# Patient Record
Sex: MALE | Race: WHITE | NOT HISPANIC OR LATINO | Employment: OTHER | URBAN - METROPOLITAN AREA
[De-identification: names, ages, dates, MRNs, and addresses within clinical notes are randomized per-mention and may not be internally consistent; named-entity substitution may affect disease eponyms.]

---

## 2017-01-06 ENCOUNTER — DOCUMENTATION ONLY (OUTPATIENT)
Dept: FAMILY MEDICINE | Facility: CLINIC | Age: 77
End: 2017-01-06

## 2017-01-06 NOTE — PROGRESS NOTES
Pre-Visit Chart Review  For Appointment Scheduled on 1/9/17.    Health Maintenance Due   Topic Date Due    Influenza Vaccine  08/01/2016

## 2017-01-09 ENCOUNTER — OFFICE VISIT (OUTPATIENT)
Dept: FAMILY MEDICINE | Facility: CLINIC | Age: 77
End: 2017-01-09
Payer: MEDICARE

## 2017-01-09 VITALS
SYSTOLIC BLOOD PRESSURE: 132 MMHG | DIASTOLIC BLOOD PRESSURE: 77 MMHG | HEART RATE: 70 BPM | WEIGHT: 174.19 LBS | HEIGHT: 71 IN | BODY MASS INDEX: 24.39 KG/M2

## 2017-01-09 DIAGNOSIS — M25.432 WRIST SWELLING, LEFT: ICD-10-CM

## 2017-01-09 DIAGNOSIS — B35.3 TINEA PEDIS, UNSPECIFIED LATERALITY: ICD-10-CM

## 2017-01-09 DIAGNOSIS — E78.5 HYPERLIPIDEMIA, UNSPECIFIED HYPERLIPIDEMIA TYPE: ICD-10-CM

## 2017-01-09 DIAGNOSIS — I10 ESSENTIAL HYPERTENSION: Primary | ICD-10-CM

## 2017-01-09 PROCEDURE — 99499 UNLISTED E&M SERVICE: CPT | Mod: S$GLB,,, | Performed by: FAMILY MEDICINE

## 2017-01-09 PROCEDURE — 1126F AMNT PAIN NOTED NONE PRSNT: CPT | Mod: S$GLB,,, | Performed by: FAMILY MEDICINE

## 2017-01-09 PROCEDURE — 3078F DIAST BP <80 MM HG: CPT | Mod: S$GLB,,, | Performed by: FAMILY MEDICINE

## 2017-01-09 PROCEDURE — 1160F RVW MEDS BY RX/DR IN RCRD: CPT | Mod: S$GLB,,, | Performed by: FAMILY MEDICINE

## 2017-01-09 PROCEDURE — 1157F ADVNC CARE PLAN IN RCRD: CPT | Mod: S$GLB,,, | Performed by: FAMILY MEDICINE

## 2017-01-09 PROCEDURE — 99999 PR PBB SHADOW E&M-EST. PATIENT-LVL III: CPT | Mod: PBBFAC,,, | Performed by: FAMILY MEDICINE

## 2017-01-09 PROCEDURE — 99214 OFFICE O/P EST MOD 30 MIN: CPT | Mod: S$GLB,,, | Performed by: FAMILY MEDICINE

## 2017-01-09 PROCEDURE — 1159F MED LIST DOCD IN RCRD: CPT | Mod: S$GLB,,, | Performed by: FAMILY MEDICINE

## 2017-01-09 PROCEDURE — 3075F SYST BP GE 130 - 139MM HG: CPT | Mod: S$GLB,,, | Performed by: FAMILY MEDICINE

## 2017-01-09 RX ORDER — CLOTRIMAZOLE AND BETAMETHASONE DIPROPIONATE 10; .64 MG/G; MG/G
CREAM TOPICAL 2 TIMES DAILY
Qty: 45 G | Refills: 3 | Status: SHIPPED | OUTPATIENT
Start: 2017-01-09 | End: 2017-04-24 | Stop reason: ALTCHOICE

## 2017-01-09 RX ORDER — TERAZOSIN 10 MG/1
10 CAPSULE ORAL NIGHTLY
Qty: 90 CAPSULE | Refills: 2 | Status: SHIPPED | OUTPATIENT
Start: 2017-01-09 | End: 2017-11-30 | Stop reason: SDUPTHER

## 2017-01-09 RX ORDER — IBUPROFEN 100 MG/5ML
1000 SUSPENSION, ORAL (FINAL DOSE FORM) ORAL DAILY
COMMUNITY

## 2017-01-09 NOTE — MR AVS SNAPSHOT
[facility]  [address]  [place] [address]  Phone: [phone]  Fax: [phone]                  Maycol Hodge   2017 8:40 AM   Office Visit    Description:  Male : 1940   Provider:  Tatiana Estrella MD   Department:  [facility]           Reason for Visit     Establish Care           Diagnoses this Visit        Comments    Essential hypertension    -  Primary     Hyperlipidemia, unspecified hyperlipidemia type         Wrist swelling, left         Tinea pedis, unspecified laterality                To Do List           Future Appointments        Provider Department Dept Phone    1/10/2017 9:30 AM LAB, JEANETTE [facility] - Lab [phone]    1/10/2017 10:00 AM SLIC US1 [facility]- Ultrasound [phone]    2017 8:40 AM Tatiana Estrella MD [facility] [phone]      Goals (5 Years of Data)     None      Follow-Up and Disposition     Return in about 6 months (around 2017) for htn.       These Medications        Disp Refills Start End    terazosin (HYTRIN) 10 MG capsule 90 capsule 2 2017    Take 1 capsule (10 mg total) by mouth every evening. - Oral    Pharmacy: [facility] [address] [place] [address] [address]  RD AT [facility] Ph #: [phone]       clotrimazole-betamethasone 1-0.05% (LOTRISONE) cream 45 g 3 2017     Apply topically 2 (two) times daily. - Topical (Top)    Pharmacy: [facility] [address] [place] [address] [address]  RD AT [facility] Ph #: [phone]         [facility]     [facility] On Call Nurse Care Line -  Assistance  Registered nurses in the [facility] Center provide clinical advisement, health education, appointment booking, and other advisory services.  Call for this free service at [phone].             Medications           Message regarding Medications     Verify the changes and/or additions to your  medication regime listed below are the same as discussed with your clinician today.  If any of these changes or additions are incorrect, please notify your healthcare provider.        START taking these NEW medications        Refills    clotrimazole-betamethasone 1-0.05% (LOTRISONE) cream 3    Sig: Apply topically 2 (two) times daily.    Class: Normal    Route: Topical (Top)      CHANGE how you are taking these medications     Start Taking Instead of    terazosin (HYTRIN) 10 MG capsule terazosin (HYTRIN) 5 MG capsule    Dosage:  Take 1 capsule (10 mg total) by mouth every evening. Dosage:  Take 1 capsule (5 mg total) by mouth every evening.    Reason for Change:  Reorder       STOP taking these medications     azithromycin (ZITHROMAX) 500 MG tablet One daily for yellow mucous, repeat if needed    diazePAM (VALIUM) 5 MG tablet TK 1 T PO BID    hydrocodone-acetaminophen 5-325mg (NORCO) 5-325 mg per tablet TK 1 TO 2 TS PO Q 6 H    predniSONE (DELTASONE) 10 MG tablet     predniSONE (DELTASONE) 20 MG tablet One daily for 3 days and repeat for flare of lung symptoms as intructed           Verify that the below list of medications is an accurate representation of the medications you are currently taking.  If none reported, the list may be blank. If incorrect, please contact your healthcare provider. Carry this list with you in case of emergency.           Current Medications     albuterol 90 mcg/actuation inhaler Inhale 2 puffs into the lungs every 4 (four) hours as needed for Wheezing. This is a rescue inhaler medication and should be used as needed    ascorbic acid, vitamin C, (VITAMIN C) 1000 MG tablet Take 1,000 mg by mouth once daily.    aspirin (ECOTRIN) 81 MG EC tablet Take 81 mg by mouth once daily.    clotrimazole-betamethasone 1-0.05% (LOTRISONE) cream Apply topically 2 (two) times daily.    fexofenadine (ALLEGRA ALLERGY) 180 MG tablet Take 180 mg by mouth daily as needed.     fluticasone-vilanterol (BREO  "ELLIPTA) 200-25 mcg/dose DsDv diskus inhaler Inhale 1 puff into the lungs once daily.    GLUC HCL/GLUC JAMESON/PG-EOZ-V-GLUC (GLUCOSAMINE COMPLEX ORAL) Take 1 capsule by mouth once daily.    latanoprost 0.005 % ophthalmic solution Place 1 drop into both eyes every evening.     lisinopril (PRINIVIL,ZESTRIL) 40 MG tablet Take 1 tablet (40 mg total) by mouth once daily.    montelukast (SINGULAIR) 10 mg tablet Take 1 tablet (10 mg total) by mouth once daily.    pravastatin (PRAVACHOL) 10 MG tablet Take 1 tablet (10 mg total) by mouth once daily.    terazosin (HYTRIN) 10 MG capsule Take 1 capsule (10 mg total) by mouth every evening.           Clinical Reference Information           Vital Signs - Last Recorded  Most recent update: 1/9/2017  8:50 AM by Steff Gaitan MA    BP Pulse Ht Wt BMI    132/77 70 5' 11" (1.803 m) 79 kg (174 lb 2.6 oz) 24.29 kg/m2      Blood Pressure          Most Recent Value    BP  132/77      Allergies as of 1/9/2017     Doxycycline    Diltiazem    Hydrochlorothiazide    Levaquin [Levofloxacin]    Norvasc [Amlodipine]      Immunizations Administered on Date of Encounter - 1/9/2017     None      Orders Placed During Today's Visit     Future Labs/Procedures Expected by Expires    Comprehensive metabolic panel  1/9/2017 3/10/2018    Lipid panel  1/9/2017 3/10/2018    US Soft Tissue Misc  1/9/2017 1/9/2018      "

## 2017-01-09 NOTE — PROGRESS NOTES
CHIEF COMPLAINT:  Establish care      HISTORY OF PRESENT ILLNESS:  Maycol Hodge is a 76 y.o. male who presents to clinic as a new patient to me to [facility]    1. HTN: he is on terazosin and lisinopril.  He brings in his BP log which demonstrates a lot of values greater than 150/90.    2. Left heel peeling: for the last several weeks he has noticed skin peeling over his left heel.    3. Left wrist mass: over the last several months he has noticed swelling over left ventral wrist in area of radial artery. It is not tender or painful. It has not increased in size.    4. Hyperlipidemia: on pravastatin. He denies any myalgia, dark colored urine.    REVIEW OF SYSTEMS:  The patient denies any fever, chills, night sweats, headaches, vision changes, difficulty speaking or swallowing, decreased hearing, weight loss, weight gain, chest pain, palpitations, shortness of breath, cough, nausea, vomiting, abdominal pain, dysuria, diarrhea, constipation, hematuria, hematochezia, melena, changes in his hair, nails, numbness or weakness in his extremities, erythema, swelling or pain over any of his joints, myalgia, swollen glands, easy bruising, fatigue, edema.     MEDICATIONS:   Reviewed and/or reconciled in EPIC    ALLERGIES:  Reviewed and/or reconciled in [facility]    PAST MEDICAL/SURGICAL HISTORY:   Past Medical History   Diagnosis Date    Hematuria     Hyperlipidemia       Past Surgical History   Procedure Laterality Date    Joint replacement Left      shoulder    Colonoscopy      Hernia repair       righht inguinal    Prostate surgery       TURP    Cystoscopy         FAMILY HISTORY:    Family History   Problem Relation Age of Onset    Stroke Father     Cancer Neg Hx     Diabetes Neg Hx     Heart disease Neg Hx     Hypertension Neg Hx        SOCIAL HISTORY:    Social History     Social History    Marital status:      Spouse name: N/A    Number of children: N/A    Years of education: N/A     Occupational  "History    Not on file.     Social History Main Topics    Smoking status: Former Smoker     Types: Cigarettes    Smokeless tobacco: Never Used      Comment: Quit around 1958    Alcohol use Yes    Drug use: No    Sexual activity: Not Currently     Partners: Female     Other Topics Concern    Not on file     Social History Narrative       PHYSICAL EXAM:  VITAL SIGNS:   Vitals:    01/09/17 0844   BP: 132/77   Pulse: 70   Weight: 79 kg (174 lb 2.6 oz)   Height: 5' 11" (1.803 m)     GENERAL:  Patient appears well nourished, sitting on exam table, in no acute distress.  HEENT:  Atraumatic, normocephalic, PERRLA, EOMI, no conjunctival injection, sclerae are anicteric, normal external auditory canals,TMs clear b/l, gross hearing intact to whisper, MMM, no oropharygneal erythema or exudate.  NECK:  Supple, normal ROM, trachea is midline , no supraclavicular or cervical LAD or masses palpated.  Thyroid gland not palpable.  CARDIOVASCULAR:  RRR, normal S1 and S2, no m/r/g.  RESPIRATORY:  CTA b/l, no wheezes, rhonchi, rales.  No increased work of breathing, no  use of accessory muscles.  ABDOMEN:  Soft, nontender, nondistended, normoactive bowel sounds in all four quadrants, no rebound or guarding, no HSM or masses palpated.  Normal percussion.  EXTREMITIES:  2+ DP pulses b/l, no edema.  SKIN:  Warm, red peeling skin over left heel.   NEUROMUSCULAR:  Cranial nerves II-XII grossly intact.  . No clubbing or cyanosis of digits/nails.  Steady gait.  PSYCH:  Patient is alert and oriented to person, time, place. They are appropriately dressed and groomed. There is normal eye contact. Rate and tone of speech is normal. Normal insight, judgement. Normal thought content and process.     LABORATORY/IMAGING STUDIES: pending    ASSESSMENT/PLAN: This is a 76 y.o. male who presents to clinic for evaluation of the following concerns    1. HTN: see below  2. Left wrist swelling:  3. Left tinea pedis: place on two week course of " lotrisone  4. Hyperlipidemia: CMP, lipid panel    Patient readiness: acceptance and barriers:none    During the course of the visit the patient was educated and counseled about the following:     Hypertension:   Medication: continue with lisinopril, increase terazosin to 10 mg daily. notify clinic in 2 weeks with BP values..    Goals: Hypertension: Reduce Blood Pressure    Did patient meet goals/outcomes: No    The following self management tools provided: declined    Patient Instructions (the written plan) was given to the patient/family.     Time spent with patient: 30 minutes    FOLLOW UP:  6 months      Tatiana Estrella MD

## 2017-01-10 ENCOUNTER — HOSPITAL ENCOUNTER (OUTPATIENT)
Dept: RADIOLOGY | Facility: CLINIC | Age: 77
Discharge: HOME | End: 2017-01-10
Attending: FAMILY MEDICINE
Payer: MEDICARE

## 2017-01-10 DIAGNOSIS — M25.432 WRIST SWELLING, LEFT: ICD-10-CM

## 2017-01-10 PROCEDURE — 76882 US LMTD JT/FCL EVL NVASC XTR: CPT | Mod: 26,,, | Performed by: RADIOLOGY

## 2017-01-10 PROCEDURE — 76882 US LMTD JT/FCL EVL NVASC XTR: CPT | Mod: TC,PO

## 2017-01-13 RX ORDER — AZITHROMYCIN 500 MG/1
500 TABLET, FILM COATED ORAL DAILY
COMMUNITY
End: 2017-01-13 | Stop reason: SDUPTHER

## 2017-01-13 RX ORDER — AMOXICILLIN 500 MG/1
500 CAPSULE ORAL 3 TIMES DAILY
COMMUNITY
End: 2017-01-13

## 2017-01-13 RX ORDER — AZITHROMYCIN 500 MG/1
500 TABLET, FILM COATED ORAL DAILY
Qty: 3 TABLET | Refills: 3 | Status: SHIPPED | OUTPATIENT
Start: 2017-01-13 | End: 2017-04-24 | Stop reason: ALTCHOICE

## 2017-01-17 ENCOUNTER — TELEPHONE (OUTPATIENT)
Dept: PULMONOLOGY | Facility: CLINIC | Age: 77
End: 2017-01-17

## 2017-01-19 ENCOUNTER — OFFICE VISIT (OUTPATIENT)
Dept: PULMONOLOGY | Facility: CLINIC | Age: 77
End: 2017-01-19
Payer: MEDICARE

## 2017-01-19 VITALS
WEIGHT: 170.63 LBS | BODY MASS INDEX: 23.89 KG/M2 | DIASTOLIC BLOOD PRESSURE: 67 MMHG | SYSTOLIC BLOOD PRESSURE: 112 MMHG | HEIGHT: 71 IN | OXYGEN SATURATION: 96 % | HEART RATE: 67 BPM

## 2017-01-19 DIAGNOSIS — J44.89 EXACERBATION OF CHRONIC BRONCHIOLITIS: Primary | ICD-10-CM

## 2017-01-19 DIAGNOSIS — J44.89 EXACERBATION OF CHRONIC BRONCHIOLITIS: ICD-10-CM

## 2017-01-19 PROCEDURE — 99999 PR PBB SHADOW E&M-EST. PATIENT-LVL IV: CPT | Mod: PBBFAC,,, | Performed by: INTERNAL MEDICINE

## 2017-01-19 PROCEDURE — 3078F DIAST BP <80 MM HG: CPT | Mod: S$GLB,,, | Performed by: INTERNAL MEDICINE

## 2017-01-19 PROCEDURE — 99213 OFFICE O/P EST LOW 20 MIN: CPT | Mod: S$GLB,,, | Performed by: INTERNAL MEDICINE

## 2017-01-19 PROCEDURE — 99499 UNLISTED E&M SERVICE: CPT | Mod: S$GLB,,, | Performed by: INTERNAL MEDICINE

## 2017-01-19 PROCEDURE — 1159F MED LIST DOCD IN RCRD: CPT | Mod: S$GLB,,, | Performed by: INTERNAL MEDICINE

## 2017-01-19 PROCEDURE — 1157F ADVNC CARE PLAN IN RCRD: CPT | Mod: S$GLB,,, | Performed by: INTERNAL MEDICINE

## 2017-01-19 PROCEDURE — 1160F RVW MEDS BY RX/DR IN RCRD: CPT | Mod: S$GLB,,, | Performed by: INTERNAL MEDICINE

## 2017-01-19 PROCEDURE — 1126F AMNT PAIN NOTED NONE PRSNT: CPT | Mod: S$GLB,,, | Performed by: INTERNAL MEDICINE

## 2017-01-19 PROCEDURE — 3074F SYST BP LT 130 MM HG: CPT | Mod: S$GLB,,, | Performed by: INTERNAL MEDICINE

## 2017-01-19 RX ORDER — PREDNISONE 20 MG/1
TABLET ORAL
Qty: 12 TABLET | Refills: 0 | Status: SHIPPED | OUTPATIENT
Start: 2017-01-19 | End: 2017-04-24 | Stop reason: ALTCHOICE

## 2017-01-19 RX ORDER — IPRATROPIUM BROMIDE AND ALBUTEROL SULFATE 2.5; .5 MG/3ML; MG/3ML
3 SOLUTION RESPIRATORY (INHALATION) EVERY 6 HOURS PRN
Qty: 120 VIAL | Refills: 11 | Status: SHIPPED | OUTPATIENT
Start: 2017-01-19 | End: 2017-01-19 | Stop reason: SDUPTHER

## 2017-01-19 RX ORDER — IPRATROPIUM BROMIDE AND ALBUTEROL SULFATE 2.5; .5 MG/3ML; MG/3ML
3 SOLUTION RESPIRATORY (INHALATION) EVERY 6 HOURS PRN
Qty: 1080 VIAL | Refills: 3 | Status: SHIPPED | OUTPATIENT
Start: 2017-01-19 | End: 2018-01-05 | Stop reason: SDUPTHER

## 2017-01-19 NOTE — PATIENT INSTRUCTIONS
Had flu like illness and are improved.  If flu may have chance pneumonia--  Do chest xray if needed.

## 2017-01-19 NOTE — MR AVS SNAPSHOT
[facility]  [address]  [place] [address]  Phone: [phone]                  Maycol Hodge   2017 9:00 AM   Office Visit    Description:  Male : 1940   Provider:  Osvaldo Burch MD   Department:  [facility]           Reason for Visit     Sputum Production     Shortness of Breath     Cough     Asthma     Bronchitis           Diagnoses this Visit        Comments    Exacerbation of chronic bronchiolitis    -  Primary            To Do List           Future Appointments        Provider Department Dept Phone    2017 8:40 AM MD [facility] [phone]      Goals (5 Years of Data)     None      Follow-Up and Disposition     Return in about 6 months (around 2017), or if symptoms worsen or fail to improve.    Follow-up and Disposition History       These Medications        Disp Refills Start End    albuterol-ipratropium 2.5mg-0.5mg/3mL (DUO-NEB) 0.5 mg-3 mg(2.5 mg base)/3 mL nebulizer solution 120 vial 11 2017    Take 3 mLs by nebulization every 6 (six) hours as needed. - Nebulization    Pharmacy: [facility] [address] [facility] [address] AT [facility] Ph #: [phone]       predniSONE (DELTASONE) 20 MG tablet 12 tablet 0 2017     One daily for 3 days and repeat for flare of lung symptoms as intructed    Pharmacy: [facility] [id number] - [place], [place] - [address] AT [facility] Ph #: [phone]         [facility]     [facility] On Call Nurse Care Line -  Assistance  Registered nurses in the [facility] On Call Center provide clinical advisement, health education, appointment booking, and other advisory services.  Call for this free service at [phone].             Medications           Message regarding Medications     Verify the changes and/or additions to your medication regime listed below are the same as  discussed with your clinician today.  If any of these changes or additions are incorrect, please notify your healthcare provider.        START taking these NEW medications        Refills    albuterol-ipratropium 2.5mg-0.5mg/3mL (DUO-NEB) 0.5 mg-3 mg(2.5 mg base)/3 mL nebulizer solution 11    Sig: Take 3 mLs by nebulization every 6 (six) hours as needed.    Class: Normal    Route: Nebulization    predniSONE (DELTASONE) 20 MG tablet 0    Sig: One daily for 3 days and repeat for flare of lung symptoms as intructed    Class: Normal           Verify that the below list of medications is an accurate representation of the medications you are currently taking.  If none reported, the list may be blank. If incorrect, please contact your healthcare provider. Carry this list with you in case of emergency.           Current Medications     albuterol 90 mcg/actuation inhaler Inhale 2 puffs into the lungs every 4 (four) hours as needed for Wheezing. This is a rescue inhaler medication and should be used as needed    ascorbic acid, vitamin C, (VITAMIN C) 1000 MG tablet Take 1,000 mg by mouth once daily.    aspirin (ECOTRIN) 81 MG EC tablet Take 81 mg by mouth once daily.    clotrimazole-betamethasone 1-0.05% (LOTRISONE) cream Apply topically 2 (two) times daily.    fluticasone-vilanterol (BREO ELLIPTA) 200-25 mcg/dose DsDv diskus inhaler Inhale 1 puff into the lungs once daily.    GLUC HCL/GLUC JAMESON/ML-PCW-L-GLUC (GLUCOSAMINE COMPLEX ORAL) Take 1 capsule by mouth once daily.    latanoprost 0.005 % ophthalmic solution Place 1 drop into both eyes every evening.     lisinopril (PRINIVIL,ZESTRIL) 40 MG tablet Take 1 tablet (40 mg total) by mouth once daily.    montelukast (SINGULAIR) 10 mg tablet Take 1 tablet (10 mg total) by mouth once daily.    pravastatin (PRAVACHOL) 10 MG tablet Take 1 tablet (10 mg total) by mouth once daily.    terazosin (HYTRIN) 10 MG capsule Take 1 capsule (10 mg total) by mouth every evening.     "albuterol-ipratropium 2.5mg-0.5mg/3mL (DUO-NEB) 0.5 mg-3 mg(2.5 mg base)/3 mL nebulizer solution Take 3 mLs by nebulization every 6 (six) hours as needed.    azithromycin (ZITHROMAX) 500 MG tablet Take 1 tablet (500 mg total) by mouth once daily. Take 1(one) tablet a day for 3(three) days for yellow mucous    fexofenadine (ALLEGRA ALLERGY) 180 MG tablet Take 180 mg by mouth daily as needed.     predniSONE (DELTASONE) 20 MG tablet One daily for 3 days and repeat for flare of lung symptoms as intructed           Clinical Reference Information           Vital Signs - Last Recorded  Most recent update: 1/19/2017  9:05 AM by Chepe Dolan MA    BP Pulse Ht Wt SpO2 BMI    112/67 (BP Location: Right arm, Patient Position: Sitting, BP Method: Automatic) 67 5' 11" (1.803 m) 77.4 kg (170 lb 10.2 oz) 96% 23.8 kg/m2      Blood Pressure          Most Recent Value    BP  112/67      Allergies as of 1/19/2017     Doxycycline    Diltiazem    Hydrochlorothiazide    Levaquin [Levofloxacin]    Norvasc [Amlodipine]      Immunizations Administered on Date of Encounter - 1/19/2017     None      Orders Placed During Today's Visit     Future Labs/Procedures Expected by Expires    X-Ray Chest PA And Lateral  6/19/2017 1/19/2018      Instructions    Had flu like illness and are improved.  If flu may have chance pneumonia--  Do chest xray if needed.       "

## 2017-01-19 NOTE — PROGRESS NOTES
1/19/2017    Maycol Hodge  Patient Seen Years Ago And Here For Evaluation    Chief Complaint   Patient presents with    Sputum Production     clearr to light yellow    Shortness of Breath    Cough    Asthma    Bronchitis     Jan 19, hpi, had acute onset 5 days ago with sinus runny, cough, wheezes, sob, no fever, no n/v, pos headache.  Muscle aches and joint aches.  Took prednisone, took inhaler, neb rx was old. Run down, poor appetite,     Took prednisone and azithro x3, still cough but better,  Down but better.  Mucous clear.           Dec 16, 2016HPI:pt with asthma with no usual noct arousals or rescue use.  Exacerbates 3x yr in spring mainly. Progresses for a week on rescue and then uses pred and doxy (recent doxy reaction) to clear.  I cared for years ago.  No recent pft.  Pt feels breathing better than  Usual yrs past.          The chief compliant  problem is varies with instablilty at time   PFSH:  Past Medical History   Diagnosis Date    Hematuria     Hyperlipidemia          Past Surgical History   Procedure Laterality Date    Joint replacement Left      shoulder    Colonoscopy      Hernia repair       righht inguinal    Prostate surgery       TURP    Cystoscopy       Social History   Substance Use Topics    Smoking status: Former Smoker     Types: Cigarettes     Quit date: 1955    Smokeless tobacco: Never Used      Comment: Quit around 1958    Alcohol use Yes     Family History   Problem Relation Age of Onset    Stroke Father     Cancer Neg Hx     Diabetes Neg Hx     Heart disease Neg Hx     Hypertension Neg Hx      Review of patient's allergies indicates:   Allergen Reactions    Doxycycline Rash     Causes rash, hives and itching    Diltiazem      pruritis    Hydrochlorothiazide      Causes elevate uric acid, gout      Levaquin [levofloxacin] Swelling    Norvasc [amlodipine] Swelling       Performance Status:The patient's activity level is functions out of house.      Review of  "Systems:  a review of eleven systems covering constitutional, Eye, HEENT, Psych, Respiratory, Cardiac, GI, , Musculoskeletal, Endocrine, Dermatologic was negative except for pertinent findings as listed ABOVE and below:  No pos, except dizzy and had tia 18 months ago with acute onset while walking of not being able standup straight and resolved after one hr.  Had artery check and eval by Dr Jha.    Exam:Comprehensive exam done.   Visit Vitals    /67 (BP Location: Right arm, Patient Position: Sitting, BP Method: Automatic)    Pulse 67    Ht 5' 11" (1.803 m)    Wt 77.4 kg (170 lb 10.2 oz)    SpO2 96%    BMI 23.8 kg/m2     Exam included Vitals as listed, and patient's appearance and affect and alertness and mood, oral exam for yeast and hygiene and pharynx lesions and Mallapatti (M) score, neck with inspection for jvd and masses and thyroid abnormalities and lymph nodes (supraclavicular and infraclavicular nodes and axillary also examined and noted if abn), chest exam included symmetry and effort and fremitus and percussion and auscultation, cardiac exam included rhythm and gallops and murmur and rubs and jvd and edema, abdominal exam for mass and hepatosplenomegaly and tenderness and hernias and bowel sounds, Musculoskeletal exam with muscle tone and posture and mobility/gait and  strength, and skin for rashes and cyanosis and pallor and turgor, extremity for clubbing.  Findings were normal except for pertinent findings listed below:  M3, chest clear and no murmur, no edema    Radiographs (ct chest and cxr) reviewed: was not done      Labs was not done        PFT was not done     Plan:  Clinical impression is apparently straight forward and impression with management as below.    Maycol was seen today for sputum production, shortness of breath, cough, asthma and bronchitis.    Diagnoses and all orders for this visit:    Exacerbation of chronic bronchiolitis  -     albuterol-ipratropium " 2.5mg-0.5mg/3mL (DUO-NEB) 0.5 mg-3 mg(2.5 mg base)/3 mL nebulizer solution; Take 3 mLs by nebulization every 6 (six) hours as needed.  -     predniSONE (DELTASONE) 20 MG tablet; One daily for 3 days and repeat for flare of lung symptoms as intructed  -     X-Ray Chest PA And Lateral; Future      Return in about 6 months (around 7/19/2017), or if symptoms worsen or fail to improve.    Discussed with patient above for education the following:       Had flu like illness and are improved.  If flu may have chance pneumonia--  Do chest xray if needed

## 2017-01-24 ENCOUNTER — TELEPHONE (OUTPATIENT)
Dept: FAMILY MEDICINE | Facility: CLINIC | Age: 77
End: 2017-01-24

## 2017-01-25 NOTE — TELEPHONE ENCOUNTER
BP log reviewed, majority of BP values in the evening are not at goal. I feel that it is not lasting the 24 hours.  I want him to also take a terazosin capsule in the am as well and continue with lisinopril and drop off bp values in one week.      If he becomes hypotensive he needs to eat something salty, drink water and go back to the old medication dose and let us know.

## 2017-01-25 NOTE — TELEPHONE ENCOUNTER
----- Message from Patience Cope sent at 1/25/2017  4:38 PM CST -----  Returning your call.  Please call [phone].

## 2017-01-26 NOTE — TELEPHONE ENCOUNTER
----- Message from Patience Cope sent at 1/26/2017 10:21 AM CST -----  Returning your call.  Please call [phone].

## 2017-01-26 NOTE — TELEPHONE ENCOUNTER
----- Message from Patience Cope sent at 1/25/2017  4:38 PM CST -----  Returning your call.  Please call [phone].

## 2017-01-31 ENCOUNTER — TELEPHONE (OUTPATIENT)
Dept: FAMILY MEDICINE | Facility: CLINIC | Age: 77
End: 2017-01-31

## 2017-02-22 ENCOUNTER — TELEPHONE (OUTPATIENT)
Dept: PULMONOLOGY | Facility: CLINIC | Age: 77
End: 2017-02-22

## 2017-02-22 ENCOUNTER — TELEPHONE (OUTPATIENT)
Dept: FAMILY MEDICINE | Facility: CLINIC | Age: 77
End: 2017-02-22

## 2017-02-22 NOTE — TELEPHONE ENCOUNTER
----- Message from Chepe Dolan MA sent at 2/22/2017  3:18 PM CST -----  Contact: [facility]      ----- Message -----     From: Osvaldo Burch MD     Sent: 2/22/2017   3:10 PM       To: Marcin SAMUEL Staff    Ok to order a home spirometry if able per [facility]  ----- Message -----     From: Chepe Dolan MA     Sent: 2/22/2017   1:55 PM       To: Osvaldo Burch MD    [facility] is requesting this to be done and asking us for orders to send to them to get this done. They sent this to Dr. Jm olguin and she sent it to us.    Requesting orders for a spirometry test to be done in the home. Please call back at  fax .     ----- Message -----     From: Gali Cavazos LPN     Sent: 2/22/2017   1:13 PM       To: Marcin SAMUEL Staff        ----- Message -----     From: Neelam Acosta     Sent: 2/22/2017  12:47 PM       To: Sameer Simpson Staff    Requesting orders for a spirometry test to be done in the home. Please call back at  fax .

## 2017-02-23 NOTE — TELEPHONE ENCOUNTER
Yes patient is taking the medication as below, this am110/53 71 12p 126/61 59  2p 153/83 59 Patient has complaints of dizziness,more tired, and headaches.

## 2017-02-23 NOTE — TELEPHONE ENCOUNTER
BP values reviewed. Still has high pressures. Is he taking the terazosin twice a day now and the lisinopril once a day?

## 2017-02-24 NOTE — TELEPHONE ENCOUNTER
Needs to continue with lisinopril 40 mg. Can decrease terazosin back to 10 mg at night, and 1/2 half tablet (5 mg) in the morning.. This may be all he needs to keep the BP at goal without having any episodes of hypotension, and the dizziness, headache.      Needs to drop off BP values in one week and have nurse BP check at that time.    If he continues to feel unwell he needs to go bacfk to just 10 mg of terazosin and needs to let us know right away.

## 2017-03-06 ENCOUNTER — TELEPHONE (OUTPATIENT)
Dept: FAMILY MEDICINE | Facility: CLINIC | Age: 77
End: 2017-03-06

## 2017-03-06 ENCOUNTER — CLINICAL SUPPORT (OUTPATIENT)
Dept: FAMILY MEDICINE | Facility: CLINIC | Age: 77
End: 2017-03-06
Payer: MEDICARE

## 2017-03-06 VITALS — DIASTOLIC BLOOD PRESSURE: 74 MMHG | HEART RATE: 58 BPM | SYSTOLIC BLOOD PRESSURE: 116 MMHG

## 2017-03-06 RX ORDER — LOSARTAN POTASSIUM 50 MG/1
50 TABLET ORAL DAILY
Qty: 90 TABLET | Refills: 3 | Status: SHIPPED | OUTPATIENT
Start: 2017-03-06 | End: 2017-03-28 | Stop reason: SDUPTHER

## 2017-03-06 NOTE — TELEPHONE ENCOUNTER
"Patient came to clinic for bp check. Terazosin 5mg in the am 10mg in the pm. lisinopril 40mg every evening.bp readings on your desk. Automatic 116/74 58. States he feels lightheaded sometimes all day,states he sometimes has trouble focusing his vision. Patient states he feel like the 5mg in the am "is not doing him any good",patient states he does not feel like it helps his reading, wants to know if he can stop 5mg in the am  "

## 2017-03-06 NOTE — TELEPHONE ENCOUNTER
BP log reviewed. Can go back to taking 10 mg of terazosin at night. Needs to stop the lisnopril. Am starting losartan 50 mg instead. Needs to drop off BP values in 2 weeks.    He needs an office visit for his dizziness/lightheadedness.

## 2017-03-08 NOTE — TELEPHONE ENCOUNTER
Patient notified and states understanding,patient states he does not feel dizzy or lightheaded patient states he stopped 5mg of terazosin in the am,patient states he will call back if he needs appointment

## 2017-03-28 ENCOUNTER — TELEPHONE (OUTPATIENT)
Dept: FAMILY MEDICINE | Facility: CLINIC | Age: 77
End: 2017-03-28

## 2017-03-28 RX ORDER — LOSARTAN POTASSIUM 100 MG/1
100 TABLET ORAL DAILY
Qty: 90 TABLET | Refills: 3 | Status: SHIPPED | OUTPATIENT
Start: 2017-03-28 | End: 2018-05-02 | Stop reason: SDUPTHER

## 2017-03-28 NOTE — TELEPHONE ENCOUNTER
Some improvement in BP with losartan. Am increasing to 100 mg daily. He really needs a nurse BP check in 2 weeks, and needs to bring his home cuff to that if he has not already done so to make sure that it is accurate. Needs to go back to 50 mg of losartan if he feels dizzy, lightheaded or if BP is less than 90/60.

## 2017-04-03 RX ORDER — CICLOPIROX 80 MG/ML
SOLUTION TOPICAL
Qty: 6.6 ML | Refills: 10 | OUTPATIENT
Start: 2017-04-03

## 2017-04-05 RX ORDER — CICLOPIROX 80 MG/ML
SOLUTION TOPICAL
Qty: 6.6 ML | Refills: 10 | OUTPATIENT
Start: 2017-04-05

## 2017-04-13 ENCOUNTER — CLINICAL SUPPORT (OUTPATIENT)
Dept: FAMILY MEDICINE | Facility: CLINIC | Age: 77
End: 2017-04-13
Payer: MEDICARE

## 2017-04-13 ENCOUNTER — TELEPHONE (OUTPATIENT)
Dept: FAMILY MEDICINE | Facility: CLINIC | Age: 77
End: 2017-04-13

## 2017-04-13 VITALS — DIASTOLIC BLOOD PRESSURE: 84 MMHG | HEART RATE: 55 BPM | SYSTOLIC BLOOD PRESSURE: 162 MMHG

## 2017-04-13 DIAGNOSIS — I10 ESSENTIAL HYPERTENSION: Primary | ICD-10-CM

## 2017-04-13 PROCEDURE — 99999 PR PBB SHADOW E&M-EST. PATIENT-LVL I: CPT | Mod: PBBFAC,,, | Performed by: FAMILY MEDICINE

## 2017-04-13 NOTE — PROGRESS NOTES
Patient came to clinic for bp check. Automatic 153/80 pulse 55. Patient has 2 bp machines. With patients wrist machine 173/95 p 59 with patient regular bp machine 176/103 p 61. Manually 162/84. bp readings on your desk,patient states losartan 100 mg once a day and terazosin 10mg once a day

## 2017-04-24 ENCOUNTER — OFFICE VISIT (OUTPATIENT)
Dept: PODIATRY | Facility: CLINIC | Age: 77
End: 2017-04-24
Payer: MEDICARE

## 2017-04-24 VITALS — WEIGHT: 174.19 LBS | BODY MASS INDEX: 24.39 KG/M2 | HEIGHT: 71 IN

## 2017-04-24 DIAGNOSIS — B35.1 ONYCHOMYCOSIS DUE TO DERMATOPHYTE: Primary | ICD-10-CM

## 2017-04-24 DIAGNOSIS — L60.9 DISEASE OF NAIL: ICD-10-CM

## 2017-04-24 PROCEDURE — 1126F AMNT PAIN NOTED NONE PRSNT: CPT | Mod: S$GLB,,, | Performed by: PODIATRIST

## 2017-04-24 PROCEDURE — 99213 OFFICE O/P EST LOW 20 MIN: CPT | Mod: S$GLB,,, | Performed by: PODIATRIST

## 2017-04-24 PROCEDURE — 1159F MED LIST DOCD IN RCRD: CPT | Mod: S$GLB,,, | Performed by: PODIATRIST

## 2017-04-24 PROCEDURE — 1160F RVW MEDS BY RX/DR IN RCRD: CPT | Mod: S$GLB,,, | Performed by: PODIATRIST

## 2017-04-24 PROCEDURE — 99999 PR PBB SHADOW E&M-EST. PATIENT-LVL II: CPT | Mod: PBBFAC,,, | Performed by: PODIATRIST

## 2017-04-24 RX ORDER — CICLOPIROX 80 MG/ML
SOLUTION TOPICAL NIGHTLY
Qty: 6.6 ML | Refills: 11 | Status: SHIPPED | OUTPATIENT
Start: 2017-04-24 | End: 2019-01-17

## 2017-04-24 NOTE — PROGRESS NOTES
Subjective:      Patient ID: Maycol Hodge is a 77 y.o. male.    Chief Complaint: Nail Problem    Maycol is a 77 y.o. male who presents to the clinic complaining of thick and discolored toenails on the left foot great toe.  Gradual onset, worsening over past several months, aggravated by increased weight bearing, shoe gear, pressure.  No previous medical treatment.  OTC med and 1 year of penlac not helping.  Relates someone stepped on it intitiating process - unknown significance.   Maycol is inquiring about treatment options.      Review of Systems   Constitution: Negative for chills, diaphoresis, fever, malaise/fatigue and night sweats.   Cardiovascular: Negative for claudication, cyanosis, leg swelling and syncope.   Skin: Positive for nail changes. Negative for color change, dry skin, rash, suspicious lesions and unusual hair distribution.   Musculoskeletal: Negative for falls, joint pain, joint swelling, muscle cramps, muscle weakness and stiffness.   Gastrointestinal: Negative for constipation, diarrhea, nausea and vomiting.   Neurological: Negative for brief paralysis, disturbances in coordination, focal weakness, numbness, paresthesias, sensory change and tremors.           Objective:      Physical Exam   Constitutional: He appears well-developed and well-nourished. He is cooperative. No distress.   Cardiovascular:   Pulses:       Popliteal pulses are 2+ on the right side, and 2+ on the left side.        Dorsalis pedis pulses are 2+ on the right side, and 2+ on the left side.        Posterior tibial pulses are 2+ on the right side, and 2+ on the left side.   Capillary refill 3 seconds all toes/distal feet, all toes/both feet warm to touch.      Negative lymphadenopathy bilateral popliteal fossa and tarsal tunnel.      Negavie lower extremity edema bilateral.     Musculoskeletal:        Right ankle: Normal. He exhibits normal range of motion, no swelling, no ecchymosis, no deformity, no laceration and  normal pulse. Achilles tendon normal. Achilles tendon exhibits no pain, no defect and normal Stanley's test results.   Normal angle, base, station of gait. All ten toes without clubbing, cyanosis, or signs of ischemia.  No pain to palpation bilateral lower extremities.  Range of motion, stability, muscle strength, and muscle tone normal bilateral feet and legs.       Lymphadenopathy: No inguinal adenopathy noted on the right or left side.   Negative lymphadenopathy bilateral popliteal fossa and tarsal tunnel.   Neurological: He is alert. He has normal strength. He displays no atrophy and no tremor. No sensory deficit. He exhibits normal muscle tone. He displays no seizure activity. Gait normal.   Reflex Scores:       Patellar reflexes are 2+ on the right side and 2+ on the left side.       Achilles reflexes are 2+ on the right side and 2+ on the left side.  Negative tinel sign to percussion sural, superficial peroneal, deep peroneal, saphenous, and posterior tibial nerves right and left ankles and feet.     Skin: Skin is warm, dry and intact. No abrasion, no bruising, no burn, no ecchymosis, no laceration, no lesion and no rash noted. He is not diaphoretic. No cyanosis or erythema. No pallor. Nails show no clubbing.     Toenails 1st, left are hypertrophic thickened 2-3 mm, dystrophic, discolored tanish brown with tan, gray crumbly subungual debris.  Tender to distal nail plate pressure, without periungual skin abnormality of each.    Otherwise, Skin is normal age and health appropriate color, turgor, texture, and temperature bilateral lower extremities without ulceration, hyperpigmentation, discoloration, masses nodules or cords palpated.  No ecchymosis, erythema, edema, or cardinal signs of infection bilateral lower extremities.               Assessment:       Encounter Diagnoses   Name Primary?    Onychomycosis due to dermatophyte Yes    Disease of nail          Plan:       Maycol was seen today for nail  problem.    Diagnoses and all orders for this visit:    Onychomycosis due to dermatophyte    Disease of nail    Other orders  -     ciclopirox (PENLAC) 8 % Soln; Apply topically nightly.      I counseled the patient on his conditions, their implications and medical management.    Discussed conservative treatment with shoes of adequate dimensions, material, and style to alleviate symptoms and delay or prevent surgical intervention.     Rx penlac  Left hallux daily.           Return if symptoms worsen or fail to improve.

## 2017-04-24 NOTE — MR AVS SNAPSHOT
[facility]  [address] [place] [address]  Phone: [phone]                  Maycol Hodge   2017 7:00 AM   Office Visit    Description:  Male : 1940   Provider:  Tye Cool DPM   Department:  [facility]           Reason for Visit     Nail Problem           Diagnoses this Visit        Comments    Onychomycosis due to dermatophyte    -  Primary     Disease of nail                To Do List           Future Appointments        Provider Department Dept Phone    2017 11:00 AM Roel Vogel Jr., MD [facility] [phone]    2017 8:40 AM Tatiana Estrella MD [facility] [phone]      Goals (5 Years of Data)     None      Follow-Up and Disposition     Return if symptoms worsen or fail to improve.       These Medications        Disp Refills Start End    ciclopirox (PENLAC) 8 % Soln 6.6 mL 11 2017     Apply topically nightly. - Topical    Pharmacy: [id number] - [address] #: [phone]         [facility]     [facility] Nurse Care Line -  Assistance  Unless otherwise directed by your provider, please contact [facility], our nurse care line that is available for  assistance.     Registered nurses in the [facility] provide: appointment scheduling, clinical advisement, health education, and other advisory services.  Call: [phone] (toll free)               Medications           Message regarding Medications     Verify the changes and/or additions to your medication regime listed below are the same as discussed with your clinician today.  If any of these changes or additions are incorrect, please notify your healthcare provider.        START taking these NEW medications        Refills    ciclopirox (PENLAC) 8 % Soln 11    Sig: Apply topically nightly.    Class: Normal    Route: Topical      STOP taking these medications     azithromycin  (ZITHROMAX) 500 MG tablet Take 1 tablet (500 mg total) by mouth once daily. Take 1(one) tablet a day for 3(three) days for yellow mucous    predniSONE (DELTASONE) 20 MG tablet One daily for 3 days and repeat for flare of lung symptoms as intructed    clotrimazole-betamethasone 1-0.05% (LOTRISONE) cream Apply topically 2 (two) times daily.           Verify that the below list of medications is an accurate representation of the medications you are currently taking.  If none reported, the list may be blank. If incorrect, please contact your healthcare provider. Carry this list with you in case of emergency.           Current Medications     ascorbic acid, vitamin C, (VITAMIN C) 1000 MG tablet Take 1,000 mg by mouth once daily.    aspirin (ECOTRIN) 81 MG EC tablet Take 81 mg by mouth once daily.    fexofenadine (ALLEGRA ALLERGY) 180 MG tablet Take 180 mg by mouth daily as needed.     GLUC HCL/GLUC JAMESON/BT-CAH-Z-GLUC (GLUCOSAMINE COMPLEX ORAL) Take 1 capsule by mouth once daily.    latanoprost 0.005 % ophthalmic solution Place 1 drop into both eyes every evening.     losartan (COZAAR) 100 MG tablet Take 1 tablet (100 mg total) by mouth once daily.    montelukast (SINGULAIR) 10 mg tablet Take 1 tablet (10 mg total) by mouth once daily.    pravastatin (PRAVACHOL) 10 MG tablet Take 1 tablet (10 mg total) by mouth once daily.    terazosin (HYTRIN) 10 MG capsule Take 1 capsule (10 mg total) by mouth every evening.    albuterol 90 mcg/actuation inhaler Inhale 2 puffs into the lungs every 4 (four) hours as needed for Wheezing. This is a rescue inhaler medication and should be used as needed    albuterol-ipratropium 2.5mg-0.5mg/3mL (DUO-NEB) 0.5 mg-3 mg(2.5 mg base)/3 mL nebulizer solution Take 3 mLs by nebulization every 6 (six) hours as needed.    ciclopirox (PENLAC) 8 % Soln Apply topically nightly.    fluticasone-vilanterol (BREO ELLIPTA) 200-25 mcg/dose DsDv diskus inhaler Inhale 1 puff into the lungs once daily.          "  Clinical Reference Information           Your Vitals Were     Height Weight BMI          5' 11" (1.803 m) 79 kg (174 lb 2.6 oz) 24.29 kg/m2        Allergies as of 4/24/2017     Doxycycline    Diltiazem    Hydrochlorothiazide    Levaquin [Levofloxacin]    Norvasc [Amlodipine]      Immunizations Administered on Date of Encounter - 4/24/2017     None      Language Assistance Services     ATTENTION: Language assistance services are available, free of charge. Please call [phone].      ATENCIÓN: Si habla luis, tiene a gaviria disposición servicios gratuitos de asistencia lingüística. Llame al [phone].     [facility] Ý: N?u b?n nói Ti?ng Vi?t, có các d?ch v? h? tr? ngôn ng? mi?n phí dành cho b?n. G?i s? [phone].         [facility] complies with applicable Federal civil rights laws and does not discriminate on the basis of race, color, national origin, age, disability, or sex.        "

## 2017-04-26 ENCOUNTER — TELEPHONE (OUTPATIENT)
Dept: PODIATRY | Facility: CLINIC | Age: 77
End: 2017-04-26

## 2017-04-28 ENCOUNTER — TELEPHONE (OUTPATIENT)
Dept: PODIATRY | Facility: CLINIC | Age: 77
End: 2017-04-28

## 2017-04-28 ENCOUNTER — OFFICE VISIT (OUTPATIENT)
Dept: CARDIOLOGY | Facility: CLINIC | Age: 77
End: 2017-04-28
Payer: MEDICARE

## 2017-04-28 VITALS
WEIGHT: 174.63 LBS | DIASTOLIC BLOOD PRESSURE: 74 MMHG | SYSTOLIC BLOOD PRESSURE: 151 MMHG | BODY MASS INDEX: 24.45 KG/M2 | HEIGHT: 71 IN | HEART RATE: 61 BPM

## 2017-04-28 DIAGNOSIS — I10 ESSENTIAL HYPERTENSION: Primary | ICD-10-CM

## 2017-04-28 DIAGNOSIS — E78.5 HYPERLIPIDEMIA, UNSPECIFIED HYPERLIPIDEMIA TYPE: ICD-10-CM

## 2017-04-28 PROCEDURE — 1160F RVW MEDS BY RX/DR IN RCRD: CPT | Mod: S$GLB,,, | Performed by: INTERNAL MEDICINE

## 2017-04-28 PROCEDURE — 99499 UNLISTED E&M SERVICE: CPT | Mod: S$GLB,,, | Performed by: INTERNAL MEDICINE

## 2017-04-28 PROCEDURE — 3077F SYST BP >= 140 MM HG: CPT | Mod: S$GLB,,, | Performed by: INTERNAL MEDICINE

## 2017-04-28 PROCEDURE — 99999 PR PBB SHADOW E&M-EST. PATIENT-LVL III: CPT | Mod: PBBFAC,,, | Performed by: INTERNAL MEDICINE

## 2017-04-28 PROCEDURE — 1159F MED LIST DOCD IN RCRD: CPT | Mod: S$GLB,,, | Performed by: INTERNAL MEDICINE

## 2017-04-28 PROCEDURE — 99204 OFFICE O/P NEW MOD 45 MIN: CPT | Mod: S$GLB,,, | Performed by: INTERNAL MEDICINE

## 2017-04-28 PROCEDURE — 1126F AMNT PAIN NOTED NONE PRSNT: CPT | Mod: S$GLB,,, | Performed by: INTERNAL MEDICINE

## 2017-04-28 PROCEDURE — 3078F DIAST BP <80 MM HG: CPT | Mod: S$GLB,,, | Performed by: INTERNAL MEDICINE

## 2017-04-28 NOTE — PROGRESS NOTES
Subjective:    Patient ID:  Maycol Hodge is a 77 y.o. male who presents for evaluation of new pt- ref from Dr sEtrella (new pt); Hypertension; and Hyperlipidemia      HPI 76 yo WF referred for HTN. Patient feels good. Goes to a gym several times a week without problems. Denies chest pain, SOB, or edema Denies palpitations, weak spells, and syncope. Last three recorded BP readings in chart are all OK. Has had reations to diuretics and calcium channel blockers.    CONCLUSIONS     1 - Concentric remodeling.     2 - Normal left ventricular systolic function (EF 60-65%).     3 - Normal right ventricular systolic function .         This document has been electronically    SIGNED BY: Maycol Jones MD On: 03/17/2015 17:58    Review of Systems   Constitution: Negative for decreased appetite, fever, weakness, malaise/fatigue, weight gain and weight loss.   HENT: Negative for headaches, hearing loss and nosebleeds.    Eyes: Negative for visual disturbance.   Cardiovascular: Negative for chest pain, claudication, cyanosis, dyspnea on exertion, irregular heartbeat, leg swelling, near-syncope, orthopnea, palpitations, paroxysmal nocturnal dyspnea and syncope.   Respiratory: Negative for cough, hemoptysis, shortness of breath, sleep disturbances due to breathing, snoring and wheezing.    Endocrine: Negative for cold intolerance, heat intolerance, polydipsia and polyuria.   Hematologic/Lymphatic: Negative for adenopathy and bleeding problem. Does not bruise/bleed easily.   Skin: Negative for color change, itching, poor wound healing, rash and suspicious lesions.   Musculoskeletal: Negative for arthritis, back pain, falls, joint pain, joint swelling, muscle cramps, muscle weakness and myalgias.   Gastrointestinal: Negative for bloating, abdominal pain, change in bowel habit, constipation, flatus, heartburn, hematemesis, hematochezia, hemorrhoids, jaundice, melena, nausea and vomiting.   Genitourinary: Negative for bladder  "incontinence, decreased libido, frequency, hematuria, hesitancy and urgency.   Neurological: Negative for brief paralysis, difficulty with concentration, excessive daytime sleepiness, dizziness, focal weakness, light-headedness, loss of balance, numbness and vertigo.   Psychiatric/Behavioral: Negative for altered mental status, depression and memory loss. The patient does not have insomnia and is not nervous/anxious.    Allergic/Immunologic: Negative for environmental allergies, hives and persistent infections.        Objective:    Physical Exam   Constitutional: He is oriented to person, place, and time. He appears well-developed and well-nourished. No distress.   BP (!) 151/74  Pulse 61  Ht 5' 11" (1.803 m)  Wt 79.2 kg (174 lb 9.7 oz)  BMI 24.35 kg/m2     HENT:   Head: Normocephalic and atraumatic.   Eyes: Conjunctivae and lids are normal. Pupils are equal, round, and reactive to light. Right eye exhibits no discharge. No scleral icterus.   Neck: Normal range of motion. Neck supple. No JVD present. No tracheal deviation present. No thyromegaly present.   Cardiovascular: Normal rate, regular rhythm, S1 normal, S2 normal, normal heart sounds and intact distal pulses.  Exam reveals no gallop and no friction rub.    No murmur heard.  Pulses:       Carotid pulses are 2+ on the right side, and 2+ on the left side.       Radial pulses are 2+ on the right side, and 2+ on the left side.        Femoral pulses are 2+ on the right side, and 2+ on the left side.       Popliteal pulses are 2+ on the right side, and 2+ on the left side.        Dorsalis pedis pulses are 2+ on the right side, and 2+ on the left side.        Posterior tibial pulses are 2+ on the right side, and 2+ on the left side.   Pulmonary/Chest: Effort normal and breath sounds normal. No respiratory distress. He has no wheezes. He has no rales. He exhibits no tenderness.   Abdominal: Soft. Bowel sounds are normal. He exhibits no distension and no mass. " There is no hepatosplenomegaly or hepatomegaly. There is no tenderness. There is no rebound and no guarding.   Musculoskeletal: Normal range of motion. He exhibits no edema or tenderness.   Lymphadenopathy:     He has no cervical adenopathy.   Neurological: He is alert and oriented to person, place, and time. He has normal reflexes. No cranial nerve deficit. Coordination normal.   Skin: Skin is warm and dry. No rash noted. He is not diaphoretic. No erythema. No pallor.   Psychiatric: He has a normal mood and affect. His speech is normal and behavior is normal. Judgment and thought content normal. Cognition and memory are normal.         Assessment:       1. Essential hypertension    2. Hyperlipidemia, unspecified hyperlipidemia type         Plan:     Patient reluctant to make ant blood pressure med changes and his BP seems reasonably controlled    The current medical regimen is effective;  continue present plan and medications.    Low salt diet and regular exercise    No orders of the defined types were placed in this encounter.    Return if symptoms worsen or fail to improve.

## 2017-04-28 NOTE — PROGRESS NOTES
Subjective:    Patient ID:  Maycol Hodge is a 77 y.o. male who presents for follow-up of new pt- ref from Dr Estrella (new pt); Hypertension; and Hyperlipidemia      HPI    ROS     Objective:    Physical Exam      Assessment:       1. Essential hypertension    2. Hyperlipidemia, unspecified hyperlipidemia type         Plan:

## 2017-04-28 NOTE — TELEPHONE ENCOUNTER
Requesting status of form faxed for PA.  Will be addressed when Dr Cool returns to clinic.  At [facility] today.  PA was submitted

## 2017-04-28 NOTE — LETTER
April 28, 2017      Tatiana Estrella MD  [address] [facility] [place] [address]           [facility]  [address] [facility] [address] [place] [address]  Phone: [phone]          Patient: Maycol Hodge   MR Number: [id number]   YOB: 1940   Date of Visit: 4/28/2017       Dear Dr. Tatiana Estrella:    Thank you for referring Maycol Hodge to me for evaluation. Attached you will find relevant portions of my assessment and plan of care.    If you have questions, please do not hesitate to call me. I look forward to following Maycol Hodge along with you.    Sincerely,    Roel Vogel Jr., MD    Enclosure  CC:  No Recipients    If you would like to receive this communication electronically, please contact [email or web address] or [phone] to request more information on [employer] Link access.    For providers and/or their staff who would like to refer a patient to [facility], please contact us through our one-stop-shop provider referral line, [facility] , at [phone].    If you feel you have received this communication in error or would no longer like to receive these types of communications, please e-mail [email or web address]

## 2017-05-30 ENCOUNTER — OFFICE VISIT (OUTPATIENT)
Dept: FAMILY MEDICINE | Facility: CLINIC | Age: 77
End: 2017-05-30
Payer: MEDICARE

## 2017-05-30 ENCOUNTER — DOCUMENTATION ONLY (OUTPATIENT)
Dept: FAMILY MEDICINE | Facility: CLINIC | Age: 77
End: 2017-05-30

## 2017-05-30 VITALS
WEIGHT: 172.38 LBS | TEMPERATURE: 98 F | HEIGHT: 71 IN | DIASTOLIC BLOOD PRESSURE: 62 MMHG | BODY MASS INDEX: 24.13 KG/M2 | SYSTOLIC BLOOD PRESSURE: 124 MMHG | HEART RATE: 63 BPM

## 2017-05-30 DIAGNOSIS — I10 ESSENTIAL HYPERTENSION: ICD-10-CM

## 2017-05-30 DIAGNOSIS — Z01.818 PRE-OPERATIVE EXAMINATION: Primary | ICD-10-CM

## 2017-05-30 DIAGNOSIS — H26.9 CATARACT OF LEFT EYE, UNSPECIFIED CATARACT TYPE: ICD-10-CM

## 2017-05-30 PROCEDURE — 99214 OFFICE O/P EST MOD 30 MIN: CPT | Mod: S$GLB,,, | Performed by: FAMILY MEDICINE

## 2017-05-30 PROCEDURE — 1126F AMNT PAIN NOTED NONE PRSNT: CPT | Mod: S$GLB,,, | Performed by: FAMILY MEDICINE

## 2017-05-30 PROCEDURE — 99999 PR PBB SHADOW E&M-EST. PATIENT-LVL III: CPT | Mod: PBBFAC,,, | Performed by: FAMILY MEDICINE

## 2017-05-30 PROCEDURE — 99499 UNLISTED E&M SERVICE: CPT | Mod: S$GLB,,, | Performed by: FAMILY MEDICINE

## 2017-05-30 PROCEDURE — 1159F MED LIST DOCD IN RCRD: CPT | Mod: S$GLB,,, | Performed by: FAMILY MEDICINE

## 2017-05-30 NOTE — PROGRESS NOTES
CHIEF COMPLAINT:  Pre-operative examination      HISTORY OF PRESENT ILLNESS:  Maycol Hodge is a 77 y.o. male who presents to clinic for a pre-operative examination.  He is scheduled to undergo left cataract surgery with Dr. Hester on 6/21/17 under local and monitored anesthesia.   There is no personal or family history of complication from anesthesia.  There is no personal or family history of coagulopathy, hypercoagulable state. His BP is well controlled. He is on a daily 81 mg ASA.      REVIEW OF SYSTEMS:  The patient denies any fever, chills, night sweats, headaches, vision changes, difficulty speaking or swallowing, decreased hearing, weight loss, weight gain, chest pain, palpitations, shortness of breath, cough, nausea, vomiting, abdominal pain, dysuria, diarrhea, constipation, hematuria, hematochezia, melena, changes in his hair, skin, nails, numbness or weakness in his extremities, erythema, swelling or pain over any of his joints, myalgia, swollen glands, easy bruising, fatigue, edema. He denies any scrotal/testicular masses, penile discharge. He denies any symptoms of anxiety or depression.      MEDICATIONS:   Reviewed and/or reconciled in EPIC    ALLERGIES:  Reviewed and/or reconciled in EPIC    PAST MEDICAL/SURGICAL HISTORY:   Past Medical History:   Diagnosis Date    Hematuria     Hyperlipidemia       Past Surgical History:   Procedure Laterality Date    COLONOSCOPY      CYSTOSCOPY      HERNIA REPAIR      righht inguinal    JOINT REPLACEMENT Left     shoulder    PROSTATE SURGERY      TURP       FAMILY HISTORY:    Family History   Problem Relation Age of Onset    Stroke Father     Cancer Neg Hx     Diabetes Neg Hx     Heart disease Neg Hx     Hypertension Neg Hx        SOCIAL HISTORY:    Social History     Social History    Marital status:      Spouse name: N/A    Number of children: N/A    Years of education: N/A     Occupational History    Not on file.     Social History  "Main Topics    Smoking status: Former Smoker     Types: Cigarettes     Quit date: 1955    Smokeless tobacco: Never Used      Comment: Quit around 1958    Alcohol use Yes    Drug use: No    Sexual activity: Not Currently     Partners: Female     Other Topics Concern    Not on file     Social History Narrative    No narrative on file       PHYSICAL EXAM:  VITAL SIGNS:   Vitals:    05/30/17 1302 05/30/17 1330   BP: (!) 144/80 124/62   BP Location: Left arm Left arm   Patient Position: Sitting Sitting   BP Method: Automatic Manual   Pulse: 63    Temp: 97.6 °F (36.4 °C)    TempSrc: Oral    Weight: 78.2 kg (172 lb 6.4 oz)    Height: 5' 11" (1.803 m)      GENERAL:  Patient appears well nourished, sitting on exam table, in no acute distress.  HEENT:  Atraumatic, normocephalic, PERRLA, EOMI, no conjunctival injection, sclerae are anicteric, normal external auditory canals,TMs clear b/l, gross hearing intact to whisper, MMM, no oropharygneal erythema or exudate.  NECK:  Supple, normal ROM, trachea is midline , no supraclavicular or cervical LAD or masses palpated.  Thyroid gland not palpable.  CARDIOVASCULAR:  RRR, normal S1 and S2, no m/r/g.  RESPIRATORY:  CTA b/l, no wheezes, rhonchi, rales.  No increased work of breathing, no  use of accessory muscles.  ABDOMEN:  Soft, nontender, nondistended, normoactive bowel sounds in all four quadrants, no rebound or guarding, no HSM or masses palpated.  Normal percussion.  EXTREMITIES:  2+ DP pulses b/l, no edema.  SKIN:  Warm, no lesions on exposed skin.  NEUROMUSCULAR:  Cranial nerves II-XII grossly intact.  No clubbing or cyanosis of digits/nails.  Steady gait.  PSYCH:  Patient is alert and oriented to person, time, place. They are appropriately dressed and groomed. There is normal eye contact. Rate and tone of speech is normal. Normal insight, judgement. Normal thought content and process.         ASSESSMENT/PLAN: This is a 77 y.o. male who presents to clinic for " pre-operative examination  1. pre-operative examination, left cataract: patient is cleared for cataract surgery. Paperwork completed and returned to him and will also be faxed to Dr. Hester's office. Discussed holding ASA 7 days prior to procedure and starting day after procedure  2. HTN: see below    Patient readiness: acceptance and barriers:none    During the course of the visit the patient was educated and counseled about the following:     Hypertension:   Medication: no change.    Goals: Hypertension: Reduce Blood Pressure    Did patient meet goals/outcomes: Yes    The following self management tools provided: declined    Patient Instructions (the written plan) was given to the patient/family.     Time spent with patient: 30 minutes        Tatiana Estrella MD

## 2017-05-30 NOTE — PROGRESS NOTES
Pre-Visit Chart Review  For Appointment Scheduled on 5/30/17.    Health Maintenance Due   Topic Date Due    Zoster Vaccine  04/18/1998    Pneumococcal (65+) (2 of 2 - PPSV23) 02/22/2017

## 2017-07-14 ENCOUNTER — DOCUMENTATION ONLY (OUTPATIENT)
Dept: FAMILY MEDICINE | Facility: CLINIC | Age: 77
End: 2017-07-14

## 2017-07-14 NOTE — PROGRESS NOTES
Pre-Visit Chart Review  For Appointment Scheduled on 7/18/17.    Health Maintenance Due   Topic Date Due    Zoster Vaccine  04/18/1998    Pneumococcal (65+) (2 of 2 - PPSV23) 02/22/2017

## 2017-07-19 RX ORDER — MONTELUKAST SODIUM 10 MG/1
10 TABLET ORAL DAILY
Qty: 90 TABLET | Refills: 3 | Status: SHIPPED | OUTPATIENT
Start: 2017-07-19 | End: 2018-01-05 | Stop reason: SDUPTHER

## 2017-08-18 ENCOUNTER — DOCUMENTATION ONLY (OUTPATIENT)
Dept: FAMILY MEDICINE | Facility: CLINIC | Age: 77
End: 2017-08-18

## 2017-08-18 NOTE — PROGRESS NOTES
Pre-Visit Chart Review  For Appointment Scheduled on 8/28/17.    Health Maintenance Due   Topic Date Due    Zoster Vaccine  04/18/1998    Pneumococcal (65+) (2 of 2 - PPSV23) 02/22/2017    Influenza Vaccine  08/01/2017

## 2017-08-28 ENCOUNTER — OFFICE VISIT (OUTPATIENT)
Dept: FAMILY MEDICINE | Facility: CLINIC | Age: 77
End: 2017-08-28
Payer: MEDICARE

## 2017-08-28 VITALS
SYSTOLIC BLOOD PRESSURE: 130 MMHG | DIASTOLIC BLOOD PRESSURE: 68 MMHG | HEIGHT: 71 IN | TEMPERATURE: 98 F | WEIGHT: 175.94 LBS | BODY MASS INDEX: 24.63 KG/M2 | HEART RATE: 65 BPM

## 2017-08-28 DIAGNOSIS — Z01.818 PRE-OPERATIVE EXAMINATION: ICD-10-CM

## 2017-08-28 DIAGNOSIS — H26.9 CATARACT OF RIGHT EYE, UNSPECIFIED CATARACT TYPE: ICD-10-CM

## 2017-08-28 DIAGNOSIS — I10 ESSENTIAL HYPERTENSION: Primary | ICD-10-CM

## 2017-08-28 DIAGNOSIS — E78.5 HYPERLIPIDEMIA, UNSPECIFIED HYPERLIPIDEMIA TYPE: ICD-10-CM

## 2017-08-28 PROCEDURE — 1126F AMNT PAIN NOTED NONE PRSNT: CPT | Mod: S$GLB,,, | Performed by: FAMILY MEDICINE

## 2017-08-28 PROCEDURE — 99999 PR PBB SHADOW E&M-EST. PATIENT-LVL III: CPT | Mod: PBBFAC,,, | Performed by: FAMILY MEDICINE

## 2017-08-28 PROCEDURE — 3008F BODY MASS INDEX DOCD: CPT | Mod: S$GLB,,, | Performed by: FAMILY MEDICINE

## 2017-08-28 PROCEDURE — 99499 UNLISTED E&M SERVICE: CPT | Mod: S$GLB,,, | Performed by: FAMILY MEDICINE

## 2017-08-28 PROCEDURE — 3078F DIAST BP <80 MM HG: CPT | Mod: S$GLB,,, | Performed by: FAMILY MEDICINE

## 2017-08-28 PROCEDURE — 1159F MED LIST DOCD IN RCRD: CPT | Mod: S$GLB,,, | Performed by: FAMILY MEDICINE

## 2017-08-28 PROCEDURE — 99214 OFFICE O/P EST MOD 30 MIN: CPT | Mod: S$GLB,,, | Performed by: FAMILY MEDICINE

## 2017-08-28 PROCEDURE — 3075F SYST BP GE 130 - 139MM HG: CPT | Mod: S$GLB,,, | Performed by: FAMILY MEDICINE

## 2017-08-28 RX ORDER — BESIFLOXACIN 6 MG/ML
SUSPENSION OPHTHALMIC
Refills: 1 | COMMUNITY
Start: 2017-06-16 | End: 2018-01-05

## 2017-08-28 RX ORDER — DUREZOL 0.5 MG/ML
EMULSION OPHTHALMIC
Refills: 1 | COMMUNITY
Start: 2017-06-16 | End: 2018-01-05

## 2017-08-28 RX ORDER — KETOROLAC TROMETHAMINE 4 MG/ML
SOLUTION/ DROPS OPHTHALMIC
Refills: 1 | COMMUNITY
Start: 2017-06-16 | End: 2018-01-05

## 2017-08-28 NOTE — PROGRESS NOTES
CHIEF COMPLAINT:  Pre-operative examination      HISTORY OF PRESENT ILLNESS:  Maycol Hodge is a 77 y.o. male who presents to clinic for a pre-operative examination.  He is scheduled to undergo right  cataract surgery with Dr. Hester on 9/20/17 under local and monitored anesthesia.  He did not bring any clearance paperwork with him.  There is no personal or family history of complication from anesthesia.  There is no personal or family history of coagulopathy, hypercoagulable state. His BP is well controlled with losartan and hytrin. He is on a daily 81 mg ASA and also pravastatin.  He is due for repeat labs.    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       REVIEW OF SYSTEMS:  The patient denies any fever, chills, night sweats, headaches, vision changes, difficulty speaking or swallowing, decreased hearing, weight loss, weight gain, chest pain, palpitations, shortness of breath, cough, nausea, vomiting, abdominal pain, dysuria, diarrhea, constipation, hematuria, hematochezia, melena, changes in his hair, skin, nails, numbness or weakness in his extremities, erythema, swelling or pain over any of his joints, myalgia, swollen glands, easy bruising, fatigue, edema. He denies any scrotal/testicular masses, penile discharge. He denies any symptoms of anxiety or depression.      MEDICATIONS:   Reviewed and/or reconciled in EPIC    ALLERGIES:  Reviewed and/or reconciled in [facility]    PAST MEDICAL/SURGICAL HISTORY:   Past Medical History:   Diagnosis Date    Hematuria     Hyperlipidemia       Past Surgical History:   Procedure Laterality Date    COLONOSCOPY      CYSTOSCOPY      HERNIA REPAIR      righht inguinal    JOINT REPLACEMENT Left     shoulder    left cataract surgery      PROSTATE SURGERY      TURP       FAMILY HISTORY:    Family History   Problem Relation Age of Onset    Stroke Father     Cancer Neg Hx     Diabetes Neg Hx     Heart disease Neg Hx     Hypertension Neg Hx   "      SOCIAL HISTORY:    Social History     Social History    Marital status:      Spouse name: N/A    Number of children: N/A    Years of education: N/A     Occupational History    Not on file.     Social History Main Topics    Smoking status: Former Smoker     Types: Cigarettes     Quit date: 1955    Smokeless tobacco: Never Used      Comment: Quit around 1958    Alcohol use Yes    Drug use: No    Sexual activity: Not Currently     Partners: Female     Other Topics Concern    Not on file     Social History Narrative    No narrative on file       PHYSICAL EXAM:  VITAL SIGNS:   Vitals:    08/28/17 0953   BP: (!) 147/79   BP Location: Left arm   Patient Position: Sitting   BP Method: Small (Automatic)   Pulse: 65   Temp: 98.2 °F (36.8 °C)   TempSrc: Oral   Weight: 79.8 kg (175 lb 14.8 oz)   Height: 5' 11" (1.803 m)     GENERAL:  Patient appears well nourished, sitting on exam table, in no acute distress.  HEENT:  Atraumatic, normocephalic, PERRLA, EOMI, no conjunctival injection, sclerae are anicteric, normal external auditory canals,TMs clear b/l, gross hearing intact to whisper, MMM, no oropharygneal erythema or exudate.  NECK:  Supple, normal ROM, trachea is midline , no supraclavicular or cervical LAD or masses palpated.  Thyroid gland not palpable.  CARDIOVASCULAR:  RRR, normal S1 and S2, no m/r/g.  RESPIRATORY:  CTA b/l, no wheezes, rhonchi, rales.  No increased work of breathing, no  use of accessory muscles.  ABDOMEN:  Soft, nontender, nondistended, normoactive bowel sounds in all four quadrants, no rebound or guarding, no HSM or masses palpated.  Normal percussion.  EXTREMITIES:  2+ DP pulses b/l, no edema.  SKIN:  Warm, no lesions on exposed skin.  NEUROMUSCULAR:  Cranial nerves II-XII grossly intact.  No clubbing or cyanosis of digits/nails.  Steady gait.  PSYCH:  Patient is alert and oriented to person, time, place. They are appropriately dressed and groomed. There is normal eye contact. " Rate and tone of speech is normal. Normal insight, judgement. Normal thought content and process.         ASSESSMENT/PLAN: This is a 77 y.o. male who presents to clinic for pre-operative examination  1. pre-operative examination, right cataract: Patient is cleared for cataract surgery. We will call Dr. Hester's office to obtain clearance paperwork.  We discussed holding his ASA starting 7 days prior to the procedure and restarting it the day after procedure.  2. HTN: see below  4. Hyperlipidemia: CMP, lipid panel    Patient readiness: acceptance and barriers:none    During the course of the visit the patient was educated and counseled about the following:     Hypertension:   Medication: no change.    Goals: Hypertension: Reduce Blood Pressure    Did patient meet goals/outcomes: Yes    The following self management tools provided: declined    Patient Instructions (the written plan) was given to the patient/family.     Time spent with patient: 30 minutes        Tatiana Estrella MD

## 2017-09-11 ENCOUNTER — LAB VISIT (OUTPATIENT)
Dept: LAB | Facility: HOSPITAL | Age: 77
End: 2017-09-11
Attending: FAMILY MEDICINE
Payer: MEDICARE

## 2017-09-11 DIAGNOSIS — E78.5 HYPERLIPIDEMIA, UNSPECIFIED HYPERLIPIDEMIA TYPE: ICD-10-CM

## 2017-09-11 DIAGNOSIS — I10 ESSENTIAL HYPERTENSION: ICD-10-CM

## 2017-09-11 LAB
ALBUMIN SERPL BCP-MCNC: 3.4 G/DL
ALP SERPL-CCNC: 54 U/L
ALT SERPL W/O P-5'-P-CCNC: 16 U/L
ANION GAP SERPL CALC-SCNC: 7 MMOL/L
AST SERPL-CCNC: 18 U/L
BILIRUB SERPL-MCNC: 2 MG/DL
BUN SERPL-MCNC: 18 MG/DL
CALCIUM SERPL-MCNC: 9 MG/DL
CHLORIDE SERPL-SCNC: 106 MMOL/L
CHOLEST SERPL-MCNC: 171 MG/DL
CHOLEST/HDLC SERPL: 2.5 {RATIO}
CO2 SERPL-SCNC: 29 MMOL/L
CREAT SERPL-MCNC: 1.1 MG/DL
EST. GFR  (AFRICAN AMERICAN): >60 ML/MIN/1.73 M^2
EST. GFR  (NON AFRICAN AMERICAN): >60 ML/MIN/1.73 M^2
GLUCOSE SERPL-MCNC: 84 MG/DL
HDLC SERPL-MCNC: 68 MG/DL
HDLC SERPL: 39.8 %
LDLC SERPL CALC-MCNC: 89.6 MG/DL
NONHDLC SERPL-MCNC: 103 MG/DL
POTASSIUM SERPL-SCNC: 3.8 MMOL/L
PROT SERPL-MCNC: 6.6 G/DL
SODIUM SERPL-SCNC: 142 MMOL/L
TRIGL SERPL-MCNC: 67 MG/DL

## 2017-09-11 PROCEDURE — 36415 COLL VENOUS BLD VENIPUNCTURE: CPT | Mod: PO

## 2017-09-11 PROCEDURE — 80061 LIPID PANEL: CPT

## 2017-09-11 PROCEDURE — 80053 COMPREHEN METABOLIC PANEL: CPT

## 2017-09-14 RX ORDER — PRAVASTATIN SODIUM 10 MG/1
10 TABLET ORAL DAILY
Qty: 90 TABLET | Refills: 3 | Status: SHIPPED | OUTPATIENT
Start: 2017-09-14 | End: 2017-11-03 | Stop reason: SDUPTHER

## 2017-09-26 ENCOUNTER — TELEPHONE (OUTPATIENT)
Dept: FAMILY MEDICINE | Facility: CLINIC | Age: 77
End: 2017-09-26

## 2017-09-26 NOTE — TELEPHONE ENCOUNTER
----- Message from Alyx Lee sent at 9/25/2017  3:08 PM CDT -----  Patient calling to [employer] he received his flu shot on 09/14/17, contact patient at [phone].      Thank you

## 2017-09-26 NOTE — TELEPHONE ENCOUNTER
Spoke to patient, patient states he received HD flu shot at [facility] on 9/14/17

## 2017-10-16 ENCOUNTER — HOSPITAL ENCOUNTER (OUTPATIENT)
Dept: RADIOLOGY | Facility: HOSPITAL | Age: 77
Discharge: HOME | End: 2017-10-16
Attending: INTERNAL MEDICINE
Payer: MEDICARE

## 2017-10-16 DIAGNOSIS — J44.89 EXACERBATION OF CHRONIC BRONCHIOLITIS: ICD-10-CM

## 2017-10-16 PROCEDURE — 71020 XR CHEST PA AND LATERAL: CPT | Mod: TC

## 2017-10-16 PROCEDURE — 71020 XR CHEST PA AND LATERAL: CPT | Mod: 26,,, | Performed by: RADIOLOGY

## 2017-10-18 ENCOUNTER — TELEPHONE (OUTPATIENT)
Dept: PULMONOLOGY | Facility: CLINIC | Age: 77
End: 2017-10-18

## 2017-10-18 NOTE — TELEPHONE ENCOUNTER
Asked to return my call for results     ----- Message from Osvaldo Burch MD sent at 10/16/2017  5:00 PM CDT -----  Notify cxr good

## 2017-10-30 DIAGNOSIS — E78.2 HYPERLIPIDEMIA, MIXED: ICD-10-CM

## 2017-10-30 DIAGNOSIS — E80.6 HYPERBILIRUBINEMIA: Primary | ICD-10-CM

## 2017-10-30 NOTE — TELEPHONE ENCOUNTER
Cardiac risk is high enough to be on a higher dose of pravastatin such as 40 mg, let me know if I can increase this.  Also bilirubin is elevated, needs repeat total and direct bilirubi, CBC and u/s of gallbladder

## 2017-11-03 RX ORDER — PRAVASTATIN SODIUM 20 MG/1
20 TABLET ORAL DAILY
Qty: 90 TABLET | Refills: 3 | Status: SHIPPED | OUTPATIENT
Start: 2017-11-03 | End: 2017-11-03 | Stop reason: SDUPTHER

## 2017-11-03 RX ORDER — PRAVASTATIN SODIUM 20 MG/1
20 TABLET ORAL DAILY
Qty: 90 TABLET | Refills: 3 | Status: SHIPPED | OUTPATIENT
Start: 2017-11-03 | End: 2017-12-12 | Stop reason: SINTOL

## 2017-11-03 NOTE — TELEPHONE ENCOUNTER
----- Message from Alissa Fernandez sent at 11/3/2017  3:13 PM CDT -----  Pt returning Gali's call / please call pt at [phone]

## 2017-11-03 NOTE — TELEPHONE ENCOUNTER
Patient was informed of Dr Estrella's message  Lab and ultrasound booked am of 11-7-17.   He want to know if he can go to 20 mg not 40 mg of the pravastatin?   Patient requested copy of lab be sent to him. Done.

## 2017-11-03 NOTE — TELEPHONE ENCOUNTER
The recommended dose for his cardiac risk is 40 mg , but if does not want to take 40 mg will increase to 20 mg and needs rpeat CMP and lipid panel in 3 months.

## 2017-11-07 ENCOUNTER — HOSPITAL ENCOUNTER (OUTPATIENT)
Dept: RADIOLOGY | Facility: CLINIC | Age: 77
Discharge: HOME | End: 2017-11-07
Attending: FAMILY MEDICINE
Payer: MEDICARE

## 2017-11-07 DIAGNOSIS — E80.6 HYPERBILIRUBINEMIA: ICD-10-CM

## 2017-11-07 PROCEDURE — 76700 US EXAM ABDOM COMPLETE: CPT | Mod: 26,,, | Performed by: RADIOLOGY

## 2017-11-07 PROCEDURE — 76700 US EXAM ABDOM COMPLETE: CPT | Mod: TC,PO

## 2017-11-30 RX ORDER — TERAZOSIN 10 MG/1
CAPSULE ORAL
Qty: 90 CAPSULE | Refills: 3 | Status: SHIPPED | OUTPATIENT
Start: 2017-11-30 | End: 2018-11-27 | Stop reason: SDUPTHER

## 2017-12-12 ENCOUNTER — TELEPHONE (OUTPATIENT)
Dept: FAMILY MEDICINE | Facility: CLINIC | Age: 77
End: 2017-12-12

## 2017-12-12 NOTE — TELEPHONE ENCOUNTER
Itching and hives since increasing pravastatin to 20mg patient did not stop medication   Notified patient to not taken anymore.  Please advise

## 2017-12-12 NOTE — TELEPHONE ENCOUNTER
Ok great, lets give the medication a week or 2 to get out of his system and we can try a different medication

## 2017-12-12 NOTE — TELEPHONE ENCOUNTER
Patient denies having trouble breathing or throat swelling patient states he does have a sore throat.  Notified patient if he has sob and throat swelling needs to go to ER.

## 2018-01-04 NOTE — PROGRESS NOTES
1/5/2018    Maycol Hodge  Patient Seen Years Ago And Here For Evaluation    Chief Complaint   Patient presents with    Follow-up     1 year    Asthma     Jan 5, 2018-- took azithro and pred x 3 - good results.  Doing well    Jan 19,2017 hpi, had acute onset 5 days ago with sinus runny, cough, wheezes, sob, no fever, no n/v, pos headache.  Muscle aches and joint aches.  Took prednisone, took inhaler, neb rx was old. Run down, poor appetite,     Took prednisone and azithro x3, still cough but better,  Down but better.  Mucous clear.           Dec 16, 2016HPI:pt with asthma with no usual noct arousals or rescue use.  Exacerbates 3x yr in spring mainly. Progresses for a week on rescue and then uses pred and doxy (recent doxy reaction) to clear.  I cared for years ago.  No recent pft.  Pt feels breathing better than  Usual yrs past.          The chief compliant  problem is varies with instablilty at time   PFSH:  Past Medical History:   Diagnosis Date    Hematuria     Hyperlipidemia          Past Surgical History:   Procedure Laterality Date    COLONOSCOPY      CYSTOSCOPY      HERNIA REPAIR      righht inguinal    JOINT REPLACEMENT Left     shoulder    left cataract surgery      PROSTATE SURGERY      TURP     Social History   Substance Use Topics    Smoking status: Former Smoker     Types: Cigarettes     Quit date: 1955    Smokeless tobacco: Never Used      Comment: Quit around 1958    Alcohol use Yes     Family History   Problem Relation Age of Onset    Stroke Father     Cancer Neg Hx     Diabetes Neg Hx     Heart disease Neg Hx     Hypertension Neg Hx      Review of patient's allergies indicates:   Allergen Reactions    Doxycycline Rash     Causes rash, hives and itching    Diltiazem      pruritis    Hydrochlorothiazide      Causes elevate uric acid, gout      Levaquin [levofloxacin] Swelling    Norvasc [amlodipine] Swelling       Performance Status:The patient's activity level is functions  "out of house.      Review of Systems:  a review of eleven systems covering constitutional, Eye, HEENT, Psych, Respiratory, Cardiac, GI, , Musculoskeletal, Endocrine, Dermatologic was negative except for pertinent findings as listed ABOVE and below:  No pos, except dizzy and had tia 18 months ago with acute onset while walking of not being able standup straight and resolved after one hr.  Had artery check and eval by Dr Jha.    Exam:Comprehensive exam done.   BP (!) 142/77 (BP Location: Right arm, Patient Position: Sitting)   Pulse (!) 53   Ht 5' 11" (1.803 m)   Wt 79.1 kg (174 lb 6.1 oz)   SpO2 98%   BMI 24.32 kg/m²   Exam included Vitals as listed, and patient's appearance and affect and alertness and mood, oral exam for yeast and hygiene and pharynx lesions and Mallapatti (M) score, neck with inspection for jvd and masses and thyroid abnormalities and lymph nodes (supraclavicular and infraclavicular nodes and axillary also examined and noted if abn), chest exam included symmetry and effort and fremitus and percussion and auscultation, cardiac exam included rhythm and gallops and murmur and rubs and jvd and edema, abdominal exam for mass and hepatosplenomegaly and tenderness and hernias and bowel sounds, Musculoskeletal exam with muscle tone and posture and mobility/gait and  strength, and skin for rashes and cyanosis and pallor and turgor, extremity for clubbing.  Findings were normal except for pertinent findings listed below:  M3, chest clear and no murmur, no edema    Radiographs (ct chest and cxr) reviewed: was not done      Labs was not done        PFT was not done     Plan:  Clinical impression is apparently straight forward and impression with management as below.    Maycol was seen today for follow-up and asthma.    Diagnoses and all orders for this visit:    Mild intermittent asthma without complication    Exacerbation of chronic bronchiolitis  -     albuterol-ipratropium 2.5mg-0.5mg/3mL " (DUO-NEB) 0.5 mg-3 mg(2.5 mg base)/3 mL nebulizer solution; Take 3 mLs by nebulization every 6 (six) hours as needed.    Intermittent asthma, uncomplicated  -     albuterol-ipratropium 2.5mg-0.5mg/3mL (DUO-NEB) 0.5 mg-3 mg(2.5 mg base)/3 mL nebulizer solution; Take 3 mLs by nebulization every 6 (six) hours as needed.  -     predniSONE (DELTASONE) 20 MG tablet; One daily for 3 days and repeat for flare of lung symptoms as intructed  -     albuterol 90 mcg/actuation inhaler; Inhale 2 puffs into the lungs every 4 (four) hours as needed for Wheezing. This is a rescue inhaler medication and should be used as needed  -     azithromycin (ZITHROMAX) 500 MG tablet; One daily for yellow mucous, repeat if needed  -     montelukast (SINGULAIR) 10 mg tablet; Take 1 tablet (10 mg total) by mouth once daily.        Return in about 1 year (around 1/5/2019), or if symptoms worsen or fail to improve.    Discussed with patient above for education the following:     Could add more asthma therapy but getting by ok.  Same prednisone and azithromycin as needed.    Flu epidemic- use tamiflu if impressive illness- call if question.

## 2018-01-05 ENCOUNTER — OFFICE VISIT (OUTPATIENT)
Dept: PULMONOLOGY | Facility: CLINIC | Age: 78
End: 2018-01-05
Payer: MEDICARE

## 2018-01-05 VITALS
HEART RATE: 53 BPM | OXYGEN SATURATION: 98 % | BODY MASS INDEX: 24.41 KG/M2 | DIASTOLIC BLOOD PRESSURE: 77 MMHG | HEIGHT: 71 IN | WEIGHT: 174.38 LBS | SYSTOLIC BLOOD PRESSURE: 142 MMHG

## 2018-01-05 DIAGNOSIS — J44.89 EXACERBATION OF CHRONIC BRONCHIOLITIS: ICD-10-CM

## 2018-01-05 DIAGNOSIS — J45.20 MILD INTERMITTENT ASTHMA WITHOUT COMPLICATION: Primary | ICD-10-CM

## 2018-01-05 DIAGNOSIS — J45.20 INTERMITTENT ASTHMA, UNCOMPLICATED: ICD-10-CM

## 2018-01-05 PROCEDURE — 99213 OFFICE O/P EST LOW 20 MIN: CPT | Mod: S$GLB,,, | Performed by: INTERNAL MEDICINE

## 2018-01-05 PROCEDURE — 99999 PR PBB SHADOW E&M-EST. PATIENT-LVL III: CPT | Mod: PBBFAC,,, | Performed by: INTERNAL MEDICINE

## 2018-01-05 RX ORDER — PREDNISONE 20 MG/1
TABLET ORAL
Qty: 12 TABLET | Refills: 0 | Status: SHIPPED | OUTPATIENT
Start: 2018-01-05 | End: 2019-08-05 | Stop reason: ALTCHOICE

## 2018-01-05 RX ORDER — IPRATROPIUM BROMIDE AND ALBUTEROL SULFATE 2.5; .5 MG/3ML; MG/3ML
3 SOLUTION RESPIRATORY (INHALATION) EVERY 6 HOURS PRN
Qty: 120 VIAL | Refills: 5 | Status: SHIPPED | OUTPATIENT
Start: 2018-01-05 | End: 2019-10-04 | Stop reason: SDUPTHER

## 2018-01-05 RX ORDER — ALBUTEROL SULFATE 90 UG/1
2 AEROSOL, METERED RESPIRATORY (INHALATION) EVERY 4 HOURS PRN
Qty: 3 INHALER | Refills: 3 | Status: SHIPPED | OUTPATIENT
Start: 2018-01-05 | End: 2020-08-17

## 2018-01-05 RX ORDER — AZITHROMYCIN 500 MG/1
TABLET, FILM COATED ORAL
Qty: 3 TABLET | Refills: 3 | Status: SHIPPED | OUTPATIENT
Start: 2018-01-05 | End: 2018-09-06 | Stop reason: ALTCHOICE

## 2018-01-05 RX ORDER — MONTELUKAST SODIUM 10 MG/1
10 TABLET ORAL DAILY
Qty: 90 TABLET | Refills: 3 | Status: SHIPPED | OUTPATIENT
Start: 2018-01-05 | End: 2019-01-15 | Stop reason: SDUPTHER

## 2018-01-05 NOTE — PATIENT INSTRUCTIONS
Could add more asthma therapy but getting by ok.  Same prednisone and azithromycin as needed.    Flu epidemic- use tamiflu if impressive illness- call if question.

## 2018-02-27 ENCOUNTER — DOCUMENTATION ONLY (OUTPATIENT)
Dept: FAMILY MEDICINE | Facility: CLINIC | Age: 78
End: 2018-02-27

## 2018-02-27 NOTE — PROGRESS NOTES
Pre-Visit Chart Review  For Appointment Scheduled on 3/1/18.    Health Maintenance Due   Topic Date Due    Zoster Vaccine  04/18/1998

## 2018-03-01 ENCOUNTER — OFFICE VISIT (OUTPATIENT)
Dept: FAMILY MEDICINE | Facility: CLINIC | Age: 78
End: 2018-03-01
Payer: MEDICARE

## 2018-03-01 VITALS
TEMPERATURE: 99 F | DIASTOLIC BLOOD PRESSURE: 83 MMHG | HEART RATE: 62 BPM | BODY MASS INDEX: 24.17 KG/M2 | SYSTOLIC BLOOD PRESSURE: 133 MMHG | HEIGHT: 71 IN | WEIGHT: 172.63 LBS

## 2018-03-01 DIAGNOSIS — E78.5 HYPERLIPIDEMIA, UNSPECIFIED HYPERLIPIDEMIA TYPE: ICD-10-CM

## 2018-03-01 DIAGNOSIS — T73.3XXA FATIGUE DUE TO EXCESSIVE EXERTION, INITIAL ENCOUNTER: ICD-10-CM

## 2018-03-01 DIAGNOSIS — I10 ESSENTIAL HYPERTENSION: Primary | ICD-10-CM

## 2018-03-01 PROCEDURE — 3075F SYST BP GE 130 - 139MM HG: CPT | Mod: S$GLB,,, | Performed by: FAMILY MEDICINE

## 2018-03-01 PROCEDURE — 3079F DIAST BP 80-89 MM HG: CPT | Mod: S$GLB,,, | Performed by: FAMILY MEDICINE

## 2018-03-01 PROCEDURE — 99999 PR PBB SHADOW E&M-EST. PATIENT-LVL III: CPT | Mod: PBBFAC,,, | Performed by: FAMILY MEDICINE

## 2018-03-01 PROCEDURE — 99214 OFFICE O/P EST MOD 30 MIN: CPT | Mod: S$GLB,,, | Performed by: FAMILY MEDICINE

## 2018-03-01 RX ORDER — LATANOPROST 50 UG/ML
1 SOLUTION/ DROPS OPHTHALMIC NIGHTLY
COMMUNITY
Start: 2018-01-12

## 2018-03-02 ENCOUNTER — LAB VISIT (OUTPATIENT)
Dept: LAB | Facility: HOSPITAL | Age: 78
End: 2018-03-02
Attending: FAMILY MEDICINE
Payer: MEDICARE

## 2018-03-02 DIAGNOSIS — E78.5 HYPERLIPIDEMIA, UNSPECIFIED HYPERLIPIDEMIA TYPE: ICD-10-CM

## 2018-03-02 DIAGNOSIS — I10 ESSENTIAL HYPERTENSION: ICD-10-CM

## 2018-03-02 LAB
ALBUMIN SERPL BCP-MCNC: 3.7 G/DL
ALP SERPL-CCNC: 62 U/L
ALT SERPL W/O P-5'-P-CCNC: 18 U/L
ANION GAP SERPL CALC-SCNC: 9 MMOL/L
AST SERPL-CCNC: 23 U/L
BILIRUB SERPL-MCNC: 1.7 MG/DL
BUN SERPL-MCNC: 15 MG/DL
CALCIUM SERPL-MCNC: 9.4 MG/DL
CHLORIDE SERPL-SCNC: 105 MMOL/L
CHOLEST SERPL-MCNC: 192 MG/DL
CHOLEST/HDLC SERPL: 3.6 {RATIO}
CO2 SERPL-SCNC: 26 MMOL/L
CREAT SERPL-MCNC: 1.1 MG/DL
EST. GFR  (AFRICAN AMERICAN): >60 ML/MIN/1.73 M^2
EST. GFR  (NON AFRICAN AMERICAN): >60 ML/MIN/1.73 M^2
GLUCOSE SERPL-MCNC: 93 MG/DL
HDLC SERPL-MCNC: 54 MG/DL
HDLC SERPL: 28.1 %
LDLC SERPL CALC-MCNC: 121.2 MG/DL
NONHDLC SERPL-MCNC: 138 MG/DL
POTASSIUM SERPL-SCNC: 4.1 MMOL/L
PROT SERPL-MCNC: 6.9 G/DL
SODIUM SERPL-SCNC: 140 MMOL/L
T4 FREE SERPL-MCNC: 0.85 NG/DL
TRIGL SERPL-MCNC: 84 MG/DL
TSH SERPL DL<=0.005 MIU/L-ACNC: 5.09 UIU/ML

## 2018-03-02 PROCEDURE — 80061 LIPID PANEL: CPT

## 2018-03-02 PROCEDURE — 36415 COLL VENOUS BLD VENIPUNCTURE: CPT | Mod: PO

## 2018-03-02 PROCEDURE — 84443 ASSAY THYROID STIM HORMONE: CPT

## 2018-03-02 PROCEDURE — 80053 COMPREHEN METABOLIC PANEL: CPT

## 2018-03-02 PROCEDURE — 84439 ASSAY OF FREE THYROXINE: CPT

## 2018-03-07 ENCOUNTER — TELEPHONE (OUTPATIENT)
Dept: FAMILY MEDICINE | Facility: CLINIC | Age: 78
End: 2018-03-07

## 2018-03-07 DIAGNOSIS — R79.89 ELEVATED TSH: Primary | ICD-10-CM

## 2018-03-07 NOTE — TELEPHONE ENCOUNTER
TSH is slightly elevated again. Needs repeat TSH in 1 month.  Cholesterol and cardiac risk are higher since not being on the pravastatin, he needs to eat a low fat/cholesterol diet and let me know if he would like to try a different statin with close monitoring for a rash.

## 2018-03-12 ENCOUNTER — TELEPHONE (OUTPATIENT)
Dept: FAMILY MEDICINE | Facility: CLINIC | Age: 78
End: 2018-03-12

## 2018-03-12 ENCOUNTER — DOCUMENTATION ONLY (OUTPATIENT)
Dept: FAMILY MEDICINE | Facility: CLINIC | Age: 78
End: 2018-03-12

## 2018-03-12 NOTE — PROGRESS NOTES
Pre-Visit Chart Review  For Appointment Scheduled on 03/13/2018    There are no preventive care reminders to display for this patient.

## 2018-03-12 NOTE — TELEPHONE ENCOUNTER
----- Message from Alyx Rothman sent at 3/12/2018 10:02 AM CDT -----  Patient requesting to post pone upcoming stress test to next week/please call patient back at [phone] to reschedule or advise.

## 2018-03-13 ENCOUNTER — OFFICE VISIT (OUTPATIENT)
Dept: FAMILY MEDICINE | Facility: CLINIC | Age: 78
End: 2018-03-13
Payer: MEDICARE

## 2018-03-13 ENCOUNTER — LAB VISIT (OUTPATIENT)
Dept: LAB | Facility: HOSPITAL | Age: 78
End: 2018-03-13
Attending: PHYSICIAN ASSISTANT
Payer: MEDICARE

## 2018-03-13 VITALS
SYSTOLIC BLOOD PRESSURE: 107 MMHG | BODY MASS INDEX: 23.8 KG/M2 | TEMPERATURE: 98 F | HEART RATE: 68 BPM | HEIGHT: 71 IN | WEIGHT: 170 LBS | DIASTOLIC BLOOD PRESSURE: 66 MMHG

## 2018-03-13 DIAGNOSIS — K57.92 ACUTE DIVERTICULITIS: ICD-10-CM

## 2018-03-13 DIAGNOSIS — R03.1 LOW BLOOD PRESSURE READING: ICD-10-CM

## 2018-03-13 DIAGNOSIS — K57.92 ACUTE DIVERTICULITIS: Primary | ICD-10-CM

## 2018-03-13 LAB
ANION GAP SERPL CALC-SCNC: 9 MMOL/L
BASOPHILS # BLD AUTO: 0.09 K/UL
BASOPHILS NFR BLD: 1.1 %
BUN SERPL-MCNC: 18 MG/DL
CALCIUM SERPL-MCNC: 9.6 MG/DL
CHLORIDE SERPL-SCNC: 103 MMOL/L
CO2 SERPL-SCNC: 29 MMOL/L
CREAT SERPL-MCNC: 1.1 MG/DL
DIFFERENTIAL METHOD: NORMAL
EOSINOPHIL # BLD AUTO: 0.5 K/UL
EOSINOPHIL NFR BLD: 6.7 %
ERYTHROCYTE [DISTWIDTH] IN BLOOD BY AUTOMATED COUNT: 14 %
EST. GFR  (AFRICAN AMERICAN): >60 ML/MIN/1.73 M^2
EST. GFR  (NON AFRICAN AMERICAN): >60 ML/MIN/1.73 M^2
GLUCOSE SERPL-MCNC: 106 MG/DL
HCT VFR BLD AUTO: 42.4 %
HGB BLD-MCNC: 14.2 G/DL
IMM GRANULOCYTES # BLD AUTO: 0.02 K/UL
IMM GRANULOCYTES NFR BLD AUTO: 0.2 %
LYMPHOCYTES # BLD AUTO: 1.7 K/UL
LYMPHOCYTES NFR BLD: 21.1 %
MCH RBC QN AUTO: 30.8 PG
MCHC RBC AUTO-ENTMCNC: 33.5 G/DL
MCV RBC AUTO: 92 FL
MONOCYTES # BLD AUTO: 0.7 K/UL
MONOCYTES NFR BLD: 9 %
NEUTROPHILS # BLD AUTO: 5 K/UL
NEUTROPHILS NFR BLD: 61.9 %
NRBC BLD-RTO: 0 /100 WBC
PLATELET # BLD AUTO: 281 K/UL
PMV BLD AUTO: 10.4 FL
POTASSIUM SERPL-SCNC: 3.9 MMOL/L
RBC # BLD AUTO: 4.61 M/UL
SODIUM SERPL-SCNC: 141 MMOL/L
WBC # BLD AUTO: 8.09 K/UL

## 2018-03-13 PROCEDURE — 99999 PR PBB SHADOW E&M-EST. PATIENT-LVL IV: CPT | Mod: PBBFAC,,, | Performed by: PHYSICIAN ASSISTANT

## 2018-03-13 PROCEDURE — 36415 COLL VENOUS BLD VENIPUNCTURE: CPT | Mod: PO

## 2018-03-13 PROCEDURE — 3078F DIAST BP <80 MM HG: CPT | Mod: CPTII,S$GLB,, | Performed by: PHYSICIAN ASSISTANT

## 2018-03-13 PROCEDURE — 80048 BASIC METABOLIC PNL TOTAL CA: CPT

## 2018-03-13 PROCEDURE — 3074F SYST BP LT 130 MM HG: CPT | Mod: CPTII,S$GLB,, | Performed by: PHYSICIAN ASSISTANT

## 2018-03-13 PROCEDURE — 85025 COMPLETE CBC W/AUTO DIFF WBC: CPT

## 2018-03-13 PROCEDURE — 99214 OFFICE O/P EST MOD 30 MIN: CPT | Mod: S$GLB,,, | Performed by: PHYSICIAN ASSISTANT

## 2018-03-13 RX ORDER — METRONIDAZOLE 500 MG/1
TABLET ORAL
COMMUNITY
Start: 2018-03-06 | End: 2018-09-06

## 2018-03-13 RX ORDER — AMOXICILLIN AND CLAVULANATE POTASSIUM 875; 125 MG/1; MG/1
TABLET, FILM COATED ORAL
COMMUNITY
Start: 2018-03-06 | End: 2018-09-06 | Stop reason: ALTCHOICE

## 2018-03-13 NOTE — PROGRESS NOTES
Subjective:       Patient ID: Maycol Hodge is a 77 y.o. male.    Chief Complaint: Hospital Follow Up (ER visit)    Patient presents for ER follow up.  He was diagnosed with acute diverticulitis 3/6/2018.  I do not have ER records at this time.  He was prescribed augmentin and flagyl.  His llq abd pain is resolved.  He is feeling better.  He denies fever.  He has one day left of antibiotics.  He admits to not eating a drinking as much as he usually dose.  He has never had any previous episodes of diverticulitis.  After his last colonoscopy his GI Dr Aleman told him that he would not need any more screening colonoscopies.        Review of Systems   Constitutional: Negative for activity change, appetite change, fatigue and unexpected weight change.   Respiratory: Negative for cough, chest tightness and shortness of breath.    Cardiovascular: Negative for chest pain, palpitations and leg swelling.   Gastrointestinal: Negative for abdominal distention, abdominal pain, anal bleeding, blood in stool, constipation, diarrhea and nausea.   Endocrine: Negative for polydipsia and polyuria.   Genitourinary: Negative for difficulty urinating, frequency, hematuria and urgency.   Musculoskeletal: Negative for arthralgias.   Skin: Negative for rash.   Neurological: Negative for dizziness, weakness, light-headedness and headaches.   Psychiatric/Behavioral: Negative for dysphoric mood. The patient is not nervous/anxious.        Objective:      Physical Exam   Constitutional: He appears well-developed and well-nourished. He is cooperative. No distress.   HENT:   Head: Normocephalic and atraumatic.   Mouth/Throat: Oropharynx is clear and moist and mucous membranes are normal. Mucous membranes are not pale and not dry.   Eyes: Conjunctivae and EOM are normal. No scleral icterus.   Neck: Carotid bruit is not present.   Cardiovascular: Normal rate, regular rhythm and normal heart sounds.    Pulmonary/Chest: Effort normal and breath  sounds normal.   Abdominal: Soft. Normal appearance and bowel sounds are normal. He exhibits no distension. There is no tenderness.   Musculoskeletal:        Right lower leg: He exhibits no edema.        Left lower leg: He exhibits no edema.   Neurological: He is alert.   Skin: Skin is warm and dry. Capillary refill takes less than 2 seconds.   Vitals reviewed.      Assessment:       1. Acute diverticulitis    2. Low blood pressure reading        Plan:       Maycol was seen today for hospital follow up.    Diagnoses and all orders for this visit:    Acute diverticulitis  -     Basic metabolic panel; Future  -     CBC auto differential; Future  -     Ambulatory referral to Gastroenterology    Complete flagyl and augmentin as ordered per ER  Increase fluid intake  Maycol was seen today for hospital follow up.    Low blood pressure reading    Increase fluids   Further recommendations will be made based on results   BP log at home

## 2018-03-14 ENCOUNTER — TELEPHONE (OUTPATIENT)
Dept: FAMILY MEDICINE | Facility: CLINIC | Age: 78
End: 2018-03-14

## 2018-03-14 NOTE — TELEPHONE ENCOUNTER
----- Message from Marcelle Guthrie sent at 3/14/2018  3:03 PM CDT -----  Patient missed a call regarding results please call [phone] (fqdg)

## 2018-03-14 NOTE — TELEPHONE ENCOUNTER
----- Message from Marcelle Guthrie sent at 3/14/2018  3:03 PM CDT -----  Patient missed a call regarding results please call [phone] (iubv)

## 2018-03-22 ENCOUNTER — CLINICAL SUPPORT (OUTPATIENT)
Dept: CARDIOLOGY | Facility: CLINIC | Age: 78
End: 2018-03-22
Attending: FAMILY MEDICINE
Payer: MEDICARE

## 2018-03-22 DIAGNOSIS — T73.3XXA FATIGUE DUE TO EXCESSIVE EXERTION, INITIAL ENCOUNTER: ICD-10-CM

## 2018-03-22 LAB
AORTIC VALVE REGURGITATION: NORMAL
DIASTOLIC DYSFUNCTION: NO
ESTIMATED PA SYSTOLIC PRESSURE: 25.28
MITRAL VALVE REGURGITATION: NORMAL
RETIRED EF AND QEF - SEE NOTES: 66 (ref 55–65)
TRICUSPID VALVE REGURGITATION: NORMAL

## 2018-03-22 PROCEDURE — 93325 DOPPLER ECHO COLOR FLOW MAPG: CPT | Mod: S$GLB,,, | Performed by: INTERNAL MEDICINE

## 2018-03-22 PROCEDURE — 93351 STRESS TTE COMPLETE: CPT | Mod: S$GLB,,, | Performed by: INTERNAL MEDICINE

## 2018-03-22 PROCEDURE — 93320 DOPPLER ECHO COMPLETE: CPT | Mod: S$GLB,,, | Performed by: INTERNAL MEDICINE

## 2018-04-09 ENCOUNTER — LAB VISIT (OUTPATIENT)
Dept: LAB | Facility: HOSPITAL | Age: 78
End: 2018-04-09
Attending: FAMILY MEDICINE
Payer: MEDICARE

## 2018-04-09 DIAGNOSIS — R79.89 ELEVATED TSH: ICD-10-CM

## 2018-04-09 PROCEDURE — 84443 ASSAY THYROID STIM HORMONE: CPT

## 2018-04-09 PROCEDURE — 36415 COLL VENOUS BLD VENIPUNCTURE: CPT | Mod: PO

## 2018-04-09 PROCEDURE — 84439 ASSAY OF FREE THYROXINE: CPT

## 2018-04-10 ENCOUNTER — TELEPHONE (OUTPATIENT)
Dept: FAMILY MEDICINE | Facility: CLINIC | Age: 78
End: 2018-04-10

## 2018-04-10 DIAGNOSIS — R79.89 ELEVATED TSH: Primary | ICD-10-CM

## 2018-04-10 LAB
T4 FREE SERPL-MCNC: 0.76 NG/DL
TSH SERPL DL<=0.005 MIU/L-ACNC: 4.93 UIU/ML

## 2018-04-20 ENCOUNTER — TELEPHONE (OUTPATIENT)
Dept: FAMILY MEDICINE | Facility: CLINIC | Age: 78
End: 2018-04-20

## 2018-04-20 NOTE — TELEPHONE ENCOUNTER
Called pt regarding results and recommendations per Dr. Estrella. Left voicemail with return number.   ----- Message from Nuno Begum sent at 4/20/2018  9:18 AM CDT -----  Contact: self   Patient miss call from your office please call back at [phone] (home)

## 2018-05-02 RX ORDER — LOSARTAN POTASSIUM 100 MG/1
TABLET ORAL
Qty: 90 TABLET | Refills: 0 | Status: SHIPPED | OUTPATIENT
Start: 2018-05-02 | End: 2018-07-29 | Stop reason: SDUPTHER

## 2018-07-19 ENCOUNTER — LAB VISIT (OUTPATIENT)
Dept: LAB | Facility: HOSPITAL | Age: 78
End: 2018-07-19
Attending: FAMILY MEDICINE
Payer: MEDICARE

## 2018-07-19 DIAGNOSIS — R79.89 ELEVATED TSH: ICD-10-CM

## 2018-07-19 LAB — TSH SERPL DL<=0.005 MIU/L-ACNC: 3.98 UIU/ML

## 2018-07-19 PROCEDURE — 84443 ASSAY THYROID STIM HORMONE: CPT

## 2018-07-19 PROCEDURE — 36415 COLL VENOUS BLD VENIPUNCTURE: CPT | Mod: PO

## 2018-07-30 RX ORDER — LOSARTAN POTASSIUM 100 MG/1
TABLET ORAL
Qty: 90 TABLET | Refills: 0 | Status: SHIPPED | OUTPATIENT
Start: 2018-07-30 | End: 2018-10-28 | Stop reason: SDUPTHER

## 2018-09-04 ENCOUNTER — DOCUMENTATION ONLY (OUTPATIENT)
Dept: FAMILY MEDICINE | Facility: CLINIC | Age: 78
End: 2018-09-04

## 2018-09-04 NOTE — PROGRESS NOTES
Pre-Visit Chart Review  For Appointment Scheduled on 9/6/2018    Health Maintenance Due   Topic Date Due    Influenza Vaccine  08/01/2018

## 2018-09-06 ENCOUNTER — OFFICE VISIT (OUTPATIENT)
Dept: FAMILY MEDICINE | Facility: CLINIC | Age: 78
End: 2018-09-06
Payer: MEDICARE

## 2018-09-06 VITALS
BODY MASS INDEX: 24.45 KG/M2 | TEMPERATURE: 98 F | DIASTOLIC BLOOD PRESSURE: 71 MMHG | HEART RATE: 64 BPM | WEIGHT: 174.63 LBS | SYSTOLIC BLOOD PRESSURE: 130 MMHG | HEIGHT: 71 IN

## 2018-09-06 DIAGNOSIS — I10 ESSENTIAL HYPERTENSION: Primary | ICD-10-CM

## 2018-09-06 DIAGNOSIS — E78.5 HYPERLIPIDEMIA, UNSPECIFIED HYPERLIPIDEMIA TYPE: ICD-10-CM

## 2018-09-06 PROCEDURE — 99214 OFFICE O/P EST MOD 30 MIN: CPT | Mod: S$PBB,,, | Performed by: FAMILY MEDICINE

## 2018-09-06 PROCEDURE — 99999 PR PBB SHADOW E&M-EST. PATIENT-LVL IV: CPT | Mod: PBBFAC,,, | Performed by: FAMILY MEDICINE

## 2018-09-06 PROCEDURE — 3078F DIAST BP <80 MM HG: CPT | Mod: CPTII,,, | Performed by: FAMILY MEDICINE

## 2018-09-06 PROCEDURE — 99214 OFFICE O/P EST MOD 30 MIN: CPT | Mod: PBBFAC,PO | Performed by: FAMILY MEDICINE

## 2018-09-06 PROCEDURE — 3075F SYST BP GE 130 - 139MM HG: CPT | Mod: CPTII,,, | Performed by: FAMILY MEDICINE

## 2018-09-06 PROCEDURE — 1101F PT FALLS ASSESS-DOCD LE1/YR: CPT | Mod: CPTII,,, | Performed by: FAMILY MEDICINE

## 2018-09-06 NOTE — PROGRESS NOTES
CHIEF COMPLAINT: follow up HTN, hyperlipidemia      HISTORY OF PRESENT ILLNESS:  Maycol Hodge is a 78 y.o. male who presents to clinic for follow up    1. HTN: He is on terazosin and cozaar.  His blood pressures have been well controlled for the most part, he states that he will occasionally have values in the 150/80s.  He denies any cough, CP, SOB, edema.     2.  Hyperlipidemia: He was on pravastatin.  This was discontinued in December due to a rash. He is trying to eat a low fat diet.         REVIEW OF SYSTEMS:  The patient denies any fever, chills, night sweats, headaches, vision changes, difficulty speaking or swallowing, decreased hearing, weight loss, weight gain, chest pain, palpitations, shortness of breath, cough, nausea, vomiting, abdominal pain, dysuria, diarrhea, constipation, hematuria, hematochezia, melena, changes in his hair, nails, numbness or weakness in his extremities, erythema, swelling or pain over any of his joints, myalgia, swollen glands, easy bruising,  edema.     MEDICATIONS:   Reviewed and/or reconciled in EPIC    ALLERGIES:  Reviewed and/or reconciled in [facility]    PAST MEDICAL/SURGICAL HISTORY:   Past Medical History:   Diagnosis Date    Hematuria     Hyperlipidemia       Past Surgical History:   Procedure Laterality Date    COLONOSCOPY      CYSTOSCOPY      HERNIA REPAIR      righht inguinal    JOINT REPLACEMENT Left     shoulder    left cataract surgery      PROSTATE SURGERY      TURP       FAMILY HISTORY:    Family History   Problem Relation Age of Onset    Stroke Father     Cancer Neg Hx     Diabetes Neg Hx     Heart disease Neg Hx     Hypertension Neg Hx        SOCIAL HISTORY:    Social History     Socioeconomic History    Marital status:      Spouse name: Not on file    Number of children: Not on file    Years of education: Not on file    Highest education level: Not on file   Social Needs    Financial resource strain: Not on file    Food insecurity -  "worry: Not on file    Food insecurity - inability: Not on file    Transportation needs - medical: Not on file    Transportation needs - non-medical: Not on file   Occupational History    Not on file   Tobacco Use    Smoking status: Former Smoker     Types: Cigarettes     Last attempt to quit: 1955     Years since quittin.7    Smokeless tobacco: Never Used    Tobacco comment: Quit around    Substance and Sexual Activity    Alcohol use: Yes    Drug use: No    Sexual activity: Not Currently     Partners: Female   Other Topics Concern    Not on file   Social History Narrative    Not on file       PHYSICAL EXAM:  VITAL SIGNS:   Vitals:    18 0805   BP: 130/71   BP Location: Left arm   Patient Position: Sitting   BP Method: Medium (Automatic)   Pulse: 64   Temp: 97.7 °F (36.5 °C)   TempSrc: Oral   Weight: 79.2 kg (174 lb 9.7 oz)   Height: 5' 11" (1.803 m)     GENERAL:  Patient appears well nourished, sitting on exam table, in no acute distress.  HEENT:  Atraumatic, normocephalic, PERRLA, EOMI, no conjunctival injection, sclerae are anicteric, normal external auditory canals,TMs clear b/l, gross hearing intact to whisper, MMM, no oropharygneal erythema or exudate.  NECK:  Supple, normal ROM, trachea is midline , no supraclavicular or cervical LAD or masses palpated.  Thyroid gland not palpable.  CARDIOVASCULAR:  RRR, normal S1 and S2, no m/r/g.  RESPIRATORY:  CTA b/l, no wheezes, rhonchi, rales.  No increased work of breathing, no  use of accessory muscles.  ABDOMEN:  Soft, nontender, nondistended, normoactive bowel sounds in all four quadrants, no rebound or guarding, no HSM or masses palpated.  Normal percussion.  EXTREMITIES:  2+ DP pulses b/l, no edema.  SKIN:  Warm, red peeling skin over left heel.   NEUROMUSCULAR:  Cranial nerves II-XII grossly intact.  . No clubbing or cyanosis of digits/nails.  Steady gait.  PSYCH:  Patient is alert and oriented to person, time, place. They are " appropriately dressed and groomed. There is normal eye contact. Rate and tone of speech is normal. Normal insight, judgement. Normal thought content and process.     LABORATORY/IMAGING STUDIES: pending    ASSESSMENT/PLAN: This is a 78 y.o. male who presents to clinic for evaluation of the following concerns    1. HTN: see below  2. Hyperlipidemia: CBC, CMP, lipid panel, TSH      Patient readiness: acceptance and barriers:none    During the course of the visit the patient was educated and counseled about the following:     Hypertension:  Continue with current medications.    Goals: Hypertension: Reduce Blood Pressure    Did patient meet goals/outcomes: yes    The following self management tools provided: declined    Patient Instructions (the written plan) was given to the patient/family.     Time spent with patient: 30 minutes    FOLLOW UP:  6 months      Tatiana Estrella MD

## 2018-09-11 ENCOUNTER — LAB VISIT (OUTPATIENT)
Dept: LAB | Facility: HOSPITAL | Age: 78
End: 2018-09-11
Attending: FAMILY MEDICINE
Payer: MEDICARE

## 2018-09-11 DIAGNOSIS — E78.5 HYPERLIPIDEMIA, UNSPECIFIED HYPERLIPIDEMIA TYPE: ICD-10-CM

## 2018-09-11 DIAGNOSIS — I10 ESSENTIAL HYPERTENSION: ICD-10-CM

## 2018-09-11 LAB
ALBUMIN SERPL BCP-MCNC: 3.5 G/DL
ALP SERPL-CCNC: 55 U/L
ALT SERPL W/O P-5'-P-CCNC: 15 U/L
ANION GAP SERPL CALC-SCNC: 7 MMOL/L
AST SERPL-CCNC: 24 U/L
BASOPHILS # BLD AUTO: 0.13 K/UL
BASOPHILS NFR BLD: 1.9 %
BILIRUB SERPL-MCNC: 1.9 MG/DL
BUN SERPL-MCNC: 16 MG/DL
CALCIUM SERPL-MCNC: 8.9 MG/DL
CHLORIDE SERPL-SCNC: 108 MMOL/L
CHOLEST SERPL-MCNC: 176 MG/DL
CHOLEST/HDLC SERPL: 2.9 {RATIO}
CO2 SERPL-SCNC: 26 MMOL/L
CREAT SERPL-MCNC: 1 MG/DL
DIFFERENTIAL METHOD: ABNORMAL
EOSINOPHIL # BLD AUTO: 0.7 K/UL
EOSINOPHIL NFR BLD: 10.1 %
ERYTHROCYTE [DISTWIDTH] IN BLOOD BY AUTOMATED COUNT: 14.6 %
EST. GFR  (AFRICAN AMERICAN): >60 ML/MIN/1.73 M^2
EST. GFR  (NON AFRICAN AMERICAN): >60 ML/MIN/1.73 M^2
GLUCOSE SERPL-MCNC: 87 MG/DL
HCT VFR BLD AUTO: 44.1 %
HDLC SERPL-MCNC: 60 MG/DL
HDLC SERPL: 34.1 %
HGB BLD-MCNC: 14.4 G/DL
IMM GRANULOCYTES # BLD AUTO: 0.02 K/UL
IMM GRANULOCYTES NFR BLD AUTO: 0.3 %
LDLC SERPL CALC-MCNC: 99 MG/DL
LYMPHOCYTES # BLD AUTO: 1.9 K/UL
LYMPHOCYTES NFR BLD: 28.4 %
MCH RBC QN AUTO: 31.2 PG
MCHC RBC AUTO-ENTMCNC: 32.7 G/DL
MCV RBC AUTO: 96 FL
MONOCYTES # BLD AUTO: 0.7 K/UL
MONOCYTES NFR BLD: 10.2 %
NEUTROPHILS # BLD AUTO: 3.4 K/UL
NEUTROPHILS NFR BLD: 49.1 %
NONHDLC SERPL-MCNC: 116 MG/DL
NRBC BLD-RTO: 0 /100 WBC
PLATELET # BLD AUTO: 231 K/UL
PMV BLD AUTO: 10.5 FL
POTASSIUM SERPL-SCNC: 3.9 MMOL/L
PROT SERPL-MCNC: 6.4 G/DL
RBC # BLD AUTO: 4.62 M/UL
SODIUM SERPL-SCNC: 141 MMOL/L
T4 FREE SERPL-MCNC: 0.72 NG/DL
TRIGL SERPL-MCNC: 85 MG/DL
TSH SERPL DL<=0.005 MIU/L-ACNC: 7.02 UIU/ML
WBC # BLD AUTO: 6.84 K/UL

## 2018-09-11 PROCEDURE — 84443 ASSAY THYROID STIM HORMONE: CPT

## 2018-09-11 PROCEDURE — 36415 COLL VENOUS BLD VENIPUNCTURE: CPT | Mod: PO

## 2018-09-11 PROCEDURE — 80061 LIPID PANEL: CPT

## 2018-09-11 PROCEDURE — 85025 COMPLETE CBC W/AUTO DIFF WBC: CPT

## 2018-09-11 PROCEDURE — 84439 ASSAY OF FREE THYROXINE: CPT

## 2018-09-11 PROCEDURE — 80053 COMPREHEN METABOLIC PANEL: CPT

## 2018-09-12 ENCOUNTER — TELEPHONE (OUTPATIENT)
Dept: FAMILY MEDICINE | Facility: CLINIC | Age: 78
End: 2018-09-12

## 2018-09-12 DIAGNOSIS — D72.10 EOSINOPHILIA: ICD-10-CM

## 2018-09-12 DIAGNOSIS — R79.89 ELEVATED TSH: Primary | ICD-10-CM

## 2018-09-20 NOTE — TELEPHONE ENCOUNTER
Called pt regarding below message. Informed pt of below message per Dr. Estrella. Pt states his US is already scheduled. Scheduled labs with pt. Appt date, time, and location given. PT verbalized understanding with no further questions.

## 2018-09-21 ENCOUNTER — LAB VISIT (OUTPATIENT)
Dept: LAB | Facility: HOSPITAL | Age: 78
End: 2018-09-21
Attending: FAMILY MEDICINE
Payer: MEDICARE

## 2018-09-21 ENCOUNTER — HOSPITAL ENCOUNTER (OUTPATIENT)
Dept: RADIOLOGY | Facility: HOSPITAL | Age: 78
Discharge: HOME | End: 2018-09-21
Attending: FAMILY MEDICINE
Payer: MEDICARE

## 2018-09-21 DIAGNOSIS — R79.89 ELEVATED TSH: ICD-10-CM

## 2018-09-21 DIAGNOSIS — D72.10 EOSINOPHILIA: ICD-10-CM

## 2018-09-21 LAB
BASOPHILS # BLD AUTO: 0.13 K/UL
BASOPHILS NFR BLD: 1.9 %
DIFFERENTIAL METHOD: ABNORMAL
EOSINOPHIL # BLD AUTO: 0.6 K/UL
EOSINOPHIL NFR BLD: 9.2 %
ERYTHROCYTE [DISTWIDTH] IN BLOOD BY AUTOMATED COUNT: 14.4 %
HCT VFR BLD AUTO: 44.1 %
HGB BLD-MCNC: 14.5 G/DL
IMM GRANULOCYTES # BLD AUTO: 0.02 K/UL
IMM GRANULOCYTES NFR BLD AUTO: 0.3 %
LYMPHOCYTES # BLD AUTO: 1.9 K/UL
LYMPHOCYTES NFR BLD: 27.1 %
MCH RBC QN AUTO: 31.5 PG
MCHC RBC AUTO-ENTMCNC: 32.9 G/DL
MCV RBC AUTO: 96 FL
MONOCYTES # BLD AUTO: 0.7 K/UL
MONOCYTES NFR BLD: 10.4 %
NEUTROPHILS # BLD AUTO: 3.5 K/UL
NEUTROPHILS NFR BLD: 51.1 %
NRBC BLD-RTO: 0 /100 WBC
PLATELET # BLD AUTO: 227 K/UL
PMV BLD AUTO: 10.6 FL
RBC # BLD AUTO: 4.61 M/UL
T3 SERPL-MCNC: 65 NG/DL
T4 FREE SERPL-MCNC: 0.84 NG/DL
THYROGLOB AB SERPL IA-ACNC: 34.3 IU/ML
THYROPEROXIDASE IGG SERPL-ACNC: 120.7 IU/ML
TSH SERPL DL<=0.005 MIU/L-ACNC: 5.19 UIU/ML
WBC # BLD AUTO: 6.83 K/UL

## 2018-09-21 PROCEDURE — 85025 COMPLETE CBC W/AUTO DIFF WBC: CPT

## 2018-09-21 PROCEDURE — 84480 ASSAY TRIIODOTHYRONINE (T3): CPT

## 2018-09-21 PROCEDURE — 84445 ASSAY OF TSI GLOBULIN: CPT

## 2018-09-21 PROCEDURE — 76536 US EXAM OF HEAD AND NECK: CPT | Mod: TC

## 2018-09-21 PROCEDURE — 84439 ASSAY OF FREE THYROXINE: CPT

## 2018-09-21 PROCEDURE — 76536 US EXAM OF HEAD AND NECK: CPT | Mod: 26,,, | Performed by: RADIOLOGY

## 2018-09-21 PROCEDURE — 84443 ASSAY THYROID STIM HORMONE: CPT

## 2018-09-21 PROCEDURE — 84432 ASSAY OF THYROGLOBULIN: CPT

## 2018-09-21 PROCEDURE — 86376 MICROSOMAL ANTIBODY EACH: CPT

## 2018-09-21 PROCEDURE — 86800 THYROGLOBULIN ANTIBODY: CPT | Mod: 91

## 2018-09-22 LAB
THRYOGLOBULIN INTERPRETATION: ABNORMAL
THYROGLOB AB SERPL-ACNC: 26 IU/ML
THYROGLOB SERPL-MCNC: 13 NG/ML

## 2018-09-24 LAB — TSI SER-ACNC: <0.1 IU/L

## 2018-09-27 ENCOUNTER — OFFICE VISIT (OUTPATIENT)
Dept: PULMONOLOGY | Facility: CLINIC | Age: 78
End: 2018-09-27
Payer: MEDICARE

## 2018-09-27 VITALS
SYSTOLIC BLOOD PRESSURE: 168 MMHG | HEART RATE: 61 BPM | BODY MASS INDEX: 24.17 KG/M2 | WEIGHT: 172.63 LBS | HEIGHT: 71 IN | OXYGEN SATURATION: 96 % | DIASTOLIC BLOOD PRESSURE: 81 MMHG

## 2018-09-27 DIAGNOSIS — J45.50 SEVERE PERSISTENT ASTHMA WITHOUT COMPLICATION: Primary | ICD-10-CM

## 2018-09-27 PROCEDURE — 3079F DIAST BP 80-89 MM HG: CPT | Mod: CPTII,,, | Performed by: INTERNAL MEDICINE

## 2018-09-27 PROCEDURE — 3077F SYST BP >= 140 MM HG: CPT | Mod: CPTII,,, | Performed by: INTERNAL MEDICINE

## 2018-09-27 PROCEDURE — 99213 OFFICE O/P EST LOW 20 MIN: CPT | Mod: PBBFAC,PO | Performed by: INTERNAL MEDICINE

## 2018-09-27 PROCEDURE — 99999 PR PBB SHADOW E&M-EST. PATIENT-LVL III: CPT | Mod: PBBFAC,,, | Performed by: INTERNAL MEDICINE

## 2018-09-27 PROCEDURE — 99214 OFFICE O/P EST MOD 30 MIN: CPT | Mod: S$PBB,,, | Performed by: INTERNAL MEDICINE

## 2018-09-27 PROCEDURE — 1101F PT FALLS ASSESS-DOCD LE1/YR: CPT | Mod: CPTII,,, | Performed by: INTERNAL MEDICINE

## 2018-09-27 RX ORDER — FLUTICASONE FUROATE AND VILANTEROL 200; 25 UG/1; UG/1
1 POWDER RESPIRATORY (INHALATION) DAILY
Qty: 1 EACH | Refills: 11 | Status: SHIPPED | OUTPATIENT
Start: 2018-09-27 | End: 2018-10-29

## 2018-09-27 NOTE — PROGRESS NOTES
2018    Maycol Hodge  Patient Seen Years Ago And Here For Evaluation    Chief Complaint   Patient presents with    Follow-up    Asthma    Sputum Production     clear     2018- had exacerbations in July and August needing azithro and prednisone- had cough clear mucous with sl yellow, no breathing issues.  Unusual to have exacerbation summer.    2018-- took azithro and pred x 3 - good results.  Doing well     hpi, had acute onset 5 days ago with sinus runny, cough, wheezes, sob, no fever, no n/v, pos headache.  Muscle aches and joint aches.  Took prednisone, took inhaler, neb rx was old. Run down, poor appetite,     Took prednisone and azithro x3, still cough but better,  Down but better.  Mucous clear.           Dec 16, 2016HPI:pt with asthma with no usual noct arousals or rescue use.  Exacerbates 3x yr in spring mainly. Progresses for a week on rescue and then uses pred and doxy (recent doxy reaction) to clear.  I cared for years ago.  No recent pft.  Pt feels breathing better than  Usual yrs past.          The chief compliant  problem is varies with instablilty at time   PFSH:  Past Medical History:   Diagnosis Date    Hematuria     Hyperlipidemia          Past Surgical History:   Procedure Laterality Date    COLONOSCOPY      CYSTOSCOPY      negative    HERNIA REPAIR      righht inguinal    JOINT REPLACEMENT Left     shoulder    left cataract surgery      PROSTATE SURGERY      TURP     Social History     Tobacco Use    Smoking status: Former Smoker     Types: Cigarettes     Last attempt to quit:      Years since quittin.7    Smokeless tobacco: Never Used    Tobacco comment: Quit around    Substance Use Topics    Alcohol use: Yes    Drug use: No     Family History   Problem Relation Age of Onset    Stroke Father     Cancer Neg Hx     Diabetes Neg Hx     Heart disease Neg Hx     Hypertension Neg Hx      Review of patient's allergies indicates:  "  Allergen Reactions    Doxycycline Rash     Causes rash, hives and itching    Diltiazem      pruritis    Hydrochlorothiazide      Causes elevate uric acid, gout      Levaquin [levofloxacin] Swelling    Norvasc [amlodipine] Swelling       Performance Status:The patient's activity level is functions out of house.      Review of Systems:  a review of eleven systems covering constitutional, Eye, HEENT, Psych, Respiratory, Cardiac, GI, , Musculoskeletal, Endocrine, Dermatologic was negative except for pertinent findings as listed ABOVE and below:  No pos, except dizzy and had tia 18 months ago with acute onset while walking of not being able standup straight and resolved after one hr.  Had artery check and eval by Dr Jha.    Exam:Comprehensive exam done.   BP (!) 168/81 (BP Location: Right arm, Patient Position: Sitting)   Pulse 61   Ht 5' 11" (1.803 m)   Wt 78.3 kg (172 lb 9.9 oz)   SpO2 96% Comment: on room air  BMI 24.08 kg/m²   Exam included Vitals as listed, and patient's appearance and affect and alertness and mood, oral exam for yeast and hygiene and pharynx lesions and Mallapatti (M) score, neck with inspection for jvd and masses and thyroid abnormalities and lymph nodes (supraclavicular and infraclavicular nodes and axillary also examined and noted if abn), chest exam included symmetry and effort and fremitus and percussion and auscultation, cardiac exam included rhythm and gallops and murmur and rubs and jvd and edema, abdominal exam for mass and hepatosplenomegaly and tenderness and hernias and bowel sounds, Musculoskeletal exam with muscle tone and posture and mobility/gait and  strength, and skin for rashes and cyanosis and pallor and turgor, extremity for clubbing.  Findings were normal except for pertinent findings listed below:  M3,  chest is symmetric, no distress, normal percussion, normal fremitus and min rhonchi,and no murmur, no edema    Radiographs (ct chest and cxr) reviewed: " was not done      Labs was not done      Results for ARNULFO SALEH (MRN [id number]) as of 9/27/2018 11:38   Ref. Range 9/21/2018 08:59   Eosinophil% Latest Ref Range: 0.0 - 8.0 % 9.2 (H)   Eos # Latest Ref Range: 0.0 - 0.5 K/uL 0.6 (H)     PFT  March 2017 fev 57%    Plan:  Clinical impression is apparently straight forward and impression with management as below.    Maycol was seen today for follow-up, asthma and sputum production.    Diagnoses and all orders for this visit:    Severe persistent asthma without complication  -     fluticasone-vilanterol (BREO ELLIPTA) 200-25 mcg/dose DsDv diskus inhaler; Inhale 1 puff into the lungs once daily.        Follow-up in about 3 months (around 12/27/2018), or if symptoms worsen or fail to improve.    Discussed with patient above for education the following:      Eosinophils (allergy cells) have been high in past - no great change.  Bad allergies may cause fatigue??    Lungs may be source of intermittent fatigue- suggest prednisone 20 mg (1/2 to 1 ) daily for 10 days - note breathing and fatigue.    Would try breo once daily to minimize wheezes, make more stable.  Start once off prednisone.    Optimizing lung may translate into improved function globally but may not, lungs not too bad.  Given more active lung problems and risk for debility- would stay on preventive therapy.

## 2018-09-27 NOTE — PATIENT INSTRUCTIONS
Eosinophils (allergy cells) have been high in past - no great change.  Bad allergies may cause fatigue??    Lungs may be source of intermittent fatigue- suggest prednisone 20 mg (1/2 to 1 ) daily for 10 days - note breathing and fatigue.    Would try breo once daily to minimize wheezes, make more stable.  Start once off prednisone.    Optimizing lung may translate into improved function globally but may not, lungs not too bad.  Given more active lung problems and risk for debility- would stay on preventive therapy.

## 2018-10-08 ENCOUNTER — TELEPHONE (OUTPATIENT)
Dept: FAMILY MEDICINE | Facility: CLINIC | Age: 78
End: 2018-10-08

## 2018-10-08 DIAGNOSIS — E03.9 HYPOTHYROIDISM, UNSPECIFIED TYPE: Primary | ICD-10-CM

## 2018-10-08 NOTE — TELEPHONE ENCOUNTER
Basil;led pt regarding below message. Left voicemail with return number.     ----- Message from Siobhandamaso Davies sent at 10/8/2018  9:50 AM CDT -----  Contact: Anurag  Type:  Test Results    Who Called:  patient  Name of Test (Lab/Mammo/Etc):  lab  Date of Test:  9/21/18  Ordering Provider:  Dr Tatiana Estrella  Where the test was performed:  [address] [place] [address]  Best Call Back Number:  [phone]  Additional Information:  Needs results plus asked for copy to be mailed to address on file but has not received. Thanks!

## 2018-10-08 NOTE — TELEPHONE ENCOUNTER
Called pt regarding below message. Left voicemail with return number.     ----- Message from Dahlia Abbott sent at 10/8/2018  1:32 PM CDT -----  Type:  Patient Returning Call    Who Called:  Patient  Who Left Message for Patient:  KELLE WATKINS  Does the patient know what this is regarding?:  Test results  Best Call Back Number:  [phone]

## 2018-10-09 NOTE — TELEPHONE ENCOUNTER
Called pt regarding below message. Pt is requesting labs be mailed to his address. Verified address. Pt verbalized understanding with no further questions.    ----- Message from Amina Torrez sent at 10/8/2018  4:01 PM CDT -----    Type:  Patient Returning Call    Who Called:  pt  Who Left Message for Patient:nurse  Best Call Back Number:[phone]

## 2018-10-15 NOTE — TELEPHONE ENCOUNTER
Thyroid ultrasound did not demonstrate any nodules. 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 I would recommend starting low dose thyroid medication with repeat sh in 6 weeks. Let me know.

## 2018-10-16 NOTE — TELEPHONE ENCOUNTER
Spoke with patient and gave him the message per Dr. Estrella. Patient confirmed his understanding of the message and stated yes he would like to be on low dose thyroid medication.

## 2018-10-17 RX ORDER — LEVOTHYROXINE SODIUM 50 UG/1
50 TABLET ORAL DAILY
Qty: 30 TABLET | Refills: 11 | Status: SHIPPED | OUTPATIENT
Start: 2018-10-17 | End: 2019-10-11 | Stop reason: SDUPTHER

## 2018-10-17 NOTE — TELEPHONE ENCOUNTER
Left message that medication was called into [facility] and for him to call with any questions.

## 2018-10-18 ENCOUNTER — TELEPHONE (OUTPATIENT)
Dept: FAMILY MEDICINE | Facility: CLINIC | Age: 78
End: 2018-10-18

## 2018-10-18 NOTE — TELEPHONE ENCOUNTER
----- Message from Dahlia Abbott sent at 10/18/2018 10:19 AM CDT -----  Type:  Patient Returning Call    Who Called:  Patient  Who Left Message for Patient:  Karlie  Does the patient know what this is regarding?:  No  Best Call Back Number:  [phone]

## 2018-10-29 DIAGNOSIS — J45.50 SEVERE PERSISTENT ASTHMA WITHOUT COMPLICATION: Primary | ICD-10-CM

## 2018-10-29 RX ORDER — FLUTICASONE FUROATE AND VILANTEROL 100; 25 UG/1; UG/1
1 POWDER RESPIRATORY (INHALATION) DAILY
Qty: 1 EACH | Refills: 11 | Status: SHIPPED | OUTPATIENT
Start: 2018-10-29 | End: 2019-01-24

## 2018-10-29 RX ORDER — LOSARTAN POTASSIUM 100 MG/1
TABLET ORAL
Qty: 90 TABLET | Refills: 3 | Status: SHIPPED | OUTPATIENT
Start: 2018-10-29 | End: 2019-08-05

## 2018-10-29 NOTE — TELEPHONE ENCOUNTER
----- Message from Alissa Fernandez sent at 10/29/2018  9:38 AM CDT -----  Call pt at [phone]  Asking to speak to nurse about getting a new prescription

## 2018-11-26 NOTE — PROGRESS NOTES
CHIEF COMPLAINT: follow up      HISTORY OF PRESENT ILLNESS:  Maycol Hodge is a 77 y.o. male who presents to clinic for follow up    1. HTN: He is on terazosin and cozaar.  His blood pressures have been well controlled for the most part, he states that he will occasionally have values in the 160/90s.  He denies any cough, CP, SOB, edema.     2.  Hyperlipidemia: He was on pravastatin.  This was discontinued in December due to a rash.    3. He describes worsening fatigue with exertion, especially while doing yard work. He denies any CP, SOB. He is very active and continues to walk on the treadmill, bicycle and lift weights at [employer].     REVIEW OF SYSTEMS:  The patient denies any fever, chills, night sweats, headaches, vision changes, difficulty speaking or swallowing, decreased hearing, weight loss, weight gain, chest pain, palpitations, shortness of breath, cough, nausea, vomiting, abdominal pain, dysuria, diarrhea, constipation, hematuria, hematochezia, melena, changes in his hair, nails, numbness or weakness in his extremities, erythema, swelling or pain over any of his joints, myalgia, swollen glands, easy bruising,  edema.     MEDICATIONS:   Reviewed and/or reconciled in EPIC    ALLERGIES:  Reviewed and/or reconciled in [facility]    PAST MEDICAL/SURGICAL HISTORY:   Past Medical History:   Diagnosis Date    Hematuria     Hyperlipidemia       Past Surgical History:   Procedure Laterality Date    COLONOSCOPY      CYSTOSCOPY      HERNIA REPAIR      righht inguinal    JOINT REPLACEMENT Left     shoulder    left cataract surgery      PROSTATE SURGERY      TURP       FAMILY HISTORY:    Family History   Problem Relation Age of Onset    Stroke Father     Cancer Neg Hx     Diabetes Neg Hx     Heart disease Neg Hx     Hypertension Neg Hx        SOCIAL HISTORY:    Social History     Social History    Marital status:      Spouse name: N/A    Number of children: N/A    Years of education: N/A      Occupational History    Not on file.     Social History Main Topics    Smoking status: Former Smoker     Types: Cigarettes     Quit date: 1955    Smokeless tobacco: Never Used      Comment: Quit around 1958    Alcohol use Yes    Drug use: No    Sexual activity: Not Currently     Partners: Female     Other Topics Concern    Not on file     Social History Narrative    No narrative on file       PHYSICAL EXAM:  VITAL SIGNS:   There were no vitals filed for this visit.  GENERAL:  Patient appears well nourished, sitting on exam table, in no acute distress.  HEENT:  Atraumatic, normocephalic, PERRLA, EOMI, no conjunctival injection, sclerae are anicteric, normal external auditory canals,TMs clear b/l, gross hearing intact to whisper, MMM, no oropharygneal erythema or exudate.  NECK:  Supple, normal ROM, trachea is midline , no supraclavicular or cervical LAD or masses palpated.  Thyroid gland not palpable.  CARDIOVASCULAR:  RRR, normal S1 and S2, no m/r/g.  RESPIRATORY:  CTA b/l, no wheezes, rhonchi, rales.  No increased work of breathing, no  use of accessory muscles.  ABDOMEN:  Soft, nontender, nondistended, normoactive bowel sounds in all four quadrants, no rebound or guarding, no HSM or masses palpated.  Normal percussion.  EXTREMITIES:  2+ DP pulses b/l, no edema.  SKIN:  Warm, red peeling skin over left heel.   NEUROMUSCULAR:  Cranial nerves II-XII grossly intact.  . No clubbing or cyanosis of digits/nails.  Steady gait.  PSYCH:  Patient is alert and oriented to person, time, place. They are appropriately dressed and groomed. There is normal eye contact. Rate and tone of speech is normal. Normal insight, judgement. Normal thought content and process.     LABORATORY/IMAGING STUDIES: pending    ASSESSMENT/PLAN: This is a 77 y.o. male who presents to clinic for evaluation of the following concerns    1. HTN: see below  2.Hyperlipidemia: CMP, lipid panel, TSH  3. Fatigue with exertion: exercise stress  Consent: The patient's consent was obtained including but not limited to risks of crusting, scabbing, blistering, scarring, darker or lighter pigmentary change, recurrence, incomplete removal and infection. echo.    Patient readiness: acceptance and barriers:none    During the course of the visit the patient was educated and counseled about the following:     Hypertension:  Continue with current medications, follow up for nurse BP check to check accuracy of his home cuff.     Goals: Hypertension: Reduce Blood Pressure    Did patient meet goals/outcomes: yes    The following self management tools provided: declined    Patient Instructions (the written plan) was given to the patient/family.     Time spent with patient: 30 minutes    FOLLOW UP:  6 months      Tatiana Estrella MD   Number Of Freeze-Thaw Cycles: 2 freeze-thaw cycles Render Post-Care Instructions In Note?: no Post-Care Instructions: I reviewed with the patient in detail post-care instructions. Patient is to wear sunprotection, and avoid picking at any of the treated lesions. Pt may apply Vaseline to crusted or scabbing areas. Detail Level: Detailed Duration Of Freeze Thaw-Cycle (Seconds): 10

## 2018-11-27 RX ORDER — TERAZOSIN 10 MG/1
CAPSULE ORAL
Qty: 90 CAPSULE | Refills: 0 | Status: SHIPPED | OUTPATIENT
Start: 2018-11-27 | End: 2019-02-26 | Stop reason: SDUPTHER

## 2019-01-15 DIAGNOSIS — J45.20 INTERMITTENT ASTHMA, UNCOMPLICATED: ICD-10-CM

## 2019-01-15 RX ORDER — MONTELUKAST SODIUM 10 MG/1
10 TABLET ORAL DAILY
Qty: 90 TABLET | Refills: 3 | Status: SHIPPED | OUTPATIENT
Start: 2019-01-15 | End: 2020-01-14 | Stop reason: SDUPTHER

## 2019-01-17 ENCOUNTER — OFFICE VISIT (OUTPATIENT)
Dept: ORTHOPEDICS | Facility: CLINIC | Age: 79
End: 2019-01-17
Payer: MEDICARE

## 2019-01-17 VITALS
SYSTOLIC BLOOD PRESSURE: 140 MMHG | HEIGHT: 71 IN | HEART RATE: 65 BPM | BODY MASS INDEX: 24.08 KG/M2 | WEIGHT: 172 LBS | DIASTOLIC BLOOD PRESSURE: 90 MMHG

## 2019-01-17 DIAGNOSIS — Z96.612 HISTORY OF TOTAL SHOULDER REPLACEMENT, LEFT: Primary | ICD-10-CM

## 2019-01-17 PROCEDURE — 3077F SYST BP >= 140 MM HG: CPT | Mod: ,,, | Performed by: ORTHOPAEDIC SURGERY

## 2019-01-17 PROCEDURE — 3077F PR MOST RECENT SYSTOLIC BLOOD PRESSURE >= 140 MM HG: ICD-10-PCS | Mod: ,,, | Performed by: ORTHOPAEDIC SURGERY

## 2019-01-17 PROCEDURE — 1101F PT FALLS ASSESS-DOCD LE1/YR: CPT | Mod: ,,, | Performed by: ORTHOPAEDIC SURGERY

## 2019-01-17 PROCEDURE — 3080F DIAST BP >= 90 MM HG: CPT | Mod: ,,, | Performed by: ORTHOPAEDIC SURGERY

## 2019-01-17 PROCEDURE — 73030 X-RAY EXAM OF SHOULDER: CPT | Mod: LT,,, | Performed by: ORTHOPAEDIC SURGERY

## 2019-01-17 PROCEDURE — 73030 PR  X-RAY SHOULDER 2+ VW: ICD-10-PCS | Mod: LT,,, | Performed by: ORTHOPAEDIC SURGERY

## 2019-01-17 PROCEDURE — 99213 PR OFFICE/OUTPT VISIT, EST, LEVL III, 20-29 MIN: ICD-10-PCS | Mod: 25,,, | Performed by: ORTHOPAEDIC SURGERY

## 2019-01-17 PROCEDURE — 1101F PR PT FALLS ASSESS DOC 0-1 FALLS W/OUT INJ PAST YR: ICD-10-PCS | Mod: ,,, | Performed by: ORTHOPAEDIC SURGERY

## 2019-01-17 PROCEDURE — 99213 OFFICE O/P EST LOW 20 MIN: CPT | Mod: 25,,, | Performed by: ORTHOPAEDIC SURGERY

## 2019-01-17 PROCEDURE — 3080F PR MOST RECENT DIASTOLIC BLOOD PRESSURE >= 90 MM HG: ICD-10-PCS | Mod: ,,, | Performed by: ORTHOPAEDIC SURGERY

## 2019-01-17 NOTE — PROGRESS NOTES
[facility]    Subjective:     Chief Complaint:   Chief Complaint   Patient presents with    Left Shoulder - Pain     Left shoulder pain x 4 months. No injury. It was replaced 5 years ago       Past Medical History:   Diagnosis Date    Hematuria     Hyperlipidemia        Past Surgical History:   Procedure Laterality Date    COLONOSCOPY      CYSTOSCOPY      negative    HERNIA REPAIR      righht inguinal    JOINT REPLACEMENT Left     shoulder    left cataract surgery      PROSTATE SURGERY      TURP       Current Outpatient Medications   Medication Sig    albuterol 90 mcg/actuation inhaler Inhale 2 puffs into the lungs every 4 (four) hours as needed for Wheezing. This is a rescue inhaler medication and should be used as needed    ascorbic acid, vitamin C, (VITAMIN C) 1000 MG tablet Take 1,000 mg by mouth once daily.    aspirin (ECOTRIN) 81 MG EC tablet Take 81 mg by mouth once daily.    fexofenadine (ALLEGRA ALLERGY) 180 MG tablet Take 180 mg by mouth daily as needed.     fluticasone-vilanterol (BREO ELLIPTA) 100-25 mcg/dose diskus inhaler Inhale 1 puff into the lungs once daily. Controller    GLUC HCL/GLUC JAMESON/OI-GUJ-B-GLUC (GLUCOSAMINE COMPLEX ORAL) Take 1 capsule by mouth once daily.    latanoprost 0.005 % ophthalmic solution Place 1 drop into both eyes every evening.     levothyroxine (SYNTHROID) 50 MCG tablet Take 1 tablet (50 mcg total) by mouth once daily.    losartan (COZAAR) 100 MG tablet TAKE 1 TABLET(100 MG) BY MOUTH EVERY DAY    montelukast (SINGULAIR) 10 mg tablet Take 1 tablet (10 mg total) by mouth once daily.    predniSONE (DELTASONE) 20 MG tablet One daily for 3 days and repeat for flare of lung symptoms as intructed    terazosin (HYTRIN) 10 MG capsule TAKE 1 CAPSULE(10 MG) BY MOUTH EVERY EVENING    albuterol-ipratropium 2.5mg-0.5mg/3mL (DUO-NEB) 0.5 mg-3 mg(2.5 mg base)/3 mL nebulizer solution Take 3 mLs by nebulization every 6 (six) hours as needed.     No current  facility-administered medications for this visit.        Review of patient's allergies indicates:   Allergen Reactions    Doxycycline Rash     Causes rash, hives and itching    Diltiazem      pruritis    Hydrochlorothiazide      Causes elevate uric acid, gout      Levaquin [levofloxacin] Swelling    Norvasc [amlodipine] Swelling    Pravastatin Rash       Family History   Problem Relation Age of Onset    Stroke Father     Cancer Neg Hx     Diabetes Neg Hx     Heart disease Neg Hx     Hypertension Neg Hx        Social History     Socioeconomic History    Marital status:      Spouse name: Not on file    Number of children: Not on file    Years of education: Not on file    Highest education level: Not on file   Social Needs    Financial resource strain: Not on file    Food insecurity - worry: Not on file    Food insecurity - inability: Not on file    Transportation needs - medical: Not on file    Transportation needs - non-medical: Not on file   Occupational History    Not on file   Tobacco Use    Smoking status: Former Smoker     Types: Cigarettes     Last attempt to quit:      Years since quittin.0    Smokeless tobacco: Never Used    Tobacco comment: Quit around    Substance and Sexual Activity    Alcohol use: Yes    Drug use: No    Sexual activity: Not Currently     Partners: Female   Other Topics Concern    Not on file   Social History Narrative    Not on file       History of present illness: Patient comes in today for his left shoulder. He is 5 years out from a total shoulder. He has done remarkably well until 3 months ago when he was using a  and felt pain in the shoulder. Over the last month it's gotten significantly better and now is essentially back to how he was.      Review of Systems:    Constitution: Negative for chills, fever, and sweats.  Negative for unexplained weight loss.    HENT:  Negative for headaches and blurry  vision.    Cardiovascular:Negative for chest pain or irregular heart beat. Negative for hypertension.    Respiratory:  Negative for cough and shortness of breath.    Gastrointestinal: Negative for abdominal pain, heartburn, melena, nausea, and vomitting.    Genitourinary:  Negative bladder incontinence and dysuria.    Musculoskeletal:  See HPI for details.     Neurological: Negative for numbness.    Psychiatric/Behavioral: Negative for depression.  The patient is not nervous/anxious.      Endocrine: Negative for polyuria    Hematologic/Lymphatic: Negative for bleeding problem.  Does not bruise/bleed easily.    Skin: Negative for poor would healing and rash    Objective:      Physical Examination:    Vital Signs:    Vitals:    01/17/19 0943   BP: (!) 140/90   Pulse: 65       Body mass index is 23.99 kg/m².    This a well-developed, well nourished patient in no acute distress.  They are alert and oriented and cooperative to examination.        Patient has full passive and active range of motion of the left shoulder. His rotator cuff is strong. Spurling sign is negative. Pertinent New Results:    XRAY Report / Interpretation: AP and lateral of the left shoulder demonstrates his left total shoulder to be in ideal position no evidence of loosening or subsidence        Assessment/Plan:      Total shoulder arthroplasty now functioning normally. We are simply can observe this. Should he have increasing pain he will return.      This note was created using Dragon voice recognition software that occasionally misinterpreted phrases or words.

## 2019-01-24 ENCOUNTER — OFFICE VISIT (OUTPATIENT)
Dept: PULMONOLOGY | Facility: CLINIC | Age: 79
End: 2019-01-24
Payer: MEDICARE

## 2019-01-24 VITALS
BODY MASS INDEX: 24.91 KG/M2 | DIASTOLIC BLOOD PRESSURE: 82 MMHG | SYSTOLIC BLOOD PRESSURE: 157 MMHG | HEART RATE: 57 BPM | HEIGHT: 71 IN | OXYGEN SATURATION: 97 % | WEIGHT: 177.94 LBS

## 2019-01-24 DIAGNOSIS — J45.50 SEVERE PERSISTENT ASTHMA WITHOUT COMPLICATION: Primary | ICD-10-CM

## 2019-01-24 DIAGNOSIS — J40 BRONCHITIS: ICD-10-CM

## 2019-01-24 PROCEDURE — 1101F PR PT FALLS ASSESS DOC 0-1 FALLS W/OUT INJ PAST YR: ICD-10-PCS | Mod: CPTII,S$GLB,, | Performed by: NURSE PRACTITIONER

## 2019-01-24 PROCEDURE — 3079F PR MOST RECENT DIASTOLIC BLOOD PRESSURE 80-89 MM HG: ICD-10-PCS | Mod: CPTII,S$GLB,, | Performed by: NURSE PRACTITIONER

## 2019-01-24 PROCEDURE — 1101F PT FALLS ASSESS-DOCD LE1/YR: CPT | Mod: CPTII,S$GLB,, | Performed by: NURSE PRACTITIONER

## 2019-01-24 PROCEDURE — 3079F DIAST BP 80-89 MM HG: CPT | Mod: CPTII,S$GLB,, | Performed by: NURSE PRACTITIONER

## 2019-01-24 PROCEDURE — 99213 OFFICE O/P EST LOW 20 MIN: CPT | Mod: S$GLB,,, | Performed by: NURSE PRACTITIONER

## 2019-01-24 PROCEDURE — 99999 PR PBB SHADOW E&M-EST. PATIENT-LVL III: ICD-10-PCS | Mod: PBBFAC,,, | Performed by: NURSE PRACTITIONER

## 2019-01-24 PROCEDURE — 99213 PR OFFICE/OUTPT VISIT, EST, LEVL III, 20-29 MIN: ICD-10-PCS | Mod: S$GLB,,, | Performed by: NURSE PRACTITIONER

## 2019-01-24 PROCEDURE — 99999 PR PBB SHADOW E&M-EST. PATIENT-LVL III: CPT | Mod: PBBFAC,,, | Performed by: NURSE PRACTITIONER

## 2019-01-24 PROCEDURE — 3077F SYST BP >= 140 MM HG: CPT | Mod: CPTII,S$GLB,, | Performed by: NURSE PRACTITIONER

## 2019-01-24 PROCEDURE — 99499 RISK ADDL DX/OHS AUDIT: ICD-10-PCS | Mod: S$GLB,,, | Performed by: NURSE PRACTITIONER

## 2019-01-24 PROCEDURE — 3077F PR MOST RECENT SYSTOLIC BLOOD PRESSURE >= 140 MM HG: ICD-10-PCS | Mod: CPTII,S$GLB,, | Performed by: NURSE PRACTITIONER

## 2019-01-24 PROCEDURE — 99499 UNLISTED E&M SERVICE: CPT | Mod: S$GLB,,, | Performed by: NURSE PRACTITIONER

## 2019-01-24 RX ORDER — BUDESONIDE AND FORMOTEROL FUMARATE DIHYDRATE 160; 4.5 UG/1; UG/1
2 AEROSOL RESPIRATORY (INHALATION) EVERY 12 HOURS
Qty: 3 INHALER | Refills: 4 | Status: SHIPPED | OUTPATIENT
Start: 2019-01-24 | End: 2020-02-06 | Stop reason: SDUPTHER

## 2019-01-24 NOTE — PROGRESS NOTES
2019    Maycol Hodge  Office Note    Chief Complaint   Patient presents with    Follow-up     3 month    Medication Problem     concerns about Aram Boateng     HPI: 2019- Feeling well, no exacerbations requiring antibiotic and steroid therapy. No cough, no nocturnal arousals, no SOB.     2018- had exacerbations in July and August needing azithro and prednisone- had cough clear mucous with sl yellow, no breathing issues.  Unusual to have exacerbation summer.    2018-- took azithro and pred x 3 - good results.  Doing well     hpi, had acute onset 5 days ago with sinus runny, cough, wheezes, sob, no fever, no n/v, pos headache.  Muscle aches and joint aches.  Took prednisone, took inhaler, neb rx was old. Run down, poor appetite,     Took prednisone and azithro x3, still cough but better,  Down but better.  Mucous clear.     Dec 16, 2016HPI:pt with asthma with no usual noct arousals or rescue use.  Exacerbates 3x yr in spring mainly. Progresses for a week on rescue and then uses pred and doxy (recent doxy reaction) to clear.  I cared for years ago.  No recent pft.  Pt feels breathing better than  Usual yrs past.          The chief compliant  problem is stable  PFSH:  Past Medical History:   Diagnosis Date    Hematuria     Hyperlipidemia          Past Surgical History:   Procedure Laterality Date    COLONOSCOPY      CYSTOSCOPY      negative    HERNIA REPAIR      righht inguinal    JOINT REPLACEMENT Left     shoulder    left cataract surgery      PROSTATE SURGERY      TURP     Social History     Tobacco Use    Smoking status: Former Smoker     Types: Cigarettes     Last attempt to quit:      Years since quittin.1    Smokeless tobacco: Never Used    Tobacco comment: Quit around    Substance Use Topics    Alcohol use: Yes    Drug use: No     Family History   Problem Relation Age of Onset    Stroke Father     Cancer Neg Hx     Diabetes Neg Hx   "   Heart disease Neg Hx     Hypertension Neg Hx      Review of patient's allergies indicates:   Allergen Reactions    Doxycycline Rash     Causes rash, hives and itching    Diltiazem      pruritis    Hydrochlorothiazide      Causes elevate uric acid, gout      Levaquin [levofloxacin] Swelling    Norvasc [amlodipine] Swelling       Performance Status:The patient's activity level is functions out of house.      Review of Systems:    Constitutional: Negative for activity change, appetite change, chills, diaphoresis, fatigue, fever and unexpected weight change.   HENT: Negative for dental problem, postnasal drip, rhinorrhea, sinus pressure, sinus pain, sneezing, sore throat, trouble swallowing and voice change.    Respiratory: Negative for apnea, cough, chest tightness, shortness of breath, wheezing and stridor.    Cardiovascular: Negative for chest pain, palpitations and leg swelling.   Gastrointestinal: Negative for abdominal distention, abdominal pain, constipation and nausea.   Musculoskeletal: Negative for gait problem, myalgias and neck pain.   Skin: Negative for color change and pallor.   Allergic/Immunologic: Negative for environmental allergies and food allergies.   Neurological: Negative for dizziness, speech difficulty, weakness, light-headedness, numbness and headaches.   Hematological: Negative for adenopathy. Does not bruise/bleed easily.   Psychiatric/Behavioral: Negative for dysphoric mood and sleep disturbance. The patient is not nervous/anxious.     Exam:Comprehensive exam done.   BP (!) 157/82 (BP Location: Right arm, Patient Position: Sitting)   Pulse (!) 57   Ht 5' 11" (1.803 m)   Wt 80.7 kg (177 lb 14.6 oz)   SpO2 97% Comment: on room air  BMI 24.81 kg/m²   Exam included Vitals as listed, and patient's appearance and affect and alertness and mood, oral exam for yeast and hygiene and pharynx lesions and Mallapatti (M) score, neck with inspection for jvd and masses and thyroid " abnormalities and lymph nodes (supraclavicular and infraclavicular nodes and axillary also examined and noted if abn), chest exam included symmetry and effort and fremitus and percussion and auscultation, cardiac exam included rhythm and gallops and murmur and rubs and jvd and edema, abdominal exam for mass and hepatosplenomegaly and tenderness and hernias and bowel sounds, Musculoskeletal exam with muscle tone and posture and mobility/gait and  strength, and skin for rashes and cyanosis and pallor and turgor, extremity for clubbing.  Findings were normal except for pertinent findings listed below:  M3,  chest is symmetric, no distress, normal percussion, normal fremitus and min rhonchi,and no murmur, no edema    Radiographs (ct chest and cxr) reviewed from 10/16/2017 normal    Labs was not done      Results for ARNULFO SALEH (MRN [id number]) as of 9/21/2018 11:38   Ref. Range 9/21/2018 08:59   Eosinophil% Latest Ref Range: 0.0 - 8.0 % 9.2 (H)   Eos # Latest Ref Range: 0.0 - 0.5 K/uL 0.6 (H)     PFT  March 31, 2017 fev 57%,     Plan:  Clinical impression is apparently straight forward and impression with management as below.    Maycol was seen today for follow-up and medication problem.    Diagnoses and all orders for this visit:    Severe persistent asthma without complication  -     budesonide-formoterol 160-4.5 mcg (SYMBICORT) 160-4.5 mcg/actuation HFAA; Inhale 2 puffs into the lungs every 12 (twelve) hours. Controller    Bronchitis        Follow-up in about 6 months (around 7/24/2019), or if symptoms worsen or fail to improve.    Discussed with patient above for education the following:    Breo's cost is high, stop while on Symbicort    Symbicort 2 puffs twice a day, can decrease to 1 puff twice a day.    If problems return call clinic and will reorder Breo    Asthma Action plan    Azithromycin 500 mg 1 pill for three days for yellow or green mucous    Prednisone 20 mg pills, Take one pill a day for three  days, repeat for shortness of breath or wheeze    Albuterol Inhaler 1-2 puffs every 4 hours, for cough or shortness of breath    Bronchitis is seasonal, seems well controlled will continue to monitor. If exacerbates use asthma action plan or call clinic

## 2019-01-24 NOTE — PATIENT INSTRUCTIONS
Breo's cost is high, stop while on Symbicort    Symbicort 2 puffs twice a day, can decrease to 1 puff twice a day.    If problems return call clinic and will reorder Breo    Asthma Action plan    Azithromycin 500 mg 1 pill for three days for yellow or green mucous    Prednisone 20 mg pills, Take one pill a day for three days, repeat for shortness of breath or wheeze    Albuterol Inhaler 1-2 puffs every 4 hours, for cough or shortness of breath    Bronchitis is seasonal, seems well controlled will continue to monitor. If exacerbates use asthma action plan or call clinic

## 2019-01-28 DIAGNOSIS — J45.50 SEVERE PERSISTENT ASTHMA WITHOUT COMPLICATION: ICD-10-CM

## 2019-01-28 DIAGNOSIS — J40 BRONCHITIS: Primary | ICD-10-CM

## 2019-01-28 RX ORDER — AZITHROMYCIN 500 MG/1
500 TABLET, FILM COATED ORAL DAILY
Qty: 3 TABLET | Refills: 1 | Status: SHIPPED | OUTPATIENT
Start: 2019-01-28 | End: 2019-03-12 | Stop reason: ALTCHOICE

## 2019-01-28 NOTE — TELEPHONE ENCOUNTER
Refill sent to pharmacy, pt notified    ----- Message from Alyx Lee sent at 1/28/2019 11:01 AM CST -----  Type:  RX Refill Request    Who Called:  Patient   Refill or New Rx:  refill  RX Name and Strength: azithromycin (ZITHROMAX) 500 MG  How is the patient currently taking it? (ex. 1XDay):    Is this a 30 day or 90 day RX:   Preferred Pharmacy with phone number:    [facility] [id number] [facility] [address] AT [facility] [address] [facility]  [address]  [place] [address]  Phone: [phone] Fax: [phone]  Local or Mail Order:  local  Ordering Provider:  Osvaldo Welch Call Back Number:  [phone]  Additional Information:  Patient coughing yellow mucus

## 2019-02-01 ENCOUNTER — OFFICE VISIT (OUTPATIENT)
Dept: PULMONOLOGY | Facility: CLINIC | Age: 79
End: 2019-02-01
Payer: MEDICARE

## 2019-02-01 ENCOUNTER — TELEPHONE (OUTPATIENT)
Dept: PULMONOLOGY | Facility: CLINIC | Age: 79
End: 2019-02-01

## 2019-02-01 VITALS
BODY MASS INDEX: 24.69 KG/M2 | OXYGEN SATURATION: 98 % | DIASTOLIC BLOOD PRESSURE: 81 MMHG | SYSTOLIC BLOOD PRESSURE: 152 MMHG | HEART RATE: 51 BPM | HEIGHT: 71 IN | WEIGHT: 176.38 LBS

## 2019-02-01 DIAGNOSIS — J01.00 ACUTE NON-RECURRENT MAXILLARY SINUSITIS: ICD-10-CM

## 2019-02-01 DIAGNOSIS — J45.51 SEVERE PERSISTENT ASTHMA WITH ACUTE EXACERBATION: Primary | ICD-10-CM

## 2019-02-01 DIAGNOSIS — J30.9 CHRONIC ALLERGIC RHINITIS: ICD-10-CM

## 2019-02-01 PROCEDURE — 3079F DIAST BP 80-89 MM HG: CPT | Mod: CPTII,S$GLB,, | Performed by: NURSE PRACTITIONER

## 2019-02-01 PROCEDURE — 99999 PR PBB SHADOW E&M-EST. PATIENT-LVL III: ICD-10-PCS | Mod: PBBFAC,,, | Performed by: NURSE PRACTITIONER

## 2019-02-01 PROCEDURE — 1101F PR PT FALLS ASSESS DOC 0-1 FALLS W/OUT INJ PAST YR: ICD-10-PCS | Mod: CPTII,S$GLB,, | Performed by: NURSE PRACTITIONER

## 2019-02-01 PROCEDURE — 99213 PR OFFICE/OUTPT VISIT, EST, LEVL III, 20-29 MIN: ICD-10-PCS | Mod: 25,S$GLB,, | Performed by: NURSE PRACTITIONER

## 2019-02-01 PROCEDURE — 99999 PR PBB SHADOW E&M-EST. PATIENT-LVL III: CPT | Mod: PBBFAC,,, | Performed by: NURSE PRACTITIONER

## 2019-02-01 PROCEDURE — 96372 THER/PROPH/DIAG INJ SC/IM: CPT | Mod: S$GLB,,, | Performed by: NURSE PRACTITIONER

## 2019-02-01 PROCEDURE — 96372 PR INJECTION,THERAP/PROPH/DIAG2ST, IM OR SUBCUT: ICD-10-PCS | Mod: S$GLB,,, | Performed by: NURSE PRACTITIONER

## 2019-02-01 PROCEDURE — 3077F SYST BP >= 140 MM HG: CPT | Mod: CPTII,S$GLB,, | Performed by: NURSE PRACTITIONER

## 2019-02-01 PROCEDURE — 99213 OFFICE O/P EST LOW 20 MIN: CPT | Mod: 25,S$GLB,, | Performed by: NURSE PRACTITIONER

## 2019-02-01 PROCEDURE — 3077F PR MOST RECENT SYSTOLIC BLOOD PRESSURE >= 140 MM HG: ICD-10-PCS | Mod: CPTII,S$GLB,, | Performed by: NURSE PRACTITIONER

## 2019-02-01 PROCEDURE — 3079F PR MOST RECENT DIASTOLIC BLOOD PRESSURE 80-89 MM HG: ICD-10-PCS | Mod: CPTII,S$GLB,, | Performed by: NURSE PRACTITIONER

## 2019-02-01 PROCEDURE — 1101F PT FALLS ASSESS-DOCD LE1/YR: CPT | Mod: CPTII,S$GLB,, | Performed by: NURSE PRACTITIONER

## 2019-02-01 RX ORDER — BETAMETHASONE SODIUM PHOSPHATE AND BETAMETHASONE ACETATE 3; 3 MG/ML; MG/ML
12 INJECTION, SUSPENSION INTRA-ARTICULAR; INTRALESIONAL; INTRAMUSCULAR; SOFT TISSUE ONCE
Status: COMPLETED | OUTPATIENT
Start: 2019-02-01 | End: 2019-02-01

## 2019-02-01 RX ORDER — FLUTICASONE PROPIONATE 50 MCG
2 SPRAY, SUSPENSION (ML) NASAL DAILY
Qty: 16 G | Refills: 0 | Status: SHIPPED | OUTPATIENT
Start: 2019-02-01 | End: 2019-08-01

## 2019-02-01 RX ORDER — AMOXICILLIN AND CLAVULANATE POTASSIUM 875; 125 MG/1; MG/1
1 TABLET, FILM COATED ORAL 2 TIMES DAILY
Qty: 14 TABLET | Refills: 0 | Status: SHIPPED | OUTPATIENT
Start: 2019-02-01 | End: 2019-02-08

## 2019-02-01 RX ORDER — OXYMETAZOLINE HCL 0.05 %
2 SPRAY, NON-AEROSOL (ML) NASAL 2 TIMES DAILY
Refills: 0 | COMMUNITY
Start: 2019-02-01 | End: 2019-02-04

## 2019-02-01 RX ADMIN — BETAMETHASONE SODIUM PHOSPHATE AND BETAMETHASONE ACETATE 12 MG: 3; 3 INJECTION, SUSPENSION INTRA-ARTICULAR; INTRALESIONAL; INTRAMUSCULAR; SOFT TISSUE at 04:02

## 2019-02-01 NOTE — PROGRESS NOTES
2019    Maycol Hodge  Office Note    Chief Complaint   Patient presents with    Cough    Sputum Production     clear    Chest Congestion     HPI: 2019- Onset one week, sore throat, congestion, cough (fits, severe, productive light yellow in color), took azithromycin with minimal benefit. Current Prednisone use.    2019- Feeling well, no exacerbations requiring antibiotic and steroid therapy. No cough, no nocturnal arousals, no SOB.     2018- had exacerbations in July and August needing azithro and prednisone- had cough clear mucous with sl yellow, no breathing issues.  Unusual to have exacerbation summer.    2018-- took azithro and pred x 3 - good results.  Doing well     hpi, had acute onset 5 days ago with sinus runny, cough, wheezes, sob, no fever, no n/v, pos headache.  Muscle aches and joint aches.  Took prednisone, took inhaler, neb rx was old. Run down, poor appetite,     Took prednisone and azithro x3, still cough but better,  Down but better.  Mucous clear.     Dec 16, 2016HPI:pt with asthma with no usual noct arousals or rescue use.  Exacerbates 3x yr in spring mainly. Progresses for a week on rescue and then uses pred and doxy (recent doxy reaction) to clear.  I cared for years ago.  No recent pft.  Pt feels breathing better than  Usual yrs past.          The chief compliant  problem varies with instablilty at time    PFSH:  Past Medical History:   Diagnosis Date    Hematuria     Hyperlipidemia          Past Surgical History:   Procedure Laterality Date    COLONOSCOPY      CYSTOSCOPY      negative    HERNIA REPAIR      righht inguinal    JOINT REPLACEMENT Left     shoulder    left cataract surgery      PROSTATE SURGERY      TURP     Social History     Tobacco Use    Smoking status: Former Smoker     Types: Cigarettes     Last attempt to quit: 1955     Years since quittin.1    Smokeless tobacco: Never Used    Tobacco comment: Quit  "around 1958   Substance Use Topics    Alcohol use: Yes    Drug use: No     Family History   Problem Relation Age of Onset    Stroke Father     Cancer Neg Hx     Diabetes Neg Hx     Heart disease Neg Hx     Hypertension Neg Hx      Review of patient's allergies indicates:   Allergen Reactions    Doxycycline Rash     Causes rash, hives and itching    Diltiazem      pruritis    Hydrochlorothiazide      Causes elevate uric acid, gout      Levaquin [levofloxacin] Swelling    Norvasc [amlodipine] Swelling       Performance Status:The patient's activity level is functions out of house.      Review of Systems:    Constitutional: Negative for activity change, appetite change, chills, diaphoresis, fatigue, fever and unexpected weight change.   HENT: Negative for dental problem, postnasal drip, trouble swallowing and voice change. Positive for  rhinorrhea, sinus pressure, sinus pain, sneezing, sore throat,   Respiratory: Negative for apnea,  shortness of breath, wheezing and stridor.  Positive for cough, chest tightness,  Cardiovascular: Negative for chest pain, palpitations and leg swelling.   Gastrointestinal: Negative for abdominal distention, abdominal pain, constipation and nausea.   Musculoskeletal: Negative for gait problem, myalgias and neck pain.   Skin: Negative for color change and pallor.   Allergic/Immunologic: Negative for environmental allergies and food allergies.   Neurological: Negative for dizziness, speech difficulty, weakness, light-headedness, numbness and headaches.   Hematological: Negative for adenopathy. Does not bruise/bleed easily.   Psychiatric/Behavioral: Negative for dysphoric mood and sleep disturbance. The patient is not nervous/anxious.     Exam:Comprehensive exam done.   BP (!) 152/81 (BP Location: Right arm, Patient Position: Sitting)   Pulse (!) 51   Ht 5' 11" (1.803 m)   Wt 80 kg (176 lb 5.9 oz)   SpO2 98% Comment: on room air  BMI 24.60 kg/m²   Exam included Vitals as " listed, and patient's appearance and affect and alertness and mood, oral exam for yeast and hygiene and pharynx lesions and Mallapatti (M) score, neck with inspection for jvd and masses and thyroid abnormalities and lymph nodes (supraclavicular and infraclavicular nodes and axillary also examined and noted if abn), chest exam included symmetry and effort and fremitus and percussion and auscultation, cardiac exam included rhythm and gallops and murmur and rubs and jvd and edema, abdominal exam for mass and hepatosplenomegaly and tenderness and hernias and bowel sounds, Musculoskeletal exam with muscle tone and posture and mobility/gait and  strength, and skin for rashes and cyanosis and pallor and turgor, extremity for clubbing.  Findings were normal except for pertinent findings listed below:  M3,  chest is symmetric, no distress, normal percussion, normal fremitus, and no murmur, no edema, lung sounds abnormal bilateral Rhonchi      Radiographs (ct chest and cxr) reviewed from 10/16/2017 normal    Labs Reviewed  Lab Results   Component Value Date    WBC 6.83 09/21/2018    HGB 14.5 09/21/2018    HCT 44.1 09/21/2018    MCV 96 09/21/2018     09/21/2018        Results for ARNULFO SALEH (MRN [id number]) as of 9/21/2018 11:38   Ref. Range 9/21/2018 08:59   Eosinophil% Latest Ref Range: 0.0 - 8.0 % 9.2 (H)   Eos # Latest Ref Range: 0.0 - 0.5 K/uL 0.6 (H)     PFT  March 31, 2017 fev 57%,     Plan:  Clinical impression is apparently straight forward and impression with management as below.    Maycol was seen today for cough, sputum production and chest congestion.    Diagnoses and all orders for this visit:    Severe persistent asthma with acute exacerbation  -     betamethasone acetate-betamethasone sodium phosphate injection 12 mg    Chronic allergic rhinitis  -     fluticasone (FLONASE) 50 mcg/actuation nasal spray; 2 sprays (100 mcg total) by Each Nare route once daily.  -     oxymetazoline (AFRIN,  OXYMETAZOLINE,) 0.05 % nasal spray; 2 sprays by Nasal route 2 (two) times daily. for 3 days    Acute non-recurrent maxillary sinusitis  -     fluticasone (FLONASE) 50 mcg/actuation nasal spray; 2 sprays (100 mcg total) by Each Nare route once daily.  -     oxymetazoline (AFRIN, OXYMETAZOLINE,) 0.05 % nasal spray; 2 sprays by Nasal route 2 (two) times daily. for 3 days  -     amoxicillin-clavulanate 875-125mg (AUGMENTIN) 875-125 mg per tablet; Take 1 tablet by mouth 2 (two) times daily. for 7 days        Follow-up if symptoms worsen or fail to improve.

## 2019-02-01 NOTE — TELEPHONE ENCOUNTER
Scheduled to see NP today. Pt stated he finished azithro and stills coughing with congested.   ----- Message from eVto Bates sent at 2/1/2019 11:13 AM CST -----  Contact: Patient  Type: Needs Medical Advice    Who Called:  Patient  Best Call Back Number: [phone]  Additional Information: Patient is not feeling any better and would like a return call from office for advise. Please advise

## 2019-02-04 ENCOUNTER — TELEPHONE (OUTPATIENT)
Dept: PULMONOLOGY | Facility: CLINIC | Age: 79
End: 2019-02-04

## 2019-02-04 NOTE — TELEPHONE ENCOUNTER
NP notified.  ----- Message from Carolyne Holguin sent at 2/4/2019 10:00 AM CST -----  Contact: pt  Type: Needs Medical Advice    Who Called:  pt  Best Call Back Number: [phone] (home)   Additional Information: Augmentin is making him  Vomit and he is not going to take anymore he feels a lot better without it just TEODORA

## 2019-02-26 RX ORDER — TERAZOSIN 10 MG/1
CAPSULE ORAL
Qty: 90 CAPSULE | Refills: 3 | Status: SHIPPED | OUTPATIENT
Start: 2019-02-26 | End: 2019-10-23 | Stop reason: SDUPTHER

## 2019-03-01 ENCOUNTER — DOCUMENTATION ONLY (OUTPATIENT)
Dept: FAMILY MEDICINE | Facility: CLINIC | Age: 79
End: 2019-03-01

## 2019-03-01 NOTE — PROGRESS NOTES
Pre-Visit Chart Review  For Appointment Scheduled on 3/12/19.    Health Maintenance Due   Topic Date Due    Influenza Vaccine  08/01/2018

## 2019-03-12 ENCOUNTER — OFFICE VISIT (OUTPATIENT)
Dept: FAMILY MEDICINE | Facility: CLINIC | Age: 79
End: 2019-03-12
Payer: MEDICARE

## 2019-03-12 VITALS
WEIGHT: 174.81 LBS | DIASTOLIC BLOOD PRESSURE: 76 MMHG | TEMPERATURE: 98 F | HEART RATE: 63 BPM | HEIGHT: 71 IN | BODY MASS INDEX: 24.47 KG/M2 | SYSTOLIC BLOOD PRESSURE: 131 MMHG

## 2019-03-12 DIAGNOSIS — E03.9 HYPOTHYROIDISM, UNSPECIFIED TYPE: ICD-10-CM

## 2019-03-12 DIAGNOSIS — E78.5 HYPERLIPIDEMIA, UNSPECIFIED HYPERLIPIDEMIA TYPE: ICD-10-CM

## 2019-03-12 DIAGNOSIS — I10 ESSENTIAL HYPERTENSION: Primary | ICD-10-CM

## 2019-03-12 PROCEDURE — 1101F PR PT FALLS ASSESS DOC 0-1 FALLS W/OUT INJ PAST YR: ICD-10-PCS | Mod: CPTII,S$GLB,, | Performed by: FAMILY MEDICINE

## 2019-03-12 PROCEDURE — 99214 OFFICE O/P EST MOD 30 MIN: CPT | Mod: S$GLB,,, | Performed by: FAMILY MEDICINE

## 2019-03-12 PROCEDURE — 99999 PR PBB SHADOW E&M-EST. PATIENT-LVL IV: ICD-10-PCS | Mod: PBBFAC,,, | Performed by: FAMILY MEDICINE

## 2019-03-12 PROCEDURE — 3075F PR MOST RECENT SYSTOLIC BLOOD PRESS GE 130-139MM HG: ICD-10-PCS | Mod: CPTII,S$GLB,, | Performed by: FAMILY MEDICINE

## 2019-03-12 PROCEDURE — 99999 PR PBB SHADOW E&M-EST. PATIENT-LVL IV: CPT | Mod: PBBFAC,,, | Performed by: FAMILY MEDICINE

## 2019-03-12 PROCEDURE — 3075F SYST BP GE 130 - 139MM HG: CPT | Mod: CPTII,S$GLB,, | Performed by: FAMILY MEDICINE

## 2019-03-12 PROCEDURE — 1101F PT FALLS ASSESS-DOCD LE1/YR: CPT | Mod: CPTII,S$GLB,, | Performed by: FAMILY MEDICINE

## 2019-03-12 PROCEDURE — 3078F PR MOST RECENT DIASTOLIC BLOOD PRESSURE < 80 MM HG: ICD-10-PCS | Mod: CPTII,S$GLB,, | Performed by: FAMILY MEDICINE

## 2019-03-12 PROCEDURE — 99214 PR OFFICE/OUTPT VISIT, EST, LEVL IV, 30-39 MIN: ICD-10-PCS | Mod: S$GLB,,, | Performed by: FAMILY MEDICINE

## 2019-03-12 PROCEDURE — 3078F DIAST BP <80 MM HG: CPT | Mod: CPTII,S$GLB,, | Performed by: FAMILY MEDICINE

## 2019-03-12 NOTE — PROGRESS NOTES
CHIEF COMPLAINT: follow up HTN, hyperlipidemia, hypothyroidism.       HISTORY OF PRESENT ILLNESS:  Maycol Hodge is a 78 y.o. male who presents to clinic for follow up    1. HTN: He is on terazosin and cozaar.  His blood pressures have been well controlled for the most part.  He denies any cough, CP, SOB, edema.     2.  Hyperlipidemia: He was on pravastatin.  This was discontinued due to a rash. He is trying to eat a low fat diet.    3. Hypothyroidism: He is currently on levothyroxine 50 mcg daily. He denies any symptoms of hypo or hyperthyroidism. He is due for a TSH.       REVIEW OF SYSTEMS:  The patient denies any fever, chills, night sweats, headaches, vision changes, difficulty speaking or swallowing, decreased hearing, weight loss, weight gain, chest pain, palpitations, shortness of breath, cough, nausea, vomiting, abdominal pain, dysuria, diarrhea, constipation, hematuria, hematochezia, melena, changes in his hair, nails, numbness or weakness in his extremities, erythema, swelling or pain over any of his joints, myalgia, swollen glands, easy bruising,  edema.     MEDICATIONS:   Reviewed and/or reconciled in EPIC    ALLERGIES:  Reviewed and/or reconciled in EPIC    PAST MEDICAL/SURGICAL HISTORY:   Past Medical History:   Diagnosis Date    Hematuria     Hyperlipidemia       Past Surgical History:   Procedure Laterality Date    COLONOSCOPY      CYSTOSCOPY      negative    HERNIA REPAIR      righht inguinal    JOINT REPLACEMENT Left     shoulder    left cataract surgery      PROSTATE SURGERY      TURP       FAMILY HISTORY:    Family History   Problem Relation Age of Onset    Stroke Father     Cancer Neg Hx     Diabetes Neg Hx     Heart disease Neg Hx     Hypertension Neg Hx        SOCIAL HISTORY:    Social History     Socioeconomic History    Marital status:      Spouse name: Not on file    Number of children: Not on file    Years of education: Not on file    Highest education level:  "Not on file   Social Needs    Financial resource strain: Not on file    Food insecurity - worry: Not on file    Food insecurity - inability: Not on file    Transportation needs - medical: Not on file    Transportation needs - non-medical: Not on file   Occupational History    Not on file   Tobacco Use    Smoking status: Former Smoker     Types: Cigarettes     Last attempt to quit:      Years since quittin.2    Smokeless tobacco: Never Used    Tobacco comment: Quit around    Substance and Sexual Activity    Alcohol use: Yes    Drug use: No    Sexual activity: Not Currently     Partners: Female   Other Topics Concern    Not on file   Social History Narrative    Not on file       PHYSICAL EXAM:  VITAL SIGNS:   Vitals:    19 0840   BP: 131/76   Pulse: 63   Temp: 97.9 °F (36.6 °C)   Weight: 79.3 kg (174 lb 13.2 oz)   Height: 5' 11" (1.803 m)     GENERAL:  Patient appears well nourished, sitting on exam table, in no acute distress.  HEENT:  Atraumatic, normocephalic, PERRLA, EOMI, no conjunctival injection, sclerae are anicteric, normal external auditory canals,TMs clear b/l, gross hearing intact to whisper, MMM, no oropharygneal erythema or exudate.  NECK:  Supple, normal ROM, trachea is midline , no supraclavicular or cervical LAD or masses palpated.  Thyroid gland not palpable.  CARDIOVASCULAR:  RRR, normal S1 and S2, no m/r/g.  RESPIRATORY:  CTA b/l, no wheezes, rhonchi, rales.  No increased work of breathing, no  use of accessory muscles.  ABDOMEN:  Soft, nontender, nondistended, normoactive bowel sounds in all four quadrants, no rebound or guarding, no HSM or masses palpated.  Normal percussion.  EXTREMITIES:  2+ DP pulses b/l, no edema.  SKIN:  Warm, red peeling skin over left heel.   NEUROMUSCULAR:  Cranial nerves II-XII grossly intact.  No clubbing or cyanosis of digits/nails.  Steady gait.  PSYCH:  Patient is alert and oriented to person, time, place. They are appropriately " dressed and groomed. There is normal eye contact. Rate and tone of speech is normal. Normal insight, judgement. Normal thought content and process.     LABORATORY/IMAGING STUDIES: pending    ASSESSMENT/PLAN: This is a 78 y.o. male who presents to clinic for evaluation of the following concerns    1. HTN: see below  2. Hyperlipidemia: CBC, CMP, lipid panel  3. Hypothyroidism: TSH      Patient readiness: acceptance and barriers:none    During the course of the visit the patient was educated and counseled about the following:     Hypertension:  Continue with current medications.    Goals: Hypertension: Reduce Blood Pressure    Did patient meet goals/outcomes: yes    The following self management tools provided: declined    Patient Instructions (the written plan) was given to the patient/family.     Time spent with patient: 30 minutes    FOLLOW UP:  6 months      Tatiana Estrella MD

## 2019-03-21 ENCOUNTER — LAB VISIT (OUTPATIENT)
Dept: LAB | Facility: HOSPITAL | Age: 79
End: 2019-03-21
Attending: FAMILY MEDICINE
Payer: MEDICARE

## 2019-03-21 DIAGNOSIS — E03.9 HYPOTHYROIDISM, UNSPECIFIED TYPE: ICD-10-CM

## 2019-03-21 DIAGNOSIS — E78.5 HYPERLIPIDEMIA, UNSPECIFIED HYPERLIPIDEMIA TYPE: ICD-10-CM

## 2019-03-21 LAB
ALBUMIN SERPL BCP-MCNC: 3.5 G/DL
ALP SERPL-CCNC: 60 U/L
ALT SERPL W/O P-5'-P-CCNC: 24 U/L
ANION GAP SERPL CALC-SCNC: 7 MMOL/L
AST SERPL-CCNC: 27 U/L
BILIRUB SERPL-MCNC: 1.7 MG/DL
BUN SERPL-MCNC: 16 MG/DL
CALCIUM SERPL-MCNC: 9.2 MG/DL
CHLORIDE SERPL-SCNC: 104 MMOL/L
CHOLEST SERPL-MCNC: 185 MG/DL
CHOLEST/HDLC SERPL: 2.9 {RATIO}
CO2 SERPL-SCNC: 31 MMOL/L
CREAT SERPL-MCNC: 1.1 MG/DL
EST. GFR  (AFRICAN AMERICAN): >60 ML/MIN/1.73 M^2
EST. GFR  (NON AFRICAN AMERICAN): >60 ML/MIN/1.73 M^2
GLUCOSE SERPL-MCNC: 90 MG/DL
HDLC SERPL-MCNC: 63 MG/DL
HDLC SERPL: 34.1 %
LDLC SERPL CALC-MCNC: 105.8 MG/DL
NONHDLC SERPL-MCNC: 122 MG/DL
POTASSIUM SERPL-SCNC: 3.9 MMOL/L
PROT SERPL-MCNC: 6.5 G/DL
SODIUM SERPL-SCNC: 142 MMOL/L
T4 FREE SERPL-MCNC: 0.9 NG/DL
TRIGL SERPL-MCNC: 81 MG/DL
TSH SERPL DL<=0.005 MIU/L-ACNC: 4.63 UIU/ML

## 2019-03-21 PROCEDURE — 80053 COMPREHEN METABOLIC PANEL: CPT

## 2019-03-21 PROCEDURE — 84439 ASSAY OF FREE THYROXINE: CPT

## 2019-03-21 PROCEDURE — 36415 COLL VENOUS BLD VENIPUNCTURE: CPT | Mod: PO

## 2019-03-21 PROCEDURE — 80061 LIPID PANEL: CPT

## 2019-03-21 PROCEDURE — 84443 ASSAY THYROID STIM HORMONE: CPT

## 2019-05-06 PROBLEM — J01.00 ACUTE NON-RECURRENT MAXILLARY SINUSITIS: Status: RESOLVED | Noted: 2019-02-01 | Resolved: 2019-05-06

## 2019-06-12 ENCOUNTER — TELEPHONE (OUTPATIENT)
Dept: FAMILY MEDICINE | Facility: CLINIC | Age: 79
End: 2019-06-12

## 2019-06-12 DIAGNOSIS — E03.9 HYPOTHYROIDISM, UNSPECIFIED TYPE: Primary | ICD-10-CM

## 2019-06-12 NOTE — TELEPHONE ENCOUNTER
Labs at goal, TSH slightly out of range. Recommend repeat TSH, if still high will increase levothyroxine.

## 2019-06-27 ENCOUNTER — LAB VISIT (OUTPATIENT)
Dept: LAB | Facility: HOSPITAL | Age: 79
End: 2019-06-27
Attending: FAMILY MEDICINE
Payer: MEDICARE

## 2019-06-27 DIAGNOSIS — E03.9 HYPOTHYROIDISM, UNSPECIFIED TYPE: ICD-10-CM

## 2019-06-27 LAB — TSH SERPL DL<=0.005 MIU/L-ACNC: 1.16 UIU/ML (ref 0.4–4)

## 2019-06-27 PROCEDURE — 36415 COLL VENOUS BLD VENIPUNCTURE: CPT | Mod: PO

## 2019-06-27 PROCEDURE — 84443 ASSAY THYROID STIM HORMONE: CPT

## 2019-07-18 ENCOUNTER — TELEPHONE (OUTPATIENT)
Dept: FAMILY MEDICINE | Facility: CLINIC | Age: 79
End: 2019-07-18

## 2019-07-18 NOTE — TELEPHONE ENCOUNTER
Patient will est care with Dr. Fox in October, has only seen Dr. Estrella or JUAN DIEGO Rhodes in the past. Please review results so I can let patient know.

## 2019-07-18 NOTE — TELEPHONE ENCOUNTER
----- Message from Carolyne Holguin sent at 7/18/2019 10:28 AM CDT -----  Contact: self  Type: Needs Medical Advice    Who Called: Patient  Best Call Back Number: [phone] (home)   Additional Information: patient said he had labs done about two weeks ago Dr Estrella ordered them he wants to know how he can find out about the results would like a nurse to call him to be  advised

## 2019-07-18 NOTE — TELEPHONE ENCOUNTER
Spoke to patient concerning lab results and for him to continue his meds until his follow up in October he has verbal understanding

## 2019-07-23 LAB
ALBUMIN SERPL-MCNC: 3.4 G/DL (ref 2.9–4.4)
ALBUMIN/GLOB SERPL ELPH: 1.3 {RATIO} (ref 0.7–1.7)
ALPHA 1 GLOBULIN/PROTEIN TOTAL: 0.2 G/DL (ref 0–0.4)
ALPHA 2 GLOBULIN/PROTEIN TOTAL: 0.7 G/DL (ref 0.4–1)
B-GLOBULIN FLD ELPH-MCNC: 0.9 G/DL (ref 0.7–1.3)
GAMMA GLOB FLD ELPH-MCNC: 0.9 G/DL (ref 0.4–1.8)
GLOBULIN SER CALC-MCNC: 2.6 G/DL (ref 2.2–3.9)
Lab: NORMAL
M PROTEIN MFR UR ELPH: NORMAL G/DL
PROT SERPL-MCNC: 6 G/DL (ref 6–8.5)

## 2019-08-01 ENCOUNTER — OFFICE VISIT (OUTPATIENT)
Dept: ALLERGY | Facility: CLINIC | Age: 79
End: 2019-08-01
Payer: MEDICARE

## 2019-08-01 VITALS
SYSTOLIC BLOOD PRESSURE: 124 MMHG | BODY MASS INDEX: 24.08 KG/M2 | HEIGHT: 71 IN | DIASTOLIC BLOOD PRESSURE: 82 MMHG | WEIGHT: 172 LBS

## 2019-08-01 DIAGNOSIS — E03.9 HYPOTHYROIDISM, UNSPECIFIED TYPE: ICD-10-CM

## 2019-08-01 DIAGNOSIS — L50.9 URTICARIA: Primary | ICD-10-CM

## 2019-08-01 PROCEDURE — 3074F PR MOST RECENT SYSTOLIC BLOOD PRESSURE < 130 MM HG: ICD-10-PCS | Mod: S$GLB,,, | Performed by: ALLERGY & IMMUNOLOGY

## 2019-08-01 PROCEDURE — 99213 OFFICE O/P EST LOW 20 MIN: CPT | Mod: S$GLB,,, | Performed by: ALLERGY & IMMUNOLOGY

## 2019-08-01 PROCEDURE — 1101F PT FALLS ASSESS-DOCD LE1/YR: CPT | Mod: S$GLB,,, | Performed by: ALLERGY & IMMUNOLOGY

## 2019-08-01 PROCEDURE — 99213 PR OFFICE/OUTPT VISIT, EST, LEVL III, 20-29 MIN: ICD-10-PCS | Mod: S$GLB,,, | Performed by: ALLERGY & IMMUNOLOGY

## 2019-08-01 PROCEDURE — 1101F PR PT FALLS ASSESS DOC 0-1 FALLS W/OUT INJ PAST YR: ICD-10-PCS | Mod: S$GLB,,, | Performed by: ALLERGY & IMMUNOLOGY

## 2019-08-01 PROCEDURE — 3079F DIAST BP 80-89 MM HG: CPT | Mod: S$GLB,,, | Performed by: ALLERGY & IMMUNOLOGY

## 2019-08-01 PROCEDURE — 3079F PR MOST RECENT DIASTOLIC BLOOD PRESSURE 80-89 MM HG: ICD-10-PCS | Mod: S$GLB,,, | Performed by: ALLERGY & IMMUNOLOGY

## 2019-08-01 PROCEDURE — 3074F SYST BP LT 130 MM HG: CPT | Mod: S$GLB,,, | Performed by: ALLERGY & IMMUNOLOGY

## 2019-08-01 RX ORDER — CETIRIZINE HYDROCHLORIDE 10 MG/1
10 TABLET ORAL DAILY
COMMUNITY
End: 2019-10-23

## 2019-08-01 RX ORDER — LORATADINE 10 MG/1
10 TABLET ORAL DAILY
COMMUNITY
End: 2019-10-23

## 2019-08-01 NOTE — PATIENT INSTRUCTIONS
Finish out your antihistamine regimen.     Talk with Dr. Fox about thyroid level.     Follow up as needed.     Follow action plan if itching restarts.

## 2019-08-01 NOTE — PROGRESS NOTES
Subjective:       Patient ID: Maycol Hodge is a 79 y.o. male.    Chief Complaint: Urticaria (recently discharged from hospital)    HPI     Pt presents today from his hospitalization for urticaria.     While in the hospital, I was concerned with purpuric non blanching lesions on his bilateral hips and trunk that made me concerned for vasculitis.   His complement testing was normal, however, his c1q antibodies were low that may be indicative of autoimmune urticarial disease.     Today he is doing well. Dr. Jaya Hudson performed biopsy and did not show vasculitis.   His only complaint today is a headache.   He typically does not get headaches. parietal temporal area and may radiate towards the back. He may take an aspirin for this.   Aspirin did help and headache did not wake him up.   He also gets fatigued easily now.     On bid antihistamines.       7/2019  Complement C1Q - Quant 11.5 L 11.8-23.8 mg/dL    6/21019  TSH 0.400 - 4.000 uIU/mL 1.163      7/2019  5.82      Component      Latest Ref Rng & Units 7/22/2019   PROTEIN TOTAL      6.0 - 8.5 g/dL 6.0   Albumin      2.9 - 4.4 g/dL 3.4   Alpha 1 Globulin/Protein Total      0.0 - 0.4 g/dL 0.2   Alpha 2 Globulin/Protein Total      0.4 - 1.0 g/dL 0.7   Beta Globulin      0.7 - 1.3 g/dL 0.9   Gamma Globulin      0.4 - 1.8 g/dL 0.9   Albumin/Globulin Ratio      0.7 - 1.7 1.3   M-Channing, %      Not Observed g/dL Not Observed   Globulin, Total      2.2 - 3.9 g/dL 2.6   Please Note:       Comment       Review of Systems      General: neg unexpected weight changes, fevers, chills, night sweats, malaise  HEENT: see hpi, Neg eye pain, vision changes, ear drainage, nose bleeds, throat tightness, sores in the mouth  CV: Neg chest pain, palpitations, swelling  Resp: see hpi, neg shortness of breath, hemoptysis, cough  GI: see hpi, neg dysphagia, night abdominal pain, reflux, chronic diarrhea, chronic constipation  Derm: See Hpi, neg new rash, neg flushing  Mu/sk: Neg joint  "pain, joint swelling   Psych: Neg anxiety  neuro: neg chronic headaches, muscle weakness  Endo: neg heat/cold intolerance, chronic fatigue    Objective:     Vitals:    08/01/19 0954   BP: 124/82   Weight: 78 kg (172 lb)   Height: 5' 11" (1.803 m)        Physical Exam        General: no acute distress, well developed well nourished   HEENT:   Head:normocephalic atraumatic  Eyes: KATE, EOMI, Neg injection, scleral icterus, or conjunctival papillary hypertrophy.  Ears: tm clear bilaterally, normal canal  Nose:nares patent   OP: mucus membranes moist, - cobblestoning, - PND, neg erythema or lesions  Neck: supple, Full range of motion, neg lymphadenopathy  Chest: full respiratory excursion no abnormal chest abnormality  Resp: clear to ascultation bilaterally  CV: RRR, neg MRG, brisk capillary refill  Abdomen: BS+, non tender, non distended, neg hepatosplenomegaly.   Ext:  Neg clubbing, cyanosis, pitting edema  Skin: Neg rashes or lesions  Lymph: neg supraclavicular, axillary     Assessment:       1. Urticaria    2. Hypothyroidism, unspecified type        Plan:       Urticaria    Hypothyroidism, unspecified type    improved urticaria   Finish out antihistamine regimen  Discussed urticarial action plan     Hypothyroidism  Elevated in the hospital and better in June.     Will see Dr. Rojo on Monday.     Follow up prn        Neelam Unger M.D.  Allergy/Immunology  [facility]   [phone] phone  [phone] fax              "

## 2019-08-05 ENCOUNTER — OFFICE VISIT (OUTPATIENT)
Dept: FAMILY MEDICINE | Facility: CLINIC | Age: 79
End: 2019-08-05
Payer: MEDICARE

## 2019-08-05 ENCOUNTER — LAB VISIT (OUTPATIENT)
Dept: LAB | Facility: HOSPITAL | Age: 79
End: 2019-08-05
Attending: FAMILY MEDICINE
Payer: MEDICARE

## 2019-08-05 VITALS
SYSTOLIC BLOOD PRESSURE: 120 MMHG | HEART RATE: 68 BPM | OXYGEN SATURATION: 95 % | BODY MASS INDEX: 24.13 KG/M2 | DIASTOLIC BLOOD PRESSURE: 70 MMHG | WEIGHT: 172.38 LBS | TEMPERATURE: 98 F | RESPIRATION RATE: 12 BRPM | HEIGHT: 71 IN

## 2019-08-05 DIAGNOSIS — E78.5 HYPERLIPIDEMIA, UNSPECIFIED HYPERLIPIDEMIA TYPE: ICD-10-CM

## 2019-08-05 DIAGNOSIS — E03.9 HYPOTHYROIDISM, UNSPECIFIED TYPE: Primary | ICD-10-CM

## 2019-08-05 DIAGNOSIS — I10 ESSENTIAL HYPERTENSION: ICD-10-CM

## 2019-08-05 DIAGNOSIS — L50.8 AUTOIMMUNE URTICARIA: ICD-10-CM

## 2019-08-05 DIAGNOSIS — T78.2XXS ANAPHYLAXIS, SEQUELA: ICD-10-CM

## 2019-08-05 DIAGNOSIS — E06.3 HASHIMOTO'S THYROIDITIS: ICD-10-CM

## 2019-08-05 DIAGNOSIS — E03.9 HYPOTHYROIDISM, UNSPECIFIED TYPE: ICD-10-CM

## 2019-08-05 DIAGNOSIS — J45.51 SEVERE PERSISTENT ASTHMA WITH ACUTE EXACERBATION: ICD-10-CM

## 2019-08-05 PROBLEM — T78.2XXA ANAPHYLACTIC SYNDROME: Status: ACTIVE | Noted: 2019-08-05

## 2019-08-05 LAB
ALBUMIN SERPL BCP-MCNC: 3.5 G/DL (ref 3.5–5.2)
ALP SERPL-CCNC: 67 U/L (ref 55–135)
ALT SERPL W/O P-5'-P-CCNC: 16 U/L (ref 10–44)
ANION GAP SERPL CALC-SCNC: 7 MMOL/L (ref 8–16)
AST SERPL-CCNC: 18 U/L (ref 10–40)
BASOPHILS # BLD AUTO: 0.15 K/UL (ref 0–0.2)
BASOPHILS NFR BLD: 1.7 % (ref 0–1.9)
BILIRUB SERPL-MCNC: 1 MG/DL (ref 0.1–1)
BUN SERPL-MCNC: 18 MG/DL (ref 8–23)
CALCIUM SERPL-MCNC: 9.3 MG/DL (ref 8.7–10.5)
CHLORIDE SERPL-SCNC: 106 MMOL/L (ref 95–110)
CO2 SERPL-SCNC: 27 MMOL/L (ref 23–29)
CREAT SERPL-MCNC: 1.2 MG/DL (ref 0.5–1.4)
DIFFERENTIAL METHOD: NORMAL
EOSINOPHIL # BLD AUTO: 0.5 K/UL (ref 0–0.5)
EOSINOPHIL NFR BLD: 5.4 % (ref 0–8)
ERYTHROCYTE [DISTWIDTH] IN BLOOD BY AUTOMATED COUNT: 14.2 % (ref 11.5–14.5)
EST. GFR  (AFRICAN AMERICAN): >60 ML/MIN/1.73 M^2
EST. GFR  (NON AFRICAN AMERICAN): 56.4 ML/MIN/1.73 M^2
GLUCOSE SERPL-MCNC: 84 MG/DL (ref 70–110)
HCT VFR BLD AUTO: 44.2 % (ref 40–54)
HGB BLD-MCNC: 14.2 G/DL (ref 14–18)
IMM GRANULOCYTES # BLD AUTO: 0.03 K/UL (ref 0–0.04)
IMM GRANULOCYTES NFR BLD AUTO: 0.3 % (ref 0–0.5)
LYMPHOCYTES # BLD AUTO: 1.8 K/UL (ref 1–4.8)
LYMPHOCYTES NFR BLD: 20.9 % (ref 18–48)
MCH RBC QN AUTO: 30.6 PG (ref 27–31)
MCHC RBC AUTO-ENTMCNC: 32.1 G/DL (ref 32–36)
MCV RBC AUTO: 95 FL (ref 82–98)
MONOCYTES # BLD AUTO: 0.9 K/UL (ref 0.3–1)
MONOCYTES NFR BLD: 10.4 % (ref 4–15)
NEUTROPHILS # BLD AUTO: 5.3 K/UL (ref 1.8–7.7)
NEUTROPHILS NFR BLD: 61.3 % (ref 38–73)
NRBC BLD-RTO: 0 /100 WBC
PLATELET # BLD AUTO: 237 K/UL (ref 150–350)
PMV BLD AUTO: 10.6 FL (ref 9.2–12.9)
POTASSIUM SERPL-SCNC: 4.4 MMOL/L (ref 3.5–5.1)
PROT SERPL-MCNC: 6.7 G/DL (ref 6–8.4)
RBC # BLD AUTO: 4.64 M/UL (ref 4.6–6.2)
SODIUM SERPL-SCNC: 140 MMOL/L (ref 136–145)
T4 FREE SERPL-MCNC: 0.96 NG/DL (ref 0.71–1.51)
TSH SERPL DL<=0.005 MIU/L-ACNC: 1.81 UIU/ML (ref 0.4–4)
WBC # BLD AUTO: 8.69 K/UL (ref 3.9–12.7)

## 2019-08-05 PROCEDURE — 1101F PR PT FALLS ASSESS DOC 0-1 FALLS W/OUT INJ PAST YR: ICD-10-PCS | Mod: CPTII,S$GLB,, | Performed by: FAMILY MEDICINE

## 2019-08-05 PROCEDURE — 84443 ASSAY THYROID STIM HORMONE: CPT

## 2019-08-05 PROCEDURE — 99999 PR PBB SHADOW E&M-EST. PATIENT-LVL IV: ICD-10-PCS | Mod: PBBFAC,,, | Performed by: FAMILY MEDICINE

## 2019-08-05 PROCEDURE — 3078F DIAST BP <80 MM HG: CPT | Mod: CPTII,S$GLB,, | Performed by: FAMILY MEDICINE

## 2019-08-05 PROCEDURE — 99999 PR PBB SHADOW E&M-EST. PATIENT-LVL IV: CPT | Mod: PBBFAC,,, | Performed by: FAMILY MEDICINE

## 2019-08-05 PROCEDURE — 99214 PR OFFICE/OUTPT VISIT, EST, LEVL IV, 30-39 MIN: ICD-10-PCS | Mod: S$GLB,,, | Performed by: FAMILY MEDICINE

## 2019-08-05 PROCEDURE — 99214 OFFICE O/P EST MOD 30 MIN: CPT | Mod: S$GLB,,, | Performed by: FAMILY MEDICINE

## 2019-08-05 PROCEDURE — 3074F PR MOST RECENT SYSTOLIC BLOOD PRESSURE < 130 MM HG: ICD-10-PCS | Mod: CPTII,S$GLB,, | Performed by: FAMILY MEDICINE

## 2019-08-05 PROCEDURE — 3078F PR MOST RECENT DIASTOLIC BLOOD PRESSURE < 80 MM HG: ICD-10-PCS | Mod: CPTII,S$GLB,, | Performed by: FAMILY MEDICINE

## 2019-08-05 PROCEDURE — 36415 COLL VENOUS BLD VENIPUNCTURE: CPT | Mod: PO

## 2019-08-05 PROCEDURE — 84439 ASSAY OF FREE THYROXINE: CPT

## 2019-08-05 PROCEDURE — 1101F PT FALLS ASSESS-DOCD LE1/YR: CPT | Mod: CPTII,S$GLB,, | Performed by: FAMILY MEDICINE

## 2019-08-05 PROCEDURE — 80053 COMPREHEN METABOLIC PANEL: CPT

## 2019-08-05 PROCEDURE — 85025 COMPLETE CBC W/AUTO DIFF WBC: CPT

## 2019-08-05 PROCEDURE — 3074F SYST BP LT 130 MM HG: CPT | Mod: CPTII,S$GLB,, | Performed by: FAMILY MEDICINE

## 2019-08-05 RX ORDER — ATENOLOL AND CHLORTHALIDONE TABLET 50; 25 MG/1; MG/1
1 TABLET ORAL DAILY
Qty: 30 TABLET | Refills: 11 | Status: SHIPPED | OUTPATIENT
Start: 2019-08-05 | End: 2019-08-28 | Stop reason: SINTOL

## 2019-08-05 NOTE — PATIENT INSTRUCTIONS
Thyroid Antithyroglobulin Antibody  Does this test have other names?  Thyroid antibody test, thyroglobulin antibody test   What is this test?  This blood test looks for antibodies made by your body in response to thyroglobulin, a protein made by the thyroid gland. The thyroid is a small, butterfly-shaped gland in the lower-front of your neck that makes the hormones T3 and T4. These hormones help control your metabolism, the process in which your body uses energy.  The thyroglobulin antibody test can help diagnose thyroid problems. These include Hashimoto's thyroiditis and autoimmune thyroid disease.   Why do I need this test?  You may need this test if your healthcare provider suspects that you have a thyroid disorder. Thyroglobulin antibodies may be found in people who have a thyroid problem, such as underactive thyroid (hypothyroidism) or overactive thyroid (hyperthyroidism). Thyroglobulin antibodies attack thyroglobulin proteins and can destroy the thyroid gland itself.  You may have this test to confirm the results of another thyroid test--the thyroglobulin test. This test measures levels of the thyroglobulin protein.  If you've been treated for thyroid cancer, this test could be part of regular checkups to monitor your condition.  Symptoms of thyroid problems depend on whether you have an overactive or underactive thyroid.  Overactive thyroid symptoms include:  · Feeling weak and fatigued  · Trembling of hands and fingers  · Losing weight  · Feeling anxious, nervous, or irritable  · Sweating heavily  · Rapid or irregular heartbeat  · Low tolerance for heat  Underactive thyroid symptoms include:  · Digestive problems, including constipation  · Problems with menstruation  · Tiredness and a lack of energy  · Hair and skin dryness  · A frequent feeling of being cold  · Weight gain   · Depression  · Forgetfulness  · Painful joints  What other tests might I have along with this test?  Your healthcare provider may  test your blood sample to measure levels of other hormones involved in thyroid functions. These are:  · TSH, or thyroid-stimulating hormone, made in the pituitary gland  · T4 hormone, or thyroxine  · T3 hormone, or triiodothyronine  You may also have a thyroglobulin test.   What do my test results mean?  Many things may affect your lab test results. These include the method each lab uses to do the test. Even if your test results are different from the normal value, you may not have a problem. To learn what the results mean for you, talk with your healthcare provider.  Normal test results are negative. This means you have no thyroglobulin antibodies in your blood. A positive test means that you have thyroglobulin antibodies in your blood. This may mean you have a problem with your thyroid gland. A positive thyroglobulin antibody test result may also mean that your thyroglobulin test measurement is incorrect.  How is this test done?  The test requires a blood sample, which is drawn through a needle from a vein in your arm.   Does this test pose any risks?  Taking a blood sample with a needle carries risks that include bleeding, infection, bruising, or feeling dizzy. When the needle pricks your arm, you may feel a slight stinging sensation or pain. Afterward, the site may be slightly sore.  What might affect my test results?  Taking thyroid hormone medicines can affect your test results.   How do I get ready for this test?  Talk with your healthcare provider beforehand about skipping any medicines you are taking on the day of your test. You may also be asked to fast starting the night before the test.   In addition, be sure your provider knows about all medicines, herbs, vitamins, and supplements you are taking. This includes medicines that don't need a prescription and any illicit drugs you may use.   © 6668-8364 [employer]. [address] [place] [address], [place] [address]. All rights reserved. This  information is not intended as a substitute for professional medical care. Always follow your healthcare professional's instructions.        Discharge Instructions for Anaphylactic Shock  You have been diagnosed with a serious allergic reaction known as anaphylactic shock or anaphylaxis. Anaphylaxis happens within minutes of exposure to an allergen. Common causes are penicillin, nuts, intravenous (IV) contrast dyes used for some X-rays and scans, a bee sting, or latex products. In anaphylaxis:    · Blood pressure drops suddenly  · Less oxygen reaches your brain and other organs, and your body goes into shock  · An itchy red rash called hives may appear  If not treated quickly, anaphylactic shock can cause death.  Home care  Tips for home care include the following:  · Ask your healthcare provider about carrying an EpiPen. This is a single-dose injection of epinephrine (adrenaline). With the kit you can give yourself a shot of medicine that will help to stop the allergic reaction until you can get medical help.  · Learn how to give yourself an epinephrine shot so that you are prepared. EpiPens come with instructions, but you can also ask your healthcare provider or pharmacist.  · Make sure you always have more than one EpiPen. Carry one kit with you. Keep others where they are easy to find, for example, at your work desk, or in a gym or tote bag.  · Check the expiration date of your EpiPens regularly.  · Avoid the things that cause your allergic reaction whenever possible.  · If you have a food allergy, always ask about ingredients when eating food prepared by others. At a restaurant, tell your  about your food allergies.  · Wear a medical identification bracelet with the information about your allergy. Ask your healthcare provider how to get one.  · Tell your family, friends, and co-workers what they should do if you have a severe allergic reaction. Include:  ¨ How to use the EpiPen. Tell them to give you the  shot if you can't.  ¨ Your position during a reaction, having you lie down with your legs raised.  ¨ Calling 911.  ¨ Starting CPR if you stop breathing.  · Tell all of your healthcare providers about your allergies.  · Ask your healthcare provider about allergy shots (immunotherapy).  Follow-up care  Make a follow-up appointment with your healthcare provider.     When to call the healthcare provider  Call 911 right away if you have:  · Fainting or loss of consciousness  · Fast pulse  · Trouble breathing or wheezing  · Nausea or vomiting  · Swelling of your lips, tongue, or throat  · Itchy, blotchy skin or hives  · Pale, cool, damp skin  · Drowsiness  · Confusion   Date Last Reviewed: 9/1/2016  © 6508-9851 [employer]. [address] [place] [address], [place] [address]. All rights reserved. This information is not intended as a substitute for professional medical care. Always follow your healthcare professional's instructions.

## 2019-08-05 NOTE — PROGRESS NOTES
Subjective:       Patient ID: Maycol Hodge is a 79 y.o. male.    Chief Complaint: Transitional Care ([facility])    HPI  Review of Systems   Constitutional: Negative for fever.   Respiratory: Negative for choking, chest tightness and shortness of breath.    Cardiovascular: Negative for chest pain, palpitations and leg swelling.   Gastrointestinal: Negative for abdominal pain, anal bleeding, nausea and vomiting.   Skin: Negative for rash.       Patient Active Problem List   Diagnosis    Chronic allergic rhinitis    Hypertension    Severe persistent asthma with acute exacerbation    BPH (benign prostatic hyperplasia)    History of TIA (transient ischemic attack)    Hyperlipidemia    Dysphagia    Cataract    Urticaria    Hypothyroidism    Autoimmune urticaria    Anaphylactic syndrome    Hashimoto's thyroiditis   July 16th HA. 19th hives and whelps.  Seen allergy Dr. Unger who was consulted during hospital stay.  Admitted after syncope in triage at [facility]. Diagnosed with anaphylaxis and shock from unknown etiology. Denies throat or tongue swelling or SOB. No known food allergies.   Had slow pulse and low BP.  Given steroids. Released 22nd.  Had purpuric lesions concerning for possible vasculitis. Complement as normal.  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 Has had erratic and high BP.  C/o fatigue and sleepiness.  BX neg for vasculitis.  On bid antihistamine zyrtec and claritin.  Rash cleared immediately after admission    Has + TPO Ab 9/18    Patient is here for a chronic conditions follow up.    Transitional Care Note    Family and/or Caretaker present at visit?  No.  Diagnostic tests reviewed/disposition: I have reviewed all completed as well as pending diagnostic tests at the time of discharge.  Disease/illness education: yes  Home health/community services discussion/referrals: Patient does not have home health established from hospital visit.  They do not need home health.   If needed, we will set up home health for the patient.   Establishment or re-establishment of referral orders for community resources: No other necessary community resources.   Discussion with other health care providers: No discussion with other health care providers necessary.     Patient has a recent hospitalization which is summarized above.  Communication (direct contact by telephone or electronic mail) with the patient and/or caregiver was documented within 2 business days of discharge.    Medical decision making was based on a face-to-face visit scheduled within the indicated time frame.  Medication reconciliation and management was completed at this visit.      Objective:      Physical Exam   Constitutional: He is oriented to person, place, and time. He appears well-developed and well-nourished.   Cardiovascular: Normal rate, regular rhythm and normal heart sounds.   Pulmonary/Chest: Effort normal and breath sounds normal.   Musculoskeletal: He exhibits no edema.   Neurological: He is alert and oriented to person, place, and time.   Skin: Skin is warm and dry.   Psychiatric: He has a normal mood and affect.   Nursing note and vitals reviewed.      Assessment:       1. Hypothyroidism, unspecified type    2. Severe persistent asthma with acute exacerbation    3. Essential hypertension    4. Hyperlipidemia, unspecified hyperlipidemia type    5. Hashimoto's thyroiditis    6. Anaphylaxis, sequela    7. Autoimmune urticaria        Plan:         1. Hypothyroidism, unspecified type  Refer for consult. Repeat labs and treat as indicated  - Ambulatory referral to Endocrinology  - CBC auto differential; Future  - Comprehensive metabolic panel; Future  - TSH; Future  - T4, free; Future    2. Severe persistent asthma with acute exacerbation  Controlled with current regimen    3. Essential hypertension  Change losartan to (due to possibility of allergic urticaria/angioedema related to ARB0  - atenolol-chlorthalidone  (TENORETIC) 50-25 mg Tab; Take 1 tablet by mouth once daily.  Dispense: 30 tablet; Refill: 11    4. Hyperlipidemia, unspecified hyperlipidemia type  Stable condition.  Continue current medications.  Will adjust based on lab findings or if condition changes.      5. Hashimoto's thyroiditis  Discussed diagnosis, prognosis and treatment. Refer for consult  - Ambulatory referral to Endocrinology    6. Anaphylaxis, sequela  precautions    7. Autoimmune urticaria  Cont antihistamines prn and cont allergy consult        Time spent with patient: 20 minutes    Patient with be reevaluated in 6 months or sooner prn    Greater than 50% of this visit was spent counseling as described in above documentation:Yes

## 2019-08-06 DIAGNOSIS — N17.9 AKI (ACUTE KIDNEY INJURY): Primary | ICD-10-CM

## 2019-08-08 ENCOUNTER — NURSE TRIAGE (OUTPATIENT)
Dept: ADMINISTRATIVE | Facility: CLINIC | Age: 79
End: 2019-08-08

## 2019-08-08 NOTE — TELEPHONE ENCOUNTER
Patient called to report the following:     -hospitalized in July for very high/low blood pressure  -100/58 pulse 60   -denies symptoms   -wants to now if bp is too low   -advised on normal bp and s/s to call back     Reason for Disposition   Health Information question, no triage required and triager able to answer question    Protocols used: INFORMATION ONLY CALL-A-AH

## 2019-08-09 NOTE — TELEPHONE ENCOUNTER
BP In clinic was normal.  If BP now < 100/60 and symptoms of lightheadedness then hold tenoretic until BP > 140/90.  Make nurse BP appt in 2 weeks with BP log .

## 2019-08-12 ENCOUNTER — LAB VISIT (OUTPATIENT)
Dept: LAB | Facility: HOSPITAL | Age: 79
End: 2019-08-12
Attending: FAMILY MEDICINE
Payer: MEDICARE

## 2019-08-12 DIAGNOSIS — N17.9 AKI (ACUTE KIDNEY INJURY): ICD-10-CM

## 2019-08-12 LAB
ANION GAP SERPL CALC-SCNC: 10 MMOL/L (ref 8–16)
BUN SERPL-MCNC: 22 MG/DL (ref 8–23)
CALCIUM SERPL-MCNC: 9.3 MG/DL (ref 8.7–10.5)
CHLORIDE SERPL-SCNC: 104 MMOL/L (ref 95–110)
CO2 SERPL-SCNC: 26 MMOL/L (ref 23–29)
CREAT SERPL-MCNC: 1.2 MG/DL (ref 0.5–1.4)
EST. GFR  (AFRICAN AMERICAN): >60 ML/MIN/1.73 M^2
EST. GFR  (NON AFRICAN AMERICAN): 56.4 ML/MIN/1.73 M^2
GLUCOSE SERPL-MCNC: 106 MG/DL (ref 70–110)
POTASSIUM SERPL-SCNC: 3.9 MMOL/L (ref 3.5–5.1)
SODIUM SERPL-SCNC: 140 MMOL/L (ref 136–145)

## 2019-08-12 PROCEDURE — 80048 BASIC METABOLIC PNL TOTAL CA: CPT

## 2019-08-12 PROCEDURE — 36415 COLL VENOUS BLD VENIPUNCTURE: CPT | Mod: PO

## 2019-08-27 ENCOUNTER — TELEPHONE (OUTPATIENT)
Dept: FAMILY MEDICINE | Facility: CLINIC | Age: 79
End: 2019-08-27

## 2019-08-27 ENCOUNTER — CLINICAL SUPPORT (OUTPATIENT)
Dept: FAMILY MEDICINE | Facility: CLINIC | Age: 79
End: 2019-08-27
Payer: MEDICARE

## 2019-08-27 VITALS — DIASTOLIC BLOOD PRESSURE: 58 MMHG | OXYGEN SATURATION: 96 % | SYSTOLIC BLOOD PRESSURE: 108 MMHG | HEART RATE: 51 BPM

## 2019-08-27 PROCEDURE — 99999 PR PBB SHADOW E&M-EST. PATIENT-LVL II: ICD-10-PCS | Mod: PBBFAC,,,

## 2019-08-27 PROCEDURE — 99999 PR PBB SHADOW E&M-EST. PATIENT-LVL II: CPT | Mod: PBBFAC,,,

## 2019-08-27 NOTE — TELEPHONE ENCOUNTER
Last BP was 120/70 8/5/19. Last dose of BP medication was this morning. At home readings are on your desk. BP today 108/58.      Patient states he is extremely fatigued and wonders if you have any suggestions or want labs to determine cause. Patient states it has been this way since he got out of the hospital. He is also having headaches much more frequently than ever before in his life since hospital discharge. Patient wonders if the medication is making his bp too low. He takes 1/2 in the morning and 1/2 in the evening. Checked 02 96% and HR 51.

## 2019-08-28 ENCOUNTER — OFFICE VISIT (OUTPATIENT)
Dept: FAMILY MEDICINE | Facility: CLINIC | Age: 79
End: 2019-08-28
Payer: MEDICARE

## 2019-08-28 VITALS
WEIGHT: 174.19 LBS | DIASTOLIC BLOOD PRESSURE: 74 MMHG | TEMPERATURE: 98 F | SYSTOLIC BLOOD PRESSURE: 102 MMHG | HEIGHT: 71 IN | BODY MASS INDEX: 24.39 KG/M2 | OXYGEN SATURATION: 94 % | HEART RATE: 54 BPM

## 2019-08-28 DIAGNOSIS — R00.1 BRADYCARDIA: ICD-10-CM

## 2019-08-28 DIAGNOSIS — I95.2 HYPOTENSION DUE TO DRUGS: Primary | ICD-10-CM

## 2019-08-28 DIAGNOSIS — T88.7XXA MEDICATION SIDE EFFECT: ICD-10-CM

## 2019-08-28 PROCEDURE — 99213 OFFICE O/P EST LOW 20 MIN: CPT | Mod: S$GLB,,, | Performed by: PHYSICIAN ASSISTANT

## 2019-08-28 PROCEDURE — 99999 PR PBB SHADOW E&M-EST. PATIENT-LVL IV: ICD-10-PCS | Mod: PBBFAC,,, | Performed by: PHYSICIAN ASSISTANT

## 2019-08-28 PROCEDURE — 3074F PR MOST RECENT SYSTOLIC BLOOD PRESSURE < 130 MM HG: ICD-10-PCS | Mod: CPTII,S$GLB,, | Performed by: PHYSICIAN ASSISTANT

## 2019-08-28 PROCEDURE — 3074F SYST BP LT 130 MM HG: CPT | Mod: CPTII,S$GLB,, | Performed by: PHYSICIAN ASSISTANT

## 2019-08-28 PROCEDURE — 99999 PR PBB SHADOW E&M-EST. PATIENT-LVL IV: CPT | Mod: PBBFAC,,, | Performed by: PHYSICIAN ASSISTANT

## 2019-08-28 PROCEDURE — 99213 PR OFFICE/OUTPT VISIT, EST, LEVL III, 20-29 MIN: ICD-10-PCS | Mod: S$GLB,,, | Performed by: PHYSICIAN ASSISTANT

## 2019-08-28 PROCEDURE — 1101F PR PT FALLS ASSESS DOC 0-1 FALLS W/OUT INJ PAST YR: ICD-10-PCS | Mod: CPTII,S$GLB,, | Performed by: PHYSICIAN ASSISTANT

## 2019-08-28 PROCEDURE — 3078F PR MOST RECENT DIASTOLIC BLOOD PRESSURE < 80 MM HG: ICD-10-PCS | Mod: CPTII,S$GLB,, | Performed by: PHYSICIAN ASSISTANT

## 2019-08-28 PROCEDURE — 1101F PT FALLS ASSESS-DOCD LE1/YR: CPT | Mod: CPTII,S$GLB,, | Performed by: PHYSICIAN ASSISTANT

## 2019-08-28 PROCEDURE — 3078F DIAST BP <80 MM HG: CPT | Mod: CPTII,S$GLB,, | Performed by: PHYSICIAN ASSISTANT

## 2019-08-28 NOTE — PROGRESS NOTES
Subjective:       Patient ID: Maycol Hodge is a 79 y.o. male.    Chief Complaint: Fatigue    HPI   Sx started after starting new BP med   Pt started atenolol - clorthalidone 50-25 prior to onset of sx  Review of Systems   Constitutional: Positive for activity change and fatigue. Negative for appetite change, chills, diaphoresis, fever and unexpected weight change.   HENT: Negative.    Eyes: Negative.    Respiratory: Negative.  Negative for cough and shortness of breath.    Cardiovascular: Negative.  Negative for chest pain and leg swelling.   Gastrointestinal: Negative.    Endocrine: Negative.    Genitourinary: Negative.    Musculoskeletal: Negative.    Skin: Negative.  Negative for rash.   Neurological: Positive for light-headedness.       Objective:      Physical Exam   Constitutional: He appears well-developed and well-nourished. No distress.   HENT:   Head: Normocephalic and atraumatic.   Right Ear: External ear normal.   Left Ear: External ear normal.   Nose: Nose normal.   Mouth/Throat: Oropharynx is clear and moist. No oropharyngeal exudate.   Eyes: Conjunctivae are normal. No scleral icterus.   Neck: Normal range of motion. Neck supple. No tracheal deviation present. No thyromegaly present.   Carotids clear   Cardiovascular: Regular rhythm, normal heart sounds and intact distal pulses. Exam reveals no gallop and no friction rub.   No murmur heard.  Slow rate   Pulmonary/Chest: Effort normal and breath sounds normal. No stridor. No respiratory distress. He has no wheezes. He has no rales.   Musculoskeletal: He exhibits no edema.   Lymphadenopathy:     He has no cervical adenopathy.   Skin: Skin is warm and dry. No rash noted.   Vitals reviewed.      Assessment:       1. Hypotension due to drugs    2. Medication side effect    3. Bradycardia        Plan:       Maycol was seen today for fatigue.    Diagnoses and all orders for this visit:    Hypotension due to drugs    Medication side  effect    Bradycardia    DC atenolol - chlorthalidone   Close monitor pulse and BP at home and log  F/u next wk to discuss med options

## 2019-09-04 ENCOUNTER — OFFICE VISIT (OUTPATIENT)
Dept: FAMILY MEDICINE | Facility: CLINIC | Age: 79
End: 2019-09-04
Payer: MEDICARE

## 2019-09-04 VITALS
DIASTOLIC BLOOD PRESSURE: 80 MMHG | HEART RATE: 65 BPM | WEIGHT: 175.69 LBS | OXYGEN SATURATION: 95 % | HEIGHT: 71 IN | TEMPERATURE: 98 F | BODY MASS INDEX: 24.6 KG/M2 | SYSTOLIC BLOOD PRESSURE: 130 MMHG

## 2019-09-04 DIAGNOSIS — I95.2 HYPOTENSION DUE TO DRUGS: Primary | ICD-10-CM

## 2019-09-04 DIAGNOSIS — R53.83 FATIGUE, UNSPECIFIED TYPE: ICD-10-CM

## 2019-09-04 DIAGNOSIS — R00.1 BRADYCARDIA: ICD-10-CM

## 2019-09-04 DIAGNOSIS — G47.30 SLEEP APNEA, UNSPECIFIED TYPE: ICD-10-CM

## 2019-09-04 PROCEDURE — 1101F PR PT FALLS ASSESS DOC 0-1 FALLS W/OUT INJ PAST YR: ICD-10-PCS | Mod: CPTII,S$GLB,, | Performed by: PHYSICIAN ASSISTANT

## 2019-09-04 PROCEDURE — 3079F DIAST BP 80-89 MM HG: CPT | Mod: CPTII,S$GLB,, | Performed by: PHYSICIAN ASSISTANT

## 2019-09-04 PROCEDURE — 99999 PR PBB SHADOW E&M-EST. PATIENT-LVL III: ICD-10-PCS | Mod: PBBFAC,,, | Performed by: PHYSICIAN ASSISTANT

## 2019-09-04 PROCEDURE — 3075F PR MOST RECENT SYSTOLIC BLOOD PRESS GE 130-139MM HG: ICD-10-PCS | Mod: CPTII,S$GLB,, | Performed by: PHYSICIAN ASSISTANT

## 2019-09-04 PROCEDURE — 1101F PT FALLS ASSESS-DOCD LE1/YR: CPT | Mod: CPTII,S$GLB,, | Performed by: PHYSICIAN ASSISTANT

## 2019-09-04 PROCEDURE — 99213 PR OFFICE/OUTPT VISIT, EST, LEVL III, 20-29 MIN: ICD-10-PCS | Mod: S$GLB,,, | Performed by: PHYSICIAN ASSISTANT

## 2019-09-04 PROCEDURE — 3075F SYST BP GE 130 - 139MM HG: CPT | Mod: CPTII,S$GLB,, | Performed by: PHYSICIAN ASSISTANT

## 2019-09-04 PROCEDURE — 99999 PR PBB SHADOW E&M-EST. PATIENT-LVL III: CPT | Mod: PBBFAC,,, | Performed by: PHYSICIAN ASSISTANT

## 2019-09-04 PROCEDURE — 3079F PR MOST RECENT DIASTOLIC BLOOD PRESSURE 80-89 MM HG: ICD-10-PCS | Mod: CPTII,S$GLB,, | Performed by: PHYSICIAN ASSISTANT

## 2019-09-04 PROCEDURE — 99213 OFFICE O/P EST LOW 20 MIN: CPT | Mod: S$GLB,,, | Performed by: PHYSICIAN ASSISTANT

## 2019-09-04 NOTE — PROGRESS NOTES
Subjective:       Patient ID: Maycol Hodge is a 79 y.o. male.    Chief Complaint: Follow-up    HPI   Pt in for f/u on hypotension and bradycardia from beta blocker  Pt is doing better  Pulse and BP have returned to normal  Pt still has fatigue  Pt does snore badly at night   No previous sleep study  Review of Systems   Constitutional: Positive for activity change and fatigue.   HENT: Negative.    Eyes: Negative.    Respiratory: Negative.  Negative for cough and shortness of breath.    Cardiovascular: Negative.  Negative for chest pain and leg swelling.   Gastrointestinal: Negative.    Endocrine: Negative.    Genitourinary: Negative.    Musculoskeletal: Negative.    Skin: Negative.  Negative for rash.   Neurological: Negative.        Objective:      Physical Exam   Constitutional: He appears well-developed and well-nourished. No distress.   HENT:   Head: Normocephalic and atraumatic.   Mouth/Throat: Oropharynx is clear and moist.   Eyes: Conjunctivae are normal. No scleral icterus.   Neck: Normal range of motion. Neck supple. No tracheal deviation present. No thyromegaly present.   Cardiovascular: Normal rate, regular rhythm, normal heart sounds and intact distal pulses. Exam reveals no gallop and no friction rub.   No murmur heard.  Pulmonary/Chest: Effort normal and breath sounds normal. No stridor. No respiratory distress. He has no wheezes. He has no rales.   Musculoskeletal: He exhibits no edema.   Lymphadenopathy:     He has no cervical adenopathy.   Skin: Skin is warm and dry. No rash noted.   Vitals reviewed.      Assessment:       1. Hypotension due to drugs    2. Bradycardia    3. Fatigue, unspecified type    4. Sleep apnea, unspecified type        Plan:       Maycol was seen today for follow-up.    Diagnoses and all orders for this visit:    Hypotension due to drugs    Bradycardia    Fatigue, unspecified type    Sleep apnea, unspecified type    monitor BP and pulse  Discussed sleep study

## 2019-09-06 ENCOUNTER — LAB VISIT (OUTPATIENT)
Dept: LAB | Facility: HOSPITAL | Age: 79
End: 2019-09-06
Attending: PHYSICIAN ASSISTANT
Payer: MEDICARE

## 2019-09-06 ENCOUNTER — OFFICE VISIT (OUTPATIENT)
Dept: ENDOCRINOLOGY | Facility: CLINIC | Age: 79
End: 2019-09-06
Payer: MEDICARE

## 2019-09-06 VITALS
HEIGHT: 71 IN | BODY MASS INDEX: 24.49 KG/M2 | WEIGHT: 174.94 LBS | TEMPERATURE: 98 F | SYSTOLIC BLOOD PRESSURE: 138 MMHG | DIASTOLIC BLOOD PRESSURE: 82 MMHG | HEART RATE: 66 BPM

## 2019-09-06 DIAGNOSIS — E03.9 HYPOTHYROIDISM, UNSPECIFIED TYPE: ICD-10-CM

## 2019-09-06 DIAGNOSIS — E55.9 HYPOVITAMINOSIS D: ICD-10-CM

## 2019-09-06 DIAGNOSIS — R53.83 FATIGUE, UNSPECIFIED TYPE: ICD-10-CM

## 2019-09-06 DIAGNOSIS — E06.3 HYPOTHYROIDISM DUE TO HASHIMOTO'S THYROIDITIS: Primary | ICD-10-CM

## 2019-09-06 DIAGNOSIS — E03.8 HYPOTHYROIDISM DUE TO HASHIMOTO'S THYROIDITIS: Primary | ICD-10-CM

## 2019-09-06 LAB
25(OH)D3+25(OH)D2 SERPL-MCNC: 44 NG/ML (ref 30–96)
CORTIS SERPL-MCNC: 6.6 UG/DL (ref 4.3–22.4)
T3 SERPL-MCNC: 73 NG/DL (ref 60–180)
T4 FREE SERPL-MCNC: 0.91 NG/DL (ref 0.71–1.51)
THYROPEROXIDASE IGG SERPL-ACNC: 88 IU/ML
TSH SERPL DL<=0.005 MIU/L-ACNC: 1.66 UIU/ML (ref 0.4–4)

## 2019-09-06 PROCEDURE — 84244 ASSAY OF RENIN: CPT

## 2019-09-06 PROCEDURE — 82024 ASSAY OF ACTH: CPT

## 2019-09-06 PROCEDURE — 1101F PR PT FALLS ASSESS DOC 0-1 FALLS W/OUT INJ PAST YR: ICD-10-PCS | Mod: CPTII,S$GLB,, | Performed by: PHYSICIAN ASSISTANT

## 2019-09-06 PROCEDURE — 84443 ASSAY THYROID STIM HORMONE: CPT

## 2019-09-06 PROCEDURE — 99213 OFFICE O/P EST LOW 20 MIN: CPT | Mod: S$GLB,,, | Performed by: PHYSICIAN ASSISTANT

## 2019-09-06 PROCEDURE — 3075F PR MOST RECENT SYSTOLIC BLOOD PRESS GE 130-139MM HG: ICD-10-PCS | Mod: CPTII,S$GLB,, | Performed by: PHYSICIAN ASSISTANT

## 2019-09-06 PROCEDURE — 84439 ASSAY OF FREE THYROXINE: CPT

## 2019-09-06 PROCEDURE — 82306 VITAMIN D 25 HYDROXY: CPT

## 2019-09-06 PROCEDURE — 86376 MICROSOMAL ANTIBODY EACH: CPT

## 2019-09-06 PROCEDURE — 1101F PT FALLS ASSESS-DOCD LE1/YR: CPT | Mod: CPTII,S$GLB,, | Performed by: PHYSICIAN ASSISTANT

## 2019-09-06 PROCEDURE — 3079F DIAST BP 80-89 MM HG: CPT | Mod: CPTII,S$GLB,, | Performed by: PHYSICIAN ASSISTANT

## 2019-09-06 PROCEDURE — 99999 PR PBB SHADOW E&M-EST. PATIENT-LVL III: ICD-10-PCS | Mod: PBBFAC,,, | Performed by: PHYSICIAN ASSISTANT

## 2019-09-06 PROCEDURE — 99999 PR PBB SHADOW E&M-EST. PATIENT-LVL III: CPT | Mod: PBBFAC,,, | Performed by: PHYSICIAN ASSISTANT

## 2019-09-06 PROCEDURE — 82088 ASSAY OF ALDOSTERONE: CPT

## 2019-09-06 PROCEDURE — 3079F PR MOST RECENT DIASTOLIC BLOOD PRESSURE 80-89 MM HG: ICD-10-PCS | Mod: CPTII,S$GLB,, | Performed by: PHYSICIAN ASSISTANT

## 2019-09-06 PROCEDURE — 82533 TOTAL CORTISOL: CPT

## 2019-09-06 PROCEDURE — 99213 PR OFFICE/OUTPT VISIT, EST, LEVL III, 20-29 MIN: ICD-10-PCS | Mod: S$GLB,,, | Performed by: PHYSICIAN ASSISTANT

## 2019-09-06 PROCEDURE — 84480 ASSAY TRIIODOTHYRONINE (T3): CPT

## 2019-09-06 PROCEDURE — 86800 THYROGLOBULIN ANTIBODY: CPT

## 2019-09-06 PROCEDURE — 3075F SYST BP GE 130 - 139MM HG: CPT | Mod: CPTII,S$GLB,, | Performed by: PHYSICIAN ASSISTANT

## 2019-09-06 NOTE — LETTER
September 6, 2019      Umu Fox MD  [address]  [place] [address]           [place] [facility]  [address]  [place] [address]  Phone: [phone]          Patient: Maycol Hodge   MR Number: [id number]   YOB: 1940   Date of Visit: 9/6/2019       Dear Dr. Umu Fox:    Thank you for referring Maycol Hodge to me for evaluation. Attached you will find relevant portions of my assessment and plan of care.    If you have questions, please do not hesitate to call me. I look forward to following Maycol Hodge along with you.    Sincerely,    WOODROW Fuentes PA-C    Enclosure  CC:  No Recipients    If you would like to receive this communication electronically, please contact [email or web address] or [phone] to request more information on [employer] Link access.    For providers and/or their staff who would like to refer a patient to [facility], please contact us through our one-stop-shop provider referral line, [facility], at [phone].    If you feel you have received this communication in error or would no longer like to receive these types of communications, please e-mail [email or web address]

## 2019-09-06 NOTE — PROGRESS NOTES
"CC: Hypothyroidism/Hashimoto's thyroiditis    HPI: Maycol Hodge is a 79 y.o. male here for hypothyroidism along with pending conditions listed in the Visit Diagnosis. Takes 50 mcg daily of levothyroxine. Pt is complaining of fatigue and low blood pressure. No muscle weakness, n/v or abdominal pain. States his exercise tolerance has reduced since July 16th. Also, had a rash with urticaria that resolved after steriods given in the ER. No palpitations, tremors, heat/cold intolerance, diarrhea/constipation, wt changes, changes in hair or nails. His energy has improved since he stopped atenolol and chlorothiadone. Takes 500 mg of vitamin d. Thyroid u/s 9/18 did not show any nodules.     PMHx, PSHx: reviewed in epic.  Social Hx: no ETOH/tobacco use.     ROS:   Constitutional: + fatigue, wt stable  Eyes: No recent visual changes  Cardiovascular: Denies current anginal symptoms  Respiratory: Denies current respiratory difficulty  Gastrointestinal: Denies recent bowel disturbances  GenitoUrinary - No dysuria  Skin: No new skin rash  Neurologic: No focal neurologic complaints  Remainder ROS negative     /82 (BP Location: Right arm, Patient Position: Sitting, BP Method: Small (Manual))   Pulse 66   Temp 97.7 °F (36.5 °C) (Oral)   Ht 5' 11" (1.803 m)   Wt 79.3 kg (174 lb 15 oz)   BMI 24.40 kg/m²      Personally reviewed labs below:  Lab Results   Component Value Date    TSH 1.814 08/05/2019    Z1VXBYL 65 09/21/2018    FREET4 0.96 08/05/2019          Chemistry        Component Value Date/Time     08/12/2019 1007    K 3.9 08/12/2019 1007     08/12/2019 1007    CO2 26 08/12/2019 1007    BUN 22 08/12/2019 1007    CREATININE 1.2 08/12/2019 1007     08/12/2019 1007        Component Value Date/Time    CALCIUM 9.3 08/12/2019 1007    ALKPHOS 67 08/05/2019 1108    AST 18 08/05/2019 1108    ALT 16 08/05/2019 1108    BILITOT 1.0 08/05/2019 1108    ESTGFRAFRICA >60.0 08/12/2019 1007    EGFRNONAA 56.4 (A) " 08/12/2019 1007           No results found for: HGBA1C     PE:  GENERAL: elderly male, well developed, well nourished  NECK: Supple neck, normal thyroid. No bruit  LYMPHATIC: No cervical or supraclavicular lymphadenopathy  CARDIOVASCULAR: Normal heart sounds, no pedal edema  RESPIRATORY: Normal effort, clear to auscultation  ABDOMEN: soft, non-tender, non-distended.  FEET: appropriate footwear.     Assessment/Plan:   1. Hypothyroidism due to Hashimoto's thyroiditis  Thyroglobulin    Thyroid peroxidase antibody    TSH   2. Fatigue, unspecified type  ACTH    Cortisol, 8AM    T4, free    T3    Aldosterone    Renin    TSH   3. Hypovitaminosis D  Vitamin D     Hypothyroidism/Hashimoto's Disease-TFTs today. Continue LT4 dose.  Fatigue-check acth/cortisol. Symptoms are improving since his blood pressure medication was stopped. Blood pressure has improved.   Hypovitaminosis d-check vd    F/u prn

## 2019-09-09 LAB
ALDOST SERPL-MCNC: 11 NG/DL
THRYOGLOBULIN INTERPRETATION: ABNORMAL
THYROGLOB AB SERPL-ACNC: 11 IU/ML
THYROGLOB SERPL-MCNC: 13 NG/ML

## 2019-09-10 LAB
ACTH PLAS-MCNC: 20 PG/ML (ref 0–46)
RENIN PLAS-CCNC: 1.1 NG/ML/H

## 2019-09-13 DIAGNOSIS — J45.50 SEVERE PERSISTENT ASTHMA WITHOUT COMPLICATION: ICD-10-CM

## 2019-09-13 DIAGNOSIS — J40 BRONCHITIS: ICD-10-CM

## 2019-09-13 RX ORDER — AZITHROMYCIN 500 MG/1
500 TABLET, FILM COATED ORAL DAILY
Qty: 3 TABLET | Refills: 1 | Status: SHIPPED | OUTPATIENT
Start: 2019-09-13 | End: 2019-10-23 | Stop reason: ALTCHOICE

## 2019-10-04 DIAGNOSIS — J45.20 INTERMITTENT ASTHMA, UNCOMPLICATED: ICD-10-CM

## 2019-10-04 DIAGNOSIS — J44.89 EXACERBATION OF CHRONIC BRONCHIOLITIS: ICD-10-CM

## 2019-10-04 RX ORDER — IPRATROPIUM BROMIDE AND ALBUTEROL SULFATE 2.5; .5 MG/3ML; MG/3ML
3 SOLUTION RESPIRATORY (INHALATION) EVERY 6 HOURS PRN
Qty: 120 VIAL | Refills: 5 | Status: SHIPPED | OUTPATIENT
Start: 2019-10-04 | End: 2022-08-15

## 2019-10-04 NOTE — TELEPHONE ENCOUNTER
----- Message from Jesus Winn sent at 10/4/2019 11:18 AM CDT -----  Contact: Patient  Type:  RX Refill Request    Who Called:  Patient  Refill or New Rx:  Refill  RX Name and Strength:  albuterol-ipratropium 2.5mg-0.5mg/3mL (DUO-NEB) 0.5 mg-3 mg(2.5 mg base)/3 mL nebulizer solution  How is the patient currently taking it? (ex. 1XDay):  As ordered  Is this a 30 day or 90 day RX:  39  Preferred Pharmacy with phone number:    [facility] [id number] - [place], [place] - [address] AT [address] [facility] [address] [address] [facility]  [address]  [place] [address]  Phone: [phone] Fax: [phone]  Local or Mail Order:  Local  Ordering Provider:  Dr. aMrcin Welch Call Back Number:  [phone]  Additional Information:  BASSEM

## 2019-10-11 DIAGNOSIS — E03.9 HYPOTHYROIDISM, UNSPECIFIED TYPE: ICD-10-CM

## 2019-10-11 NOTE — TELEPHONE ENCOUNTER
----- Message from Sinai Muhammad sent at 10/11/2019  4:50 PM CDT -----  Contact: Maycol osman  Type:  RX Refill Request    Who Called:  Maycol  Refill or New Rx:  refill  RX Name and Strength:  levothyroxine (SYNTHROID) 50 MCG tablet  How is the patient currently taking it? (ex. 1XDay):  As directed  Is this a 30 day or 90 day RX:  90  Preferred Pharmacy with phone number:    [facility] #[id number] - [place], [place] - [address] [facility] [address] [facility]  [address] [address]  Phone: [phone] Fax: [phone]        Local or Mail Order:  Local  Ordering Provider:  Ruddy  Best Call Back Number:  [phone]  Additional Information:  Pls call pt regarding his refill. He is also requesting to know if Dr Fox wants him to still take the med. His thyroid dr told him his thyroid was in good shape.

## 2019-10-12 RX ORDER — LEVOTHYROXINE SODIUM 50 UG/1
50 TABLET ORAL DAILY
Qty: 30 TABLET | Refills: 11 | Status: SHIPPED | OUTPATIENT
Start: 2019-10-12 | End: 2019-10-23 | Stop reason: SDUPTHER

## 2019-10-13 RX ORDER — LEVOTHYROXINE SODIUM 50 UG/1
50 TABLET ORAL DAILY
Qty: 30 TABLET | Refills: 11 | Status: SHIPPED | OUTPATIENT
Start: 2019-10-13 | End: 2020-12-30 | Stop reason: SDUPTHER

## 2019-10-13 NOTE — TELEPHONE ENCOUNTER
The value is in the normal range BECAUSE he is taking a supplement. If he stops it then his own thyroid will not produce enough to keep up with his needs.  Keep taking it

## 2019-10-14 NOTE — TELEPHONE ENCOUNTER
Called patient regarding thyroid medication, no answer left detail voice message for patient to call back.

## 2019-10-23 ENCOUNTER — OFFICE VISIT (OUTPATIENT)
Dept: FAMILY MEDICINE | Facility: CLINIC | Age: 79
End: 2019-10-23
Payer: MEDICARE

## 2019-10-23 VITALS
TEMPERATURE: 98 F | HEART RATE: 66 BPM | OXYGEN SATURATION: 96 % | DIASTOLIC BLOOD PRESSURE: 70 MMHG | SYSTOLIC BLOOD PRESSURE: 130 MMHG | HEIGHT: 71 IN | RESPIRATION RATE: 14 BRPM | BODY MASS INDEX: 24.97 KG/M2 | WEIGHT: 178.38 LBS

## 2019-10-23 DIAGNOSIS — L50.8 AUTOIMMUNE URTICARIA: ICD-10-CM

## 2019-10-23 DIAGNOSIS — I10 ESSENTIAL HYPERTENSION: Primary | ICD-10-CM

## 2019-10-23 DIAGNOSIS — J30.9 CHRONIC ALLERGIC RHINITIS: ICD-10-CM

## 2019-10-23 DIAGNOSIS — E06.3 HASHIMOTO'S THYROIDITIS: ICD-10-CM

## 2019-10-23 DIAGNOSIS — J45.51 SEVERE PERSISTENT ASTHMA WITH ACUTE EXACERBATION: ICD-10-CM

## 2019-10-23 DIAGNOSIS — N40.0 BENIGN PROSTATIC HYPERPLASIA WITHOUT LOWER URINARY TRACT SYMPTOMS: ICD-10-CM

## 2019-10-23 DIAGNOSIS — E03.8 HYPOTHYROIDISM DUE TO HASHIMOTO'S THYROIDITIS: ICD-10-CM

## 2019-10-23 DIAGNOSIS — R53.83 FATIGUE, UNSPECIFIED TYPE: ICD-10-CM

## 2019-10-23 DIAGNOSIS — Z23 FLU VACCINE NEED: ICD-10-CM

## 2019-10-23 DIAGNOSIS — E06.3 HYPOTHYROIDISM DUE TO HASHIMOTO'S THYROIDITIS: ICD-10-CM

## 2019-10-23 PROCEDURE — G0008 ADMIN INFLUENZA VIRUS VAC: HCPCS | Mod: S$GLB,,, | Performed by: FAMILY MEDICINE

## 2019-10-23 PROCEDURE — 99499 RISK ADDL DX/OHS AUDIT: ICD-10-PCS | Mod: S$GLB,,, | Performed by: FAMILY MEDICINE

## 2019-10-23 PROCEDURE — 3075F PR MOST RECENT SYSTOLIC BLOOD PRESS GE 130-139MM HG: ICD-10-PCS | Mod: CPTII,S$GLB,, | Performed by: FAMILY MEDICINE

## 2019-10-23 PROCEDURE — 99999 PR PBB SHADOW E&M-EST. PATIENT-LVL III: ICD-10-PCS | Mod: PBBFAC,,, | Performed by: FAMILY MEDICINE

## 2019-10-23 PROCEDURE — 99214 OFFICE O/P EST MOD 30 MIN: CPT | Mod: 25,S$GLB,, | Performed by: FAMILY MEDICINE

## 2019-10-23 PROCEDURE — 99214 PR OFFICE/OUTPT VISIT, EST, LEVL IV, 30-39 MIN: ICD-10-PCS | Mod: 25,S$GLB,, | Performed by: FAMILY MEDICINE

## 2019-10-23 PROCEDURE — 99999 PR PBB SHADOW E&M-EST. PATIENT-LVL III: CPT | Mod: PBBFAC,,, | Performed by: FAMILY MEDICINE

## 2019-10-23 PROCEDURE — 3078F DIAST BP <80 MM HG: CPT | Mod: CPTII,S$GLB,, | Performed by: FAMILY MEDICINE

## 2019-10-23 PROCEDURE — 90662 FLU VACCINE - HIGH DOSE (65+) PRESERVATIVE FREE IM: ICD-10-PCS | Mod: S$GLB,,, | Performed by: FAMILY MEDICINE

## 2019-10-23 PROCEDURE — 90662 IIV NO PRSV INCREASED AG IM: CPT | Mod: S$GLB,,, | Performed by: FAMILY MEDICINE

## 2019-10-23 PROCEDURE — 3078F PR MOST RECENT DIASTOLIC BLOOD PRESSURE < 80 MM HG: ICD-10-PCS | Mod: CPTII,S$GLB,, | Performed by: FAMILY MEDICINE

## 2019-10-23 PROCEDURE — 99499 UNLISTED E&M SERVICE: CPT | Mod: S$GLB,,, | Performed by: FAMILY MEDICINE

## 2019-10-23 PROCEDURE — G0008 FLU VACCINE - HIGH DOSE (65+) PRESERVATIVE FREE IM: ICD-10-PCS | Mod: S$GLB,,, | Performed by: FAMILY MEDICINE

## 2019-10-23 PROCEDURE — 1101F PR PT FALLS ASSESS DOC 0-1 FALLS W/OUT INJ PAST YR: ICD-10-PCS | Mod: CPTII,S$GLB,, | Performed by: FAMILY MEDICINE

## 2019-10-23 PROCEDURE — 1101F PT FALLS ASSESS-DOCD LE1/YR: CPT | Mod: CPTII,S$GLB,, | Performed by: FAMILY MEDICINE

## 2019-10-23 PROCEDURE — 3075F SYST BP GE 130 - 139MM HG: CPT | Mod: CPTII,S$GLB,, | Performed by: FAMILY MEDICINE

## 2019-10-23 RX ORDER — TERAZOSIN 5 MG/1
5 CAPSULE ORAL DAILY
Qty: 90 CAPSULE | Refills: 3 | Status: SHIPPED | OUTPATIENT
Start: 2019-10-23 | End: 2020-05-05 | Stop reason: SDUPTHER

## 2019-10-23 RX ORDER — LOSARTAN POTASSIUM 50 MG/1
50 TABLET ORAL DAILY
Qty: 90 TABLET | Refills: 3 | Status: SHIPPED | OUTPATIENT
Start: 2019-10-23 | End: 2020-11-03 | Stop reason: SDUPTHER

## 2019-10-23 NOTE — PROGRESS NOTES
Subjective:       Patient ID: Maycol Hodge is a 79 y.o. male.    Chief Complaint: Follow-up (6mth f/u hypertension)    HPI  Review of Systems   Constitutional: Positive for fatigue. Negative for chills and fever.   HENT: Positive for postnasal drip, rhinorrhea, sneezing and sore throat. Negative for ear discharge, ear pain and sinus pressure.    Respiratory: Positive for wheezing. Negative for cough and shortness of breath.    Neurological: Negative for dizziness.       Patient Active Problem List   Diagnosis    Chronic allergic rhinitis    Hypertension    Severe persistent asthma with acute exacerbation    BPH (benign prostatic hyperplasia)    History of TIA (transient ischemic attack)    Hyperlipidemia    Dysphagia    Cataract    Urticaria    Hypothyroidism    Autoimmune urticaria    Anaphylactic syndrome    Hashimoto's thyroiditis     Patient is here for a chronic conditions follow up.    C/o fatigue, easy to tire. No sob. Generalized weakness. No syncope x 2 months     Had episodes of low BP and symptomatic bradycardia 8/19-atenolol chlorthalidone stopped. BP at home 104-159-55-90s. Has orthostasis from terazosin 10mg      Allergy Dr. Unger- History: July 16th HA. 19th hives and whelps.  Allergy Dr. Unger who was consulted during hospital stay.  Admitted after syncope in triage at [facility]. Diagnosed with anaphylaxis and shock from unknown etiology. Denies throat or tongue swelling or SOB. No known food allergies.   Had slow pulse and low BP.  Given steroids. Released 22nd.  Had purpuric lesions concerning for possible vasculitis. Complement as normal.  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 Has had erratic and high BP.  C/o fatigue and sleepiness.  BX neg for vasculitis.  On bid antihistamine zyrtec and claritin.  Rash cleared immediately after admission. Has had no further hives x 2 months. On daily antihistamines     Endocrine Dr. Duncan -Has + TPO Ab  9/18,hypothyroid    Pulm Dr. Burch -asthma. Having moire mucous prod and occ wheezing on symbicort 160, singulair. Takes claritin prn and uses flonase daily     Objective:      Physical Exam   Constitutional: He is oriented to person, place, and time. He appears well-developed and well-nourished.   Cardiovascular: Normal rate, regular rhythm and normal heart sounds.   Pulmonary/Chest: Effort normal and breath sounds normal.   Musculoskeletal: He exhibits no edema.   Neurological: He is alert and oriented to person, place, and time.   Skin: Skin is warm and dry.   Psychiatric: He has a normal mood and affect.   Nursing note and vitals reviewed.      Assessment:       1. Essential hypertension    2. Flu vaccine need    3. Autoimmune urticaria    4. Severe persistent asthma with acute exacerbation    5. Benign prostatic hyperplasia without lower urinary tract symptoms    6. Hashimoto's thyroiditis    7. Hypothyroidism due to Hashimoto's thyroiditis    8. Chronic allergic rhinitis    9. Fatigue, unspecified type        Plan:         1. Essential hypertension  Decrease  - terazosin (HYTRIN) 5 MG capsule; Take 1 capsule (5 mg total) by mouth once daily.  Dispense: 90 capsule; Refill: 3  Add  - losartan (COZAAR) 50 MG tablet; Take 1 tablet (50 mg total) by mouth once daily.  Dispense: 90 tablet; Refill: 3    2. Flu vaccine need  Immunize today.  Counseled patient on risks, benefits and side effects.  Patient elected to proceed with vaccination.    - Influenza - High Dose (65+) (PF) (IM)    3. Autoimmune urticaria  Controlled on current medications.  Continue current medications.      4. Severe persistent asthma with acute exacerbation  Add otc non sedating antihistamine daily to the regimen and monitor. Consider change from symbicort to trelegy if sx persist/worsen    5. Benign prostatic hyperplasia without lower urinary tract symptoms  Controlled on current medications.  Continue current medications.  Decrease hytrin and  monitor    6. Hashimoto's thyroiditis  Cont endocrine mgmt    7. Hypothyroidism due to Hashimoto's thyroiditis  Stable condition.  Continue current medications.  Will adjust based on lab findings or if condition changes.      8. Chronic allergic rhinitis  Recommend otc non-sedating antihistamine such as Loratadine and/or steroid nasal spray such as Flonase as directed and as needed.  Please return to clinic if symptoms persist after these interventions.      9. Fatigue, unspecified type  Medication induced?  Adjust down hytrin and use alternative if needed. Reassess 1 month        Time spent with patient: 20 minutes    Patient with be reevaluated in 4 weeks or sooner prn    Greater than 50% of this visit was spent counseling as described in above documentation:Yes

## 2019-11-19 ENCOUNTER — OFFICE VISIT (OUTPATIENT)
Dept: FAMILY MEDICINE | Facility: CLINIC | Age: 79
End: 2019-11-19
Payer: MEDICARE

## 2019-11-19 ENCOUNTER — HOSPITAL ENCOUNTER (OUTPATIENT)
Dept: RADIOLOGY | Facility: CLINIC | Age: 79
Discharge: HOME | End: 2019-11-19
Attending: NURSE PRACTITIONER
Payer: MEDICARE

## 2019-11-19 VITALS
WEIGHT: 177.94 LBS | TEMPERATURE: 98 F | DIASTOLIC BLOOD PRESSURE: 74 MMHG | SYSTOLIC BLOOD PRESSURE: 136 MMHG | HEART RATE: 59 BPM | HEIGHT: 71 IN | OXYGEN SATURATION: 95 % | BODY MASS INDEX: 24.91 KG/M2

## 2019-11-19 DIAGNOSIS — I10 ESSENTIAL HYPERTENSION: ICD-10-CM

## 2019-11-19 DIAGNOSIS — Z86.73 HISTORY OF TIA (TRANSIENT ISCHEMIC ATTACK): ICD-10-CM

## 2019-11-19 DIAGNOSIS — R53.83 FATIGUE, UNSPECIFIED TYPE: Primary | ICD-10-CM

## 2019-11-19 DIAGNOSIS — R55 SYNCOPE AND COLLAPSE: ICD-10-CM

## 2019-11-19 DIAGNOSIS — R40.0 DAYTIME SOMNOLENCE: ICD-10-CM

## 2019-11-19 PROCEDURE — 1126F AMNT PAIN NOTED NONE PRSNT: CPT | Mod: S$GLB,,, | Performed by: NURSE PRACTITIONER

## 2019-11-19 PROCEDURE — 3078F PR MOST RECENT DIASTOLIC BLOOD PRESSURE < 80 MM HG: ICD-10-PCS | Mod: CPTII,S$GLB,, | Performed by: NURSE PRACTITIONER

## 2019-11-19 PROCEDURE — 99999 PR PBB SHADOW E&M-EST. PATIENT-LVL IV: CPT | Mod: PBBFAC,,, | Performed by: NURSE PRACTITIONER

## 2019-11-19 PROCEDURE — 93880 EXTRACRANIAL BILAT STUDY: CPT | Mod: 26,,, | Performed by: RADIOLOGY

## 2019-11-19 PROCEDURE — 1101F PR PT FALLS ASSESS DOC 0-1 FALLS W/OUT INJ PAST YR: ICD-10-PCS | Mod: CPTII,S$GLB,, | Performed by: NURSE PRACTITIONER

## 2019-11-19 PROCEDURE — 3075F PR MOST RECENT SYSTOLIC BLOOD PRESS GE 130-139MM HG: ICD-10-PCS | Mod: CPTII,S$GLB,, | Performed by: NURSE PRACTITIONER

## 2019-11-19 PROCEDURE — 1159F MED LIST DOCD IN RCRD: CPT | Mod: S$GLB,,, | Performed by: NURSE PRACTITIONER

## 2019-11-19 PROCEDURE — 93880 EXTRACRANIAL BILAT STUDY: CPT | Mod: TC,PO

## 2019-11-19 PROCEDURE — 3075F SYST BP GE 130 - 139MM HG: CPT | Mod: CPTII,S$GLB,, | Performed by: NURSE PRACTITIONER

## 2019-11-19 PROCEDURE — 3078F DIAST BP <80 MM HG: CPT | Mod: CPTII,S$GLB,, | Performed by: NURSE PRACTITIONER

## 2019-11-19 PROCEDURE — 99214 OFFICE O/P EST MOD 30 MIN: CPT | Mod: S$GLB,,, | Performed by: NURSE PRACTITIONER

## 2019-11-19 PROCEDURE — 1159F PR MEDICATION LIST DOCUMENTED IN MEDICAL RECORD: ICD-10-PCS | Mod: S$GLB,,, | Performed by: NURSE PRACTITIONER

## 2019-11-19 PROCEDURE — 93880 US CAROTID BILATERAL: ICD-10-PCS | Mod: 26,,, | Performed by: RADIOLOGY

## 2019-11-19 PROCEDURE — 99214 PR OFFICE/OUTPT VISIT, EST, LEVL IV, 30-39 MIN: ICD-10-PCS | Mod: S$GLB,,, | Performed by: NURSE PRACTITIONER

## 2019-11-19 PROCEDURE — 99999 PR PBB SHADOW E&M-EST. PATIENT-LVL IV: ICD-10-PCS | Mod: PBBFAC,,, | Performed by: NURSE PRACTITIONER

## 2019-11-19 PROCEDURE — 1126F PR PAIN SEVERITY QUANTIFIED, NO PAIN PRESENT: ICD-10-PCS | Mod: S$GLB,,, | Performed by: NURSE PRACTITIONER

## 2019-11-19 PROCEDURE — 1101F PT FALLS ASSESS-DOCD LE1/YR: CPT | Mod: CPTII,S$GLB,, | Performed by: NURSE PRACTITIONER

## 2019-11-19 NOTE — PROGRESS NOTES
Subjective:       Patient ID: Maycol Hodge is a 79 y.o. male.    Chief Complaint: Follow-up    Mr. Hodge presents today for a 4 week follow-up for fatigue and generalized weakness.  He is new to me.  Has history of low blood pressure and symptomatic bradycardia, at his last visit blood pressure medications were adjusted in an effort to reduce hypotension, bradycardia and fatigue.  Has blood pressure log here today, most readings at goal- occasionally elevated.  Heart rate stable.  Still reports fatigue.  Recently exercising at [employer], had to leave after short period of time- felt like he was was about to pass out.  Has not lost consciousness or had syncopal episode recently.  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  Has recently been very difficult to complete.  Has history of TIA, last echo in 2018.  He denies dizziness, lightheadedness, leg swelling, chest pain, shortness of breath, heart palpitations.  No difficulty sleeping, often sleeps during the day.  He is eating well and his weight is stable.  Vital stable today.    Patient Active Problem List   Diagnosis    Chronic allergic rhinitis    Hypertension    Severe persistent asthma with acute exacerbation    BPH (benign prostatic hyperplasia)    History of TIA (transient ischemic attack)    Hyperlipidemia    Dysphagia    Cataract    Urticaria    Hypothyroidism    Autoimmune urticaria    Anaphylactic syndrome    Hashimoto's thyroiditis       Current Outpatient Medications:     albuterol-ipratropium (DUO-NEB) 2.5 mg-0.5 mg/3 mL nebulizer solution, Take 3 mLs by nebulization every 6 (six) hours as needed., Disp: 120 vial, Rfl: 5    ascorbic acid, vitamin C, (VITAMIN C) 1000 MG tablet, Take 1,000 mg by mouth once daily., Disp: , Rfl:     aspirin (ECOTRIN) 81 MG EC tablet, Take 81 mg by mouth once daily., Disp: , Rfl:     budesonide-formoterol 160-4.5 mcg (SYMBICORT) 160-4.5 mcg/actuation HFAA, Inhale 2  puffs into the lungs every 12 (twelve) hours. Controller, Disp: 3 Inhaler, Rfl: 4    GLUC HCL/GLUC JAMESON/OH-NOX-W-GLUC (GLUCOSAMINE COMPLEX ORAL), Take 1 capsule by mouth once daily., Disp: , Rfl:     latanoprost 0.005 % ophthalmic solution, Place 1 drop into both eyes every evening. , Disp: , Rfl:     levothyroxine (SYNTHROID) 50 MCG tablet, Take 1 tablet (50 mcg total) by mouth once daily., Disp: 30 tablet, Rfl: 11    losartan (COZAAR) 50 MG tablet, Take 1 tablet (50 mg total) by mouth once daily., Disp: 90 tablet, Rfl: 3    montelukast (SINGULAIR) 10 mg tablet, Take 1 tablet (10 mg total) by mouth once daily., Disp: 90 tablet, Rfl: 3    terazosin (HYTRIN) 5 MG capsule, Take 1 capsule (5 mg total) by mouth once daily., Disp: 90 capsule, Rfl: 3    albuterol 90 mcg/actuation inhaler, Inhale 2 puffs into the lungs every 4 (four) hours as needed for Wheezing. This is a rescue inhaler medication and should be used as needed, Disp: 3 Inhaler, Rfl: 3    Lab Results   Component Value Date    WBC 8.69 08/05/2019    HGB 14.2 08/05/2019    HCT 44.2 08/05/2019     08/05/2019    CHOL 185 03/21/2019    TRIG 81 03/21/2019    HDL 63 03/21/2019    ALT 16 08/05/2019    AST 18 08/05/2019     08/12/2019    K 3.9 08/12/2019     08/12/2019    CREATININE 1.2 08/12/2019    BUN 22 08/12/2019    CO2 26 08/12/2019    TSH 1.655 09/06/2019           Review of Systems   Constitutional: Positive for activity change and fatigue. Negative for appetite change, chills, fever and unexpected weight change.   Respiratory: Positive for shortness of breath. Negative for cough, chest tightness and wheezing.    Cardiovascular: Negative for chest pain and palpitations.   Gastrointestinal: Negative for diarrhea, nausea and vomiting.   Genitourinary: Negative for difficulty urinating, frequency and urgency.   Neurological: Negative for dizziness, syncope, facial asymmetry, speech difficulty, weakness and light-headedness.    Psychiatric/Behavioral: Negative for sleep disturbance.       Objective:      Physical Exam   Constitutional: He is oriented to person, place, and time. Vital signs are normal. He appears well-developed and well-nourished. No distress.   Cardiovascular: Normal rate, regular rhythm and normal heart sounds.   Pulmonary/Chest: Effort normal and breath sounds normal. He has no wheezes.   Abdominal: Soft. Normal appearance.   Musculoskeletal: He exhibits no edema.   Neurological: He is alert and oriented to person, place, and time.   Skin: Skin is warm and dry.   Psychiatric: He has a normal mood and affect.   Nursing note and vitals reviewed.      Assessment:       1. Fatigue, unspecified type    2. Essential hypertension    3. History of TIA (transient ischemic attack)    4. Syncope and collapse     5. Daytime somnolence        Plan:       Maycol was seen today for follow-up.    Diagnoses and all orders for this visit:    Fatigue, unspecified type  -     CBC auto differential; Future  -     Comprehensive metabolic panel; Future  -     TSH; Future  -     Stress Echo Which stress agent will be used? Treadmill Exercise; Color Flow Doppler? No; Future  Check labs  Will contact with results of stress echo for follow-up    Essential hypertension  Stable readings, hesitant to adjust medications due to fluctuation  Want to avoid symptomatic hypotension and bradycardia  Continue medications as prescribed and home monitoring, notify clinic if greater than 140/90 consistently    History of TIA (transient ischemic attack)  -     US Carotid Bilateral; Future  Last carotid 2015, screen and treat as needed    Syncope and collapse   -     US Carotid Bilateral; Future  See above  Avoid strenuous exercise, stop activity of symptomatic    Daytime somnolence  Recommended sleep study, patient would like to have stress echo prior    Follow-up based on results    This office note has been dictated.

## 2019-11-20 ENCOUNTER — TELEPHONE (OUTPATIENT)
Dept: FAMILY MEDICINE | Facility: CLINIC | Age: 79
End: 2019-11-20

## 2019-11-20 NOTE — TELEPHONE ENCOUNTER
----- Message from Enio Webber sent at 11/20/2019  3:23 PM CST -----  Contact: Ptnt   Type:  Test Results    Who Called: Ptnt     Name of Test (Lab/Mammo/Etc):  Lab    Date of Test: 11-19-19    Ordering Provider:  Dr Fox    Where the test was performed: [place] [address]    Best Call Back Number:  [phone]    Additional Information:  Advised returning a call for test results. Please call to advise.

## 2019-11-20 NOTE — TELEPHONE ENCOUNTER
----- Message from Odalis Meade NP sent at 11/20/2019  9:02 AM CST -----  Labs stable. No irregularities contributing to symptoms, stress echo as scheduled.

## 2019-11-21 ENCOUNTER — TELEPHONE (OUTPATIENT)
Dept: FAMILY MEDICINE | Facility: CLINIC | Age: 79
End: 2019-11-21

## 2019-11-21 NOTE — TELEPHONE ENCOUNTER
Results and recommendations were given to patient r/t lab drawn on 11/19/19. Patient verbalized understanding.

## 2019-11-21 NOTE — TELEPHONE ENCOUNTER
----- Message from Active DSP  sent at 11/21/2019 10:01 AM CST -----  Contact: pt  Type:  Patient Returning Call    Who Called:  pt  Who Left Message for Patient:  Galina  Does the patient know what this is regarding?:  Yes test results  Best Call Back Number:    Additional Information:  Pt returning from nurse

## 2019-11-22 ENCOUNTER — CLINICAL SUPPORT (OUTPATIENT)
Dept: CARDIOLOGY | Facility: CLINIC | Age: 79
End: 2019-11-22
Attending: NURSE PRACTITIONER
Payer: MEDICARE

## 2019-11-22 VITALS
DIASTOLIC BLOOD PRESSURE: 95 MMHG | HEIGHT: 71 IN | WEIGHT: 177.94 LBS | HEART RATE: 55 BPM | SYSTOLIC BLOOD PRESSURE: 179 MMHG | BODY MASS INDEX: 24.91 KG/M2

## 2019-11-22 DIAGNOSIS — R53.83 FATIGUE, UNSPECIFIED TYPE: ICD-10-CM

## 2019-11-22 LAB
ASCENDING AORTA: 3.29 CM
BSA FOR ECHO PROCEDURE: 2.01 M2
CV ECHO LV RWT: 0.66 CM
CV STRESS BASE HR: 55 BPM
DIASTOLIC BLOOD PRESSURE: 95 MMHG
DOP CALC LVOT PEAK VEL: 0.97 M/S
DOP CALCLVOT PEAK VEL VTI: 23.33 CM
E WAVE DECELERATION TIME: 129.32 MSEC
E/A RATIO: 0.96
ECHO LV POSTERIOR WALL: 1.2 CM (ref 0.6–1.1)
FRACTIONAL SHORTENING: 33 % (ref 28–44)
INTERVENTRICULAR SEPTUM: 1.18 CM (ref 0.6–1.1)
IVRT: 0.14 MSEC
LA MAJOR: 4.92 CM
LA MINOR: 5.28 CM
LA WIDTH: 4.14 CM
LEFT ATRIUM SIZE: 3.72 CM
LEFT ATRIUM VOLUME INDEX: 33.2 ML/M2
LEFT ATRIUM VOLUME: 66.68 CM3
LEFT INTERNAL DIMENSION IN SYSTOLE: 2.43 CM (ref 2.1–4)
LEFT VENTRICLE DIASTOLIC VOLUME INDEX: 27.47 ML/M2
LEFT VENTRICLE DIASTOLIC VOLUME: 55.12 ML
LEFT VENTRICLE MASS INDEX: 70 G/M2
LEFT VENTRICLE SYSTOLIC VOLUME INDEX: 10.4 ML/M2
LEFT VENTRICLE SYSTOLIC VOLUME: 20.9 ML
LEFT VENTRICULAR INTERNAL DIMENSION IN DIASTOLE: 3.62 CM (ref 3.5–6)
LEFT VENTRICULAR MASS: 140.84 G
MV PEAK A VEL: 0.81 M/S
MV PEAK E VEL: 0.78 M/S
OHS CV CPX 1 MINUTE RECOVERY HEART RATE: 89 BPM
OHS CV CPX 85 PERCENT MAX PREDICTED HEART RATE MALE: 118
OHS CV CPX ESTIMATED METS: 10
OHS CV CPX MAX PREDICTED HEART RATE: 139
OHS CV CPX PATIENT IS FEMALE: 0
OHS CV CPX PATIENT IS MALE: 1
OHS CV CPX PEAK DIASTOLIC BLOOD PRESSURE: 110 MMHG
OHS CV CPX PEAK HEAR RATE: 126 BPM
OHS CV CPX PEAK RATE PRESSURE PRODUCT: ABNORMAL
OHS CV CPX PEAK SYSTOLIC BLOOD PRESSURE: 226 MMHG
OHS CV CPX PERCENT MAX PREDICTED HEART RATE ACHIEVED: 91
OHS CV CPX RATE PRESSURE PRODUCT PRESENTING: 9845
PULM VEIN S/D RATIO: 1.09
PV PEAK D VEL: 0.34 M/S
PV PEAK S VEL: 0.37 M/S
RA MAJOR: 5.1 CM
RA WIDTH: 3.87 CM
SINUS: 3.33 CM
STJ: 3.01 CM
STRESS ECHO POST EXERCISE DUR MIN: 6 MINUTES
STRESS ECHO POST EXERCISE DUR SEC: 30 SECONDS
SYSTOLIC BLOOD PRESSURE: 179 MMHG

## 2019-11-22 PROCEDURE — 93351 STRESS ECHO (CUPID ONLY): ICD-10-PCS | Mod: S$GLB,,, | Performed by: INTERNAL MEDICINE

## 2019-11-22 PROCEDURE — 99999 PR PBB SHADOW E&M-EST. PATIENT-LVL II: ICD-10-PCS | Mod: PBBFAC,,,

## 2019-11-22 PROCEDURE — 93351 STRESS TTE COMPLETE: CPT | Mod: S$GLB,,, | Performed by: INTERNAL MEDICINE

## 2019-11-22 PROCEDURE — 99999 PR PBB SHADOW E&M-EST. PATIENT-LVL II: CPT | Mod: PBBFAC,,,

## 2019-11-25 NOTE — PROGRESS NOTES
Spoke with patient regarding his results, patient stated he don't understanding that there is overall, no abnormal findings when the tech told him he had to stop the test after 5 minutes because his pressure went up to 220/110. Patient would like a call from Odalis.

## 2019-11-25 NOTE — PROGRESS NOTES
Unable to reach patient by phone (no voice mail) regarding his results, will send results letter with a message requesting for a call back.

## 2019-11-26 ENCOUNTER — TELEPHONE (OUTPATIENT)
Dept: FAMILY MEDICINE | Facility: CLINIC | Age: 79
End: 2019-11-26

## 2019-11-26 DIAGNOSIS — R53.83 FATIGUE, UNSPECIFIED TYPE: ICD-10-CM

## 2019-11-26 DIAGNOSIS — R40.0 DAYTIME SOMNOLENCE: Primary | ICD-10-CM

## 2019-11-26 DIAGNOSIS — G47.8 OTHER SLEEP DISORDERS: ICD-10-CM

## 2019-11-26 NOTE — TELEPHONE ENCOUNTER
----- Message from Paresh Vogel sent at 11/26/2019  2:36 PM CST -----  Contact: same  Patient called in and stated he was returning call to office about his heart.  Patient would not give any other information.  Call back number is [phone]

## 2019-11-26 NOTE — TELEPHONE ENCOUNTER
----- Message from Odalis Meade NP sent at 11/26/2019  3:12 PM CST -----  Called patient and discussed results, agreeable to sleep study.

## 2019-12-04 ENCOUNTER — TELEPHONE (OUTPATIENT)
Dept: FAMILY MEDICINE | Facility: CLINIC | Age: 79
End: 2019-12-04

## 2019-12-05 ENCOUNTER — OFFICE VISIT (OUTPATIENT)
Dept: GASTROENTEROLOGY | Facility: CLINIC | Age: 79
End: 2019-12-05
Payer: MEDICARE

## 2019-12-05 ENCOUNTER — OFFICE VISIT (OUTPATIENT)
Dept: FAMILY MEDICINE | Facility: CLINIC | Age: 79
End: 2019-12-05
Payer: MEDICARE

## 2019-12-05 VITALS
HEART RATE: 68 BPM | BODY MASS INDEX: 24.69 KG/M2 | TEMPERATURE: 98 F | OXYGEN SATURATION: 95 % | DIASTOLIC BLOOD PRESSURE: 72 MMHG | HEIGHT: 71 IN | WEIGHT: 176.38 LBS | SYSTOLIC BLOOD PRESSURE: 130 MMHG

## 2019-12-05 VITALS
HEART RATE: 62 BPM | SYSTOLIC BLOOD PRESSURE: 165 MMHG | DIASTOLIC BLOOD PRESSURE: 86 MMHG | WEIGHT: 178.38 LBS | HEIGHT: 71 IN | BODY MASS INDEX: 24.97 KG/M2

## 2019-12-05 DIAGNOSIS — E06.3 HYPOTHYROIDISM DUE TO HASHIMOTO'S THYROIDITIS: ICD-10-CM

## 2019-12-05 DIAGNOSIS — R19.4 CHANGE IN BOWEL HABITS: Primary | ICD-10-CM

## 2019-12-05 DIAGNOSIS — I10 ESSENTIAL HYPERTENSION: ICD-10-CM

## 2019-12-05 DIAGNOSIS — E78.5 HYPERLIPIDEMIA, UNSPECIFIED HYPERLIPIDEMIA TYPE: ICD-10-CM

## 2019-12-05 DIAGNOSIS — Z86.010 HISTORY OF COLON POLYPS: ICD-10-CM

## 2019-12-05 DIAGNOSIS — E03.8 HYPOTHYROIDISM DUE TO HASHIMOTO'S THYROIDITIS: ICD-10-CM

## 2019-12-05 DIAGNOSIS — L23.9 ALLERGIC CONTACT DERMATITIS, UNSPECIFIED TRIGGER: Primary | ICD-10-CM

## 2019-12-05 PROBLEM — J44.89: Status: ACTIVE | Noted: 2019-12-05

## 2019-12-05 PROCEDURE — 1101F PT FALLS ASSESS-DOCD LE1/YR: CPT | Mod: CPTII,S$GLB,, | Performed by: INTERNAL MEDICINE

## 2019-12-05 PROCEDURE — 1126F AMNT PAIN NOTED NONE PRSNT: CPT | Mod: S$GLB,,, | Performed by: NURSE PRACTITIONER

## 2019-12-05 PROCEDURE — 1159F PR MEDICATION LIST DOCUMENTED IN MEDICAL RECORD: ICD-10-PCS | Mod: S$GLB,,, | Performed by: INTERNAL MEDICINE

## 2019-12-05 PROCEDURE — 1101F PT FALLS ASSESS-DOCD LE1/YR: CPT | Mod: CPTII,S$GLB,, | Performed by: NURSE PRACTITIONER

## 2019-12-05 PROCEDURE — 1126F PR PAIN SEVERITY QUANTIFIED, NO PAIN PRESENT: ICD-10-PCS | Mod: S$GLB,,, | Performed by: INTERNAL MEDICINE

## 2019-12-05 PROCEDURE — 99999 PR PBB SHADOW E&M-EST. PATIENT-LVL III: CPT | Mod: PBBFAC,,, | Performed by: INTERNAL MEDICINE

## 2019-12-05 PROCEDURE — 96372 THER/PROPH/DIAG INJ SC/IM: CPT | Mod: S$GLB,,, | Performed by: NURSE PRACTITIONER

## 2019-12-05 PROCEDURE — 99204 PR OFFICE/OUTPT VISIT, NEW, LEVL IV, 45-59 MIN: ICD-10-PCS | Mod: S$GLB,,, | Performed by: INTERNAL MEDICINE

## 2019-12-05 PROCEDURE — 1126F AMNT PAIN NOTED NONE PRSNT: CPT | Mod: S$GLB,,, | Performed by: INTERNAL MEDICINE

## 2019-12-05 PROCEDURE — 99999 PR PBB SHADOW E&M-EST. PATIENT-LVL III: ICD-10-PCS | Mod: PBBFAC,,, | Performed by: NURSE PRACTITIONER

## 2019-12-05 PROCEDURE — 96372 PR INJECTION,THERAP/PROPH/DIAG2ST, IM OR SUBCUT: ICD-10-PCS | Mod: S$GLB,,, | Performed by: NURSE PRACTITIONER

## 2019-12-05 PROCEDURE — 1101F PR PT FALLS ASSESS DOC 0-1 FALLS W/OUT INJ PAST YR: ICD-10-PCS | Mod: CPTII,S$GLB,, | Performed by: NURSE PRACTITIONER

## 2019-12-05 PROCEDURE — 3079F PR MOST RECENT DIASTOLIC BLOOD PRESSURE 80-89 MM HG: ICD-10-PCS | Mod: CPTII,S$GLB,, | Performed by: INTERNAL MEDICINE

## 2019-12-05 PROCEDURE — 3075F PR MOST RECENT SYSTOLIC BLOOD PRESS GE 130-139MM HG: ICD-10-PCS | Mod: CPTII,S$GLB,, | Performed by: NURSE PRACTITIONER

## 2019-12-05 PROCEDURE — 1126F PR PAIN SEVERITY QUANTIFIED, NO PAIN PRESENT: ICD-10-PCS | Mod: S$GLB,,, | Performed by: NURSE PRACTITIONER

## 2019-12-05 PROCEDURE — 3078F DIAST BP <80 MM HG: CPT | Mod: CPTII,S$GLB,, | Performed by: NURSE PRACTITIONER

## 2019-12-05 PROCEDURE — 3075F SYST BP GE 130 - 139MM HG: CPT | Mod: CPTII,S$GLB,, | Performed by: NURSE PRACTITIONER

## 2019-12-05 PROCEDURE — 3074F SYST BP LT 130 MM HG: CPT | Mod: CPTII,S$GLB,, | Performed by: INTERNAL MEDICINE

## 2019-12-05 PROCEDURE — 99999 PR PBB SHADOW E&M-EST. PATIENT-LVL III: CPT | Mod: PBBFAC,,, | Performed by: NURSE PRACTITIONER

## 2019-12-05 PROCEDURE — 1159F MED LIST DOCD IN RCRD: CPT | Mod: S$GLB,,, | Performed by: INTERNAL MEDICINE

## 2019-12-05 PROCEDURE — 3079F DIAST BP 80-89 MM HG: CPT | Mod: CPTII,S$GLB,, | Performed by: INTERNAL MEDICINE

## 2019-12-05 PROCEDURE — 3074F PR MOST RECENT SYSTOLIC BLOOD PRESSURE < 130 MM HG: ICD-10-PCS | Mod: CPTII,S$GLB,, | Performed by: INTERNAL MEDICINE

## 2019-12-05 PROCEDURE — 1159F PR MEDICATION LIST DOCUMENTED IN MEDICAL RECORD: ICD-10-PCS | Mod: S$GLB,,, | Performed by: NURSE PRACTITIONER

## 2019-12-05 PROCEDURE — 99214 OFFICE O/P EST MOD 30 MIN: CPT | Mod: 25,S$GLB,, | Performed by: NURSE PRACTITIONER

## 2019-12-05 PROCEDURE — 3078F PR MOST RECENT DIASTOLIC BLOOD PRESSURE < 80 MM HG: ICD-10-PCS | Mod: CPTII,S$GLB,, | Performed by: NURSE PRACTITIONER

## 2019-12-05 PROCEDURE — 99214 PR OFFICE/OUTPT VISIT, EST, LEVL IV, 30-39 MIN: ICD-10-PCS | Mod: 25,S$GLB,, | Performed by: NURSE PRACTITIONER

## 2019-12-05 PROCEDURE — 1159F MED LIST DOCD IN RCRD: CPT | Mod: S$GLB,,, | Performed by: NURSE PRACTITIONER

## 2019-12-05 PROCEDURE — 1101F PR PT FALLS ASSESS DOC 0-1 FALLS W/OUT INJ PAST YR: ICD-10-PCS | Mod: CPTII,S$GLB,, | Performed by: INTERNAL MEDICINE

## 2019-12-05 PROCEDURE — 99999 PR PBB SHADOW E&M-EST. PATIENT-LVL III: ICD-10-PCS | Mod: PBBFAC,,, | Performed by: INTERNAL MEDICINE

## 2019-12-05 PROCEDURE — 99204 OFFICE O/P NEW MOD 45 MIN: CPT | Mod: S$GLB,,, | Performed by: INTERNAL MEDICINE

## 2019-12-05 RX ORDER — DIPHENHYDRAMINE HCL 50 MG
50 CAPSULE ORAL EVERY 6 HOURS PRN
COMMUNITY
End: 2022-04-22

## 2019-12-05 RX ORDER — PREDNISONE 20 MG/1
20 TABLET ORAL DAILY
Qty: 5 TABLET | Refills: 0 | Status: SHIPPED | OUTPATIENT
Start: 2019-12-06 | End: 2019-12-05

## 2019-12-05 RX ORDER — METHYLPREDNISOLONE ACETATE 40 MG/ML
80 INJECTION, SUSPENSION INTRA-ARTICULAR; INTRALESIONAL; INTRAMUSCULAR; SOFT TISSUE
Status: DISCONTINUED | OUTPATIENT
Start: 2019-12-05 | End: 2019-12-05

## 2019-12-05 RX ORDER — CETIRIZINE HYDROCHLORIDE 5 MG/1
5 TABLET ORAL DAILY
Qty: 7 TABLET | Refills: 0 | Status: SHIPPED | OUTPATIENT
Start: 2019-12-05 | End: 2019-12-16

## 2019-12-05 NOTE — TELEPHONE ENCOUNTER
----- Message from Galina Alvarez sent at 12/4/2019  2:13 PM CST -----  Contact: Pt  Type: Needs Medical Advice    Who Called:  Pt  Best Call Back Number: [phone]  Additional Information: Requesting a call back regarding pt having hives and wanted doctor to call him in something   Please Advise ---Thank you

## 2019-12-05 NOTE — H&P (VIEW-ONLY)
"Subjective:       Patient ID: Maycol Hodge is a 79 y.o. male.    This patient is new to me.  Referring provider:  Dr. Fox for change in bowel habits.        Chief Complaint: Change in bowel habits        Patient seen for change in bowel habits, characterized by irregular frequency and pasty consistency, recent onset, with symptoms ongoing and waxing and waning, with alleviating/exacerbating factors including none.  Severity of symptoms is moderate.  He denies bleeding or weight loss.  No anemia.  No upper GI complaints.  Last colonoscopy with Dr. Foss was 6 years ago at [facility] and the patient has had polyps in the past.  No other acute complaints. He has had dilation for dysphagia in the past but does not complain of this currently.      Review of Systems   Constitutional: Negative for chills, fatigue and fever.   HENT: Negative for trouble swallowing.    Respiratory: Negative for cough, shortness of breath and wheezing.    Cardiovascular: Negative for chest pain and palpitations.   Gastrointestinal: Negative for abdominal pain, constipation, diarrhea, nausea and vomiting.        + change in bowel habits     Musculoskeletal: Negative for arthralgias and myalgias.   Skin: Negative for color change and rash.   Neurological: Negative for dizziness, weakness and numbness.   Psychiatric/Behavioral: Negative for confusion. The patient is not nervous/anxious.    All other systems reviewed and are negative.      Objective:       Vitals:    12/05/19 1508   BP: (!) 165/86   Pulse: 62   Weight: 80.9 kg (178 lb 5.6 oz)   Height: 5' 11" (1.803 m)       Physical Exam   Constitutional: He is oriented to person, place, and time. He appears well-developed and well-nourished.   HENT:   Head: Normocephalic and atraumatic.   Eyes: Pupils are equal, round, and reactive to light. No scleral icterus.   Neck: Normal range of motion. Neck supple. No thyromegaly present.   Cardiovascular: Normal rate and regular rhythm.   No murmur " heard.  Pulmonary/Chest: Effort normal and breath sounds normal. He has no wheezes.   Abdominal: Soft. Bowel sounds are normal. He exhibits no distension. There is no tenderness.   Lymphadenopathy:     He has no cervical adenopathy.   Neurological: He is alert and oriented to person, place, and time.   Skin: Skin is warm and dry. No rash noted. No erythema.   Psychiatric: He has a normal mood and affect. His behavior is normal.         Lab Results   Component Value Date    WBC 9.02 11/19/2019    HGB 14.8 11/19/2019    HCT 47.7 11/19/2019     (H) 11/19/2019     11/19/2019       CMP  Sodium   Date Value Ref Range Status   11/19/2019 143 136 - 145 mmol/L Final     Potassium   Date Value Ref Range Status   11/19/2019 4.7 3.5 - 5.1 mmol/L Final     Chloride   Date Value Ref Range Status   11/19/2019 107 95 - 110 mmol/L Final     CO2   Date Value Ref Range Status   11/19/2019 30 (H) 23 - 29 mmol/L Final     Glucose   Date Value Ref Range Status   11/19/2019 86 70 - 110 mg/dL Final     BUN, Bld   Date Value Ref Range Status   11/19/2019 16 8 - 23 mg/dL Final     Creatinine   Date Value Ref Range Status   11/19/2019 1.2 0.5 - 1.4 mg/dL Final     Calcium   Date Value Ref Range Status   11/19/2019 9.2 8.7 - 10.5 mg/dL Final     Total Protein   Date Value Ref Range Status   11/19/2019 6.6 6.0 - 8.4 g/dL Final     Albumin   Date Value Ref Range Status   11/19/2019 3.6 3.5 - 5.2 g/dL Final   07/22/2019 3.4 2.9 - 4.4 g/dL      Total Bilirubin   Date Value Ref Range Status   11/19/2019 0.9 0.1 - 1.0 mg/dL Final     Comment:     For infants and newborns, interpretation of results should be based  on gestational age, weight and in agreement with clinical  observations.  Premature Infant recommended reference ranges:  Up to 24 hours.............<8.0 mg/dL  Up to 48 hours............<12.0 mg/dL  3-5 days..................<15.0 mg/dL  6-29 days.................<15.0 mg/dL       Alkaline Phosphatase   Date Value Ref Range  Status   11/19/2019 61 55 - 135 U/L Final     AST   Date Value Ref Range Status   11/19/2019 19 10 - 40 U/L Final     ALT   Date Value Ref Range Status   11/19/2019 18 10 - 44 U/L Final     Anion Gap   Date Value Ref Range Status   11/19/2019 6 (L) 8 - 16 mmol/L Final     eGFR if    Date Value Ref Range Status   11/19/2019 >60.0 >60 mL/min/1.73 m^2 Final     eGFR if non    Date Value Ref Range Status   11/19/2019 56.4 (A) >60 mL/min/1.73 m^2 Final     Comment:     Calculation used to obtain the estimated glomerular filtration  rate (eGFR) is the CKD-EPI equation.          Old records from Dr. Foss reviewed and are as summarized above in the HPI.      Prior U/S was independently visualized and reviewed by me and showed GB stones/sludge.        Assessment:       1. Change in bowel habits    2. History of colon polyps        Plan:       1.  Increase fiber intake  2.  Trial of probiotic  3.  Colonoscopy for change in bowel habits  4.  Further recommendations to follow after above.  5.  Communication will be sent to the referring provider, Dr. Fox regarding my assessment and plan on this patient via EPIC.

## 2019-12-05 NOTE — PROGRESS NOTES
"Subjective:       Patient ID: Maycol Hodge is a 79 y.o. male.    This patient is new to me.  Referring provider:  Dr. Fox for change in bowel habits.        Chief Complaint: Change in bowel habits        Patient seen for change in bowel habits, characterized by irregular frequency and pasty consistency, recent onset, with symptoms ongoing and waxing and waning, with alleviating/exacerbating factors including none.  Severity of symptoms is moderate.  He denies bleeding or weight loss.  No anemia.  No upper GI complaints.  Last colonoscopy with Dr. Foss was 6 years ago at [facility] and the patient has had polyps in the past.  No other acute complaints. He has had dilation for dysphagia in the past but does not complain of this currently.      Review of Systems   Constitutional: Negative for chills, fatigue and fever.   HENT: Negative for trouble swallowing.    Respiratory: Negative for cough, shortness of breath and wheezing.    Cardiovascular: Negative for chest pain and palpitations.   Gastrointestinal: Negative for abdominal pain, constipation, diarrhea, nausea and vomiting.        + change in bowel habits     Musculoskeletal: Negative for arthralgias and myalgias.   Skin: Negative for color change and rash.   Neurological: Negative for dizziness, weakness and numbness.   Psychiatric/Behavioral: Negative for confusion. The patient is not nervous/anxious.    All other systems reviewed and are negative.      Objective:       Vitals:    12/05/19 1508   BP: (!) 165/86   Pulse: 62   Weight: 80.9 kg (178 lb 5.6 oz)   Height: 5' 11" (1.803 m)       Physical Exam   Constitutional: He is oriented to person, place, and time. He appears well-developed and well-nourished.   HENT:   Head: Normocephalic and atraumatic.   Eyes: Pupils are equal, round, and reactive to light. No scleral icterus.   Neck: Normal range of motion. Neck supple. No thyromegaly present.   Cardiovascular: Normal rate and regular rhythm.   No murmur " heard.  Pulmonary/Chest: Effort normal and breath sounds normal. He has no wheezes.   Abdominal: Soft. Bowel sounds are normal. He exhibits no distension. There is no tenderness.   Lymphadenopathy:     He has no cervical adenopathy.   Neurological: He is alert and oriented to person, place, and time.   Skin: Skin is warm and dry. No rash noted. No erythema.   Psychiatric: He has a normal mood and affect. His behavior is normal.         Lab Results   Component Value Date    WBC 9.02 11/19/2019    HGB 14.8 11/19/2019    HCT 47.7 11/19/2019     (H) 11/19/2019     11/19/2019       CMP  Sodium   Date Value Ref Range Status   11/19/2019 143 136 - 145 mmol/L Final     Potassium   Date Value Ref Range Status   11/19/2019 4.7 3.5 - 5.1 mmol/L Final     Chloride   Date Value Ref Range Status   11/19/2019 107 95 - 110 mmol/L Final     CO2   Date Value Ref Range Status   11/19/2019 30 (H) 23 - 29 mmol/L Final     Glucose   Date Value Ref Range Status   11/19/2019 86 70 - 110 mg/dL Final     BUN, Bld   Date Value Ref Range Status   11/19/2019 16 8 - 23 mg/dL Final     Creatinine   Date Value Ref Range Status   11/19/2019 1.2 0.5 - 1.4 mg/dL Final     Calcium   Date Value Ref Range Status   11/19/2019 9.2 8.7 - 10.5 mg/dL Final     Total Protein   Date Value Ref Range Status   11/19/2019 6.6 6.0 - 8.4 g/dL Final     Albumin   Date Value Ref Range Status   11/19/2019 3.6 3.5 - 5.2 g/dL Final   07/22/2019 3.4 2.9 - 4.4 g/dL      Total Bilirubin   Date Value Ref Range Status   11/19/2019 0.9 0.1 - 1.0 mg/dL Final     Comment:     For infants and newborns, interpretation of results should be based  on gestational age, weight and in agreement with clinical  observations.  Premature Infant recommended reference ranges:  Up to 24 hours.............<8.0 mg/dL  Up to 48 hours............<12.0 mg/dL  3-5 days..................<15.0 mg/dL  6-29 days.................<15.0 mg/dL       Alkaline Phosphatase   Date Value Ref Range  Status   11/19/2019 61 55 - 135 U/L Final     AST   Date Value Ref Range Status   11/19/2019 19 10 - 40 U/L Final     ALT   Date Value Ref Range Status   11/19/2019 18 10 - 44 U/L Final     Anion Gap   Date Value Ref Range Status   11/19/2019 6 (L) 8 - 16 mmol/L Final     eGFR if    Date Value Ref Range Status   11/19/2019 >60.0 >60 mL/min/1.73 m^2 Final     eGFR if non    Date Value Ref Range Status   11/19/2019 56.4 (A) >60 mL/min/1.73 m^2 Final     Comment:     Calculation used to obtain the estimated glomerular filtration  rate (eGFR) is the CKD-EPI equation.          Old records from Dr. Foss reviewed and are as summarized above in the HPI.      Prior U/S was independently visualized and reviewed by me and showed GB stones/sludge.        Assessment:       1. Change in bowel habits    2. History of colon polyps        Plan:       1.  Increase fiber intake  2.  Trial of probiotic  3.  Colonoscopy for change in bowel habits  4.  Further recommendations to follow after above.  5.  Communication will be sent to the referring provider, Dr. Fox regarding my assessment and plan on this patient via EPIC.

## 2019-12-05 NOTE — PROGRESS NOTES
2 patient identifiers used (name & ). Administered Solu-Medrol 125 mg IM. Aseptic technique used. Patient tolerated injection well. No bleeding noted. Pain rated 0 on scale of 0-10. Patient remained in clinic 15 minutes post injection. No adverse reaction noted.

## 2019-12-05 NOTE — TELEPHONE ENCOUNTER
Patient states that he is been getting hives since last night. In axillary and groin areas. He reports that this happened several months ago and he ended up in the hospital because it had gotten so bad. No cause was ever found. Patient took OTC benadryl which has helped. Denies any swelling, itching or hives on face or in mouth/throat. Denies change in soaps and detergents. no seafood intake. Appointment scheduled to see Ms Medina on tomorrow.  Advised to go directly to the ER if any itching or swelling of face/mouth occurs. Patient verbalized understanding.

## 2019-12-05 NOTE — PROGRESS NOTES
Subjective:       Patient ID: Maycol Hodge is a 79 y.o. male.    Chief Complaint: Urticaria    Urticaria   This is a recurrent problem. The current episode started in the past 7 days. The rash is diffuse. The rash is characterized by itchiness and redness. He was exposed to nothing. Associated symptoms include shortness of breath. Pertinent negatives include no cough, facial edema, fever or sore throat. Past treatments include topical steroids. The treatment provided mild relief.       Past Medical History:   Diagnosis Date    Asthma     Hematuria        Review of patient's allergies indicates:   Allergen Reactions    Doxycycline Rash     Causes rash, hives and itching    Augmentin [amoxicillin-pot clavulanate] Nausea And Vomiting    Diltiazem      pruritis    Hydrochlorothiazide      Causes elevate uric acid, gout      Levaquin [levofloxacin] Swelling    Norvasc [amlodipine] Swelling    Pravastatin Rash         Current Outpatient Medications:     diphenhydrAMINE (BENADRYL) 50 MG capsule, Take 50 mg by mouth every 6 (six) hours as needed for Itching., Disp: , Rfl:     albuterol 90 mcg/actuation inhaler, Inhale 2 puffs into the lungs every 4 (four) hours as needed for Wheezing. This is a rescue inhaler medication and should be used as needed, Disp: 3 Inhaler, Rfl: 3    albuterol-ipratropium (DUO-NEB) 2.5 mg-0.5 mg/3 mL nebulizer solution, Take 3 mLs by nebulization every 6 (six) hours as needed., Disp: 120 vial, Rfl: 5    ascorbic acid, vitamin C, (VITAMIN C) 1000 MG tablet, Take 1,000 mg by mouth once daily., Disp: , Rfl:     aspirin (ECOTRIN) 81 MG EC tablet, Take 81 mg by mouth once daily., Disp: , Rfl:     budesonide-formoterol 160-4.5 mcg (SYMBICORT) 160-4.5 mcg/actuation HFAA, Inhale 2 puffs into the lungs every 12 (twelve) hours. Controller, Disp: 3 Inhaler, Rfl: 4    cetirizine (ZYRTEC) 5 MG tablet, Take 1 tablet (5 mg total) by mouth once daily. for 7 days, Disp: 7 tablet, Rfl: 0     "GLUC HCL/GLUC JAMESON/LZ-JUM-T-GLUC (GLUCOSAMINE COMPLEX ORAL), Take 1 capsule by mouth once daily., Disp: , Rfl:     latanoprost 0.005 % ophthalmic solution, Place 1 drop into both eyes every evening. , Disp: , Rfl:     levothyroxine (SYNTHROID) 50 MCG tablet, Take 1 tablet (50 mcg total) by mouth once daily., Disp: 30 tablet, Rfl: 11    losartan (COZAAR) 50 MG tablet, Take 1 tablet (50 mg total) by mouth once daily., Disp: 90 tablet, Rfl: 3    montelukast (SINGULAIR) 10 mg tablet, Take 1 tablet (10 mg total) by mouth once daily., Disp: 90 tablet, Rfl: 3    terazosin (HYTRIN) 5 MG capsule, Take 1 capsule (5 mg total) by mouth once daily., Disp: 90 capsule, Rfl: 3    Review of Systems   Constitutional: Negative for fever and unexpected weight change.   HENT: Negative for sore throat and trouble swallowing.    Eyes: Negative for visual disturbance.   Respiratory: Positive for shortness of breath. Negative for cough.    Cardiovascular: Negative for chest pain, palpitations and leg swelling.   Gastrointestinal: Negative for blood in stool.   Genitourinary: Negative for hematuria.   Skin: Positive for rash.   Allergic/Immunologic: Negative for immunocompromised state.   Neurological: Negative for headaches.   Hematological: Does not bruise/bleed easily.   Psychiatric/Behavioral: Negative for agitation. The patient is not nervous/anxious.        Objective:      /72 (BP Location: Right arm, Patient Position: Sitting, BP Method: Medium (Manual))   Pulse 68   Temp 98 °F (36.7 °C) (Oral)   Ht 5' 11" (1.803 m)   Wt 80 kg (176 lb 5.9 oz)   SpO2 95%   BMI 24.60 kg/m²   Physical Exam   Constitutional: He is oriented to person, place, and time. He appears well-developed and well-nourished.   Eyes: Pupils are equal, round, and reactive to light. Conjunctivae and EOM are normal.   Neck: Normal range of motion. Neck supple.   Cardiovascular: Normal rate, regular rhythm, normal heart sounds and intact distal pulses. " "  Pulmonary/Chest: Effort normal and breath sounds normal.   Abdominal: Soft. Bowel sounds are normal.   Musculoskeletal: Normal range of motion.   Neurological: He is alert and oriented to person, place, and time.   Skin: Skin is warm and dry. Rash noted. Rash is maculopapular.   diffused rash   Psychiatric: He has a normal mood and affect. His behavior is normal. Judgment and thought content normal.       Assessment:       1. Allergic contact dermatitis, unspecified trigger    2. Essential hypertension    3. Hypothyroidism due to Hashimoto's thyroiditis    4. Hyperlipidemia, unspecified hyperlipidemia type    5. BMI 24.0-24.9, adult        Plan:       Allergic contact dermatitis, unspecified trigger  -     cetirizine (ZYRTEC) 5 MG tablet; Take 1 tablet (5 mg total) by mouth once daily. for 7 days  Dispense: 7 tablet; Refill: 0  -     methylPREDNISolone sod suc(PF) injection 125 mg  Please follow up with Allergy-SOLA Unger  Essential hypertension  Controlled, continue medication  Low sodium diet  Hypothyroidism due to Hashimoto's thyroiditis  Stable, continue medicaiton  Hyperlipidemia, unspecified hyperlipidemia type  The ASCVD Risk score ([place] DC Jr., et al., 2013) failed to calculate for the following reasons:    The 2013 ASCVD risk score is only valid for ages 40 to 79  BMI 24.0-24.9, adult  Counseled patient on his ideal body weight, health consequences of being obese and current recommendations including weekly exercise and a heart healthy diet.  Current BMI is:Estimated body mass index is 24.6 kg/m² as calculated from the following:    Height as of this encounter: 5' 11" (1.803 m).    Weight as of this encounter: 80 kg (176 lb 5.9 oz)..  Patient is aware that ideal BMI < 25 or Weight in (lb) to have BMI = 25: 178.9.            Patient readiness: acceptance and barriers:none    During the course of the visit the patient was educated and counseled about the following:     Hypertension:   Dietary sodium " restriction.  Regular aerobic exercise.    Goals: Hypertension: Reduce Blood Pressure    Did patient meet goals/outcomes: Yes    The following self management tools provided: declined    Patient Instructions (the written plan) was given to the patient/family.     Time spent with patient: 15 minutes    Barriers to medications present (no )    Adverse reactions to current medications (no)    Over the counter medications reviewed (Yes)

## 2019-12-06 ENCOUNTER — TELEPHONE (OUTPATIENT)
Dept: GASTROENTEROLOGY | Facility: CLINIC | Age: 79
End: 2019-12-06

## 2019-12-06 NOTE — TELEPHONE ENCOUNTER
----- Message from Viri Hsu sent at 12/6/2019 10:37 AM CST -----  Contact: Patient  Type: Needs Medical Advice    Who Called:  Patient   Best Call Back Number:   Additional Information: Calling to speak with the nurse to clarify instructions to prep for his colonoscopy.

## 2019-12-10 ENCOUNTER — HOSPITAL ENCOUNTER (OUTPATIENT)
Facility: HOSPITAL | Age: 79
Discharge: HOME | End: 2019-12-10
Attending: INTERNAL MEDICINE | Admitting: INTERNAL MEDICINE
Payer: MEDICARE

## 2019-12-10 DIAGNOSIS — I49.9 ARRHYTHMIA: ICD-10-CM

## 2019-12-10 DIAGNOSIS — R19.4 CHANGE IN BOWEL HABITS: ICD-10-CM

## 2019-12-10 PROCEDURE — 93005 ELECTROCARDIOGRAM TRACING: CPT

## 2019-12-10 RX ORDER — SODIUM CHLORIDE 9 MG/ML
INJECTION, SOLUTION INTRAVENOUS CONTINUOUS
Status: DISCONTINUED | OUTPATIENT
Start: 2019-12-10 | End: 2019-12-10 | Stop reason: HOSPADM

## 2019-12-10 NOTE — OR NURSING
Colonoscopy cancelled. Pt was found to be with new onset Atrial fibrillation per 12 lead EKG. He is non symptomatic at this time but states he has been suffering episodes of SOB and sudden weakness for some time now. We are putting in a referral to cardiology through Dr Mullen's office and will notify the pt.

## 2019-12-10 NOTE — INTERVAL H&P NOTE
The patient has been examined and the H&P has been reviewed:    I concur with the findings and changes have been noted since the H&P was written: Upon evaluation by anaesthesia, Dr. Garcia found the patient to be in atrial fibrillation with significant block which is new onset.  Given this, anaesthesia has recommended cancellation of the case in favor of cardiology evaluation.  Case will be canceled and rescheduled.    Anesthesia/Surgery risks, benefits and alternative options discussed and understood by patient/family.          Active Hospital Problems    Diagnosis  POA    Change in bowel habits [R19.4]  Yes      Resolved Hospital Problems   No resolved problems to display.

## 2019-12-11 ENCOUNTER — TELEPHONE (OUTPATIENT)
Dept: GASTROENTEROLOGY | Facility: CLINIC | Age: 79
End: 2019-12-11

## 2019-12-11 DIAGNOSIS — I48.91 ATRIAL FIBRILLATION, UNSPECIFIED TYPE: Primary | ICD-10-CM

## 2019-12-11 NOTE — TELEPHONE ENCOUNTER
Patient notified and understands Dr. Jones not taking new patients will have to call [facility] cardiology group

## 2019-12-11 NOTE — TELEPHONE ENCOUNTER
----- Message from Siobhan Munoz sent at 12/11/2019  2:06 PM CST -----  Type: Needs Medical Advice    Who Called:  Patient  Symptoms (please be specific):  na  How long has patient had these symptoms:  na  Pharmacy name and phone #:  [facility]  Best Call Back Number: [phone]  Additional Information: patient was advised Nurse Francisco could recommend cardiologist for him/please advise

## 2019-12-12 ENCOUNTER — TELEPHONE (OUTPATIENT)
Dept: GASTROENTEROLOGY | Facility: CLINIC | Age: 79
End: 2019-12-12

## 2019-12-12 NOTE — TELEPHONE ENCOUNTER
Referral placed for [facility] and patient given number [phone] to call and schedule appointment

## 2019-12-13 ENCOUNTER — TELEPHONE (OUTPATIENT)
Dept: FAMILY MEDICINE | Facility: CLINIC | Age: 79
End: 2019-12-13

## 2019-12-13 ENCOUNTER — TELEPHONE (OUTPATIENT)
Dept: ALLERGY | Facility: CLINIC | Age: 79
End: 2019-12-13

## 2019-12-13 NOTE — TELEPHONE ENCOUNTER
----- Message from Santa Moreira sent at 12/13/2019 10:21 AM CST -----  Contact: Self, [phone]  The patient called stating he has hives for the 3rd time this year.  He is asking for any suggestions for treatment. Please call him back at his request at [phone].  Thanks in advance.

## 2019-12-13 NOTE — TELEPHONE ENCOUNTER
Patient reports that when he went in for his colonoscopy on 12/10/19 he was found to be in A Fib.   Dr Akins referred patient to Cardiology but DR Jones is not taking new patients.   Patient is requesting that you recommend a cardiologist for him. States that Dr Choudhury' group is covered by [employer]

## 2019-12-13 NOTE — TELEPHONE ENCOUNTER
----- Message from Debbie Onofre sent at 12/13/2019 11:24 AM CST -----  Contact: patient  Type: Needs Medical Advice    Who Called:  patient  Best Call Back Number: [phone]  Additional Information: patient requesting recommendations for cardiologist. Please give call back

## 2019-12-16 ENCOUNTER — OFFICE VISIT (OUTPATIENT)
Dept: ALLERGY | Facility: CLINIC | Age: 79
End: 2019-12-16
Payer: MEDICARE

## 2019-12-16 ENCOUNTER — TELEPHONE (OUTPATIENT)
Dept: FAMILY MEDICINE | Facility: CLINIC | Age: 79
End: 2019-12-16

## 2019-12-16 ENCOUNTER — TELEPHONE (OUTPATIENT)
Dept: ALLERGY | Facility: CLINIC | Age: 79
End: 2019-12-16

## 2019-12-16 VITALS
SYSTOLIC BLOOD PRESSURE: 140 MMHG | BODY MASS INDEX: 24.92 KG/M2 | HEIGHT: 71 IN | DIASTOLIC BLOOD PRESSURE: 78 MMHG | WEIGHT: 178 LBS

## 2019-12-16 DIAGNOSIS — I48.91 ATRIAL FIBRILLATION, UNSPECIFIED TYPE: ICD-10-CM

## 2019-12-16 DIAGNOSIS — L50.8 ACUTE URTICARIA: Primary | ICD-10-CM

## 2019-12-16 PROCEDURE — 1101F PT FALLS ASSESS-DOCD LE1/YR: CPT | Mod: S$GLB,,, | Performed by: ALLERGY & IMMUNOLOGY

## 2019-12-16 PROCEDURE — 99215 OFFICE O/P EST HI 40 MIN: CPT | Mod: S$GLB,,, | Performed by: ALLERGY & IMMUNOLOGY

## 2019-12-16 PROCEDURE — 1159F PR MEDICATION LIST DOCUMENTED IN MEDICAL RECORD: ICD-10-PCS | Mod: S$GLB,,, | Performed by: ALLERGY & IMMUNOLOGY

## 2019-12-16 PROCEDURE — 1101F PR PT FALLS ASSESS DOC 0-1 FALLS W/OUT INJ PAST YR: ICD-10-PCS | Mod: S$GLB,,, | Performed by: ALLERGY & IMMUNOLOGY

## 2019-12-16 PROCEDURE — 99215 PR OFFICE/OUTPT VISIT, EST, LEVL V, 40-54 MIN: ICD-10-PCS | Mod: S$GLB,,, | Performed by: ALLERGY & IMMUNOLOGY

## 2019-12-16 PROCEDURE — 3078F DIAST BP <80 MM HG: CPT | Mod: S$GLB,,, | Performed by: ALLERGY & IMMUNOLOGY

## 2019-12-16 PROCEDURE — 1159F MED LIST DOCD IN RCRD: CPT | Mod: S$GLB,,, | Performed by: ALLERGY & IMMUNOLOGY

## 2019-12-16 PROCEDURE — 3078F PR MOST RECENT DIASTOLIC BLOOD PRESSURE < 80 MM HG: ICD-10-PCS | Mod: S$GLB,,, | Performed by: ALLERGY & IMMUNOLOGY

## 2019-12-16 PROCEDURE — 3077F PR MOST RECENT SYSTOLIC BLOOD PRESSURE >= 140 MM HG: ICD-10-PCS | Mod: S$GLB,,, | Performed by: ALLERGY & IMMUNOLOGY

## 2019-12-16 PROCEDURE — 3077F SYST BP >= 140 MM HG: CPT | Mod: S$GLB,,, | Performed by: ALLERGY & IMMUNOLOGY

## 2019-12-16 RX ORDER — FAMOTIDINE 20 MG/1
20 TABLET, FILM COATED ORAL 2 TIMES DAILY
Qty: 60 TABLET | Refills: 11 | Status: SHIPPED | OUTPATIENT
Start: 2019-12-16 | End: 2022-07-12

## 2019-12-16 RX ORDER — CETIRIZINE HYDROCHLORIDE 10 MG/1
10 TABLET ORAL 2 TIMES DAILY
Qty: 180 TABLET | Refills: 3 | Status: SHIPPED | OUTPATIENT
Start: 2019-12-16 | End: 2022-04-22

## 2019-12-16 NOTE — TELEPHONE ENCOUNTER
EKG showed atrial fibrillation which is a new diagnosis.  He has good rate control.  He needs to start eliquis 2.5mg bid right away to protect him from risk of stroke from clot forming in heart and breaking off to the brain.  Rx called in to [facility].  Needs f/u with me or NADINE Meade in the next 2 weeks as well as appt with Cardiology Dr. Cabrera or Kei munoz

## 2019-12-16 NOTE — TELEPHONE ENCOUNTER
Patient has been scheduled to see Dr Vogel in [facility]. He has also started the Eliquis that was prescribed.

## 2019-12-16 NOTE — PROGRESS NOTES
"Subjective:       Patient ID: Maycol Hodge is a 79 y.o. male.    Chief Complaint: Urticaria (has had 2 break outs in December (12/5/2019 lasted 3 days - had steroid shot and used ointment), (12/9/2019 used ointment and took pred tabs that he had left and cleared up in 2 days))    HPI     Pt presents for urticaria.     While in the hospital 8/2019, I was concerned with purpuric non blanching lesions on his bilateral hips and trunk that made me concerned for vasculitis.   His complement testing was normal, however, his c1q antibodies were low that may be indicative of autoimmune urticarial disease.   Dr. Jaya Hudson performed biopsy and did not show vasculitis. Possibly he may need repeat biopsies.     Pt recently started to have repeat urticarial outbreak and went to outside facility and received steroid therapy that helped clear his urticaria.     Early November, pt thought he had a mosquito bite that spread like in august.   Again occurred last week.   He had a few spots on abdomen and he had prednisone from prior.   His urticaria had cleared.   He presents today to get a "game plan."   Montelukast 10 mg po qday, benadryl past few days.     He was recently dx with afib last Tuesday and had to postpone colonoscopy.       I reviewed pt's ekg that was taken and it looks like atrial fibrillation in V1.   See media.     7/2019  Complement C1Q - Quant 11.5 L 11.8-23.8 mg/dL    6/21019  TSH 0.400 - 4.000 uIU/mL 1.163      7/2019  5.82      Component      Latest Ref Rng & Units 7/22/2019   PROTEIN TOTAL      6.0 - 8.5 g/dL 6.0   Albumin      2.9 - 4.4 g/dL 3.4   Alpha 1 Globulin/Protein Total      0.0 - 0.4 g/dL 0.2   Alpha 2 Globulin/Protein Total      0.4 - 1.0 g/dL 0.7   Beta Globulin      0.7 - 1.3 g/dL 0.9   Gamma Globulin      0.4 - 1.8 g/dL 0.9   Albumin/Globulin Ratio      0.7 - 1.7 1.3   M-Channing, %      Not Observed g/dL Not Observed   Globulin, Total      2.2 - 3.9 g/dL 2.6   Please Note:       Comment " "      Review of Systems      General: neg unexpected weight changes, fevers, chills, night sweats, malaise  HEENT: see hpi, Neg eye pain, vision changes, ear drainage, nose bleeds, throat tightness, sores in the mouth  CV: Neg chest pain, palpitations, swelling  Resp: see hpi, neg shortness of breath, hemoptysis, cough  GI: see hpi, neg dysphagia, night abdominal pain, reflux, chronic diarrhea, chronic constipation  Derm: See Hpi, neg new rash, neg flushing  Mu/sk: Neg joint pain, joint swelling   Psych: Neg anxiety  neuro: neg chronic headaches, muscle weakness  Endo: neg heat/cold intolerance, chronic fatigue    Objective:     Vitals:    12/16/19 0943   BP: (!) 140/78   Weight: 80.7 kg (178 lb)   Height: 5' 11" (1.803 m)        Physical Exam        General: no acute distress, well developed well nourished   HEENT:   Head:normocephalic atraumatic  Eyes: KATE, EOMI, Neg injection, scleral icterus, or conjunctival papillary hypertrophy.  Ears: tm clear bilaterally, normal canal  Nose:nares patent   OP: mucus membranes moist, - cobblestoning, - PND, neg erythema or lesions  Neck: supple, Full range of motion, neg lymphadenopathy  Chest: full respiratory excursion no abnormal chest abnormality  Resp: clear to ascultation bilaterally  CV: RRR, neg MRG, brisk capillary refill  Abdomen: BS+, non tender, non distended, neg hepatosplenomegaly.   Ext:  Neg clubbing, cyanosis, pitting edema  Skin: macular erythamatous lesions on bilateral arms, do not viviane with palpation.   Lymph: neg supraclavicular, axillary     Assessment:       1. Acute urticaria    2. Atrial fibrillation, unspecified type        Plan:       Acute urticaria  -     cetirizine (ZYRTEC) 10 MG tablet; Take 1 tablet (10 mg total) by mouth 2 (two) times daily.  Dispense: 180 tablet; Refill: 3  -     famotidine (PEPCID) 20 MG tablet; Take 1 tablet (20 mg total) by mouth 2 (two) times daily.  Dispense: 60 tablet; Refill: 11    Atrial fibrillation, unspecified " type  -     Ambulatory referral to Cardiology    improved urticaria   antihistamine regimen- 4 weeks zyrtec again then wean.   Discussed urticarial action plan     Hypothyroidism  Elevated in the hospital and better in June.     Pt referred for afib to cards and discussed risk of stroke or heart attack and will try to get him seen.   My nurse called [facility] to find out who is in network.     Follow up in 6 months, sooner if needed.          Neelam Unger M.D.  Allergy/Immunology  [place] [place] [facility]   [phone] phone  [phone] fax

## 2019-12-16 NOTE — Clinical Note
Pt brought in ekg, we referred to cardiology , will you please follow up with him to make sure he is on adequate treatment.

## 2019-12-16 NOTE — PATIENT INSTRUCTIONS
Take 1 zyrtec twice per day  Take 1 pepcid twice per day to help with itch and swelling    Use topical steroid cream for redness and swelling 2-3 times per day .    We referred to cardiology and will call insurance and make sure you have an appt.       Follow up in 6 months sooner if needed for hives.     My suspicion for shrimp as a cause is not likely.       Follow up in 6 months, sooner if needed.

## 2019-12-16 NOTE — TELEPHONE ENCOUNTER
Call to [facility] to see what Cardiologist is in network with them so that we can refer due to a recent abnormal EKG.    -Dr. Rosenbaum at [phone]  -Dr. Gina Navas [address] ph: [phone]  -Dr. Bonifacio Choudhury [address] [phone]  -Dr. Nico Gilbert [address] [phone]    Referral faxed to Dr. Navas per [facility]'s request.

## 2019-12-16 NOTE — TELEPHONE ENCOUNTER
----- Message from Neelam Unger MD sent at 12/16/2019 10:14 AM CST -----  Pt brought in ekg, we referred to cardiology , will you please follow up with him to make sure he is on adequate treatment.

## 2019-12-17 NOTE — TELEPHONE ENCOUNTER
OK appt with cardiology 20th. If heart rate at rest > 100, chest pain, sob, dizziness or feeling faint then he needs to go to ED

## 2019-12-20 ENCOUNTER — OFFICE VISIT (OUTPATIENT)
Dept: CARDIOLOGY | Facility: CLINIC | Age: 79
End: 2019-12-20
Payer: MEDICARE

## 2019-12-20 VITALS
BODY MASS INDEX: 25.16 KG/M2 | DIASTOLIC BLOOD PRESSURE: 90 MMHG | WEIGHT: 179.69 LBS | HEIGHT: 71 IN | HEART RATE: 57 BPM | SYSTOLIC BLOOD PRESSURE: 182 MMHG

## 2019-12-20 DIAGNOSIS — I48.91 ATRIAL FIBRILLATION, UNSPECIFIED TYPE: ICD-10-CM

## 2019-12-20 DIAGNOSIS — I48.0 PAROXYSMAL ATRIAL FIBRILLATION: Primary | ICD-10-CM

## 2019-12-20 DIAGNOSIS — E78.5 HYPERLIPIDEMIA, UNSPECIFIED HYPERLIPIDEMIA TYPE: ICD-10-CM

## 2019-12-20 DIAGNOSIS — I10 ESSENTIAL HYPERTENSION: ICD-10-CM

## 2019-12-20 PROCEDURE — 99999 PR PBB SHADOW E&M-EST. PATIENT-LVL III: ICD-10-PCS | Mod: PBBFAC,,, | Performed by: INTERNAL MEDICINE

## 2019-12-20 PROCEDURE — 1126F PR PAIN SEVERITY QUANTIFIED, NO PAIN PRESENT: ICD-10-PCS | Mod: S$GLB,,, | Performed by: INTERNAL MEDICINE

## 2019-12-20 PROCEDURE — 99214 OFFICE O/P EST MOD 30 MIN: CPT | Mod: S$GLB,,, | Performed by: INTERNAL MEDICINE

## 2019-12-20 PROCEDURE — 1159F PR MEDICATION LIST DOCUMENTED IN MEDICAL RECORD: ICD-10-PCS | Mod: S$GLB,,, | Performed by: INTERNAL MEDICINE

## 2019-12-20 PROCEDURE — 3077F SYST BP >= 140 MM HG: CPT | Mod: CPTII,S$GLB,, | Performed by: INTERNAL MEDICINE

## 2019-12-20 PROCEDURE — 93000 ELECTROCARDIOGRAM COMPLETE: CPT | Mod: S$GLB,,, | Performed by: INTERNAL MEDICINE

## 2019-12-20 PROCEDURE — 1159F MED LIST DOCD IN RCRD: CPT | Mod: S$GLB,,, | Performed by: INTERNAL MEDICINE

## 2019-12-20 PROCEDURE — 1101F PR PT FALLS ASSESS DOC 0-1 FALLS W/OUT INJ PAST YR: ICD-10-PCS | Mod: CPTII,S$GLB,, | Performed by: INTERNAL MEDICINE

## 2019-12-20 PROCEDURE — 1101F PT FALLS ASSESS-DOCD LE1/YR: CPT | Mod: CPTII,S$GLB,, | Performed by: INTERNAL MEDICINE

## 2019-12-20 PROCEDURE — 99999 PR PBB SHADOW E&M-EST. PATIENT-LVL III: CPT | Mod: PBBFAC,,, | Performed by: INTERNAL MEDICINE

## 2019-12-20 PROCEDURE — 99499 UNLISTED E&M SERVICE: CPT | Mod: S$GLB,,, | Performed by: INTERNAL MEDICINE

## 2019-12-20 PROCEDURE — 3080F PR MOST RECENT DIASTOLIC BLOOD PRESSURE >= 90 MM HG: ICD-10-PCS | Mod: CPTII,S$GLB,, | Performed by: INTERNAL MEDICINE

## 2019-12-20 PROCEDURE — 99499 RISK ADDL DX/OHS AUDIT: ICD-10-PCS | Mod: S$GLB,,, | Performed by: INTERNAL MEDICINE

## 2019-12-20 PROCEDURE — 3077F PR MOST RECENT SYSTOLIC BLOOD PRESSURE >= 140 MM HG: ICD-10-PCS | Mod: CPTII,S$GLB,, | Performed by: INTERNAL MEDICINE

## 2019-12-20 PROCEDURE — 99214 PR OFFICE/OUTPT VISIT, EST, LEVL IV, 30-39 MIN: ICD-10-PCS | Mod: S$GLB,,, | Performed by: INTERNAL MEDICINE

## 2019-12-20 PROCEDURE — 1126F AMNT PAIN NOTED NONE PRSNT: CPT | Mod: S$GLB,,, | Performed by: INTERNAL MEDICINE

## 2019-12-20 PROCEDURE — 3080F DIAST BP >= 90 MM HG: CPT | Mod: CPTII,S$GLB,, | Performed by: INTERNAL MEDICINE

## 2019-12-20 PROCEDURE — 93000 EKG 12-LEAD: ICD-10-PCS | Mod: S$GLB,,, | Performed by: INTERNAL MEDICINE

## 2019-12-20 RX ORDER — ATENOLOL 25 MG/1
TABLET ORAL
Qty: 45 TABLET | Refills: 3 | Status: SHIPPED | OUTPATIENT
Start: 2019-12-20 | End: 2020-08-11 | Stop reason: SDUPTHER

## 2019-12-20 NOTE — PROGRESS NOTES
Subjective:    Patient ID:  Maycol Hodge is a 79 y.o. male who presents for evaluation of Consult (ref from pcp)      HPI79 yo WM who I had seen in 2017 for HTN who at that time was asymptomatic and not interested in making any med changes. Had EKG on 12- showing AF.He feels he has had intermittent symptoms since August.Had stress echo in November and was in sinus and test negative for ischemia.    Review of Systems   Constitution: Positive for malaise/fatigue. Negative for decreased appetite, fever, weight gain and weight loss.   HENT: Negative for hearing loss and nosebleeds.    Eyes: Negative for visual disturbance.   Cardiovascular: Negative for chest pain, claudication, cyanosis, dyspnea on exertion, irregular heartbeat, leg swelling, near-syncope, orthopnea, palpitations, paroxysmal nocturnal dyspnea and syncope.   Respiratory: Negative for cough, hemoptysis, shortness of breath, sleep disturbances due to breathing, snoring and wheezing.    Endocrine: Negative for cold intolerance, heat intolerance, polydipsia and polyuria.   Hematologic/Lymphatic: Negative for adenopathy and bleeding problem. Does not bruise/bleed easily.   Skin: Negative for color change, itching, poor wound healing, rash and suspicious lesions.   Musculoskeletal: Negative for arthritis, back pain, falls, joint pain, joint swelling, muscle cramps, muscle weakness and myalgias.   Gastrointestinal: Negative for bloating, abdominal pain, change in bowel habit, constipation, flatus, heartburn, hematemesis, hematochezia, hemorrhoids, jaundice, melena, nausea and vomiting.   Genitourinary: Negative for bladder incontinence, decreased libido, frequency, hematuria, hesitancy and urgency.   Neurological: Negative for brief paralysis, difficulty with concentration, excessive daytime sleepiness, dizziness, focal weakness, headaches, light-headedness, loss of balance, numbness, vertigo and weakness.   Psychiatric/Behavioral: Negative for  "altered mental status, depression and memory loss. The patient does not have insomnia and is not nervous/anxious.    Allergic/Immunologic: Negative for environmental allergies, hives and persistent infections.        Objective:    Physical Exam   Constitutional: He is oriented to person, place, and time. He appears well-developed and well-nourished. No distress.   BP (!) 182/90   Pulse (!) 57   Ht 5' 11" (1.803 m)   Wt 81.5 kg (179 lb 10.8 oz)   BMI 25.06 kg/m²      HENT:   Head: Normocephalic and atraumatic.   Eyes: Pupils are equal, round, and reactive to light. Conjunctivae and lids are normal. Right eye exhibits no discharge. No scleral icterus.   Neck: Normal range of motion. Neck supple. No JVD present. No tracheal deviation present. No thyromegaly present.   Cardiovascular: Normal rate, regular rhythm, S1 normal, S2 normal, normal heart sounds and intact distal pulses. Exam reveals no gallop and no friction rub.   No murmur heard.  Pulses:       Carotid pulses are 2+ on the right side, and 2+ on the left side.       Radial pulses are 2+ on the right side, and 2+ on the left side.        Femoral pulses are 2+ on the right side, and 2+ on the left side.       Popliteal pulses are 2+ on the right side, and 2+ on the left side.        Dorsalis pedis pulses are 2+ on the right side, and 2+ on the left side.        Posterior tibial pulses are 2+ on the right side, and 2+ on the left side.   Pulmonary/Chest: Effort normal and breath sounds normal. No respiratory distress. He has no wheezes. He has no rales. He exhibits no tenderness.   Abdominal: Soft. Bowel sounds are normal. He exhibits no distension and no mass. There is no hepatosplenomegaly or hepatomegaly. There is no tenderness. There is no rebound and no guarding.   Musculoskeletal: Normal range of motion. He exhibits no edema or tenderness.   Lymphadenopathy:     He has no cervical adenopathy.   Neurological: He is alert and oriented to person, place, " and time. He has normal reflexes. No cranial nerve deficit. Coordination normal.   Skin: Skin is warm and dry. No rash noted. He is not diaphoretic. No erythema. No pallor.   Psychiatric: He has a normal mood and affect. His speech is normal and behavior is normal. Judgment and thought content normal. Cognition and memory are normal.       EKG back in sinus  Assessment:       1. Paroxysmal atrial fibrillation    2. Hyperlipidemia, unspecified hyperlipidemia type    3. Essential hypertension    4. Atrial fibrillation, unspecified type         Plan:     Start eliquis 5mg BIB    Try low dose beta blocker 12.5 mg of atenolol at night    Follow BP    Risk of stroke from atrial fib has been discussed as well as bleeding risk from alternative anticoagulation regiments as well as risk and benefits of rate control vs cardioversion  Stop eliquis if any abnormal bleeding      Orders Placed This Encounter   Procedures    Holter monitor - 48 hour    IN OFFICE EKG 12-LEAD (to Muse)     Follow up in about 6 weeks (around 1/31/2020).

## 2019-12-20 NOTE — LETTER
December 20, 2019      Willis Mullen MD  [address]  [address]  [facility] [address]           [facility]  [address] [place] [address]  Phone: [phone]          Patient: Maycol Hogde   MR Number: [id number]   YOB: 1940   Date of Visit: 12/20/2019       Dear Dr. Willis Mullen:    Thank you for referring Maycol Hodge to me for evaluation. Attached you will find relevant portions of my assessment and plan of care.    If you have questions, please do not hesitate to call me. I look forward to following Maycol Hodge along with you.    Sincerely,    Roel Vogel Jr., MD    Enclosure  CC:  No Recipients    If you would like to receive this communication electronically, please contact [email or web address] or [phone] to request more information on [employer] access.    For providers and/or their staff who would like to refer a patient to [facility], please contact us through our one-stop-shop provider referral line, [facility], at [phone].    If you feel you have received this communication in error or would no longer like to receive these types of communications, please e-mail [email or web address]

## 2019-12-23 DIAGNOSIS — Z86.010 HISTORY OF COLON POLYPS: ICD-10-CM

## 2019-12-23 DIAGNOSIS — R19.4 CHANGE IN BOWEL HABITS: Primary | ICD-10-CM

## 2020-01-06 ENCOUNTER — TELEPHONE (OUTPATIENT)
Dept: GASTROENTEROLOGY | Facility: CLINIC | Age: 80
End: 2020-01-06

## 2020-01-06 NOTE — TELEPHONE ENCOUNTER
----- Message from Amina Torrez sent at 1/6/2020 11:44 AM CST -----    Pt I s calling to  Talk  To steve about a colonoscopy // please [phone]

## 2020-01-10 ENCOUNTER — OFFICE VISIT (OUTPATIENT)
Dept: FAMILY MEDICINE | Facility: CLINIC | Age: 80
End: 2020-01-10
Payer: MEDICARE

## 2020-01-10 VITALS
BODY MASS INDEX: 24.97 KG/M2 | WEIGHT: 178.38 LBS | TEMPERATURE: 98 F | SYSTOLIC BLOOD PRESSURE: 130 MMHG | RESPIRATION RATE: 16 BRPM | HEART RATE: 57 BPM | DIASTOLIC BLOOD PRESSURE: 70 MMHG | HEIGHT: 71 IN | OXYGEN SATURATION: 95 %

## 2020-01-10 DIAGNOSIS — E06.3 HYPOTHYROIDISM DUE TO HASHIMOTO'S THYROIDITIS: ICD-10-CM

## 2020-01-10 DIAGNOSIS — I10 ESSENTIAL HYPERTENSION: Primary | ICD-10-CM

## 2020-01-10 DIAGNOSIS — J45.51 SEVERE PERSISTENT ASTHMA WITH ACUTE EXACERBATION: ICD-10-CM

## 2020-01-10 DIAGNOSIS — E78.5 HYPERLIPIDEMIA, UNSPECIFIED HYPERLIPIDEMIA TYPE: ICD-10-CM

## 2020-01-10 DIAGNOSIS — L50.8 AUTOIMMUNE URTICARIA: ICD-10-CM

## 2020-01-10 DIAGNOSIS — E03.8 HYPOTHYROIDISM DUE TO HASHIMOTO'S THYROIDITIS: ICD-10-CM

## 2020-01-10 DIAGNOSIS — N40.0 BENIGN PROSTATIC HYPERPLASIA WITHOUT LOWER URINARY TRACT SYMPTOMS: ICD-10-CM

## 2020-01-10 DIAGNOSIS — I48.0 PAROXYSMAL ATRIAL FIBRILLATION: ICD-10-CM

## 2020-01-10 PROCEDURE — 99999 PR PBB SHADOW E&M-EST. PATIENT-LVL III: ICD-10-PCS | Mod: PBBFAC,,, | Performed by: FAMILY MEDICINE

## 2020-01-10 PROCEDURE — 3075F SYST BP GE 130 - 139MM HG: CPT | Mod: CPTII,S$GLB,, | Performed by: FAMILY MEDICINE

## 2020-01-10 PROCEDURE — 1126F PR PAIN SEVERITY QUANTIFIED, NO PAIN PRESENT: ICD-10-PCS | Mod: S$GLB,,, | Performed by: FAMILY MEDICINE

## 2020-01-10 PROCEDURE — 1126F AMNT PAIN NOTED NONE PRSNT: CPT | Mod: S$GLB,,, | Performed by: FAMILY MEDICINE

## 2020-01-10 PROCEDURE — 3078F PR MOST RECENT DIASTOLIC BLOOD PRESSURE < 80 MM HG: ICD-10-PCS | Mod: CPTII,S$GLB,, | Performed by: FAMILY MEDICINE

## 2020-01-10 PROCEDURE — 3075F PR MOST RECENT SYSTOLIC BLOOD PRESS GE 130-139MM HG: ICD-10-PCS | Mod: CPTII,S$GLB,, | Performed by: FAMILY MEDICINE

## 2020-01-10 PROCEDURE — 1101F PR PT FALLS ASSESS DOC 0-1 FALLS W/OUT INJ PAST YR: ICD-10-PCS | Mod: CPTII,S$GLB,, | Performed by: FAMILY MEDICINE

## 2020-01-10 PROCEDURE — 1101F PT FALLS ASSESS-DOCD LE1/YR: CPT | Mod: CPTII,S$GLB,, | Performed by: FAMILY MEDICINE

## 2020-01-10 PROCEDURE — 1159F MED LIST DOCD IN RCRD: CPT | Mod: S$GLB,,, | Performed by: FAMILY MEDICINE

## 2020-01-10 PROCEDURE — 99999 PR PBB SHADOW E&M-EST. PATIENT-LVL III: CPT | Mod: PBBFAC,,, | Performed by: FAMILY MEDICINE

## 2020-01-10 PROCEDURE — 99214 OFFICE O/P EST MOD 30 MIN: CPT | Mod: S$GLB,,, | Performed by: FAMILY MEDICINE

## 2020-01-10 PROCEDURE — 3078F DIAST BP <80 MM HG: CPT | Mod: CPTII,S$GLB,, | Performed by: FAMILY MEDICINE

## 2020-01-10 PROCEDURE — 1159F PR MEDICATION LIST DOCUMENTED IN MEDICAL RECORD: ICD-10-PCS | Mod: S$GLB,,, | Performed by: FAMILY MEDICINE

## 2020-01-10 PROCEDURE — 99214 PR OFFICE/OUTPT VISIT, EST, LEVL IV, 30-39 MIN: ICD-10-PCS | Mod: S$GLB,,, | Performed by: FAMILY MEDICINE

## 2020-01-10 NOTE — PROGRESS NOTES
Subjective:       Patient ID: Maycol Hodge is a 79 y.o. male.    Chief Complaint: Follow-up (3mth f/u hypertension)    HPI  Review of Systems   Constitutional: Negative for fatigue and unexpected weight change.   Respiratory: Negative for chest tightness and shortness of breath.    Cardiovascular: Negative for chest pain, palpitations and leg swelling.   Gastrointestinal: Negative for abdominal pain.   Musculoskeletal: Negative for arthralgias.   Neurological: Negative for dizziness, syncope, light-headedness and headaches.       Patient Active Problem List   Diagnosis    Chronic allergic rhinitis    Hypertension    Severe persistent asthma with acute exacerbation    BPH (benign prostatic hyperplasia)    History of TIA (transient ischemic attack)    Hyperlipidemia    Cataract    Urticaria    Hypothyroidism    Autoimmune urticaria    Anaphylactic syndrome    Hashimoto's thyroiditis    Exacerbation of chronic bronchiolitis    Change in bowel habits    Acute urticaria    Paroxysmal atrial fibrillation     Patient is here for a chronic conditions follow up.    Has not had further hives since on famotidine and zyrtec.  Wants to trial off  Objective:      Physical Exam   Constitutional: He is oriented to person, place, and time. He appears well-developed and well-nourished.   Cardiovascular: Normal rate, regular rhythm and normal heart sounds.   Pulmonary/Chest: Effort normal and breath sounds normal.   Musculoskeletal: He exhibits no edema.   Neurological: He is alert and oriented to person, place, and time.   Skin: Skin is warm and dry.   Psychiatric: He has a normal mood and affect.   Nursing note and vitals reviewed.      Assessment:       1. Essential hypertension    2. Severe persistent asthma with acute exacerbation    3. Benign prostatic hyperplasia without lower urinary tract symptoms    4. Hypothyroidism due to Hashimoto's thyroiditis    5. Hyperlipidemia, unspecified hyperlipidemia type     6. Paroxysmal atrial fibrillation    7. Autoimmune urticaria        Plan:         1. Essential hypertension  Controlled on current medications.  Continue current medications.      2. Severe persistent asthma with acute exacerbation  Controlled on current medications.  Continue current medications.      3. Benign prostatic hyperplasia without lower urinary tract symptoms  Cont current mgmt    4. Hypothyroidism due to Hashimoto's thyroiditis  Controlled on current medications.  Continue current medications.    - CBC auto differential; Future  - TSH; Future  - T4, free; Future    5. Hyperlipidemia, unspecified hyperlipidemia type  Stable condition.  Continue current medications.  Will adjust based on lab findings or if condition changes.    - Comprehensive metabolic panel; Future  - Lipid panel; Future    6. Paroxysmal atrial fibrillation  Cont current mgmt    7. Autoimmune urticaria  Controlled. Per pat request trial off famotidine and zyrtec        Time spent with patient: 20 minutes    Patient with be reevaluated in 6 months or sooner prn    Greater than 50% of this visit was spent counseling as described in above documentation:Yes

## 2020-01-14 DIAGNOSIS — J45.20 INTERMITTENT ASTHMA, UNCOMPLICATED: ICD-10-CM

## 2020-01-14 RX ORDER — MONTELUKAST SODIUM 10 MG/1
10 TABLET ORAL DAILY
Qty: 90 TABLET | Refills: 3 | Status: SHIPPED | OUTPATIENT
Start: 2020-01-14 | End: 2021-02-23

## 2020-01-21 ENCOUNTER — ANESTHESIA (OUTPATIENT)
Dept: ENDOSCOPY | Facility: HOSPITAL | Age: 80
End: 2020-01-21
Payer: MEDICARE

## 2020-01-21 ENCOUNTER — HOSPITAL ENCOUNTER (OUTPATIENT)
Facility: HOSPITAL | Age: 80
Discharge: HOME | End: 2020-01-21
Attending: INTERNAL MEDICINE | Admitting: INTERNAL MEDICINE
Payer: MEDICARE

## 2020-01-21 ENCOUNTER — ANESTHESIA EVENT (OUTPATIENT)
Dept: ENDOSCOPY | Facility: HOSPITAL | Age: 80
End: 2020-01-21
Payer: MEDICARE

## 2020-01-21 DIAGNOSIS — K63.5 POLYP OF COLON, UNSPECIFIED PART OF COLON, UNSPECIFIED TYPE: Primary | ICD-10-CM

## 2020-01-21 DIAGNOSIS — K57.90 DIVERTICULOSIS: ICD-10-CM

## 2020-01-21 DIAGNOSIS — K64.8 INTERNAL HEMORRHOIDS: ICD-10-CM

## 2020-01-21 DIAGNOSIS — Z86.010 HX OF COLONIC POLYPS: ICD-10-CM

## 2020-01-21 PROBLEM — Z86.0100 HX OF COLONIC POLYPS: Status: ACTIVE | Noted: 2020-01-21

## 2020-01-21 PROCEDURE — D9220A PRA ANESTHESIA: ICD-10-PCS | Mod: PT,CRNA,, | Performed by: NURSE ANESTHETIST, CERTIFIED REGISTERED

## 2020-01-21 PROCEDURE — 45380 PR COLONOSCOPY,BIOPSY: ICD-10-PCS | Mod: 22,59,, | Performed by: INTERNAL MEDICINE

## 2020-01-21 PROCEDURE — D9220A PRA ANESTHESIA: Mod: PT,CRNA,, | Performed by: NURSE ANESTHETIST, CERTIFIED REGISTERED

## 2020-01-21 PROCEDURE — 88305 TISSUE EXAM BY PATHOLOGIST: CPT | Mod: 26,,, | Performed by: PATHOLOGY

## 2020-01-21 PROCEDURE — 45380 COLONOSCOPY AND BIOPSY: CPT | Mod: 22,59,, | Performed by: INTERNAL MEDICINE

## 2020-01-21 PROCEDURE — 37000008 HC ANESTHESIA 1ST 15 MINUTES: Performed by: INTERNAL MEDICINE

## 2020-01-21 PROCEDURE — 27201012 HC FORCEPS, HOT/COLD, DISP: Performed by: INTERNAL MEDICINE

## 2020-01-21 PROCEDURE — 25000003 PHARM REV CODE 250: Performed by: NURSE ANESTHETIST, CERTIFIED REGISTERED

## 2020-01-21 PROCEDURE — 88305 TISSUE EXAM BY PATHOLOGIST: ICD-10-PCS | Mod: 26,,, | Performed by: PATHOLOGY

## 2020-01-21 PROCEDURE — 45385 COLONOSCOPY W/LESION REMOVAL: CPT | Mod: 22,PT,, | Performed by: INTERNAL MEDICINE

## 2020-01-21 PROCEDURE — 63600175 PHARM REV CODE 636 W HCPCS: Performed by: NURSE ANESTHETIST, CERTIFIED REGISTERED

## 2020-01-21 PROCEDURE — 27201089 HC SNARE, DISP (ANY): Performed by: INTERNAL MEDICINE

## 2020-01-21 PROCEDURE — D9220A PRA ANESTHESIA: Mod: PT,ANES,, | Performed by: ANESTHESIOLOGY

## 2020-01-21 PROCEDURE — 37000009 HC ANESTHESIA EA ADD 15 MINS: Performed by: INTERNAL MEDICINE

## 2020-01-21 PROCEDURE — 45385 PR COLONOSCOPY,REMV LESN,SNARE: ICD-10-PCS | Mod: 22,PT,, | Performed by: INTERNAL MEDICINE

## 2020-01-21 PROCEDURE — 45380 COLONOSCOPY AND BIOPSY: CPT | Performed by: INTERNAL MEDICINE

## 2020-01-21 PROCEDURE — 45385 COLONOSCOPY W/LESION REMOVAL: CPT | Performed by: INTERNAL MEDICINE

## 2020-01-21 PROCEDURE — 88305 TISSUE EXAM BY PATHOLOGIST: CPT | Mod: 59 | Performed by: PATHOLOGY

## 2020-01-21 PROCEDURE — 63600175 PHARM REV CODE 636 W HCPCS: Performed by: INTERNAL MEDICINE

## 2020-01-21 PROCEDURE — D9220A PRA ANESTHESIA: ICD-10-PCS | Mod: PT,ANES,, | Performed by: ANESTHESIOLOGY

## 2020-01-21 RX ORDER — SODIUM CHLORIDE 9 MG/ML
INJECTION, SOLUTION INTRAVENOUS CONTINUOUS
Status: DISCONTINUED | OUTPATIENT
Start: 2020-01-21 | End: 2020-01-21 | Stop reason: HOSPADM

## 2020-01-21 RX ORDER — EPHEDRINE SULFATE 50 MG/ML
INJECTION, SOLUTION INTRAVENOUS
Status: DISCONTINUED | OUTPATIENT
Start: 2020-01-21 | End: 2020-01-21

## 2020-01-21 RX ORDER — LIDOCAINE HCL/PF 100 MG/5ML
SYRINGE (ML) INTRAVENOUS
Status: DISCONTINUED | OUTPATIENT
Start: 2020-01-21 | End: 2020-01-21

## 2020-01-21 RX ORDER — PROPOFOL 10 MG/ML
VIAL (ML) INTRAVENOUS
Status: DISCONTINUED | OUTPATIENT
Start: 2020-01-21 | End: 2020-01-21

## 2020-01-21 RX ADMIN — EPHEDRINE SULFATE 5 MG: 50 INJECTION, SOLUTION INTRAMUSCULAR; INTRAVENOUS; SUBCUTANEOUS at 09:01

## 2020-01-21 RX ADMIN — PROPOFOL 25 MG: 10 INJECTION, EMULSION INTRAVENOUS at 09:01

## 2020-01-21 RX ADMIN — PROPOFOL 100 MG: 10 INJECTION, EMULSION INTRAVENOUS at 09:01

## 2020-01-21 RX ADMIN — LIDOCAINE HYDROCHLORIDE 100 MG: 20 INJECTION, SOLUTION INTRAVENOUS at 09:01

## 2020-01-21 RX ADMIN — SODIUM CHLORIDE: 0.9 INJECTION, SOLUTION INTRAVENOUS at 08:01

## 2020-01-21 NOTE — H&P
CC: Change in bowel habits - last scope 6 years ago    79 year old male with above. States that symptoms are stable, no alleviating/exacerbating factors. No family history of CA. Positive personal history of polyps. No bleeding or weight loss.     ROS:  No headache, no fever/chills, no chest pain/SOB, no nausea/vomiting/diarrhea/constipation/GI bleeding/abdominal pain, no dysuria/hematuria.    VSSAF   Exam:   Alert and oriented x 3; no apparent distress   PERRLA, sclera anicteric  CV: Regular rate/rhythm, normal PMI   Lungs: Clear bilaterally with no wheeze/rales   Abdomen: Soft, NT/ND, normal bowel sounds   Ext: No cyanosis, clubbing     Impression:   As above    Plan:   Proceed with endoscopy. Further recs to follow.

## 2020-01-21 NOTE — TRANSFER OF CARE
"Anesthesia Transfer of Care Note    Patient: Maycol Hodge    Procedure(s) Performed: Procedure(s) (LRB):  COLONOSCOPY (N/A)    Patient location: PACU    Anesthesia Type: general    Transport from OR: Transported from OR on 6-10 L/min O2 by face mask with adequate spontaneous ventilation    Post pain: adequate analgesia    Post assessment: no apparent anesthetic complications    Post vital signs: stable    Level of consciousness: responds to stimulation    Nausea/Vomiting: no nausea/vomiting    Complications: none    Transfer of care protocol was followed      Last vitals:   Visit Vitals  BP (!) 83/54   Pulse 82   Temp 36.5 °C (97.7 °F) (Skin)   Resp 12   Ht 5' 10" (1.778 m)   Wt 79.4 kg (175 lb)   SpO2 (!) 89%   BMI 25.11 kg/m²     "

## 2020-01-21 NOTE — ANESTHESIA POSTPROCEDURE EVALUATION
Anesthesia Post Evaluation    Patient: Maycol Hodge    Procedure(s) Performed: Procedure(s) (LRB):  COLONOSCOPY (N/A)    Final Anesthesia Type: general    Patient location during evaluation: PACU  Patient participation: Yes- Able to Participate  Level of consciousness: awake and alert  Post-procedure vital signs: reviewed and stable  Pain management: adequate  Airway patency: patent    PONV status at discharge: No PONV  Anesthetic complications: no      Cardiovascular status: blood pressure returned to baseline and hemodynamically stable  Respiratory status: unassisted  Hydration status: euvolemic  Follow-up not needed.          Vitals Value Taken Time   BP 83/54 1/21/2020  9:31 AM   Temp 36.5 °C (97.7 °F) 1/21/2020  8:38 AM   Pulse 82 1/21/2020  9:31 AM   Resp 12 1/21/2020  9:31 AM   SpO2 89 % 1/21/2020  9:31 AM         No case tracking events are documented in the log.      Pain/Nestor Score: No data recorded

## 2020-01-21 NOTE — DISCHARGE INSTRUCTIONS
Discharge Instructions: Eating a High-Fiber Diet  Your health care provider has prescribed a high-fiber diet for you. Fiber is what gives strength and structure to plants. Most grains, beans, vegetables, and fruits contain fiber. Foods rich in fiber are often low in calories and fat, but they fill you up more. These foods may also reduce the risk of certain health problems.  There are two types of fiber:  · Insoluble fiber. This is found in whole grains, cereals, and certain fruits and vegetables (such as apple skins, corn, and beans). Insoluble fiber is made up mainly of plant cell walls. It may prevent constipation and reduce the risk of certain types of cancer.  · Soluble fiber. This type of fiber is found in oats, beans, nuts, and certain fruits and vegetables (such as strawberries and peas). Soluble fiber turns to gel in the digestive system, slowing the movement of the digestive tract. It helps control blood sugar levels and can reduce cholesterol, which may help lower the risk of heart disease. Soluble fiber can also help control appetite.     Home care  · Know how much fiber you need a day. The recommended daily amount of fiber is 25 grams for women and 38 grams for men. After age 50, daily fiber needs drop to 21 grams for women and 30 grams for men.  · Ask your doctor about a fiber supplement. (Always take fiber supplements with a large glass of water.)  · Keep track of how much fiber you eat.  · Eat a variety of foods high in fiber.  · Learn to read and understand food labels.  · Ask your healthcare provider how much water you should be drinking.  · Look for these high-fiber foods:  ¨ Whole-grain breads and cereals  § 6 ounces a day give you about 18 grams of fiber (1 ounce is equal to 1 slice of bread, 1 cup of dry cereal, or 1/2 cup of cooked rice).  § Include wheat and oat bran cereals, whole-wheat muffins or toast, and corn tortillas in your meals.  ¨ Fruits   § 2 cups a day give you about 8 grams of  fiber.  § Apples, oranges, strawberries, pears, and bananas are good sources.  § Fruit juice does not contain as much fiber as the fruit it was made from.  ¨ Vegetables  § 2½ cups a day give you about 11 grams of fiber. Add asparagus, carrots, broccoli, peas, and corn to your meals.  ¨ Legumes  § 1/4 cup a day (in place of meat) gives you about 4 grams of fiber. Try navy beans, lentils, chickpeas, and soybeans.  ¨ Seeds   § A small handful of seeds gives you about 3 grams of fiber. Try sunflower seeds.  Follow-up  Make a follow-up appointment with a nutritionist as directed by our staff.  Date Last Reviewed: 6/1/2015 © 2000-2017 [employer]. [address] [place] [address]. All rights reserved. This information is not intended as a substitute for professional medical care. Always follow your healthcare professional's instructions.        Hemorrhoids    Hemorrhoids are swollen and inflamed veins inside the rectum and near the anus. The rectum is the last several inches of the colon. The anus is the passage between the rectum and the outside of the body.  Causes  The veins can become swollen due to increased pressure in them. This is most often caused by:  · Chronic constipation or diarrhea  · Straining when having a bowel movement  · Sitting too long on the toilet  · A low-fiber diet  · Pregnancy  Symptoms  · Bleeding from the rectum (this may be noticeable after bowel movements)  · Lump near the anus  · Itching around the anus  · Pain around the anus  There are different types of hemorrhoids. Depending on the type you have and the severity, you may be able to treat yourself at home. In some cases, a procedure may be the best treatment option. Your healthcare provider can tell you more about this, if needed.  Home care  General care  · To get relief from pain or itching, try:  ¨ Topical products. Your healthcare provider may prescribe or recommend creams, ointments, or pads that can be  applied to the hemorrhoid. Use these exactly as directed.  ¨ Medicines. Your healthcare provider may recommend stool softeners, suppositories, or laxatives to help manage constipation. Use these exactly as directed.  ¨ Sitz baths. A sitz bath involves sitting in a few inches of warm bath water. Be careful not to make the water so hot that you burn yourself--test it before sitting in it. Soak for about 10 to 15 minutes a few times a day. This may help relieve pain.  Tips to help prevent hemorrhoids  · Eat more fiber. Fiber adds bulk to stool and absorbs water as it moves through your colon. This makes stool softer and easier to pass.  ¨ Increase the fiber in your diet with more fiber-rich foods. These include fresh fruit, vegetables, and whole grains.  ¨ Take a fiber supplement or bulking agent, if advised to by your provider. These include products such as psyllium or methylcellulose.  · Drink plenty of water, if directed to by your provider. This can help keep stool soft.  · Be more active. Frequent exercise aids digestion and helps prevent constipation. It may also help make bowel movements more regular.  · Dont strain during bowel movements. This can make hemorrhoids more likely. Also, dont sit on the toilet for long periods of time.  Follow-up care  Follow up with your healthcare provider, or as advised. If a culture or imaging tests were done, you will be notified of the results when they are ready. This may take a few days or longer.  When to seek medical advice  Call your healthcare provider right away if any of these occur:  · Increased bleeding from the rectum  · Increased pain around the rectum or anus  · Weakness or dizziness  Call 911  Call 911 or return to the emergency department right away if any of these occur:  · Trouble breathing or swallowing  · Fainting or loss of consciousness  · Unusually fast heart rate  · Vomiting blood  · Large amounts of blood in stool  Date Last Reviewed: 6/22/2015  ©  8490-2533 [employer]. [address] [place] [address], [place] [address]. All rights reserved. This information is not intended as a substitute for professional medical care. Always follow your healthcare professional's instructions.        Diverticulosis    Diverticulosis means that small pouches have formed in the wall of your large intestine (colon). Most often, this problem causes no symptoms and is common as people age. But the pouches in the colon are at risk of becoming infected. When this happens, the condition is called diverticulitis. Although most people with diverticulosis never develop diverticulitis, it is still not uncommon. Rectal bleeding can also occur and in less common situations, a type of colon inflammation called colitis.  While most people do not have symptoms, some people with diverticulosis may have:  · Abdominal cramps and pain  · Bloating  · Constipation  · Change in bowel habits  Causes  The exact cause of diverticulosis (and diverticulitis) has not been proved, but a few things are associated with the condition:  · Low-fiber diet  · Constipation  · Lack of exercise  Your healthcare provider will talk with you about how to manage your condition. Diet changes may be all that are needed to help control diverticulosis and prevent progression to diverticulitis. If you develop diverticulitis, you will likely need other treatments.  Home care  You may be told to take fiber supplements daily. Fiber adds bulk to the stool so that it passes through the colon more easily. Stool softeners may be recommended. You may also be given medications for pain relief. Be sure to take all medications as directed.  In the past, people were told to avoid corn, nuts, and seeds. This is no longer necessary.  Follow these guidelines when caring for yourself at home:  · Eat unprocessed foods that are high in fiber. Whole grains, fruits, and vegetables are good choices.  · Drink 6 to 8 glasses of water every  day unless your healthcare provider has you limit how much fluid you should have.  · Watch for changes in your bowel movements. Tell your provider if you notice any changes.  · Begin an exercise program. Ask your provider how to get started. Generally, walking is the best.  · Get plenty of rest and sleep.  Follow-up care  Follow up with your healthcare provider, or as advised. Regular visits may be needed to check on your health. Sometimes special procedures such as colonoscopy, are needed after an episode of diverticulitis or blooding. Be sure to keep all your appointments.  If a stool sample was taken, or cultures were done, you should be told if they are positive, or if your treatment needs to be changed. You can call as directed for the results.  If X-rays were done, a radiologist will look at them. You will be told if there is a change in your treatment.  If antibiotics were prescribed, be sure to finish them all.  When to seek medical advice  Call your healthcare provider right away if any of these occur:  · Fever of 100.4°F (38°C) or higher, or as directed by your healthcare provider  · Severe cramps in the lower left side of the abdomen or pain that is getting worse  · Tenderness in the lower left side of the abdomen or worsening pain throughout the abdomen  · Diarrhea or constipation that doesn't get better within 24 hours  · Nausea and vomiting  · Bleeding from the rectum  Call 911  Call emergency services if any of the following occur:  · Trouble breathing  · Confusion  · Very drowsy or trouble awakening  · Fainting or loss of consciousness  · Rapid heart rate  · Chest pain  Date Last Reviewed: 12/30/2015 © 2000-2017 [employer]. [address] [place] [address], [place] [address]. All rights reserved. This information is not intended as a substitute for professional medical care. Always follow your healthcare professional's instructions.        Understanding Colon and Rectal Polyps    The colon  (also called the large intestine) is a muscular tube that forms the last part of the digestive tract. It absorbs water and stores food waste. The colon is about 4 to 6 feet long. The rectum is the last 6 inches of the colon. The colon and rectum have a smooth lining composed of millions of cells. Changes in these cells can lead to growths in the colon that can become cancerous and should be removed. Multiple tests are available to screen for colon cancer, but the colonoscopy is the most recommended test. During colonoscopy, these polyps can be removed. How often you need this test depends on many things including your condition, your family history, symptoms, and what the findings were at the previous colonoscopy.   When the colon lining changes  Changes that happen in the cells that line the colon or rectum can lead to growths called polyps. Over a period of years, polyps can turn cancerous. Removing polyps early may prevent cancer from ever forming.  Polyps  Polyps are fleshy clumps of tissue that form on the lining of the colon or rectum. Small polyps are usually benign (not cancerous). However, over time, cells in a polyp can change and become cancerous. Certain types of polyps known as adenomatous polyps are premalignant. The risk for invasive cancer increases with the size of the polyp and certain cell and gene features. This means that they can become cancerous if they're not removed. Hyperplastic polyps are benign. They can grow quite large and not turn cancerous.   Cancer  Almost all colorectal cancers start when polyp cells begin growing abnormally. As a cancerous tumor grows, it may involve more and more of the colon or rectum. In time, cancer can also grow beyond the colon or rectum and spread to nearby organs or to glands called lymph nodes. The cells can also travel to other parts of the body. This is known as metastasis. The earlier a cancerous tumor is removed, the better the chance of preventing its  spread.    Date Last Reviewed: 8/1/2016  © 0377-2490 [employer]. [address] [place] [address], [place] [address]. All rights reserved. This information is not intended as a substitute for professional medical care. Always follow your healthcare professional's instructions.        Colonoscopy     A camera attached to a flexible tube with a viewing lens is used to take video pictures.     Colonoscopy is a test to view the inside of your lower digestive tract (colon and rectum). Sometimes it can show the last part of the small intestine (ileum). During the test, small pieces of tissue may be removed for testing. This is called a biopsy. Small growths, such as polyps, may also be removed.   Why is colonoscopy done?  The test is done to help look for colon cancer. And it can help find the source of abdominal pain, bleeding, and changes in bowel habits. It may be needed once a year, depending on factors such as your:  · Age  · Health history  · Family health history  · Symptoms  · Results from any prior colonoscopy  Risks and possible complications  These include:  · Bleeding               · A puncture or tear in the colon   · Risks of anesthesia  · A cancer lesion not being seen  Getting ready   To prepare for the test:  · Talk with your healthcare provider about the risks of the test (see below). Also ask your healthcare provider about alternatives to the test.  · Tell your healthcare provider about any medicines you take. Also tell him or her about any health conditions you may have.  · Make sure your rectum and colon are empty for the test. Follow the diet and bowel prep instructions exactly. If you dont, the test may need to be rescheduled.  · Plan for a friend or family member to drive you home after the test.     Colonoscopy provides an inside view of the entire colon.     You may discuss the results with your doctor right away or at a future visit.  During the test   The test is usually done in the  hospital on an outpatient basis. This means you go home the same day. The procedure takes about 30 minutes. During that time:  · You are given relaxing (sedating) medicine through an IV line. You may be drowsy, or fully asleep.  · The healthcare provider will first give you a physical exam to check for anal and rectal problems.  · Then the anus is lubricated and the scope inserted.  · If you are awake, you may have a feeling similar to needing to have a bowel movement. You may also feel pressure as air is pumped into the colon. Its OK to pass gas during the procedure.  · Biopsy, polyp removal, or other treatments may be done during the test.  After the test   You may have gas right after the test. It can help to try to pass it to help prevent later bloating. Your healthcare provider may discuss the results with you right away. Or you may need to schedule a follow-up visit to talk about the results. After the test, you can go back to your normal eating and other activities. You may be tired from the sedation and need to rest for a few hours.  Date Last Reviewed: 11/1/2016  © 3550-6173 [employer]. [address] [place] [address]. All rights reserved. This information is not intended as a substitute for professional medical care. Always follow your healthcare professional's instructions.        Discharge Instructions: After Your Surgery  Youve just had surgery. During surgery, you were given medicine called anesthesia to keep you relaxed and free of pain. After surgery, you may have some pain or nausea. This is common. Here are some tips for feeling better and getting well after surgery.     Stay on schedule with your medicine.   Going home  Your healthcare provider will show you how to take care of yourself when you go home. He or she will also answer your questions. Have an adult family member or friend drive you home. For the first 24 hours after your surgery:  · Do not drive or use heavy  equipment.  · Do not make important decisions or sign legal papers.  · Do not drink alcohol.  · Have someone stay with you, if needed. He or she can watch for problems and help keep you safe.  Be sure to go to all follow-up visits with your healthcare provider. And rest after your surgery for as long as your healthcare provider tells you to.  Coping with pain  If you have pain after surgery, pain medicine will help you feel better. Take it as told, before pain becomes severe. Also, ask your healthcare provider or pharmacist about other ways to control pain. This might be with heat, ice, or relaxation. And follow any other instructions your surgeon or nurse gives you.  Tips for taking pain medicine  To get the best relief possible, remember these points:  · Pain medicines can upset your stomach. Taking them with a little food may help.  · Most pain relievers taken by mouth need at least 20 to 30 minutes to start to work.  · Taking medicine on a schedule can help you remember to take it. Try to time your medicine so that you can take it before starting an activity. This might be before you get dressed, go for a walk, or sit down for dinner.  · Constipation is a common side effect of pain medicines. Call your healthcare provider before taking any medicines such as laxatives or stool softeners to help ease constipation. Also ask if you should skip any foods. Drinking lots of fluids and eating foods such as fruits and vegetables that are high in fiber can also help. Remember, do not take laxatives unless your surgeon has prescribed them.  · Drinking alcohol and taking pain medicine can cause dizziness and slow your breathing. It can even be deadly. Do not drink alcohol while taking pain medicine.  · Pain medicine can make you react more slowly to things. Do not drive or run machinery while taking pain medicine.  Your healthcare provider may tell you to take acetaminophen to help ease your pain. Ask him or her how much  you are supposed to take each day. Acetaminophen or other pain relievers may interact with your prescription medicines or other over-the-counter (OTC) medicines. Some prescription medicines have acetaminophen and other ingredients. Using both prescription and OTC acetaminophen for pain can cause you to overdose. Read the labels on your OTC medicines with care. This will help you to clearly know the list of ingredients, how much to take, and any warnings. It may also help you not take too much acetaminophen. If you have questions or do not understand the information, ask your pharmacist or healthcare provider to explain it to you before you take the OTC medicine.  Managing nausea  Some people have an upset stomach after surgery. This is often because of anesthesia, pain, or pain medicine, or the stress of surgery. These tips will help you handle nausea and eat healthy foods as you get better. If you were on a special food plan before surgery, ask your healthcare provider if you should follow it while you get better. These tips may help:  · Do not push yourself to eat. Your body will tell you when to eat and how much.  · Start off with clear liquids and soup. They are easier to digest.  · Next try semi-solid foods, such as mashed potatoes, applesauce, and gelatin, as you feel ready.  · Slowly move to solid foods. Dont eat fatty, rich, or spicy foods at first.  · Do not force yourself to have 3 large meals a day. Instead eat smaller amounts more often.  · Take pain medicines with a small amount of solid food, such as crackers or toast, to avoid nausea.     Call your surgeon if  · You still have pain an hour after taking medicine. The medicine may not be strong enough.  · You feel too sleepy, dizzy, or groggy. The medicine may be too strong.  · You have side effects like nausea, vomiting, or skin changes, such as rash, itching, or hives.       If you have obstructive sleep apnea  You were given anesthesia medicine  during surgery to keep you comfortable and free of pain. After surgery, you may have more apnea spells because of this medicine and other medicines you were given. The spells may last longer than usual.   At home:  · Keep using the continuous positive airway pressure (CPAP) device when you sleep. Unless your healthcare provider tells you not to, use it when you sleep, day or night. CPAP is a common device used to treat obstructive sleep apnea.  · Talk with your provider before taking any pain medicine, muscle relaxants, or sedatives. Your provider will tell you about the possible dangers of taking these medicines.  Date Last Reviewed: 12/1/2016  © 9987-1128 [employer]. [address] [place] [address], [place] [address]. All rights reserved. This information is not intended as a substitute for professional medical care. Always follow your healthcare professional's instructions.

## 2020-01-21 NOTE — [employer] PATIENT INSTRUCTIONS
Discharge Summary/Instructions after an Endoscopic Procedure  Patient Name: Maycol Magallon  Patient MRN: [id number]  Patient YOB: 1940 Tuesday, January 21, 2020  Willis Akins MD  RESTRICTIONS:  During your procedure today, you received medications for sedation.  These   medications may affect your judgment, balance and coordination.  Therefore,   for 24 hours, you have the following restrictions:   - DO NOT drive a car, operate machinery, make legal/financial decisions,   sign important papers or drink alcohol.    ACTIVITY:  Today: no heavy lifting, straining or running due to procedural   sedation/anesthesia.  The following day: return to full activity including work.  DIET:  Eat and drink normally unless instructed otherwise.     TREATMENT FOR COMMON SIDE EFFECTS:  - Mild abdominal pain, nausea, belching, bloating or excessive gas:  rest,   eat lightly and use a heating pad.  - Sore Throat: treat with throat lozenges and/or gargle with warm salt   water.  - Because air was used during the procedure, expelling large amounts of air   from your rectum or belching is normal.  - If a bowel prep was taken, you may not have a bowel movement for 1-3 days.    This is normal.  SYMPTOMS TO WATCH FOR AND REPORT TO YOUR PHYSICIAN:  1. Abdominal pain or bloating, other than gas cramps.  2. Chest pain.  3. Back pain.  4. Signs of infection such as: chills or fever occurring within 24 hours   after the procedure.  5. Rectal bleeding, which would show as bright red, maroon, or black stools.   (A tablespoon of blood from the rectum is not serious, especially if   hemorrhoids are present.)  6. Vomiting.  7. Weakness or dizziness.  GO DIRECTLY TO THE NEAREST EMERGENCY ROOM IF YOU HAVE ANY OF THE FOLLOWING:      Difficulty breathing              Chills and/or fever over 101 F   Persistent vomiting and/or vomiting blood   Severe abdominal pain   Severe chest pain   Black, tarry stools   Bleeding- more than one  tablespoon   Any other symptom or condition that you feel may need urgent attention  Your doctor recommends these additional instructions:  If any biopsies were taken, your doctors clinic will contact you in 1 to 2   weeks with any results.  - Patient has a contact number available for emergencies.  The signs and   symptoms of potential delayed complications were discussed with the   patient.  Return to normal activities tomorrow.  Written discharge   instructions were provided to the patient.   - High fiber diet.   - Continue present medications.   - No aspirin, ibuprofen, naproxen, or other non-steroidal anti-inflammatory   drugs.   - Resume Eliquis (apixaban) at prior dose today.   - Await pathology results.   - Repeat colonoscopy in 3 - 5 years for surveillance.   - Discharge patient to home (ambulatory).   - Return to my office PRN.  For questions, problems or results please call your physician - Willis Akins MD at Work:  [phone].  [facility], EMERGENCY ROOM PHONE NUMBER: [phone]  IF A COMPLICATION OR EMERGENCY SITUATION ARISES AND YOU ARE UNABLE TO REACH   YOUR PHYSICIAN - GO DIRECTLY TO THE EMERGENCY ROOM.  Willis Akins MD  1/21/2020 9:34:28 AM  This report has been verified and signed electronically.  [employer]

## 2020-01-21 NOTE — ANESTHESIA PREPROCEDURE EVALUATION
01/21/2020  Maycol Hodge is a 79 y.o., male.    Anesthesia Evaluation    I have reviewed the Patient Summary Reports.    I have reviewed the Nursing Notes.      Review of Systems  Anesthesia Hx:  No problems with previous Anesthesia    Cardiovascular:   Hypertension, well controlled Dysrhythmias atrial fibrillation    Pulmonary:   COPD, mild Asthma asymptomatic    Endocrine:   Hypothyroidism        Physical Exam  General:  Well nourished    Airway/Jaw/Neck:  Airway Findings: Mallampati: II                Anesthesia Plan  Type of Anesthesia, risks & benefits discussed:  Anesthesia Type:  general  Patient's Preference:   Intra-op Monitoring Plan:   Intra-op Monitoring Plan Comments:   Post Op Pain Control Plan:   Post Op Pain Control Plan Comments:   Induction:   IV  Beta Blocker:  Patient is not currently on a Beta-Blocker (No further documentation required).       Informed Consent: Patient understands risks and agrees with Anesthesia plan.  Questions answered. Anesthesia consent signed with patient.  ASA Score: 3     Day of Surgery Review of History & Physical:    H&P update referred to the surgeon.         Ready For Surgery From Anesthesia Perspective.

## 2020-01-21 NOTE — PLAN OF CARE
No pain, no nausea, tolerating po, instructions given with verbalized understanding, ready for discharge. Waiting for MD

## 2020-01-22 VITALS
OXYGEN SATURATION: 94 % | RESPIRATION RATE: 16 BRPM | SYSTOLIC BLOOD PRESSURE: 128 MMHG | BODY MASS INDEX: 25.05 KG/M2 | DIASTOLIC BLOOD PRESSURE: 69 MMHG | WEIGHT: 175 LBS | HEIGHT: 70 IN | TEMPERATURE: 98 F | HEART RATE: 66 BPM

## 2020-01-27 ENCOUNTER — CLINICAL SUPPORT (OUTPATIENT)
Dept: CARDIOLOGY | Facility: CLINIC | Age: 80
End: 2020-01-27
Attending: INTERNAL MEDICINE
Payer: MEDICARE

## 2020-01-27 DIAGNOSIS — I48.0 PAROXYSMAL ATRIAL FIBRILLATION: ICD-10-CM

## 2020-01-27 DIAGNOSIS — E78.5 HYPERLIPIDEMIA, UNSPECIFIED HYPERLIPIDEMIA TYPE: ICD-10-CM

## 2020-01-27 DIAGNOSIS — I48.91 ATRIAL FIBRILLATION, UNSPECIFIED TYPE: ICD-10-CM

## 2020-01-27 DIAGNOSIS — I10 ESSENTIAL HYPERTENSION: ICD-10-CM

## 2020-01-27 PROCEDURE — 93224 XTRNL ECG REC UP TO 48 HRS: CPT | Mod: S$GLB,,, | Performed by: INTERNAL MEDICINE

## 2020-01-27 PROCEDURE — 93224 HOLTER MONITOR - 48 HOUR (CUPID ONLY): ICD-10-PCS | Mod: S$GLB,,, | Performed by: INTERNAL MEDICINE

## 2020-01-30 LAB
FINAL PATHOLOGIC DIAGNOSIS: NORMAL
GROSS: NORMAL

## 2020-01-31 ENCOUNTER — TELEPHONE (OUTPATIENT)
Dept: CARDIOLOGY | Facility: CLINIC | Age: 80
End: 2020-01-31

## 2020-01-31 LAB
OHS CV EVENT MONITOR DAY: 0
OHS CV HOLTER LENGTH DECIMAL HOURS: 48
OHS CV HOLTER LENGTH HOURS: 48
OHS CV HOLTER LENGTH MINUTES: 0

## 2020-02-06 DIAGNOSIS — J45.50 SEVERE PERSISTENT ASTHMA WITHOUT COMPLICATION: ICD-10-CM

## 2020-02-10 RX ORDER — BUDESONIDE AND FORMOTEROL FUMARATE DIHYDRATE 160; 4.5 UG/1; UG/1
2 AEROSOL RESPIRATORY (INHALATION) EVERY 12 HOURS
Qty: 3 INHALER | Refills: 4 | Status: SHIPPED | OUTPATIENT
Start: 2020-02-10 | End: 2021-11-24 | Stop reason: SDUPTHER

## 2020-02-11 ENCOUNTER — OFFICE VISIT (OUTPATIENT)
Dept: CARDIOLOGY | Facility: CLINIC | Age: 80
End: 2020-02-11
Payer: MEDICARE

## 2020-02-11 VITALS
DIASTOLIC BLOOD PRESSURE: 80 MMHG | WEIGHT: 177.5 LBS | HEIGHT: 71 IN | BODY MASS INDEX: 24.85 KG/M2 | SYSTOLIC BLOOD PRESSURE: 159 MMHG | HEART RATE: 53 BPM

## 2020-02-11 DIAGNOSIS — I10 ESSENTIAL HYPERTENSION: ICD-10-CM

## 2020-02-11 DIAGNOSIS — Z86.73 HISTORY OF TIA (TRANSIENT ISCHEMIC ATTACK): Primary | ICD-10-CM

## 2020-02-11 DIAGNOSIS — I48.0 PAROXYSMAL ATRIAL FIBRILLATION: ICD-10-CM

## 2020-02-11 DIAGNOSIS — E78.5 HYPERLIPIDEMIA, UNSPECIFIED HYPERLIPIDEMIA TYPE: ICD-10-CM

## 2020-02-11 PROCEDURE — 99214 OFFICE O/P EST MOD 30 MIN: CPT | Mod: S$GLB,,, | Performed by: INTERNAL MEDICINE

## 2020-02-11 PROCEDURE — 1101F PT FALLS ASSESS-DOCD LE1/YR: CPT | Mod: CPTII,S$GLB,, | Performed by: INTERNAL MEDICINE

## 2020-02-11 PROCEDURE — 1126F AMNT PAIN NOTED NONE PRSNT: CPT | Mod: S$GLB,,, | Performed by: INTERNAL MEDICINE

## 2020-02-11 PROCEDURE — 1159F MED LIST DOCD IN RCRD: CPT | Mod: S$GLB,,, | Performed by: INTERNAL MEDICINE

## 2020-02-11 PROCEDURE — 99999 PR PBB SHADOW E&M-EST. PATIENT-LVL II: ICD-10-PCS | Mod: PBBFAC,,, | Performed by: INTERNAL MEDICINE

## 2020-02-11 PROCEDURE — 1159F PR MEDICATION LIST DOCUMENTED IN MEDICAL RECORD: ICD-10-PCS | Mod: S$GLB,,, | Performed by: INTERNAL MEDICINE

## 2020-02-11 PROCEDURE — 1101F PR PT FALLS ASSESS DOC 0-1 FALLS W/OUT INJ PAST YR: ICD-10-PCS | Mod: CPTII,S$GLB,, | Performed by: INTERNAL MEDICINE

## 2020-02-11 PROCEDURE — 3077F SYST BP >= 140 MM HG: CPT | Mod: CPTII,S$GLB,, | Performed by: INTERNAL MEDICINE

## 2020-02-11 PROCEDURE — 3079F PR MOST RECENT DIASTOLIC BLOOD PRESSURE 80-89 MM HG: ICD-10-PCS | Mod: CPTII,S$GLB,, | Performed by: INTERNAL MEDICINE

## 2020-02-11 PROCEDURE — 3077F PR MOST RECENT SYSTOLIC BLOOD PRESSURE >= 140 MM HG: ICD-10-PCS | Mod: CPTII,S$GLB,, | Performed by: INTERNAL MEDICINE

## 2020-02-11 PROCEDURE — 99214 PR OFFICE/OUTPT VISIT, EST, LEVL IV, 30-39 MIN: ICD-10-PCS | Mod: S$GLB,,, | Performed by: INTERNAL MEDICINE

## 2020-02-11 PROCEDURE — 1126F PR PAIN SEVERITY QUANTIFIED, NO PAIN PRESENT: ICD-10-PCS | Mod: S$GLB,,, | Performed by: INTERNAL MEDICINE

## 2020-02-11 PROCEDURE — 3079F DIAST BP 80-89 MM HG: CPT | Mod: CPTII,S$GLB,, | Performed by: INTERNAL MEDICINE

## 2020-02-11 PROCEDURE — 99999 PR PBB SHADOW E&M-EST. PATIENT-LVL II: CPT | Mod: PBBFAC,,, | Performed by: INTERNAL MEDICINE

## 2020-02-11 NOTE — PROGRESS NOTES
Subjective:    Patient ID:  Maycol Hodge is a 79 y.o. male who presents for evaluation of Follow-up (follow up )      HPI 78 yo WM who I had seen in 2017 for HTN who at that time was asymptomatic and not interested in making any med changes. Had EKG on 12- showing AF.He feels he has had intermittent symptoms since August.Had stress echo in November and was in sinus and test negative for ischemia. Holter on 1- showed sinus rhythm. Has some fatigue but overall doing OK.No bleeding problems.    Review of Systems   Constitution: Positive for malaise/fatigue. Negative for decreased appetite, fever, weight gain and weight loss.   HENT: Negative for hearing loss and nosebleeds.    Eyes: Negative for visual disturbance.   Cardiovascular: Negative for chest pain, claudication, cyanosis, dyspnea on exertion, irregular heartbeat, leg swelling, near-syncope, orthopnea, palpitations, paroxysmal nocturnal dyspnea and syncope.   Respiratory: Negative for cough, hemoptysis, shortness of breath, sleep disturbances due to breathing, snoring and wheezing.    Endocrine: Negative for cold intolerance, heat intolerance, polydipsia and polyuria.   Hematologic/Lymphatic: Negative for adenopathy and bleeding problem. Does not bruise/bleed easily.   Skin: Negative for color change, itching, poor wound healing, rash and suspicious lesions.   Musculoskeletal: Negative for arthritis, back pain, falls, joint pain, joint swelling, muscle cramps, muscle weakness and myalgias.   Gastrointestinal: Negative for bloating, abdominal pain, change in bowel habit, constipation, flatus, heartburn, hematemesis, hematochezia, hemorrhoids, jaundice, melena, nausea and vomiting.   Genitourinary: Negative for bladder incontinence, decreased libido, frequency, hematuria, hesitancy and urgency.   Neurological: Negative for brief paralysis, difficulty with concentration, excessive daytime sleepiness, dizziness, focal weakness, headaches,  "light-headedness, loss of balance, numbness, vertigo and weakness.   Psychiatric/Behavioral: Negative for altered mental status, depression and memory loss. The patient does not have insomnia and is not nervous/anxious.    Allergic/Immunologic: Negative for environmental allergies, hives and persistent infections.        Objective:    Physical Exam   Constitutional: He is oriented to person, place, and time. He appears well-developed and well-nourished. No distress.   BP (!) 159/80   Pulse (!) 53   Ht 5' 11" (1.803 m)   Wt 80.5 kg (177 lb 7.5 oz)   BMI 24.75 kg/m²      HENT:   Head: Normocephalic and atraumatic.   Eyes: Pupils are equal, round, and reactive to light. Conjunctivae and lids are normal. Right eye exhibits no discharge. No scleral icterus.   Neck: Normal range of motion. Neck supple. No JVD present. No tracheal deviation present. No thyromegaly present.   Cardiovascular: Normal rate, regular rhythm, S1 normal, S2 normal, normal heart sounds and intact distal pulses. Exam reveals no gallop and no friction rub.   No murmur heard.  Pulses:       Carotid pulses are 2+ on the right side, and 2+ on the left side.       Radial pulses are 2+ on the right side, and 2+ on the left side.        Femoral pulses are 2+ on the right side, and 2+ on the left side.       Popliteal pulses are 2+ on the right side, and 2+ on the left side.        Dorsalis pedis pulses are 2+ on the right side, and 2+ on the left side.        Posterior tibial pulses are 2+ on the right side, and 2+ on the left side.   Pulmonary/Chest: Effort normal and breath sounds normal. No respiratory distress. He has no wheezes. He has no rales. He exhibits no tenderness.   Abdominal: Soft. Bowel sounds are normal. He exhibits no distension and no mass. There is no hepatosplenomegaly or hepatomegaly. There is no tenderness. There is no rebound and no guarding.   Musculoskeletal: Normal range of motion. He exhibits no edema or tenderness. "   Lymphadenopathy:     He has no cervical adenopathy.   Neurological: He is alert and oriented to person, place, and time. He has normal reflexes. No cranial nerve deficit. Coordination normal.   Skin: Skin is warm and dry. No rash noted. He is not diaphoretic. No erythema. No pallor.   Psychiatric: He has a normal mood and affect. His speech is normal and behavior is normal. Judgment and thought content normal. Cognition and memory are normal.         Assessment:       1. History of TIA (transient ischemic attack)    2. Essential hypertension    3. Hyperlipidemia, unspecified hyperlipidemia type    4. Paroxysmal atrial fibrillation         Plan:     The current medical regimen is effective;  continue present plan and medications.    Brought BP recordings from home that are reasonable    Risk of stroke from atrial fib has been discussed as well as bleeding risk from alternative anticoagulation regiments as well as risk and benefits of rate control vs cardioversion   Patient advised to limit exposure to caffeine, stimulants, and alcohol     No orders of the defined types were placed in this encounter.    Follow up in about 6 months (around 8/11/2020).

## 2020-02-13 ENCOUNTER — TELEPHONE (OUTPATIENT)
Dept: PULMONOLOGY | Facility: CLINIC | Age: 80
End: 2020-02-13

## 2020-02-13 NOTE — TELEPHONE ENCOUNTER
Gave patient appt for 3/2/2020.      ----- Message from Shy Cavazos sent at 2/13/2020 10:09 AM CST -----  Type:  Sooner Apoointment Request    Caller is requesting a sooner appointment.  Caller declined first available appointment listed below.  Caller will not accept being placed on the waitlist and is requesting a message be sent to doctor.    Name of Caller: self   When is the first available appointment? 05/05/2020   Symptoms:    Best Call Back Number: [phone]  Additional Information:

## 2020-02-24 ENCOUNTER — TELEPHONE (OUTPATIENT)
Dept: FAMILY MEDICINE | Facility: CLINIC | Age: 80
End: 2020-02-24

## 2020-02-24 NOTE — TELEPHONE ENCOUNTER
Spoke to pt in regards to recent loss of consciousness on Saturday, 02/22/2020. Pt stated he was home sitting in his chair reading a newspaper after breakfast and passed out. Pt didn't have any injuries and denies any c/o pain,vomiting, HA, or dizziness since occurrence. Pt didn't go to the ER or urgent care. Pt stated he felt fine when he regained consciousness and he feels fine today. Scheduled earliest appointment available for Friday 02/28/2020 at 1340 with NADINE Medina.

## 2020-02-24 NOTE — TELEPHONE ENCOUNTER
----- Message from Nick Ponce sent at 2/24/2020 12:25 PM CST -----  Contact: patient  Type:  Patient Returning Call    Who Called:  patient  Who Left Message for Patient: Shelby  Does the patient know what this is regarding?:  yes  Best Call Back Number:  [phone]  Additional Information:  Requesting  A call back

## 2020-02-24 NOTE — TELEPHONE ENCOUNTER
----- Message from Amina Torrez sent at 2/24/2020 10:15 AM CST -----    Type: Needs Medical Advice    Who Called:   pt  Symptoms (please be specific):   Pt  Passed out  On  Sat and  Was  By alone  And   Stated  He  Messed on hisself  And   Reported  His  Pressure  Was  Low 91/58/  Did  Not  Go to ER  / no  Injuries  Per pt  And feels  Ok   At the  Time  Of this  Message     Best Call Back Number: [phone]  Additional Information:   Pt  Calling to discuss

## 2020-02-28 ENCOUNTER — OFFICE VISIT (OUTPATIENT)
Dept: FAMILY MEDICINE | Facility: CLINIC | Age: 80
End: 2020-02-28
Payer: MEDICARE

## 2020-02-28 ENCOUNTER — LAB VISIT (OUTPATIENT)
Dept: LAB | Facility: HOSPITAL | Age: 80
End: 2020-02-28
Attending: NURSE PRACTITIONER
Payer: MEDICARE

## 2020-02-28 VITALS
OXYGEN SATURATION: 98 % | WEIGHT: 178.81 LBS | SYSTOLIC BLOOD PRESSURE: 140 MMHG | HEART RATE: 57 BPM | DIASTOLIC BLOOD PRESSURE: 68 MMHG | TEMPERATURE: 98 F | HEIGHT: 71 IN | BODY MASS INDEX: 25.03 KG/M2

## 2020-02-28 DIAGNOSIS — J44.89 EXACERBATION OF CHRONIC BRONCHIOLITIS: ICD-10-CM

## 2020-02-28 DIAGNOSIS — R40.20 LOSS OF CONSCIOUSNESS: ICD-10-CM

## 2020-02-28 DIAGNOSIS — E06.3 HYPOTHYROIDISM DUE TO HASHIMOTO'S THYROIDITIS: ICD-10-CM

## 2020-02-28 DIAGNOSIS — R40.20 LOSS OF CONSCIOUSNESS: Primary | ICD-10-CM

## 2020-02-28 DIAGNOSIS — E78.2 MIXED HYPERLIPIDEMIA: ICD-10-CM

## 2020-02-28 DIAGNOSIS — I10 HYPERTENSION, UNSPECIFIED TYPE: ICD-10-CM

## 2020-02-28 DIAGNOSIS — I48.0 PAROXYSMAL ATRIAL FIBRILLATION: ICD-10-CM

## 2020-02-28 DIAGNOSIS — E03.8 HYPOTHYROIDISM DUE TO HASHIMOTO'S THYROIDITIS: ICD-10-CM

## 2020-02-28 LAB
ALBUMIN SERPL BCP-MCNC: 3.7 G/DL (ref 3.5–5.2)
ALP SERPL-CCNC: 64 U/L (ref 55–135)
ALT SERPL W/O P-5'-P-CCNC: 21 U/L (ref 10–44)
ANION GAP SERPL CALC-SCNC: 8 MMOL/L (ref 8–16)
AST SERPL-CCNC: 20 U/L (ref 10–40)
BASOPHILS # BLD AUTO: 0.12 K/UL (ref 0–0.2)
BASOPHILS NFR BLD: 1.3 % (ref 0–1.9)
BILIRUB SERPL-MCNC: 1.3 MG/DL (ref 0.1–1)
BUN SERPL-MCNC: 18 MG/DL (ref 8–23)
CALCIUM SERPL-MCNC: 9.1 MG/DL (ref 8.7–10.5)
CHLORIDE SERPL-SCNC: 105 MMOL/L (ref 95–110)
CO2 SERPL-SCNC: 29 MMOL/L (ref 23–29)
CREAT SERPL-MCNC: 1.3 MG/DL (ref 0.5–1.4)
DIFFERENTIAL METHOD: ABNORMAL
EOSINOPHIL # BLD AUTO: 0.7 K/UL (ref 0–0.5)
EOSINOPHIL NFR BLD: 8.1 % (ref 0–8)
ERYTHROCYTE [DISTWIDTH] IN BLOOD BY AUTOMATED COUNT: 14.3 % (ref 11.5–14.5)
EST. GFR  (AFRICAN AMERICAN): 59.2 ML/MIN/1.73 M^2
EST. GFR  (NON AFRICAN AMERICAN): 51.2 ML/MIN/1.73 M^2
GLUCOSE SERPL-MCNC: 90 MG/DL (ref 70–110)
HCT VFR BLD AUTO: 45.9 % (ref 40–54)
HGB BLD-MCNC: 14.4 G/DL (ref 14–18)
IMM GRANULOCYTES # BLD AUTO: 0.02 K/UL (ref 0–0.04)
IMM GRANULOCYTES NFR BLD AUTO: 0.2 % (ref 0–0.5)
LYMPHOCYTES # BLD AUTO: 2.4 K/UL (ref 1–4.8)
LYMPHOCYTES NFR BLD: 27.1 % (ref 18–48)
MCH RBC QN AUTO: 30.8 PG (ref 27–31)
MCHC RBC AUTO-ENTMCNC: 31.4 G/DL (ref 32–36)
MCV RBC AUTO: 98 FL (ref 82–98)
MONOCYTES # BLD AUTO: 0.9 K/UL (ref 0.3–1)
MONOCYTES NFR BLD: 9.8 % (ref 4–15)
NEUTROPHILS # BLD AUTO: 4.8 K/UL (ref 1.8–7.7)
NEUTROPHILS NFR BLD: 53.5 % (ref 38–73)
NRBC BLD-RTO: 0 /100 WBC
PLATELET # BLD AUTO: 207 K/UL (ref 150–350)
PMV BLD AUTO: 10.8 FL (ref 9.2–12.9)
POTASSIUM SERPL-SCNC: 4.2 MMOL/L (ref 3.5–5.1)
PROT SERPL-MCNC: 7 G/DL (ref 6–8.4)
RBC # BLD AUTO: 4.67 M/UL (ref 4.6–6.2)
SODIUM SERPL-SCNC: 142 MMOL/L (ref 136–145)
TSH SERPL DL<=0.005 MIU/L-ACNC: 2.74 UIU/ML (ref 0.4–4)
WBC # BLD AUTO: 8.99 K/UL (ref 3.9–12.7)

## 2020-02-28 PROCEDURE — 1126F AMNT PAIN NOTED NONE PRSNT: CPT | Mod: S$GLB,,, | Performed by: NURSE PRACTITIONER

## 2020-02-28 PROCEDURE — 99999 PR PBB SHADOW E&M-EST. PATIENT-LVL IV: ICD-10-PCS | Mod: PBBFAC,,, | Performed by: NURSE PRACTITIONER

## 2020-02-28 PROCEDURE — 80053 COMPREHEN METABOLIC PANEL: CPT

## 2020-02-28 PROCEDURE — 3077F SYST BP >= 140 MM HG: CPT | Mod: CPTII,S$GLB,, | Performed by: NURSE PRACTITIONER

## 2020-02-28 PROCEDURE — 93005 EKG 12-LEAD: ICD-10-PCS | Mod: S$GLB,,, | Performed by: NURSE PRACTITIONER

## 2020-02-28 PROCEDURE — 85025 COMPLETE CBC W/AUTO DIFF WBC: CPT

## 2020-02-28 PROCEDURE — 99214 PR OFFICE/OUTPT VISIT, EST, LEVL IV, 30-39 MIN: ICD-10-PCS | Mod: S$GLB,,, | Performed by: NURSE PRACTITIONER

## 2020-02-28 PROCEDURE — 1126F PR PAIN SEVERITY QUANTIFIED, NO PAIN PRESENT: ICD-10-PCS | Mod: S$GLB,,, | Performed by: NURSE PRACTITIONER

## 2020-02-28 PROCEDURE — 99499 UNLISTED E&M SERVICE: CPT | Mod: S$GLB,,, | Performed by: NURSE PRACTITIONER

## 2020-02-28 PROCEDURE — 93005 ELECTROCARDIOGRAM TRACING: CPT | Mod: S$GLB,,, | Performed by: NURSE PRACTITIONER

## 2020-02-28 PROCEDURE — 1101F PT FALLS ASSESS-DOCD LE1/YR: CPT | Mod: CPTII,S$GLB,, | Performed by: NURSE PRACTITIONER

## 2020-02-28 PROCEDURE — 36415 COLL VENOUS BLD VENIPUNCTURE: CPT | Mod: PO

## 2020-02-28 PROCEDURE — 99499 RISK ADDL DX/OHS AUDIT: ICD-10-PCS | Mod: S$GLB,,, | Performed by: NURSE PRACTITIONER

## 2020-02-28 PROCEDURE — 3077F PR MOST RECENT SYSTOLIC BLOOD PRESSURE >= 140 MM HG: ICD-10-PCS | Mod: CPTII,S$GLB,, | Performed by: NURSE PRACTITIONER

## 2020-02-28 PROCEDURE — 1159F MED LIST DOCD IN RCRD: CPT | Mod: S$GLB,,, | Performed by: NURSE PRACTITIONER

## 2020-02-28 PROCEDURE — 99214 OFFICE O/P EST MOD 30 MIN: CPT | Mod: S$GLB,,, | Performed by: NURSE PRACTITIONER

## 2020-02-28 PROCEDURE — 93010 ELECTROCARDIOGRAM REPORT: CPT | Mod: S$GLB,,, | Performed by: INTERNAL MEDICINE

## 2020-02-28 PROCEDURE — 99999 PR PBB SHADOW E&M-EST. PATIENT-LVL IV: CPT | Mod: PBBFAC,,, | Performed by: NURSE PRACTITIONER

## 2020-02-28 PROCEDURE — 84443 ASSAY THYROID STIM HORMONE: CPT

## 2020-02-28 PROCEDURE — 3078F PR MOST RECENT DIASTOLIC BLOOD PRESSURE < 80 MM HG: ICD-10-PCS | Mod: CPTII,S$GLB,, | Performed by: NURSE PRACTITIONER

## 2020-02-28 PROCEDURE — 1101F PR PT FALLS ASSESS DOC 0-1 FALLS W/OUT INJ PAST YR: ICD-10-PCS | Mod: CPTII,S$GLB,, | Performed by: NURSE PRACTITIONER

## 2020-02-28 PROCEDURE — 93010 EKG 12-LEAD: ICD-10-PCS | Mod: S$GLB,,, | Performed by: INTERNAL MEDICINE

## 2020-02-28 PROCEDURE — 1159F PR MEDICATION LIST DOCUMENTED IN MEDICAL RECORD: ICD-10-PCS | Mod: S$GLB,,, | Performed by: NURSE PRACTITIONER

## 2020-02-28 PROCEDURE — 3078F DIAST BP <80 MM HG: CPT | Mod: CPTII,S$GLB,, | Performed by: NURSE PRACTITIONER

## 2020-02-28 NOTE — PATIENT INSTRUCTIONS
Altered Level of Consciousness  Level of consciousness (LOC) is a measure of a persons ability to interact with other people and to react to the surroundings. A person with an altered level of consciousness may not respond to touch or voices. He or she may look vacant or blank and may not make eye contact with others. He or she may be limp and may not move for a long time or show little interest in moving. He or she may also be confused.  There are many causes of altered LOC. They include low blood sugar, infection, medicines, injuries, or other medical problems.  Altered LOC is a medical emergency. The healthcare provider will do tests to help find the cause. These may include blood tests and imaging tests. The person is treated so breathing and heart rate are stable. An intravenous (IV) line may be put into a vein in the arm or hand to give medicines. Once the cause of altered LOC is found, the goal is to treat the cause.  In almost all cases, the person will be admitted to the hospital for observation.  Home care  When your loved one is released from the hospital, you will be given guidelines for caring for him or her. In general:  · Follow the healthcare provider's instructions for giving any prescribed medicines to your child.  · Stay with your loved one or have another responsible adult look after him or her. Watch carefully for any return of symptoms or changes in behavior.  · If the person has diabetes, make sure that any approved medicines are given on time and as prescribed.  Follow-up care  Follow up with the healthcare provider or our staff.  When to seek medical advice  Call the healthcare provider right away for any of the following:  · Symptoms of altered LOC return  · New symptoms appear  Date Last Reviewed: 8/23/2015 © 2000-2017 [employer]. [address] [place] [address], [place] [address]. All rights reserved. This information is not intended as a substitute for professional medical  care. Always follow your healthcare professional's instructions.

## 2020-02-28 NOTE — PROGRESS NOTES
"Subjective:       Patient ID: Maycol Hodge is a 79 y.o. male.    Chief Complaint: Loss of Consciousness     Patient presents today for complaints of loss of consciousness and defecation on Saturday, 02/22/2020. Pt stated he was home sitting in his chair reading a newspaper after breakfast and passed out. Pt didn't have any injuries and denies any c/o pain,vomiting, HA, or dizziness since occurrence. Pt didn't go to the ER or urgent care. Pt stated he was a little "groogie" when he regained consciousness, he reports BP was 91/52 and he feels fine today.    Loss of Consciousness   This is a recurrent problem. The current episode started in the past 7 days. The problem occurs intermittently. The problem has been rapidly improving. Length of episode of loss of consciousness: yes. Nothing aggravates the symptoms. Pertinent negatives include no chest pain, dizziness, headaches, light-headedness, palpitations, slurred speech or weakness. He has tried nothing for the symptoms.       Past Medical History:   Diagnosis Date    A-fib     Asthma     Hematuria     Hypertension        Review of patient's allergies indicates:   Allergen Reactions    Doxycycline Rash     Causes rash, hives and itching    Augmentin [amoxicillin-pot clavulanate] Nausea And Vomiting    Diltiazem      pruritis    Hydrochlorothiazide      Causes elevate uric acid, gout      Levaquin [levofloxacin] Swelling    Norvasc [amlodipine] Swelling    Pravastatin Rash         Current Outpatient Medications:     albuterol 90 mcg/actuation inhaler, Inhale 2 puffs into the lungs every 4 (four) hours as needed for Wheezing. This is a rescue inhaler medication and should be used as needed, Disp: 3 Inhaler, Rfl: 3    albuterol sulfate (PROAIR RESPICLICK) 90 mcg/actuation AePB, Inhale 2 puffs into the lungs every 4 (four) hours as needed. Rescue, Disp: 1 each, Rfl: 11    albuterol-ipratropium (DUO-NEB) 2.5 mg-0.5 mg/3 mL nebulizer solution, Take 3 mLs " by nebulization every 6 (six) hours as needed., Disp: 120 vial, Rfl: 5    apixaban (ELIQUIS) 5 mg Tab, Take 1 tablet (5 mg total) by mouth 2 (two) times daily., Disp: 180 tablet, Rfl: 3    ascorbic acid, vitamin C, (VITAMIN C) 1000 MG tablet, Take 1,000 mg by mouth once daily., Disp: , Rfl:     atenolol (TENORMIN) 25 MG tablet, One half tablet po nightly, Disp: 45 tablet, Rfl: 3    budesonide-formoterol 160-4.5 mcg (SYMBICORT) 160-4.5 mcg/actuation HFAA, Inhale 2 puffs into the lungs every 12 (twelve) hours. Controller, Disp: 3 Inhaler, Rfl: 4    cetirizine (ZYRTEC) 10 MG tablet, Take 1 tablet (10 mg total) by mouth 2 (two) times daily., Disp: 180 tablet, Rfl: 3    diphenhydrAMINE (BENADRYL) 50 MG capsule, Take 50 mg by mouth every 6 (six) hours as needed for Itching., Disp: , Rfl:     famotidine (PEPCID) 20 MG tablet, Take 1 tablet (20 mg total) by mouth 2 (two) times daily., Disp: 60 tablet, Rfl: 11    GLUC HCL/GLUC JAMESON/HL-KPQ-[id number]-GLUC (GLUCOSAMINE COMPLEX ORAL), Take 1 capsule by mouth once daily., Disp: , Rfl:     latanoprost 0.005 % ophthalmic solution, Place 1 drop into both eyes every evening. , Disp: , Rfl:     levothyroxine (SYNTHROID) 50 MCG tablet, Take 1 tablet (50 mcg total) by mouth once daily., Disp: 30 tablet, Rfl: 11    losartan (COZAAR) 50 MG tablet, Take 1 tablet (50 mg total) by mouth once daily., Disp: 90 tablet, Rfl: 3    montelukast (SINGULAIR) 10 mg tablet, Take 1 tablet (10 mg total) by mouth once daily., Disp: 90 tablet, Rfl: 3    predniSONE (DELTASONE) 20 MG tablet, One daily for 3 days and repeat for flare of lung symptoms as intructed, Disp: 21 tablet, Rfl: 1    terazosin (HYTRIN) 5 MG capsule, Take 1 capsule (5 mg total) by mouth once daily., Disp: 90 capsule, Rfl: 3    Review of Systems   Constitutional: Negative for unexpected weight change.   HENT: Negative for trouble swallowing.    Eyes: Negative for visual disturbance.   Respiratory: Negative for shortness of breath.   "  Cardiovascular: Positive for syncope. Negative for chest pain, palpitations and leg swelling.   Gastrointestinal: Negative for blood in stool.   Genitourinary: Negative for hematuria.   Skin: Negative for rash.   Allergic/Immunologic: Negative for immunocompromised state.   Neurological: Negative for dizziness, weakness, light-headedness and headaches.   Hematological: Does not bruise/bleed easily.   Psychiatric/Behavioral: Negative for agitation. The patient is not nervous/anxious.        Objective:      BP (!) 140/68 (BP Location: Right arm, Patient Position: Sitting, BP Method: Large (Manual))   Pulse (!) 57   Temp 97.7 °F (36.5 °C) (Oral)   Ht 5' 11" (1.803 m)   Wt 81.1 kg (178 lb 12.7 oz)   SpO2 98%   BMI 24.94 kg/m²   Physical Exam   Constitutional: He is oriented to person, place, and time. He appears well-developed and well-nourished.   Eyes: Pupils are equal, round, and reactive to light. Conjunctivae and EOM are normal.   Neck: Normal range of motion. Neck supple.   Cardiovascular: Normal rate, regular rhythm, normal heart sounds and intact distal pulses.   Pulmonary/Chest: Effort normal and breath sounds normal.   Abdominal: Soft. Bowel sounds are normal.   Musculoskeletal: Normal range of motion.   Neurological: He is alert and oriented to person, place, and time.   Skin: Skin is warm and dry.   Psychiatric: He has a normal mood and affect. His behavior is normal. Judgment and thought content normal.       Assessment:       1. Loss of consciousness    2. Hypertension, unspecified type    3. Mixed hyperlipidemia    4. Paroxysmal atrial fibrillation    5. Exacerbation of chronic bronchiolitis    6. Hypothyroidism due to Hashimoto's thyroiditis    7. BMI 24.0-24.9, adult        Plan:       Loss of consciousness  -     IN OFFICE EKG 12-LEAD (to [place])  -     Ambulatory EEG; Future  -     CBC W/ AUTO DIFFERENTIAL; Future; Expected date: 02/28/2020  -     Comprehensive metabolic panel; Future; Expected " date: 02/28/2020  -     CT Head With Contrast; Future; Expected date: 02/28/2020  -     TSH; Future; Expected date: 02/28/2020    Hypertension, unspecified type  Stable condition.  Continue current medications.  Will adjust based on lab findings or if condition changes.  Mixed hyperlipidemia  Stable condition.  Continue current medications.  Will adjust based on lab findings or if condition changes.  Paroxysmal atrial fibrillation  Stable, continue medication  Followed by [facility]  Exacerbation of chronic bronchiolitis  Stable, followed by Pulmonology-  Hypothyroidism due to Hashimoto's thyroiditis  Stable, continue medication  BMI 24.0-24.9, adult  Healthy weight in patient        Patient readiness: acceptance and barriers:none    During the course of the visit the patient was educated and counseled about the following:     Hypertension:   Dietary sodium restriction.  Regular aerobic exercise.    Goals: Hypertension: Reduce Blood Pressure    Did patient meet goals/outcomes: No    The following self management tools provided: blood pressure log    Patient Instructions (the written plan) was given to the patient/family.     Time spent with patient: 15 minutes    Barriers to medications present (no )    Adverse reactions to current medications (no)    Over the counter medications reviewed (Yes)

## 2020-03-02 ENCOUNTER — OFFICE VISIT (OUTPATIENT)
Dept: PULMONOLOGY | Facility: CLINIC | Age: 80
End: 2020-03-02
Payer: MEDICARE

## 2020-03-02 VITALS
HEART RATE: 53 BPM | WEIGHT: 178 LBS | OXYGEN SATURATION: 96 % | BODY MASS INDEX: 24.92 KG/M2 | DIASTOLIC BLOOD PRESSURE: 81 MMHG | HEIGHT: 71 IN | SYSTOLIC BLOOD PRESSURE: 178 MMHG

## 2020-03-02 DIAGNOSIS — J45.51 SEVERE PERSISTENT ASTHMA WITH ACUTE EXACERBATION: Primary | ICD-10-CM

## 2020-03-02 DIAGNOSIS — R09.89 CHRONIC SINUS COMPLAINTS: ICD-10-CM

## 2020-03-02 DIAGNOSIS — R53.83 FATIGUE, UNSPECIFIED TYPE: ICD-10-CM

## 2020-03-02 PROCEDURE — 99499 UNLISTED E&M SERVICE: CPT | Mod: S$GLB,,, | Performed by: INTERNAL MEDICINE

## 2020-03-02 PROCEDURE — 1159F PR MEDICATION LIST DOCUMENTED IN MEDICAL RECORD: ICD-10-PCS | Mod: S$GLB,,, | Performed by: INTERNAL MEDICINE

## 2020-03-02 PROCEDURE — 1126F PR PAIN SEVERITY QUANTIFIED, NO PAIN PRESENT: ICD-10-PCS | Mod: S$GLB,,, | Performed by: INTERNAL MEDICINE

## 2020-03-02 PROCEDURE — 99999 PR PBB SHADOW E&M-EST. PATIENT-LVL IV: CPT | Mod: PBBFAC,,, | Performed by: INTERNAL MEDICINE

## 2020-03-02 PROCEDURE — 99214 OFFICE O/P EST MOD 30 MIN: CPT | Mod: S$GLB,,, | Performed by: INTERNAL MEDICINE

## 2020-03-02 PROCEDURE — 1126F AMNT PAIN NOTED NONE PRSNT: CPT | Mod: S$GLB,,, | Performed by: INTERNAL MEDICINE

## 2020-03-02 PROCEDURE — 99999 PR PBB SHADOW E&M-EST. PATIENT-LVL IV: ICD-10-PCS | Mod: PBBFAC,,, | Performed by: INTERNAL MEDICINE

## 2020-03-02 PROCEDURE — 3079F PR MOST RECENT DIASTOLIC BLOOD PRESSURE 80-89 MM HG: ICD-10-PCS | Mod: CPTII,S$GLB,, | Performed by: INTERNAL MEDICINE

## 2020-03-02 PROCEDURE — 3077F PR MOST RECENT SYSTOLIC BLOOD PRESSURE >= 140 MM HG: ICD-10-PCS | Mod: CPTII,S$GLB,, | Performed by: INTERNAL MEDICINE

## 2020-03-02 PROCEDURE — 1101F PT FALLS ASSESS-DOCD LE1/YR: CPT | Mod: CPTII,S$GLB,, | Performed by: INTERNAL MEDICINE

## 2020-03-02 PROCEDURE — 3077F SYST BP >= 140 MM HG: CPT | Mod: CPTII,S$GLB,, | Performed by: INTERNAL MEDICINE

## 2020-03-02 PROCEDURE — 1101F PR PT FALLS ASSESS DOC 0-1 FALLS W/OUT INJ PAST YR: ICD-10-PCS | Mod: CPTII,S$GLB,, | Performed by: INTERNAL MEDICINE

## 2020-03-02 PROCEDURE — 99214 PR OFFICE/OUTPT VISIT, EST, LEVL IV, 30-39 MIN: ICD-10-PCS | Mod: S$GLB,,, | Performed by: INTERNAL MEDICINE

## 2020-03-02 PROCEDURE — 99499 RISK ADDL DX/OHS AUDIT: ICD-10-PCS | Mod: S$GLB,,, | Performed by: INTERNAL MEDICINE

## 2020-03-02 PROCEDURE — 1159F MED LIST DOCD IN RCRD: CPT | Mod: S$GLB,,, | Performed by: INTERNAL MEDICINE

## 2020-03-02 PROCEDURE — 3079F DIAST BP 80-89 MM HG: CPT | Mod: CPTII,S$GLB,, | Performed by: INTERNAL MEDICINE

## 2020-03-02 RX ORDER — PREDNISONE 20 MG/1
TABLET ORAL
Qty: 21 TABLET | Refills: 1 | Status: SHIPPED | OUTPATIENT
Start: 2020-03-02 | End: 2020-08-11 | Stop reason: SDUPTHER

## 2020-03-02 NOTE — PATIENT INSTRUCTIONS
Fatigue and wheezes are noted.  Breathing not typical cause for loss of consciousness.    Heart rate is low - may contribute to fatigue but not that bad?      Chronic sinuses seem ok.    Would recommend prednisone 20 mg daily for 3 days now,  May repeat but would like to see better function and activity.      Call if not perking up.     oxygen level walking in office went from 97 to 93 % - not bad.    bp standing didn't fall today.      Blood wk last month all looked good.

## 2020-03-02 NOTE — PROGRESS NOTES
3/2/2020    Maycol Hodge  Office Note    Chief Complaint   Patient presents with    Fatigue    Shortness of Breath    Sputum Production     clear     HPI:     3/2/2020- having exhaustion after 10-15 min doing yd wk.  Saw cardiology- had a fib at colonoscopy in dec but sinus mile since. - eliquis started.  Pt had syncope 10 days - saw Dr Vogel in f/u.  Last wk had some noct wheezes.   Uses symbicort 2 bid and singulair.  No no prednisone use last yr.  Sleeps ok, nerves ok - denies depression.  Had hives last July.  Stress echo in 11/22/2019 was good save high bp?        Feb 1, 2019- Onset one week, sore throat, congestion, cough (fits, severe, productive light yellow in color), took azithromycin with minimal benefit. Current Prednisone use.    January 24, 2019- Feeling well, no exacerbations requiring antibiotic and steroid therapy. No cough, no nocturnal arousals, no SOB.     Sept 27, 2018- had exacerbations in July and August needing azithro and prednisone- had cough clear mucous with sl yellow, no breathing issues.  Unusual to have exacerbation summer.    Jan 5, 2018-- took azithro and pred x 3 - good results.  Doing well    Jan 19,2017 hpi, had acute onset 5 days ago with sinus runny, cough, wheezes, sob, no fever, no n/v, pos headache.  Muscle aches and joint aches.  Took prednisone, took inhaler, neb rx was old. Run down, poor appetite,     Took prednisone and azithro x3, still cough but better,  Down but better.  Mucous clear.     Dec 16, 2016HPI:pt with asthma with no usual noct arousals or rescue use.  Exacerbates 3x yr in spring mainly. Progresses for a week on rescue and then uses pred and doxy (recent doxy reaction) to clear.  I cared for years ago.  No recent pft.  Pt feels breathing better than  Usual yrs past.          The chief compliant  problem varies with instablilty at time    PFSH:  Past Medical History:   Diagnosis Date    A-fib     Asthma     Hematuria     Hypertension           Past Surgical History:   Procedure Laterality Date    COLONOSCOPY      COLONOSCOPY N/A 2020    Procedure: COLONOSCOPY;  Surgeon: Willis Mullen MD;  Location: [facility];  Service: Endoscopy;  Laterality: N/A;    CYSTOSCOPY      negative    EYE SURGERY      bilateral cataracts    HERNIA REPAIR      righht inguinal    JOINT REPLACEMENT Left     shoulder    left cataract surgery      PROSTATE SURGERY      TURP     Social History     Tobacco Use    Smoking status: Former Smoker     Types: Cigarettes     Last attempt to quit:      Years since quittin.2    Smokeless tobacco: Never Used    Tobacco comment: Quit around    Substance Use Topics    Alcohol use: Yes     Alcohol/week: 14.0 standard drinks     Types: 7 Glasses of wine, 7 Shots of liquor per week    Drug use: No     Family History   Problem Relation Age of Onset    Stroke Father     Cancer Neg Hx     Diabetes Neg Hx     Heart disease Neg Hx     Hypertension Neg Hx      Review of patient's allergies indicates:   Allergen Reactions    Doxycycline Rash     Causes rash, hives and itching    Diltiazem      pruritis    Hydrochlorothiazide      Causes elevate uric acid, gout      Levaquin [levofloxacin] Swelling    Norvasc [amlodipine] Swelling       Performance Status:The patient's activity level is functions out of house.      Review of Systems:    Constitutional: Negative for activity change, appetite change, chills, diaphoresis, fatigue, fever and unexpected weight change.   HENT: Negative for dental problem, postnasal drip, trouble swallowing and voice change. Positive for  rhinorrhea, sinus pressure, sinus pain, sneezing, sore throat,   Respiratory: Negative for apnea,  shortness of breath, wheezing and stridor.  Positive for cough, chest tightness,  Cardiovascular: Negative for chest pain, palpitations and leg swelling.   Gastrointestinal: Negative for abdominal distention, abdominal pain, constipation and  "nausea.   Musculoskeletal: Negative for gait problem, myalgias and neck pain.   Skin: Negative for color change and pallor.   Allergic/Immunologic: Negative for environmental allergies and food allergies.   Neurological: Negative for dizziness, speech difficulty, weakness, light-headedness, numbness and headaches.   Hematological: Negative for adenopathy. Does not bruise/bleed easily.   Psychiatric/Behavioral: Negative for dysphoric mood and sleep disturbance. The patient is not nervous/anxious.     Exam:Comprehensive exam done.   BP (!) 178/81 (BP Location: Right arm, Patient Position: Sitting)   Pulse (!) 53   Ht 5' 11" (1.803 m)   Wt 80.7 kg (178 lb 0.3 oz)   SpO2 96% Comment: on room air  BMI 24.83 kg/m²   Exam included Vitals as listed, and patient's appearance and affect and alertness and mood, oral exam for yeast and hygiene and pharynx lesions and Mallapatti (M) score, neck with inspection for jvd and masses and thyroid abnormalities and lymph nodes (supraclavicular and infraclavicular nodes and axillary also examined and noted if abn), chest exam included symmetry and effort and fremitus and percussion and auscultation, cardiac exam included rhythm and gallops and murmur and rubs and jvd and edema, abdominal exam for mass and hepatosplenomegaly and tenderness and hernias and bowel sounds, Musculoskeletal exam with muscle tone and posture and mobility/gait and  strength, and skin for rashes and cyanosis and pallor and turgor, extremity for clubbing.  Findings were normal except for pertinent findings listed below:  M3,  chest is symmetric, no distress, normal percussion, normal fremitus, and no murmur, no edema, lung sounds abnormal bilateral Rhonchi and faint wheezes.        Radiographs (ct chest and cxr) reviewed from 10/16/2017 normal    Labs Reviewed  Lab Results   Component Value Date    WBC 8.99 02/28/2020    HGB 14.4 02/28/2020    HCT 45.9 02/28/2020    MCV 98 02/28/2020     02/28/2020 "   · holter 1/27/2020- The diary was properly completed. The tape was adequate (0 days , 48 hours, 0 minutes).  · Predominant Rhythm Sinus rhythm with heart rates varying between 43 and 113 bpm with an average of 56 bpm.  · Ventricular Arrhythmias There were very rare PVCs totalling 58 and averaging 1.21 per hour.  · Supraventricular Arrhythmias There were occasional PACs totalling 638 and averaging 13.29 per hour.  · Occassional atrial pair and short run of atrial tachycardia,the longest 7 beats with no associated symptoms..  There was an episode of SOB reported. The corresponding rhythm strips revealed the following: During the event ([employer]), the rhythm was sinus tachycardia at 113 bpm with without ectopy.     Results for ARNULFO SALEH (MRN [id number]) as of 9/21/2018 11:38   Ref. Range 9/21/2018 08:59   Eosinophil% Latest Ref Range: 0.0 - 8.0 % 9.2 (H)   Eos # Latest Ref Range: 0.0 - 0.5 K/uL 0.6 (H)     PFT  March 31, 2017 fev 57%,     11/22/19-Conclusion     · Concentric left ventricular remodeling.  · Normal left ventricular systolic function. The estimated ejection fraction is 60%  · Normal LV diastolic function.  · No wall motion abnormalities.  · Normal right ventricular systolic function.  · The stress echo portion of this study is negative for myocardial ischemia.  · The patient's exercise capacity was above average. functional capacity of 10 METS  · The patient reached the end of the protocol.  · There were no arrhythmias during stress.  · The EKG portion of this study is negative for myocardial ischemia.  · Exercise portion of stress test stopped due to an increase in systolic and diastolic blood pressure above protocol.  · Hypertensive response changed from stress echo in 3/2018.       Plan:  Clinical impression is apparently straight forward and impression with management as below.    Maycol was seen today for fatigue, shortness of breath and sputum production.    Diagnoses and all orders for this  visit:    Severe persistent asthma with acute exacerbation  -     predniSONE (DELTASONE) 20 MG tablet; One daily for 3 days and repeat for flare of lung symptoms as intructed  -     X-Ray Chest PA And Lateral; Future  -     albuterol sulfate (PROAIR RESPICLICK) 90 mcg/actuation AePB; Inhale 2 puffs into the lungs every 4 (four) hours as needed. Rescue    Chronic sinus complaints    Fatigue, unspecified type          Maycol was seen today for fatigue, shortness of breath and sputum production.    Diagnoses and all orders for this visit:    Severe persistent asthma with acute exacerbation  -     predniSONE (DELTASONE) 20 MG tablet; One daily for 3 days and repeat for flare of lung symptoms as intructed  -     X-Ray Chest PA And Lateral; Future  -     albuterol sulfate (PROAIR RESPICLICK) 90 mcg/actuation AePB; Inhale 2 puffs into the lungs every 4 (four) hours as needed. Rescue    Chronic sinus complaints    Fatigue, unspecified type        Follow up in about 4 months (around 7/2/2020).    Discussed with patient above for education the following:      Patient Instructions   Fatigue and wheezes are noted.  Breathing not typical cause for loss of consciousness.    Heart rate is low - may contribute to fatigue but not that bad?      Chronic sinuses seem ok.    Would recommend prednisone 20 mg daily for 3 days now,  May repeat but would like to see better function and activity.      Call if not perking up.     oxygen level walking in office went from 97 to 93 % - not bad.    bp standing didn't fall today.      Blood wk last month all looked good.

## 2020-03-03 ENCOUNTER — TELEPHONE (OUTPATIENT)
Dept: FAMILY MEDICINE | Facility: CLINIC | Age: 80
End: 2020-03-03

## 2020-03-03 NOTE — TELEPHONE ENCOUNTER
Mrs. Medina please review the message below. The scheduling departments need addiational information.

## 2020-03-03 NOTE — TELEPHONE ENCOUNTER
Hi Mrs. Aicha Medina is out of the office today. I will forward her your message.           ----- Message from Aicha Werner sent at 3/3/2020  9:53 AM CST -----  Mr Severino is wanting to set EEG up and I can not until we know how many hours test md wants , must be noted within the orders in the comment section or reason for exam section, please let me know when this has been corrected and I will recall the patient and set appointment. Thanking you in advance.    Aicha Werner    [phone]

## 2020-03-04 DIAGNOSIS — N28.9 RENAL INSUFFICIENCY: Primary | ICD-10-CM

## 2020-03-10 ENCOUNTER — TELEPHONE (OUTPATIENT)
Dept: FAMILY MEDICINE | Facility: CLINIC | Age: 80
End: 2020-03-10

## 2020-03-10 DIAGNOSIS — R40.0 DAYTIME SOMNOLENCE: Primary | ICD-10-CM

## 2020-03-10 NOTE — TELEPHONE ENCOUNTER
----- Message from Aicha Werner sent at 3/10/2020 10:46 AM CDT -----  In order to schedule this patients test which he would like all on sameday , Dr Medina needs to reenter the EEG orders or edit the orders to state what kind of ambul. EEG Hour wise in the comment section or the reason for exam section. Please let me know when this has been done and I will follow up with our patient to set his appointment for his test/ Thanking you in advance for the help with this important matter to our patient.    Aicha Werner    [phone]

## 2020-03-12 ENCOUNTER — HOSPITAL ENCOUNTER (EMERGENCY)
Facility: HOSPITAL | Age: 80
Discharge: HOME | End: 2020-03-12
Attending: EMERGENCY MEDICINE
Payer: MEDICARE

## 2020-03-12 VITALS
SYSTOLIC BLOOD PRESSURE: 170 MMHG | TEMPERATURE: 99 F | HEIGHT: 71 IN | RESPIRATION RATE: 14 BRPM | OXYGEN SATURATION: 93 % | DIASTOLIC BLOOD PRESSURE: 76 MMHG | HEART RATE: 56 BPM | WEIGHT: 177.94 LBS | BODY MASS INDEX: 24.91 KG/M2

## 2020-03-12 DIAGNOSIS — R52 PAIN: ICD-10-CM

## 2020-03-12 DIAGNOSIS — S93.601A FOOT SPRAIN, RIGHT, INITIAL ENCOUNTER: Primary | ICD-10-CM

## 2020-03-12 PROCEDURE — 99283 EMERGENCY DEPT VISIT LOW MDM: CPT | Mod: 25

## 2020-03-12 NOTE — PROGRESS NOTES
I have evaluated and performed a medical screening assessment on this patient while awaiting a thorough evaluation and treatment. All of the emergency department beds are occupied at this time. When appropriate, laboratory studies will be ordered from triage. The patient has been advised of this process and care plan. Patient complains of right foot pain after stepped on uneven ground yesterday.     Orders pending:xray right foot  Disposition: stable

## 2020-03-13 NOTE — ED PROVIDER NOTES
Encounter Date: 3/12/2020       History     Chief Complaint   Patient presents with    Foot Pain     Twisted R foot last pm stepping on uneven ground.     HPI   79-year-old man who presents emergency department complaining of acute onset of right foot pain after stepping on uneven ground last night.  Patient states the pain is relieved by walking on his heel.  Symptoms are moderate intensity, intermittent.  He is not taking anything for the pain.  Review of patient's allergies indicates:   Allergen Reactions    Doxycycline Rash     Causes rash, hives and itching    Augmentin [amoxicillin-pot clavulanate] Nausea And Vomiting    Diltiazem      pruritis    Hydrochlorothiazide      Causes elevate uric acid, gout      Levaquin [levofloxacin] Swelling    Norvasc [amlodipine] Swelling    Pravastatin Rash     Past Medical History:   Diagnosis Date    A-fib     Asthma     Hematuria     Hypertension      Past Surgical History:   Procedure Laterality Date    COLONOSCOPY      COLONOSCOPY N/A 2020    Procedure: COLONOSCOPY;  Surgeon: Willis Mullen MD;  Location: [facility];  Service: Endoscopy;  Laterality: N/A;    CYSTOSCOPY      negative    EYE SURGERY      bilateral cataracts    HERNIA REPAIR      righht inguinal    JOINT REPLACEMENT Left     shoulder    left cataract surgery      PROSTATE SURGERY      TURP     Family History   Problem Relation Age of Onset    Stroke Father     Cancer Neg Hx     Diabetes Neg Hx     Heart disease Neg Hx     Hypertension Neg Hx      Social History     Tobacco Use    Smoking status: Former Smoker     Types: Cigarettes     Last attempt to quit:      Years since quittin.2    Smokeless tobacco: Never Used    Tobacco comment: Quit around    Substance Use Topics    Alcohol use: Yes     Alcohol/week: 14.0 standard drinks     Types: 7 Glasses of wine, 7 Shots of liquor per week    Drug use: No     Review of Systems   Constitutional: Negative for fever.    HENT: Negative for sore throat.    Respiratory: Negative for shortness of breath.    Cardiovascular: Negative for chest pain.   Gastrointestinal: Negative for nausea.   Genitourinary: Negative for dysuria.   Musculoskeletal: Positive for gait problem. Negative for back pain and joint swelling.        Positive for right lateral foot pain.   Skin: Negative for rash.   Neurological: Negative for weakness.   Hematological: Does not bruise/bleed easily.       Physical Exam     Initial Vitals [03/12/20 1426]   BP Pulse Resp Temp SpO2   (!) 170/76 (!) 56 14 98.7 °F (37.1 °C) (!) 93 %      MAP       --         Physical Exam  Physical Exam   Nursing note and vitals reviewed.   Constitutional: Oriented to person, place, and time. Appears well-developed and well-nourished.   HENT:   Head: Normocephalic and atraumatic.   Eyes: EOM are normal.   Neck: Normal range of motion. Neck supple.   Cardiovascular: Normal rate.   Pulmonary/Chest: Effort normal. Clear BS b/l   Abdominal: Soft, Non tender, no distension.   Musculoskeletal: Normal range of motion.  There is tenderness to the base of the 5th metacarpal.  There is no bruising or swelling.  2+ dorsalis pedis pulses.  No tenderness over the ankle or the malleoli    Neurological:Alert and oriented to person, place, and time.   Psychiatric: Normal mood and affect. Behavior is normal.         ED Course   Procedures  Labs Reviewed - No data to display       Imaging Results          X-Ray Foot Complete Right (Final result)  Result time 03/12/20 16:25:07    Final result by Mikel Gonzalez Jr., MD (03/12/20 16:25:07)                 Impression:      Negative radiographs of the right foot.      Electronically signed by: Mikel Gonzalez MD  Date:    03/12/2020  Time:    16:25             Narrative:    EXAMINATION:  XR FOOT COMPLETE 3 VIEW RIGHT    CLINICAL HISTORY:  . Pain, unspecified    TECHNIQUE:  AP, lateral, and oblique views of the right foot were  performed.    COMPARISON:  None    FINDINGS:  A fracture of the bones of the right foot is not seen.  Soft tissue swelling or foreign bodies are not identified.  Abnormal angulation is not seen.                                 Medical Decision Making:   History:   Old Medical Records: I decided to obtain old medical records.  Initial Assessment:   This is an emergent evaluation for a patient complaining of right foot pain.    The patient appears to have a right foot sprain.   The patient's xrays show no signs of fracture, dislocation, or subluxation.  The patient could have a ligamentous injury.   They will be treated with supportive care with an Ace wrap and crutches.  The patient may take NSAIDs for their pain.The patient will be discharged home to follow up with their physician or the orthopedic doctor provided.     The results and physical exam findings were reviewed with the patient. Pt agrees with assessment, disposition and treatment plan and has no further questions or complaints at this time.    Johan Iniguez M.D. 8:21 PM 3/12/2020                                         Clinical Impression:       ICD-10-CM ICD-9-CM   1. Foot sprain, right, initial encounter S93.601A 845.10   2. Pain R52 780.96             ED Disposition Condition    Discharge Stable        ED Prescriptions     None        Follow-up Information     Follow up With Specialties Details Why Contact Info    Umu Fox MD Family Medicine In 1 week As needed [address] [facility] [address]  [phone]      [facility] Emergency Medicine  If symptoms worsen [address] [place] [id number]  [phone]                                     Johan Iniguez MD  03/12/20 2022

## 2020-04-02 ENCOUNTER — TELEPHONE (OUTPATIENT)
Dept: FAMILY MEDICINE | Facility: CLINIC | Age: 80
End: 2020-04-02

## 2020-04-02 NOTE — TELEPHONE ENCOUNTER
Pt was informed that he needed to have kidney functions repeated  Pt verbalized understanding and will come in mon or tues of next week

## 2020-04-02 NOTE — TELEPHONE ENCOUNTER
----- Message from Nuno Begum sent at 4/2/2020 11:30 AM CDT -----  Contact: self  Patient want to know if he need anymore bloodwork if so please call back at [phone] (home)       Case number [id number]

## 2020-04-03 ENCOUNTER — LAB VISIT (OUTPATIENT)
Dept: LAB | Facility: HOSPITAL | Age: 80
End: 2020-04-03
Attending: NURSE PRACTITIONER
Payer: MEDICARE

## 2020-04-03 DIAGNOSIS — N28.9 RENAL INSUFFICIENCY: ICD-10-CM

## 2020-04-03 LAB
ALBUMIN SERPL BCP-MCNC: 3.4 G/DL (ref 3.5–5.2)
ANION GAP SERPL CALC-SCNC: 10 MMOL/L (ref 8–16)
BUN SERPL-MCNC: 18 MG/DL (ref 8–23)
CALCIUM SERPL-MCNC: 9.2 MG/DL (ref 8.7–10.5)
CHLORIDE SERPL-SCNC: 103 MMOL/L (ref 95–110)
CO2 SERPL-SCNC: 29 MMOL/L (ref 23–29)
CREAT SERPL-MCNC: 1.2 MG/DL (ref 0.5–1.4)
EST. GFR  (AFRICAN AMERICAN): >60 ML/MIN/1.73 M^2
EST. GFR  (NON AFRICAN AMERICAN): 56.4 ML/MIN/1.73 M^2
GLUCOSE SERPL-MCNC: 95 MG/DL (ref 70–110)
PHOSPHATE SERPL-MCNC: 3.5 MG/DL (ref 2.7–4.5)
POTASSIUM SERPL-SCNC: 4.4 MMOL/L (ref 3.5–5.1)
SODIUM SERPL-SCNC: 142 MMOL/L (ref 136–145)

## 2020-04-03 PROCEDURE — 36415 COLL VENOUS BLD VENIPUNCTURE: CPT | Mod: PO

## 2020-04-03 PROCEDURE — 80069 RENAL FUNCTION PANEL: CPT

## 2020-04-06 ENCOUNTER — TELEPHONE (OUTPATIENT)
Dept: FAMILY MEDICINE | Facility: CLINIC | Age: 80
End: 2020-04-06

## 2020-04-06 NOTE — TELEPHONE ENCOUNTER
This message has been advised. Results have been given to patient.      ----- Message from Tori Sears sent at 4/6/2020  9:31 AM CDT -----  Contact: pt [phone]  Call placed to pod.   Patient is calling back for his test results  Call back [phone]

## 2020-04-06 NOTE — TELEPHONE ENCOUNTER
----- Message from Tori Sears sent at 4/6/2020  9:31 AM CDT -----  Contact: pt [phone]  Call placed to pod.   Patient is calling back for his test results  Call back [phone]

## 2020-05-05 DIAGNOSIS — I10 ESSENTIAL HYPERTENSION: ICD-10-CM

## 2020-05-05 NOTE — TELEPHONE ENCOUNTER
----- Message from Roxie Arellano sent at 5/5/2020  3:56 PM CDT -----  Contact: Patient  Type:  RX Refill Request    Who Called:  Patient  Refill or New Rx: Refill  RX Name and Strength:  terazosin (HYTRIN) 5 MG capsule  How is the patient currently taking it? (ex. 1XDay):  1XDay  Is this a 30 day or 90 day RX:  90  Preferred Pharmacy with phone number:  [id number] - [place] - [address] AT [facility] [phone] (Phone)   Local or Mail Order:  Local  Ordering Provider:  Dimitris Welch Call Back Number:  [phone]  Additional Information:   Pt says this is from Jm and does Layla Fox still want him on it? States him and pharmacy have requested refills with no luck. Had to get emergency supply from pharmacy

## 2020-05-06 RX ORDER — TERAZOSIN 5 MG/1
5 CAPSULE ORAL DAILY
Qty: 90 CAPSULE | Refills: 3 | Status: SHIPPED | OUTPATIENT
Start: 2020-05-06 | End: 2021-05-25 | Stop reason: SDUPTHER

## 2020-05-18 DIAGNOSIS — J40 BRONCHITIS: ICD-10-CM

## 2020-05-18 DIAGNOSIS — J45.50 SEVERE PERSISTENT ASTHMA WITHOUT COMPLICATION: ICD-10-CM

## 2020-05-18 RX ORDER — AZITHROMYCIN 500 MG/1
500 TABLET, FILM COATED ORAL DAILY
Qty: 3 TABLET | Refills: 1 | Status: SHIPPED | OUTPATIENT
Start: 2020-05-18 | End: 2020-06-01 | Stop reason: SDUPTHER

## 2020-05-20 ENCOUNTER — ANCILLARY ORDERS (OUTPATIENT)
Dept: FAMILY MEDICINE | Facility: CLINIC | Age: 80
End: 2020-05-20

## 2020-05-20 ENCOUNTER — HOSPITAL ENCOUNTER (OUTPATIENT)
Dept: RADIOLOGY | Facility: HOSPITAL | Age: 80
Discharge: HOME | End: 2020-05-20
Attending: INTERNAL MEDICINE
Payer: MEDICARE

## 2020-05-20 ENCOUNTER — HOSPITAL ENCOUNTER (OUTPATIENT)
Dept: RADIOLOGY | Facility: HOSPITAL | Age: 80
Discharge: HOME | End: 2020-05-20
Attending: NURSE PRACTITIONER
Payer: MEDICARE

## 2020-05-20 DIAGNOSIS — R09.89 CHRONIC SINUS COMPLAINTS: Primary | ICD-10-CM

## 2020-05-20 DIAGNOSIS — J45.51 SEVERE PERSISTENT ASTHMA WITH ACUTE EXACERBATION: ICD-10-CM

## 2020-05-20 DIAGNOSIS — R40.20 LOSS OF CONSCIOUSNESS: ICD-10-CM

## 2020-05-20 PROCEDURE — 71046 XR CHEST PA AND LATERAL: ICD-10-PCS | Mod: 26,,, | Performed by: RADIOLOGY

## 2020-05-20 PROCEDURE — 70450 CT HEAD WITHOUT CONTRAST: ICD-10-PCS | Mod: 26,,, | Performed by: RADIOLOGY

## 2020-05-20 PROCEDURE — 70450 CT HEAD/BRAIN W/O DYE: CPT | Mod: 26,,, | Performed by: RADIOLOGY

## 2020-05-20 PROCEDURE — 71046 X-RAY EXAM CHEST 2 VIEWS: CPT | Mod: 26,,, | Performed by: RADIOLOGY

## 2020-05-20 PROCEDURE — 71046 X-RAY EXAM CHEST 2 VIEWS: CPT | Mod: TC,FY

## 2020-05-20 PROCEDURE — 70450 CT HEAD/BRAIN W/O DYE: CPT | Mod: TC

## 2020-05-20 RX ORDER — METHYLPREDNISOLONE 4 MG/1
TABLET ORAL
Qty: 1 PACKAGE | Refills: 0 | Status: SHIPPED | OUTPATIENT
Start: 2020-05-20 | End: 2020-06-10

## 2020-05-21 ENCOUNTER — TELEPHONE (OUTPATIENT)
Dept: PULMONOLOGY | Facility: CLINIC | Age: 80
End: 2020-05-21

## 2020-05-21 ENCOUNTER — TELEPHONE (OUTPATIENT)
Dept: FAMILY MEDICINE | Facility: CLINIC | Age: 80
End: 2020-05-21

## 2020-05-21 NOTE — TELEPHONE ENCOUNTER
Spoke with patient and reviewed results, patient verbally understood.     ----- Message from Osvaldo Burch MD sent at 5/20/2020  5:21 PM CDT -----  Notify good cxr no change in plans

## 2020-05-21 NOTE — TELEPHONE ENCOUNTER
----- Message from Amina Torrez sent at 5/21/2020  3:40 PM CDT -----    Type:  Patient Returning Call    Who Called: pt  Who Left Message for Patient: nurse  Does the patient know what this is regarding?: About  test  Best Call Back Number:  [phone]  Per pt  Additional Information:    Please call

## 2020-06-01 ENCOUNTER — OFFICE VISIT (OUTPATIENT)
Dept: PULMONOLOGY | Facility: CLINIC | Age: 80
End: 2020-06-01
Payer: MEDICARE

## 2020-06-01 VITALS
HEART RATE: 66 BPM | HEIGHT: 71 IN | OXYGEN SATURATION: 97 % | DIASTOLIC BLOOD PRESSURE: 82 MMHG | WEIGHT: 175.38 LBS | BODY MASS INDEX: 24.55 KG/M2 | SYSTOLIC BLOOD PRESSURE: 138 MMHG

## 2020-06-01 DIAGNOSIS — J40 BRONCHITIS: ICD-10-CM

## 2020-06-01 DIAGNOSIS — J45.50 SEVERE PERSISTENT ASTHMA WITHOUT COMPLICATION: ICD-10-CM

## 2020-06-01 PROCEDURE — 1159F PR MEDICATION LIST DOCUMENTED IN MEDICAL RECORD: ICD-10-PCS | Mod: S$GLB,,, | Performed by: INTERNAL MEDICINE

## 2020-06-01 PROCEDURE — 1100F PTFALLS ASSESS-DOCD GE2>/YR: CPT | Mod: CPTII,S$GLB,, | Performed by: INTERNAL MEDICINE

## 2020-06-01 PROCEDURE — 3079F DIAST BP 80-89 MM HG: CPT | Mod: CPTII,S$GLB,, | Performed by: INTERNAL MEDICINE

## 2020-06-01 PROCEDURE — 99499 UNLISTED E&M SERVICE: CPT | Mod: S$GLB,,, | Performed by: INTERNAL MEDICINE

## 2020-06-01 PROCEDURE — 1126F AMNT PAIN NOTED NONE PRSNT: CPT | Mod: S$GLB,,, | Performed by: INTERNAL MEDICINE

## 2020-06-01 PROCEDURE — 99499 RISK ADDL DX/OHS AUDIT: ICD-10-PCS | Mod: S$GLB,,, | Performed by: INTERNAL MEDICINE

## 2020-06-01 PROCEDURE — 1126F PR PAIN SEVERITY QUANTIFIED, NO PAIN PRESENT: ICD-10-PCS | Mod: S$GLB,,, | Performed by: INTERNAL MEDICINE

## 2020-06-01 PROCEDURE — 3079F PR MOST RECENT DIASTOLIC BLOOD PRESSURE 80-89 MM HG: ICD-10-PCS | Mod: CPTII,S$GLB,, | Performed by: INTERNAL MEDICINE

## 2020-06-01 PROCEDURE — 1100F PR PT FALLS ASSESS DOC 2+ FALLS/FALL W/INJURY/YR: ICD-10-PCS | Mod: CPTII,S$GLB,, | Performed by: INTERNAL MEDICINE

## 2020-06-01 PROCEDURE — 3075F SYST BP GE 130 - 139MM HG: CPT | Mod: CPTII,S$GLB,, | Performed by: INTERNAL MEDICINE

## 2020-06-01 PROCEDURE — 99999 PR PBB SHADOW E&M-EST. PATIENT-LVL IV: ICD-10-PCS | Mod: PBBFAC,,, | Performed by: INTERNAL MEDICINE

## 2020-06-01 PROCEDURE — 1159F MED LIST DOCD IN RCRD: CPT | Mod: S$GLB,,, | Performed by: INTERNAL MEDICINE

## 2020-06-01 PROCEDURE — 99999 PR PBB SHADOW E&M-EST. PATIENT-LVL IV: CPT | Mod: PBBFAC,,, | Performed by: INTERNAL MEDICINE

## 2020-06-01 PROCEDURE — 99213 OFFICE O/P EST LOW 20 MIN: CPT | Mod: S$GLB,,, | Performed by: INTERNAL MEDICINE

## 2020-06-01 PROCEDURE — 3075F PR MOST RECENT SYSTOLIC BLOOD PRESS GE 130-139MM HG: ICD-10-PCS | Mod: CPTII,S$GLB,, | Performed by: INTERNAL MEDICINE

## 2020-06-01 PROCEDURE — 3288F FALL RISK ASSESSMENT DOCD: CPT | Mod: CPTII,S$GLB,, | Performed by: INTERNAL MEDICINE

## 2020-06-01 PROCEDURE — 3288F PR FALLS RISK ASSESSMENT DOCUMENTED: ICD-10-PCS | Mod: CPTII,S$GLB,, | Performed by: INTERNAL MEDICINE

## 2020-06-01 PROCEDURE — 99213 PR OFFICE/OUTPT VISIT, EST, LEVL III, 20-29 MIN: ICD-10-PCS | Mod: S$GLB,,, | Performed by: INTERNAL MEDICINE

## 2020-06-01 RX ORDER — AZITHROMYCIN 500 MG/1
500 TABLET, FILM COATED ORAL DAILY
Qty: 3 TABLET | Refills: 3 | Status: SHIPPED | OUTPATIENT
Start: 2020-06-01 | End: 2020-08-17 | Stop reason: ALTCHOICE

## 2020-06-01 NOTE — PROGRESS NOTES
2020    Maycol Hodge  Office Note    Chief Complaint   Patient presents with    Follow-up     4 month    Asthma     HPI:   2020 49 yo son  suddenly  in [place]- son avihailee - death.  Toxicology pending.  Had 2 spells ppt prednisone use since last visit with good results.  Took erythro also.  Having some grief- eats and sleeps ok . Wife in good shape. Uses symbicort regular.    +      3/2/2020- having exhaustion after 10-15 min doing yd wk.  Saw cardiology- had a fib at colonoscopy in dec but sinus mile since. - eliquis started.  Pt had syncope 10 days - saw Dr Vogel in f/u.  Last wk had some noct wheezes.   Uses symbicort 2 bid and singulair.  No no prednisone use last yr.  Sleeps ok, nerves ok - denies depression.  Had hives last July.  Stress echo in 2019 was good save high bp?    Patient Instructions   Fatigue and wheezes are noted.  Breathing not typical cause for loss of consciousness.  Heart rate is low - may contribute to fatigue but not that bad?    Chronic sinuses seem ok.  Would recommend prednisone 20 mg daily for 3 days now,  May repeat but would like to see better function and activity.    Call if not perking up.   oxygen level walking in office went from 97 to 93 % - not bad.  bp standing didn't fall today.    Blood wk last month all looked good.    2019- Onset one week, sore throat, congestion, cough (fits, severe, productive light yellow in color), took azithromycin with minimal benefit. Current Prednisone use.  2019- Feeling well, no exacerbations requiring antibiotic and steroid therapy. No cough, no nocturnal arousals, no SOB.   2018- had exacerbations in July and August needing azithro and prednisone- had cough clear mucous with sl yellow, no breathing issues.  Unusual to have exacerbation summer.  2018-- took azithro and pred x 3 - good results.  Doing well   hpi, had acute onset 5 days ago with sinus runny, cough,  wheezes, sob, no fever, no n/v, pos headache.  Muscle aches and joint aches.  Took prednisone, took inhaler, neb rx was old. Run down, poor appetite,   Took prednisone and azithro x3, still cough but better,  Down but better.  Mucous clear.   Dec 16, 2016HPI:pt with asthma with no usual noct arousals or rescue use.  Exacerbates 3x yr in spring mainly. Progresses for a week on rescue and then uses pred and doxy (recent doxy reaction) to clear.  I cared for years ago.  No recent pft.  Pt feels breathing better than  Usual yrs past.      The chief compliant  problem varies with instablilty at time    PFSH:  Past Medical History:   Diagnosis Date    A-fib     Asthma     Hematuria     Hypertension          Past Surgical History:   Procedure Laterality Date    COLONOSCOPY      COLONOSCOPY N/A 2020    Procedure: COLONOSCOPY;  Surgeon: Willis Mullen MD;  Location[facility];  Service: Endoscopy;  Laterality: N/A;    CYSTOSCOPY      negative    EYE SURGERY      bilateral cataracts    HERNIA REPAIR      righht inguinal    JOINT REPLACEMENT Left     shoulder    left cataract surgery      PROSTATE SURGERY      TURP     Social History     Tobacco Use    Smoking status: Former Smoker     Types: Cigarettes     Last attempt to quit:      Years since quittin.4    Smokeless tobacco: Never Used    Tobacco comment: Quit around    Substance Use Topics    Alcohol use: Yes     Alcohol/week: 14.0 standard drinks     Types: 7 Glasses of wine, 7 Shots of liquor per week    Drug use: No     Family History   Problem Relation Age of Onset    Stroke Father     Cancer Neg Hx     Diabetes Neg Hx     Heart disease Neg Hx     Hypertension Neg Hx      Review of patient's allergies indicates:   Allergen Reactions    Doxycycline Rash     Causes rash, hives and itching    Diltiazem      pruritis    Hydrochlorothiazide      Causes elevate uric acid, gout      Levaquin [levofloxacin] Swelling    Norvasc  "[amlodipine] Swelling       Performance Status:The patient's activity level is functions out of house.      Review of Systems:    Constitutional: Negative for activity change, appetite change, chills, diaphoresis, fatigue, fever and unexpected weight change.   HENT: Negative for dental problem, postnasal drip, trouble swallowing and voice change. Positive for  rhinorrhea, sinus pressure, sinus pain, sneezing, sore throat,   Respiratory: Negative for apnea,  shortness of breath, wheezing and stridor.  Positive for cough, chest tightness,  Cardiovascular: Negative for chest pain, palpitations and leg swelling.   Gastrointestinal: Negative for abdominal distention, abdominal pain, constipation and nausea.   Musculoskeletal: Negative for gait problem, myalgias and neck pain.   Skin: Negative for color change and pallor.   Allergic/Immunologic: Negative for environmental allergies and food allergies.   Neurological: Negative for dizziness, speech difficulty, weakness, light-headedness, numbness and headaches.   Hematological: Negative for adenopathy. Does not bruise/bleed easily.   Psychiatric/Behavioral: Negative for dysphoric mood and sleep disturbance. The patient is not nervous/anxious.     Exam:Comprehensive exam done.   /82   Pulse 66   Ht 5' 11" (1.803 m)   Wt 79.5 kg (175 lb 6 oz)   SpO2 97% Comment: on room air  BMI 24.46 kg/m²   Exam included Vitals as listed, and patient's appearance and affect and alertness and mood, oral exam for yeast and hygiene and pharynx lesions and Mallapatti (M) score, neck with inspection for jvd and masses and thyroid abnormalities and lymph nodes (supraclavicular and infraclavicular nodes and axillary also examined and noted if abn), chest exam included symmetry and effort and fremitus and percussion and auscultation, cardiac exam included rhythm and gallops and murmur and rubs and jvd and edema, abdominal exam for mass and hepatosplenomegaly and tenderness and hernias " and bowel sounds, Musculoskeletal exam with muscle tone and posture and mobility/gait and  strength, and skin for rashes and cyanosis and pallor and turgor, extremity for clubbing.  Findings were normal except for pertinent findings listed below:  M3,  chest is symmetric, no distress, normal percussion, normal fremitus, and no murmur, no edema, lung sounds now clear.   Rest good.      Radiographs (ct chest and cxr) reviewed from 10/16/2017 normal    Labs Reviewed  Lab Results   Component Value Date    WBC 8.99 02/28/2020    HGB 14.4 02/28/2020    HCT 45.9 02/28/2020    MCV 98 02/28/2020     02/28/2020   · holter 1/27/2020- The diary was properly completed. The tape was adequate (0 days , 48 hours, 0 minutes).  · Predominant Rhythm Sinus rhythm with heart rates varying between 43 and 113 bpm with an average of 56 bpm.  · Ventricular Arrhythmias There were very rare PVCs totalling 58 and averaging 1.21 per hour.  · Supraventricular Arrhythmias There were occasional PACs totalling 638 and averaging 13.29 per hour.  · Occassional atrial pair and short run of atrial tachycardia,the longest 7 beats with no associated symptoms..  There was an episode of SOB reported. The corresponding rhythm strips revealed the following: During the event ([employer]), the rhythm was sinus tachycardia at 113 bpm with without ectopy.     Results for ARNULFO SALEH (MRN [id number]) as of 9/21/2018 11:38   Ref. Range 9/21/2018 08:59   Eosinophil% Latest Ref Range: 0.0 - 8.0 % 9.2 (H)   Eos # Latest Ref Range: 0.0 - 0.5 K/uL 0.6 (H)     PFT  March 31, 2017 fev 57%,     11/22/19-Conclusion     · Concentric left ventricular remodeling.  · Normal left ventricular systolic function. The estimated ejection fraction is 60%  · Normal LV diastolic function.  · No wall motion abnormalities.  · Normal right ventricular systolic function.  · The stress echo portion of this study is negative for myocardial ischemia.  · The patient's exercise  capacity was above average. functional capacity of 10 METS  · The patient reached the end of the protocol.  · There were no arrhythmias during stress.  · The EKG portion of this study is negative for myocardial ischemia.  · Exercise portion of stress test stopped due to an increase in systolic and diastolic blood pressure above protocol.  · Hypertensive response changed from stress echo in 3/2018.       Plan:  Clinical impression is apparently straight forward and impression with management as below.    Maycol was seen today for follow-up and asthma.    Diagnoses and all orders for this visit:    Bronchitis  -     azithromycin (ZITHROMAX) 500 MG tablet; Take 1 tablet (500 mg total) by mouth once daily.    Severe persistent asthma without complication  -     azithromycin (ZITHROMAX) 500 MG tablet; Take 1 tablet (500 mg total) by mouth once daily.          Maycol was seen today for follow-up and asthma.    Diagnoses and all orders for this visit:    Bronchitis  -     azithromycin (ZITHROMAX) 500 MG tablet; Take 1 tablet (500 mg total) by mouth once daily.    Severe persistent asthma without complication  -     azithromycin (ZITHROMAX) 500 MG tablet; Take 1 tablet (500 mg total) by mouth once daily.        Follow up in about 5 months (around 11/1/2020).    Discussed with patient above for education the following:      Patient Instructions   Asthma not too bad but you needed prednisone twice since March.  Special shots may help-  Could ask to get fasenra covered.  May consider starting if pattern worsens.    symbicort needed regular especially in winter/spring.

## 2020-06-01 NOTE — PATIENT INSTRUCTIONS
Asthma not too bad but you needed prednisone twice since March.  Special shots may help-  Could ask to get fasenra covered.  May consider starting if pattern worsens.    symbicort needed regular especially in winter/spring.

## 2020-07-22 ENCOUNTER — TELEPHONE (OUTPATIENT)
Dept: PULMONOLOGY | Facility: CLINIC | Age: 80
End: 2020-07-22

## 2020-07-22 NOTE — TELEPHONE ENCOUNTER
Spoke to pt who expresses concern he is using prednisone too often. Made pt an appt for 8/11 @ 1:40    ----- Message from Aicha Sotelo sent at 7/22/2020 12:04 PM CDT -----  Regarding: advice  Contact: patient  Type: Needs Medical Advice  Who Called:  self  Symptoms (please be specific):  asthma flare up, coughing and shortness of breath  How long has patient had these symptoms:  today  Pharmacy name and phone #:    [id number] - [place], [place] - [address] & [facility]  [address]  [place] [address]  Phone: [phone] Fax: [phone]  Best Call Back Number: [phone]  Additional Information: patient states he had to start his prednisone today. He states it is the 4 th time since March. Please call patient to advise. Thanks!

## 2020-07-24 ENCOUNTER — LAB VISIT (OUTPATIENT)
Dept: LAB | Facility: HOSPITAL | Age: 80
End: 2020-07-24
Attending: FAMILY MEDICINE
Payer: MEDICARE

## 2020-07-24 ENCOUNTER — TELEPHONE (OUTPATIENT)
Dept: CARDIOLOGY | Facility: CLINIC | Age: 80
End: 2020-07-24

## 2020-07-24 DIAGNOSIS — E03.8 HYPOTHYROIDISM DUE TO HASHIMOTO'S THYROIDITIS: ICD-10-CM

## 2020-07-24 DIAGNOSIS — E06.3 HYPOTHYROIDISM DUE TO HASHIMOTO'S THYROIDITIS: ICD-10-CM

## 2020-07-24 DIAGNOSIS — I10 ESSENTIAL HYPERTENSION: Primary | ICD-10-CM

## 2020-07-24 DIAGNOSIS — E78.5 HYPERLIPIDEMIA, UNSPECIFIED HYPERLIPIDEMIA TYPE: ICD-10-CM

## 2020-07-24 LAB
ALBUMIN SERPL BCP-MCNC: 3.7 G/DL (ref 3.5–5.2)
ALP SERPL-CCNC: 57 U/L (ref 55–135)
ALT SERPL W/O P-5'-P-CCNC: 17 U/L (ref 10–44)
ANION GAP SERPL CALC-SCNC: 8 MMOL/L (ref 8–16)
AST SERPL-CCNC: 18 U/L (ref 10–40)
BASOPHILS # BLD AUTO: 0.12 K/UL (ref 0–0.2)
BASOPHILS NFR BLD: 1.2 % (ref 0–1.9)
BILIRUB SERPL-MCNC: 1.3 MG/DL (ref 0.1–1)
BUN SERPL-MCNC: 16 MG/DL (ref 8–23)
CALCIUM SERPL-MCNC: 8.8 MG/DL (ref 8.7–10.5)
CHLORIDE SERPL-SCNC: 106 MMOL/L (ref 95–110)
CHOLEST SERPL-MCNC: 199 MG/DL (ref 120–199)
CHOLEST/HDLC SERPL: 3 {RATIO} (ref 2–5)
CO2 SERPL-SCNC: 27 MMOL/L (ref 23–29)
CREAT SERPL-MCNC: 1 MG/DL (ref 0.5–1.4)
DIFFERENTIAL METHOD: ABNORMAL
EOSINOPHIL # BLD AUTO: 0.3 K/UL (ref 0–0.5)
EOSINOPHIL NFR BLD: 3.1 % (ref 0–8)
ERYTHROCYTE [DISTWIDTH] IN BLOOD BY AUTOMATED COUNT: 14.8 % (ref 11.5–14.5)
EST. GFR  (AFRICAN AMERICAN): >60 ML/MIN/1.73 M^2
EST. GFR  (NON AFRICAN AMERICAN): >60 ML/MIN/1.73 M^2
GLUCOSE SERPL-MCNC: 82 MG/DL (ref 70–110)
HCT VFR BLD AUTO: 43.5 % (ref 40–54)
HDLC SERPL-MCNC: 66 MG/DL (ref 40–75)
HDLC SERPL: 33.2 % (ref 20–50)
HGB BLD-MCNC: 14 G/DL (ref 14–18)
IMM GRANULOCYTES # BLD AUTO: 0.03 K/UL (ref 0–0.04)
IMM GRANULOCYTES NFR BLD AUTO: 0.3 % (ref 0–0.5)
LDLC SERPL CALC-MCNC: 116.6 MG/DL (ref 63–159)
LYMPHOCYTES # BLD AUTO: 3.7 K/UL (ref 1–4.8)
LYMPHOCYTES NFR BLD: 37.3 % (ref 18–48)
MCH RBC QN AUTO: 31 PG (ref 27–31)
MCHC RBC AUTO-ENTMCNC: 32.2 G/DL (ref 32–36)
MCV RBC AUTO: 96 FL (ref 82–98)
MONOCYTES # BLD AUTO: 0.8 K/UL (ref 0.3–1)
MONOCYTES NFR BLD: 8.4 % (ref 4–15)
NEUTROPHILS # BLD AUTO: 4.9 K/UL (ref 1.8–7.7)
NEUTROPHILS NFR BLD: 49.7 % (ref 38–73)
NONHDLC SERPL-MCNC: 133 MG/DL
NRBC BLD-RTO: 0 /100 WBC
PLATELET # BLD AUTO: 252 K/UL (ref 150–350)
PMV BLD AUTO: 11.1 FL (ref 9.2–12.9)
POTASSIUM SERPL-SCNC: 3.8 MMOL/L (ref 3.5–5.1)
PROT SERPL-MCNC: 6.9 G/DL (ref 6–8.4)
RBC # BLD AUTO: 4.52 M/UL (ref 4.6–6.2)
SODIUM SERPL-SCNC: 141 MMOL/L (ref 136–145)
T4 FREE SERPL-MCNC: 0.89 NG/DL (ref 0.71–1.51)
TRIGL SERPL-MCNC: 82 MG/DL (ref 30–150)
TSH SERPL DL<=0.005 MIU/L-ACNC: 4.77 UIU/ML (ref 0.4–4)
WBC # BLD AUTO: 9.94 K/UL (ref 3.9–12.7)

## 2020-07-24 PROCEDURE — 36415 COLL VENOUS BLD VENIPUNCTURE: CPT | Mod: PO

## 2020-07-24 PROCEDURE — 84443 ASSAY THYROID STIM HORMONE: CPT

## 2020-07-24 PROCEDURE — 84439 ASSAY OF FREE THYROXINE: CPT

## 2020-07-24 PROCEDURE — 85025 COMPLETE CBC W/AUTO DIFF WBC: CPT

## 2020-07-24 PROCEDURE — 80061 LIPID PANEL: CPT

## 2020-07-24 PROCEDURE — 80053 COMPREHEN METABOLIC PANEL: CPT

## 2020-07-24 NOTE — TELEPHONE ENCOUNTER
----- Message from Terry Snider sent at 7/24/2020 12:47 PM CDT -----  Contact: Janis with Dr. Truong's office  Type: Needs Medical Advice    Who Called:  Rita Welch Call Back Number: [phone]  Additional Information: states that the needs to be taken off of ELIQUIS for a cysto procedure. Please call to advise.

## 2020-07-30 ENCOUNTER — TELEPHONE (OUTPATIENT)
Dept: FAMILY MEDICINE | Facility: CLINIC | Age: 80
End: 2020-07-30

## 2020-07-30 NOTE — TELEPHONE ENCOUNTER
Called patient regarding appointment on Friday 7/31/2020 with Dr. Fox, no answer left detail voice message for patient to call back to rescheduled appointment.

## 2020-08-04 ENCOUNTER — LAB VISIT (OUTPATIENT)
Dept: LAB | Facility: HOSPITAL | Age: 80
End: 2020-08-04
Attending: PHYSICIAN ASSISTANT
Payer: MEDICARE

## 2020-08-04 DIAGNOSIS — I10 ESSENTIAL HYPERTENSION: ICD-10-CM

## 2020-08-04 PROCEDURE — 80048 BASIC METABOLIC PNL TOTAL CA: CPT

## 2020-08-04 PROCEDURE — 36415 COLL VENOUS BLD VENIPUNCTURE: CPT | Mod: PO

## 2020-08-05 LAB
ANION GAP SERPL CALC-SCNC: 8 MMOL/L (ref 8–16)
BUN SERPL-MCNC: 13 MG/DL (ref 8–23)
CALCIUM SERPL-MCNC: 9.2 MG/DL (ref 8.7–10.5)
CHLORIDE SERPL-SCNC: 107 MMOL/L (ref 95–110)
CO2 SERPL-SCNC: 26 MMOL/L (ref 23–29)
CREAT SERPL-MCNC: 1.1 MG/DL (ref 0.5–1.4)
EST. GFR  (AFRICAN AMERICAN): >60 ML/MIN/1.73 M^2
EST. GFR  (NON AFRICAN AMERICAN): >60 ML/MIN/1.73 M^2
GLUCOSE SERPL-MCNC: 84 MG/DL (ref 70–110)
POTASSIUM SERPL-SCNC: 4.6 MMOL/L (ref 3.5–5.1)
SODIUM SERPL-SCNC: 141 MMOL/L (ref 136–145)

## 2020-08-09 DIAGNOSIS — Z12.5 PROSTATE CANCER SCREENING: ICD-10-CM

## 2020-08-09 DIAGNOSIS — E06.3 HYPOTHYROIDISM DUE TO HASHIMOTO'S THYROIDITIS: ICD-10-CM

## 2020-08-09 DIAGNOSIS — E78.2 MIXED HYPERLIPIDEMIA: Primary | ICD-10-CM

## 2020-08-09 DIAGNOSIS — E03.8 HYPOTHYROIDISM DUE TO HASHIMOTO'S THYROIDITIS: ICD-10-CM

## 2020-08-11 ENCOUNTER — OFFICE VISIT (OUTPATIENT)
Dept: PULMONOLOGY | Facility: CLINIC | Age: 80
End: 2020-08-11
Payer: MEDICARE

## 2020-08-11 ENCOUNTER — OFFICE VISIT (OUTPATIENT)
Dept: CARDIOLOGY | Facility: CLINIC | Age: 80
End: 2020-08-11
Payer: MEDICARE

## 2020-08-11 VITALS
SYSTOLIC BLOOD PRESSURE: 130 MMHG | WEIGHT: 178.13 LBS | HEART RATE: 50 BPM | BODY MASS INDEX: 24.94 KG/M2 | HEIGHT: 71 IN | DIASTOLIC BLOOD PRESSURE: 80 MMHG

## 2020-08-11 VITALS
BODY MASS INDEX: 24.75 KG/M2 | WEIGHT: 176.81 LBS | HEART RATE: 58 BPM | HEIGHT: 71 IN | SYSTOLIC BLOOD PRESSURE: 155 MMHG | OXYGEN SATURATION: 96 % | DIASTOLIC BLOOD PRESSURE: 74 MMHG

## 2020-08-11 DIAGNOSIS — I48.91 ATRIAL FIBRILLATION, UNSPECIFIED TYPE: ICD-10-CM

## 2020-08-11 DIAGNOSIS — J45.50 SEVERE PERSISTENT ASTHMA WITHOUT COMPLICATION: ICD-10-CM

## 2020-08-11 DIAGNOSIS — I10 HYPERTENSION, UNSPECIFIED TYPE: ICD-10-CM

## 2020-08-11 DIAGNOSIS — Z86.73 HISTORY OF TIA (TRANSIENT ISCHEMIC ATTACK): Primary | ICD-10-CM

## 2020-08-11 DIAGNOSIS — E78.2 MIXED HYPERLIPIDEMIA: ICD-10-CM

## 2020-08-11 DIAGNOSIS — G47.10 EXCESSIVE SLEEPINESS: Primary | ICD-10-CM

## 2020-08-11 DIAGNOSIS — I48.0 PAROXYSMAL ATRIAL FIBRILLATION: ICD-10-CM

## 2020-08-11 DIAGNOSIS — J45.51 SEVERE PERSISTENT ASTHMA WITH ACUTE EXACERBATION: ICD-10-CM

## 2020-08-11 PROCEDURE — 99214 OFFICE O/P EST MOD 30 MIN: CPT | Mod: S$GLB,,, | Performed by: INTERNAL MEDICINE

## 2020-08-11 PROCEDURE — 3079F DIAST BP 80-89 MM HG: CPT | Mod: CPTII,S$GLB,, | Performed by: INTERNAL MEDICINE

## 2020-08-11 PROCEDURE — 1101F PR PT FALLS ASSESS DOC 0-1 FALLS W/OUT INJ PAST YR: ICD-10-PCS | Mod: CPTII,S$GLB,, | Performed by: INTERNAL MEDICINE

## 2020-08-11 PROCEDURE — 3288F FALL RISK ASSESSMENT DOCD: CPT | Mod: CPTII,S$GLB,, | Performed by: INTERNAL MEDICINE

## 2020-08-11 PROCEDURE — 99999 PR PBB SHADOW E&M-EST. PATIENT-LVL IV: ICD-10-PCS | Mod: PBBFAC,,, | Performed by: INTERNAL MEDICINE

## 2020-08-11 PROCEDURE — 1159F PR MEDICATION LIST DOCUMENTED IN MEDICAL RECORD: ICD-10-PCS | Mod: S$GLB,,, | Performed by: INTERNAL MEDICINE

## 2020-08-11 PROCEDURE — 1100F PTFALLS ASSESS-DOCD GE2>/YR: CPT | Mod: CPTII,S$GLB,, | Performed by: INTERNAL MEDICINE

## 2020-08-11 PROCEDURE — 99999 PR PBB SHADOW E&M-EST. PATIENT-LVL IV: CPT | Mod: PBBFAC,,, | Performed by: INTERNAL MEDICINE

## 2020-08-11 PROCEDURE — 1126F PR PAIN SEVERITY QUANTIFIED, NO PAIN PRESENT: ICD-10-PCS | Mod: S$GLB,,, | Performed by: INTERNAL MEDICINE

## 2020-08-11 PROCEDURE — 1126F AMNT PAIN NOTED NONE PRSNT: CPT | Mod: S$GLB,,, | Performed by: INTERNAL MEDICINE

## 2020-08-11 PROCEDURE — 3078F DIAST BP <80 MM HG: CPT | Mod: CPTII,S$GLB,, | Performed by: INTERNAL MEDICINE

## 2020-08-11 PROCEDURE — 3288F PR FALLS RISK ASSESSMENT DOCUMENTED: ICD-10-PCS | Mod: CPTII,S$GLB,, | Performed by: INTERNAL MEDICINE

## 2020-08-11 PROCEDURE — 3074F PR MOST RECENT SYSTOLIC BLOOD PRESSURE < 130 MM HG: ICD-10-PCS | Mod: CPTII,S$GLB,, | Performed by: INTERNAL MEDICINE

## 2020-08-11 PROCEDURE — 3078F PR MOST RECENT DIASTOLIC BLOOD PRESSURE < 80 MM HG: ICD-10-PCS | Mod: CPTII,S$GLB,, | Performed by: INTERNAL MEDICINE

## 2020-08-11 PROCEDURE — 3075F PR MOST RECENT SYSTOLIC BLOOD PRESS GE 130-139MM HG: ICD-10-PCS | Mod: CPTII,S$GLB,, | Performed by: INTERNAL MEDICINE

## 2020-08-11 PROCEDURE — 3079F PR MOST RECENT DIASTOLIC BLOOD PRESSURE 80-89 MM HG: ICD-10-PCS | Mod: CPTII,S$GLB,, | Performed by: INTERNAL MEDICINE

## 2020-08-11 PROCEDURE — 1100F PR PT FALLS ASSESS DOC 2+ FALLS/FALL W/INJURY/YR: ICD-10-PCS | Mod: CPTII,S$GLB,, | Performed by: INTERNAL MEDICINE

## 2020-08-11 PROCEDURE — 99499 RISK ADDL DX/OHS AUDIT: ICD-10-PCS | Mod: S$GLB,,, | Performed by: INTERNAL MEDICINE

## 2020-08-11 PROCEDURE — 1101F PT FALLS ASSESS-DOCD LE1/YR: CPT | Mod: CPTII,S$GLB,, | Performed by: INTERNAL MEDICINE

## 2020-08-11 PROCEDURE — 1159F MED LIST DOCD IN RCRD: CPT | Mod: S$GLB,,, | Performed by: INTERNAL MEDICINE

## 2020-08-11 PROCEDURE — 99214 PR OFFICE/OUTPT VISIT, EST, LEVL IV, 30-39 MIN: ICD-10-PCS | Mod: S$GLB,,, | Performed by: INTERNAL MEDICINE

## 2020-08-11 PROCEDURE — 99499 UNLISTED E&M SERVICE: CPT | Mod: S$GLB,,, | Performed by: INTERNAL MEDICINE

## 2020-08-11 PROCEDURE — 3075F SYST BP GE 130 - 139MM HG: CPT | Mod: CPTII,S$GLB,, | Performed by: INTERNAL MEDICINE

## 2020-08-11 PROCEDURE — 3074F SYST BP LT 130 MM HG: CPT | Mod: CPTII,S$GLB,, | Performed by: INTERNAL MEDICINE

## 2020-08-11 RX ORDER — ATENOLOL 25 MG/1
TABLET ORAL
Qty: 45 TABLET | Refills: 3 | Status: SHIPPED | OUTPATIENT
Start: 2020-08-11 | End: 2021-09-27

## 2020-08-11 RX ORDER — PREDNISONE 20 MG/1
TABLET ORAL
Qty: 21 TABLET | Refills: 1 | Status: SHIPPED | OUTPATIENT
Start: 2020-08-11 | End: 2021-02-23

## 2020-08-11 NOTE — PROGRESS NOTES
Subjective:    Patient ID:  Maycol Hodge is a 80 y.o. male who presents for follow-up of Follow-up (follow up)      HPI 80 yo WM who I had seen in 2017 for HTN who at that time was asymptomatic and not interested in making any med changes. Had EKG on 12- showing AF.He feels he has had intermittent symptoms since August.Had stress echo in November and was in sinus and test negative for ischemia. Holter on 1- showed sinus rhythm. Was seen in February and again was in sinus. Continues in sinus. Does have fatigue otherwise asymptomatic.    Review of Systems   Constitution: Positive for malaise/fatigue. Negative for decreased appetite, fever, weight gain and weight loss.   HENT: Negative for hearing loss and nosebleeds.    Eyes: Negative for visual disturbance.   Cardiovascular: Negative for chest pain, claudication, cyanosis, dyspnea on exertion, irregular heartbeat, leg swelling, near-syncope, orthopnea, palpitations, paroxysmal nocturnal dyspnea and syncope.   Respiratory: Negative for cough, hemoptysis, shortness of breath, sleep disturbances due to breathing, snoring and wheezing.    Endocrine: Negative for cold intolerance, heat intolerance, polydipsia and polyuria.   Hematologic/Lymphatic: Negative for adenopathy and bleeding problem. Does not bruise/bleed easily.   Skin: Negative for color change, itching, poor wound healing, rash and suspicious lesions.   Musculoskeletal: Negative for arthritis, back pain, falls, joint pain, joint swelling, muscle cramps, muscle weakness and myalgias.   Gastrointestinal: Negative for bloating, abdominal pain, change in bowel habit, constipation, flatus, heartburn, hematemesis, hematochezia, hemorrhoids, jaundice, melena, nausea and vomiting.   Genitourinary: Negative for bladder incontinence, decreased libido, frequency, hematuria, hesitancy and urgency.   Neurological: Negative for brief paralysis, difficulty with concentration, excessive daytime sleepiness,  "dizziness, focal weakness, headaches, light-headedness, loss of balance, numbness, vertigo and weakness.   Psychiatric/Behavioral: Negative for altered mental status, depression and memory loss. The patient does not have insomnia and is not nervous/anxious.    Allergic/Immunologic: Negative for environmental allergies, hives and persistent infections.        Objective:    Physical Exam   Constitutional: He is oriented to person, place, and time. He appears well-developed and well-nourished. No distress.   /80   Pulse (!) 50   Ht 5' 11" (1.803 m)   Wt 80.8 kg (178 lb 2.1 oz)   BMI 24.84 kg/m²      HENT:   Head: Normocephalic and atraumatic.   Eyes: Pupils are equal, round, and reactive to light. Conjunctivae and lids are normal. Right eye exhibits no discharge. No scleral icterus.   Neck: Normal range of motion. Neck supple. No JVD present. No tracheal deviation present. No thyromegaly present.   Cardiovascular: Normal rate, regular rhythm, S1 normal, S2 normal, normal heart sounds and intact distal pulses. Exam reveals no gallop and no friction rub.   No murmur heard.  Pulses:       Carotid pulses are 2+ on the right side and 2+ on the left side.       Radial pulses are 2+ on the right side and 2+ on the left side.        Femoral pulses are 2+ on the right side and 2+ on the left side.       Popliteal pulses are 2+ on the right side and 2+ on the left side.        Dorsalis pedis pulses are 2+ on the right side and 2+ on the left side.        Posterior tibial pulses are 2+ on the right side and 2+ on the left side.   Pulmonary/Chest: Effort normal and breath sounds normal. No respiratory distress. He has no wheezes. He has no rales. He exhibits no tenderness.   Abdominal: Soft. Bowel sounds are normal. He exhibits no distension and no mass. There is no hepatosplenomegaly or hepatomegaly. There is no abdominal tenderness. There is no rebound and no guarding.   Musculoskeletal: Normal range of motion.         " General: No tenderness or edema.   Lymphadenopathy:     He has no cervical adenopathy.   Neurological: He is alert and oriented to person, place, and time. He has normal reflexes. No cranial nerve deficit. Coordination normal.   Skin: Skin is warm and dry. No rash noted. He is not diaphoretic. No erythema. No pallor.   Psychiatric: He has a normal mood and affect. His speech is normal and behavior is normal. Judgment and thought content normal. Cognition and memory are normal.         Assessment:       1. History of TIA (transient ischemic attack)    2. Mixed hyperlipidemia    3. Hypertension, unspecified type    4. Paroxysmal atrial fibrillation    5. Atrial fibrillation, unspecified type         Plan:    Remains in sinus.  bleeding resolved   The current medical regimen is effective;  continue present plan and medications.     No orders of the defined types were placed in this encounter.    Follow up in about 6 months (around 2/11/2021).

## 2020-08-11 NOTE — PATIENT INSTRUCTIONS
Check lung capacity     Check sleep study - best prior to prednisone    May use prednisone - start 20/d dropping to 10 mg daily once better.    If you are found to have sleep apnea- less than 5 episodes an hour would be normal- would try cpap therapy.

## 2020-08-11 NOTE — PROGRESS NOTES
2020    Maycol Hodge  Office Note    Chief Complaint   Patient presents with    Follow-up    Asthma     HPI:     2020 had to use prednisone since March, having severe fatigue - reports sleeps well, sleep non restorative, snores with no sleep study in past.  Romo to mail box - wife sleeps separate room. No snore arousal.    Uses symbicort regular.  Uses albuterol rescue minimally less than once a wk.  Pt starts prednisone when cough becomes excessive.  Eats well, no wt loss. Sleeps 7 hrs/d.  Goes to [place] for hr 3/wk.    Pt having vivid dreams.     2020 49 yo son  suddenly  in [place]- son avid - death.  Toxicology pending.  Had 2 spells ppt prednisone use since last visit with good results.  Took erythro also.  Having some grief- eats and sleeps ok . Wife in good shape. Uses symbicort regular.  Patient Instructions   Asthma not too bad but you needed prednisone twice since March.  Special shots may help-  Could ask to get fasenra covered.  May consider starting if pattern worsens.    symbicort needed regular especially in winter/spring.+      3/2/2020- having exhaustion after 10-15 min doing yd wk.  Saw cardiology- had a fib at colonoscopy in dec but sinus mile since. - eliquis started.  Pt had syncope 10 days - saw Dr Vogel in f/u.  Last wk had some noct wheezes.   Uses symbicort 2 bid and singulair.  No no prednisone use last yr.  Sleeps ok, nerves ok - denies depression.  Had hives last July.  Stress echo in 2019 was good save high bp?    Patient Instructions   Fatigue and wheezes are noted.  Breathing not typical cause for loss of consciousness.  Heart rate is low - may contribute to fatigue but not that bad?    Chronic sinuses seem ok.  Would recommend prednisone 20 mg daily for 3 days now,  May repeat but would like to see better function and activity.    Call if not perking up.   oxygen level walking in office went from 97 to 93 % - not bad.  bp standing didn't  fall today.    Blood wk last month all looked good.    Feb 1, 2019- Onset one week, sore throat, congestion, cough (fits, severe, productive light yellow in color), took azithromycin with minimal benefit. Current Prednisone use.  January 24, 2019- Feeling well, no exacerbations requiring antibiotic and steroid therapy. No cough, no nocturnal arousals, no SOB.   Sept 27, 2018- had exacerbations in July and August needing azithro and prednisone- had cough clear mucous with sl yellow, no breathing issues.  Unusual to have exacerbation summer.  Jan 5, 2018-- took azithro and pred x 3 - good results.  Doing well  Jan 19,2017 hpi, had acute onset 5 days ago with sinus runny, cough, wheezes, sob, no fever, no n/v, pos headache.  Muscle aches and joint aches.  Took prednisone, took inhaler, neb rx was old. Run down, poor appetite,   Took prednisone and azithro x3, still cough but better,  Down but better.  Mucous clear.   Dec 16, 2016HPI:pt with asthma with no usual noct arousals or rescue use.  Exacerbates 3x yr in spring mainly. Progresses for a week on rescue and then uses pred and doxy (recent doxy reaction) to clear.  I cared for years ago.  No recent pft.  Pt feels breathing better than  Usual yrs past.      The chief compliant  problem varies with instablilty at time    PFSH:  Past Medical History:   Diagnosis Date    A-fib     Asthma     Hematuria     Hypertension          Past Surgical History:   Procedure Laterality Date    COLONOSCOPY      COLONOSCOPY N/A 1/21/2020    Procedure: COLONOSCOPY;  Surgeon: Willis Mullen MD;  Location: [facility];  Service: Endoscopy;  Laterality: N/A;    CYSTOSCOPY      negative    EYE SURGERY      bilateral cataracts    HERNIA REPAIR      righht inguinal    JOINT REPLACEMENT Left     shoulder    left cataract surgery      PROSTATE SURGERY      TURP     Social History     Tobacco Use    Smoking status: Former Smoker     Types: Cigarettes     Quit date: 1955     Years  since quittin.6    Smokeless tobacco: Never Used    Tobacco comment: Quit around    Substance Use Topics    Alcohol use: Yes     Alcohol/week: 14.0 standard drinks     Types: 7 Glasses of wine, 7 Shots of liquor per week    Drug use: No     Family History   Problem Relation Age of Onset    Stroke Father     Cancer Neg Hx     Diabetes Neg Hx     Heart disease Neg Hx     Hypertension Neg Hx      Review of patient's allergies indicates:   Allergen Reactions    Doxycycline Rash     Causes rash, hives and itching    Diltiazem      pruritis    Hydrochlorothiazide      Causes elevate uric acid, gout      Levaquin [levofloxacin] Swelling    Norvasc [amlodipine] Swelling       Performance Status:The patient's activity level is functions out of house.      Review of Systems:    Constitutional: Negative for activity change, appetite change, chills, diaphoresis, fatigue, fever and unexpected weight change.   HENT: Negative for dental problem, postnasal drip, trouble swallowing and voice change. Positive for  rhinorrhea, sinus pressure, sinus pain, sneezing, sore throat,   Respiratory: Negative for apnea,  shortness of breath, wheezing and stridor.  Positive for cough, chest tightness,  Cardiovascular: Negative for chest pain, palpitations and leg swelling.   Gastrointestinal: Negative for abdominal distention, abdominal pain, constipation and nausea.   Musculoskeletal: Negative for gait problem, myalgias and neck pain.   Skin: Negative for color change and pallor.   Allergic/Immunologic: Negative for environmental allergies and food allergies.   Neurological: Negative for dizziness, speech difficulty, weakness, light-headedness, numbness and headaches.   Hematological: Negative for adenopathy. Does not bruise/bleed easily.   Psychiatric/Behavioral: Negative for dysphoric mood and sleep disturbance. The patient is not nervous/anxious.     Exam:Comprehensive exam done.   BP (!) 155/74 (BP Location: Right  "arm, Patient Position: Sitting)   Pulse (!) 58   Ht 5' 11" (1.803 m)   Wt 80.2 kg (176 lb 12.9 oz)   SpO2 96% Comment: on room air at rest  BMI 24.66 kg/m²   Exam included Vitals as listed, and patient's appearance and affect and alertness and mood, oral exam for yeast and hygiene and pharynx lesions and Mallapatti (M) score, neck with inspection for jvd and masses and thyroid abnormalities and lymph nodes (supraclavicular and infraclavicular nodes and axillary also examined and noted if abn), chest exam included symmetry and effort and fremitus and percussion and auscultation, cardiac exam included rhythm and gallops and murmur and rubs and jvd and edema, abdominal exam for mass and hepatosplenomegaly and tenderness and hernias and bowel sounds, Musculoskeletal exam with muscle tone and posture and mobility/gait and  strength, and skin for rashes and cyanosis and pallor and turgor, extremity for clubbing.  Findings were normal except for pertinent findings listed below:  M3,  chest is symmetric, no distress, normal percussion, normal fremitus, and no murmur, no edema, lung sounds now clear.   Rest good.      Radiographs (ct chest and cxr) reviewed from 10/16/2017 normal  cxr 5/20/2020 nad    Labs Reviewed  Lab Results   Component Value Date    WBC 9.94 07/24/2020    HGB 14.0 07/24/2020    HCT 43.5 07/24/2020    MCV 96 07/24/2020     07/24/2020   · holter 1/27/2020- The diary was properly completed. The tape was adequate (0 days , 48 hours, 0 minutes).  · Predominant Rhythm Sinus rhythm with heart rates varying between 43 and 113 bpm with an average of 56 bpm.  · Ventricular Arrhythmias There were very rare PVCs totalling 58 and averaging 1.21 per hour.  · Supraventricular Arrhythmias There were occasional PACs totalling 638 and averaging 13.29 per hour.  · Occassional atrial pair and short run of atrial tachycardia,the longest 7 beats with no associated symptoms..  There was an episode of SOB " reported. The corresponding rhythm strips revealed the following: During the event ([employer]), the rhythm was sinus tachycardia at 113 bpm with without ectopy.     Results for ARNULFO SALEH (MRN [id number]) as of 9/21/2018 11:38   Ref. Range 9/21/2018 08:59   Eosinophil% Latest Ref Range: 0.0 - 8.0 % 9.2 (H)   Eos # Latest Ref Range: 0.0 - 0.5 K/uL 0.6 (H)     PFT  March 31, 2017 fev 57%,     11/22/19-Conclusion     · Concentric left ventricular remodeling.  · Normal left ventricular systolic function. The estimated ejection fraction is 60%  · Normal LV diastolic function.  · No wall motion abnormalities.  · Normal right ventricular systolic function.  · The stress echo portion of this study is negative for myocardial ischemia.  · The patient's exercise capacity was above average. functional capacity of 10 METS  · The patient reached the end of the protocol.  · There were no arrhythmias during stress.  · The EKG portion of this study is negative for myocardial ischemia.  · Exercise portion of stress test stopped due to an increase in systolic and diastolic blood pressure above protocol.  · Hypertensive response changed from stress echo in 3/2018.       Plan:  Clinical impression is apparently straight forward and impression with management as below.    Maycol was seen today for follow-up and asthma.    Diagnoses and all orders for this visit:    Excessive sleepiness  -     Home Sleep Studies; Future    Severe persistent asthma without complication  -     Complete PFT with bronchodilator; Future    Severe persistent asthma with acute exacerbation  -     predniSONE (DELTASONE) 20 MG tablet; One daily for 3 days and repeat for flare of lung symptoms as intructed          Maycol was seen today for follow-up and asthma.    Diagnoses and all orders for this visit:    Excessive sleepiness  -     Home Sleep Studies; Future    Severe persistent asthma without complication  -     Complete PFT with bronchodilator;  Future    Severe persistent asthma with acute exacerbation  -     predniSONE (DELTASONE) 20 MG tablet; One daily for 3 days and repeat for flare of lung symptoms as intructed        Follow up in about 4 weeks (around 9/8/2020), or if symptoms worsen or fail to improve.    Discussed with patient above for education the following:      Patient Instructions   Check lung capacity     Check sleep study - best prior to prednisone    May use prednisone - start 20/d dropping to 10 mg daily once better.    If you are found to have sleep apnea- less than 5 episodes an hour would be normal- would try cpap therapy.

## 2020-08-17 ENCOUNTER — HOSPITAL ENCOUNTER (OUTPATIENT)
Dept: PULMONOLOGY | Facility: HOSPITAL | Age: 80
Discharge: HOME | End: 2020-08-17
Attending: INTERNAL MEDICINE
Payer: MEDICARE

## 2020-08-17 ENCOUNTER — OFFICE VISIT (OUTPATIENT)
Dept: FAMILY MEDICINE | Facility: CLINIC | Age: 80
End: 2020-08-17
Payer: MEDICARE

## 2020-08-17 VITALS
WEIGHT: 177.69 LBS | HEART RATE: 58 BPM | BODY MASS INDEX: 24.88 KG/M2 | DIASTOLIC BLOOD PRESSURE: 70 MMHG | SYSTOLIC BLOOD PRESSURE: 110 MMHG | OXYGEN SATURATION: 95 % | RESPIRATION RATE: 14 BRPM | HEIGHT: 71 IN | TEMPERATURE: 97 F

## 2020-08-17 DIAGNOSIS — R05.9 COUGH: ICD-10-CM

## 2020-08-17 DIAGNOSIS — E06.3 HYPOTHYROIDISM DUE TO HASHIMOTO'S THYROIDITIS: ICD-10-CM

## 2020-08-17 DIAGNOSIS — I48.0 PAROXYSMAL ATRIAL FIBRILLATION: ICD-10-CM

## 2020-08-17 DIAGNOSIS — Z12.5 PROSTATE CANCER SCREENING: ICD-10-CM

## 2020-08-17 DIAGNOSIS — J45.50 SEVERE PERSISTENT ASTHMA WITHOUT COMPLICATION: Primary | ICD-10-CM

## 2020-08-17 DIAGNOSIS — I10 HYPERTENSION, UNSPECIFIED TYPE: ICD-10-CM

## 2020-08-17 DIAGNOSIS — E78.2 MIXED HYPERLIPIDEMIA: ICD-10-CM

## 2020-08-17 DIAGNOSIS — J45.50 SEVERE PERSISTENT ASTHMA WITHOUT COMPLICATION: ICD-10-CM

## 2020-08-17 DIAGNOSIS — E03.8 HYPOTHYROIDISM DUE TO HASHIMOTO'S THYROIDITIS: ICD-10-CM

## 2020-08-17 PROCEDURE — 94060 PR EVAL OF BRONCHOSPASM: ICD-10-PCS | Mod: 26,,, | Performed by: INTERNAL MEDICINE

## 2020-08-17 PROCEDURE — 94727 PR PULM FUNCTION TEST BY GAS: ICD-10-PCS | Mod: 26,,, | Performed by: INTERNAL MEDICINE

## 2020-08-17 PROCEDURE — 94060 EVALUATION OF WHEEZING: CPT

## 2020-08-17 PROCEDURE — 99999 PR PBB SHADOW E&M-EST. PATIENT-LVL III: ICD-10-PCS | Mod: PBBFAC,,, | Performed by: FAMILY MEDICINE

## 2020-08-17 PROCEDURE — 94060 EVALUATION OF WHEEZING: CPT | Mod: 26,,, | Performed by: INTERNAL MEDICINE

## 2020-08-17 PROCEDURE — 94729 DIFFUSING CAPACITY: CPT

## 2020-08-17 PROCEDURE — 94729 DIFFUSING CAPACITY: CPT | Mod: 26,,, | Performed by: INTERNAL MEDICINE

## 2020-08-17 PROCEDURE — 99214 OFFICE O/P EST MOD 30 MIN: CPT | Mod: S$GLB,,, | Performed by: FAMILY MEDICINE

## 2020-08-17 PROCEDURE — 1126F AMNT PAIN NOTED NONE PRSNT: CPT | Mod: S$GLB,,, | Performed by: FAMILY MEDICINE

## 2020-08-17 PROCEDURE — 94729 PR C02/MEMBANE DIFFUSE CAPACITY: ICD-10-PCS | Mod: 26,,, | Performed by: INTERNAL MEDICINE

## 2020-08-17 PROCEDURE — 99999 PR PBB SHADOW E&M-EST. PATIENT-LVL III: CPT | Mod: PBBFAC,,, | Performed by: FAMILY MEDICINE

## 2020-08-17 PROCEDURE — 3078F PR MOST RECENT DIASTOLIC BLOOD PRESSURE < 80 MM HG: ICD-10-PCS | Mod: CPTII,S$GLB,, | Performed by: FAMILY MEDICINE

## 2020-08-17 PROCEDURE — 1126F PR PAIN SEVERITY QUANTIFIED, NO PAIN PRESENT: ICD-10-PCS | Mod: S$GLB,,, | Performed by: FAMILY MEDICINE

## 2020-08-17 PROCEDURE — 3078F DIAST BP <80 MM HG: CPT | Mod: CPTII,S$GLB,, | Performed by: FAMILY MEDICINE

## 2020-08-17 PROCEDURE — 94727 GAS DIL/WSHOT DETER LNG VOL: CPT | Mod: 26,,, | Performed by: INTERNAL MEDICINE

## 2020-08-17 PROCEDURE — 1101F PT FALLS ASSESS-DOCD LE1/YR: CPT | Mod: CPTII,S$GLB,, | Performed by: FAMILY MEDICINE

## 2020-08-17 PROCEDURE — 1159F PR MEDICATION LIST DOCUMENTED IN MEDICAL RECORD: ICD-10-PCS | Mod: S$GLB,,, | Performed by: FAMILY MEDICINE

## 2020-08-17 PROCEDURE — 3074F PR MOST RECENT SYSTOLIC BLOOD PRESSURE < 130 MM HG: ICD-10-PCS | Mod: CPTII,S$GLB,, | Performed by: FAMILY MEDICINE

## 2020-08-17 PROCEDURE — 3074F SYST BP LT 130 MM HG: CPT | Mod: CPTII,S$GLB,, | Performed by: FAMILY MEDICINE

## 2020-08-17 PROCEDURE — 94727 GAS DIL/WSHOT DETER LNG VOL: CPT

## 2020-08-17 PROCEDURE — 1101F PR PT FALLS ASSESS DOC 0-1 FALLS W/OUT INJ PAST YR: ICD-10-PCS | Mod: CPTII,S$GLB,, | Performed by: FAMILY MEDICINE

## 2020-08-17 PROCEDURE — 1159F MED LIST DOCD IN RCRD: CPT | Mod: S$GLB,,, | Performed by: FAMILY MEDICINE

## 2020-08-17 PROCEDURE — 99214 PR OFFICE/OUTPT VISIT, EST, LEVL IV, 30-39 MIN: ICD-10-PCS | Mod: S$GLB,,, | Performed by: FAMILY MEDICINE

## 2020-08-17 RX ORDER — PROMETHAZINE HYDROCHLORIDE AND CODEINE PHOSPHATE 6.25; 1 MG/5ML; MG/5ML
5 SOLUTION ORAL EVERY 4 HOURS PRN
Qty: 118 ML | Refills: 0 | Status: SHIPPED | OUTPATIENT
Start: 2020-08-17 | End: 2020-08-27

## 2020-08-17 NOTE — PROGRESS NOTES
Subjective:       Patient ID: Maycol Hodge is a 80 y.o. male.    Chief Complaint: Follow-up (6mth f/u hypertension)    HPI  Review of Systems   Constitutional: Positive for fatigue. Negative for unexpected weight change.   Respiratory: Positive for shortness of breath. Negative for chest tightness.    Cardiovascular: Negative for chest pain, palpitations and leg swelling.   Gastrointestinal: Negative for abdominal pain.   Musculoskeletal: Negative for arthralgias.   Neurological: Positive for light-headedness. Negative for dizziness, syncope and headaches.       Patient Active Problem List   Diagnosis    Chronic allergic rhinitis    Hypertension    Severe persistent asthma without complication    BPH (benign prostatic hyperplasia)    History of TIA (transient ischemic attack)    Hyperlipidemia    Cataract    Urticaria    Hypothyroidism    Autoimmune urticaria    Anaphylactic syndrome    Hashimoto's thyroiditis    Exacerbation of chronic bronchiolitis    Change in bowel habits    Acute urticaria    Paroxysmal atrial fibrillation    Hx of colonic polyps    Chronic sinus complaints    Fatigue     Patient is here for a chronic conditions follow up.      Reviewed labs 7/20  -TFTs borderline    Pulm has ordered Bertha.C/o fatigue/tired. Excessive sleeping. Muscles weak. Exercise intolerance.  No CP.  No worsening sob.  Low stamina worsening x 1 year since second faintiong spell. Has had to use steroids 4 times this year. Most recent 7/2020.  PFT s today.      Hsitory:   Allergy Dr. Unger- History: July 16th HA. 19th hives and whelps.  Allergy Dr. Unger who was consulted during hospital stay.  Admitted after syncope in triage at [facility]. Diagnosed with anaphylaxis and shock from unknown etiology. Denies throat or tongue swelling or SOB. No known food allergies.   Had slow pulse and low BP.  Given steroids. Released 22nd.  Had purpuric lesions concerning for possible vasculitis. Complement as normal.   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 Has had erratic and high BP.  C/o fatigue and sleepiness.  BX neg for vasculitis.  On bid antihistamine zyrtec and claritin.  Rash cleared immediately after admission. Has had no further hives x 2 months. On daily antihistamines     Endocrine Dr. Duncan -Has + TPO Ab 9/18,hypothyroid     Pulm Dr. Burch -asthma. Having moire mucous prod and occ wheezing on symbicort 160, singulair. Takes claritin prn and uses flonase daily.    Card Dr. Vogel h/o TIA . PAf on eliquis       Objective:      Physical Exam  Vitals signs and nursing note reviewed.   Constitutional:       Appearance: He is well-developed.   Cardiovascular:      Rate and Rhythm: Normal rate and regular rhythm.      Heart sounds: Normal heart sounds.   Pulmonary:      Effort: Pulmonary effort is normal.      Breath sounds: Normal breath sounds.   Skin:     General: Skin is warm and dry.   Neurological:      Mental Status: He is alert and oriented to person, place, and time.         Assessment:       1. Severe persistent asthma without complication    2. Mixed hyperlipidemia    3. Hypothyroidism due to Hashimoto's thyroiditis    4. Prostate cancer screening    5. Hypertension, unspecified type    6. Paroxysmal atrial fibrillation    7. Cough        Plan:         1. Severe persistent asthma without complication  Cont pulm care and mgmt    2. Mixed hyperlipidemia  Stable condition.  Continue current medications.  Will adjust based on lab findings or if condition changes.    - CBC auto differential; Future  - Comprehensive metabolic panel; Future  - Lipid Panel; Future    3. Hypothyroidism due to Hashimoto's thyroiditis  Screen and treat as indicated:    - TSH; Future  - T4, free; Future    4. Prostate cancer screening  Discussed benefits, risks and limitations of PSA testing and current USPSTF guidelines.  Patient expressed verbal understanding and wished to pursue screening.    - PSA, Screening;  Future    5. Hypertension, unspecified type  Controlled on current medications.  Continue current medications.      6. Paroxysmal atrial fibrillation  Cont current mgmt. Cont card monitoring    7. Cough  Use as needed  - promethazine-codeine 6.25-10 mg/5 ml (PHENERGAN WITH CODEINE) 6.25-10 mg/5 mL syrup; Take 5 mLs by mouth every 4 (four) hours as needed for Cough.  Dispense: 118 mL; Refill: 0        Time spent with patient: 20 minutes    Patient with be reevaluated in 6 months or sooner prn    Greater than 50% of this visit was spent counseling as described in above documentation:Yes

## 2020-08-18 ENCOUNTER — TELEPHONE (OUTPATIENT)
Dept: PULMONOLOGY | Facility: CLINIC | Age: 80
End: 2020-08-18

## 2020-08-18 LAB
BRPFT: ABNORMAL
DLCO ADJ PRE: 15.59 ML/(MIN*MMHG) (ref 18.62–32.48)
DLCO SINGLE BREATH LLN: 18.62
DLCO SINGLE BREATH PRE REF: 61 %
DLCO SINGLE BREATH REF: 25.55
DLCOC SBVA LLN: 2.4
DLCOC SBVA PRE REF: 128.2 %
DLCOC SBVA REF: 3.5
DLCOC SINGLE BREATH LLN: 18.62
DLCOC SINGLE BREATH PRE REF: 61 %
DLCOC SINGLE BREATH REF: 25.55
DLCOVA LLN: 2.4
DLCOVA PRE REF: 128.2 %
DLCOVA PRE: 4.48 ML/(MIN*MMHG*L) (ref 2.4–4.6)
DLCOVA REF: 3.5
DLVAADJ PRE: 4.48 ML/(MIN*MMHG*L) (ref 2.4–4.6)
ERVN2 LLN: -16449.07
ERVN2 PRE REF: 29.6 %
ERVN2 PRE: 0.28 L (ref -16449.07–16450.93)
ERVN2 REF: 0.93
FEF 25 75 CHG: 3.1 %
FEF 25 75 LLN: 0.73
FEF 25 75 POST REF: 28.2 %
FEF 25 75 PRE REF: 27.3 %
FEF 25 75 REF: 1.99
FET100 CHG: 4.8 %
FEV1 CHG: 12.8 %
FEV1 FVC CHG: 3.1 %
FEV1 FVC LLN: 59
FEV1 FVC POST REF: 90.4 %
FEV1 FVC PRE REF: 87.7 %
FEV1 FVC REF: 74
FEV1 LLN: 1.98
FEV1 POST REF: 47.4 %
FEV1 PRE REF: 42 %
FEV1 REF: 2.89
FRCN2 LLN: 2.87
FRCN2 PRE REF: 67.6 %
FRCN2 REF: 3.86
FVC CHG: 9.4 %
FVC LLN: 2.82
FVC POST REF: 51.9 %
FVC PRE REF: 47.4 %
FVC REF: 3.93
IVC PRE: 1.56 L (ref 2.82–5.05)
IVC SINGLE BREATH LLN: 2.82
IVC SINGLE BREATH PRE REF: 39.7 %
IVC SINGLE BREATH REF: 3.93
MVV LLN: 98
MVV PRE REF: 26.1 %
MVV REF: 115
PEF CHG: -28.4 %
PEF LLN: 4.8
PEF POST REF: 49.5 %
PEF PRE REF: 69.2 %
PEF REF: 7.18
POST FEF 25 75: 0.56 L/S (ref 0.73–3.25)
POST FET 100: 6.5 SEC
POST FEV1 FVC: 67.09 % (ref 59.19–89.27)
POST FEV1: 1.37 L (ref 1.98–3.79)
POST FVC: 2.04 L (ref 2.82–5.05)
POST PEF: 3.55 L/S (ref 4.8–9.55)
PRE DLCO: 15.59 ML/(MIN*MMHG) (ref 18.62–32.48)
PRE FEF 25 75: 0.54 L/S (ref 0.73–3.25)
PRE FET 100: 6.2 SEC
PRE FEV1 FVC: 65.08 % (ref 59.19–89.27)
PRE FEV1: 1.21 L (ref 1.98–3.79)
PRE FRC N2: 2.61 L
PRE FVC: 1.86 L (ref 2.82–5.05)
PRE MVV: 30 L/MIN (ref 97.65–132.11)
PRE PEF: 4.97 L/S (ref 4.8–9.55)
RVN2 LLN: 2.26
RVN2 PRE REF: 77.2 %
RVN2 PRE: 2.26 L (ref 2.26–3.61)
RVN2 REF: 2.93
RVN2TLCN2 LLN: 36.96
RVN2TLCN2 PRE REF: 118.1 %
RVN2TLCN2 PRE: 54.25 % (ref 36.96–54.92)
RVN2TLCN2 REF: 45.94
TLCN2 LLN: 6.15
TLCN2 PRE REF: 57.1 %
TLCN2 PRE: 4.17 L (ref 6.15–8.45)
TLCN2 REF: 7.3
VA PRE: 3.48 L (ref 7.15–7.15)
VA SINGLE BREATH LLN: 7.15
VA SINGLE BREATH PRE REF: 48.6 %
VA SINGLE BREATH REF: 7.15
VCMAXN2 LLN: 2.82
VCMAXN2 PRE REF: 48.6 %
VCMAXN2 PRE: 1.91 L (ref 2.82–5.05)
VCMAXN2 REF: 3.93

## 2020-08-18 NOTE — TELEPHONE ENCOUNTER
Pt informed. Has appt on 9/8 @ 2pm ----- Message from Osvaldo Burch MD sent at 8/18/2020  3:09 PM CDT -----  Lungs measured about 57% in 2017-now 42%.  Should respond to asthma therapy.  Will need to review at [email or web address]

## 2020-09-21 ENCOUNTER — TELEPHONE (OUTPATIENT)
Dept: PULMONOLOGY | Facility: CLINIC | Age: 80
End: 2020-09-21

## 2020-09-21 NOTE — TELEPHONE ENCOUNTER
Patient states that he does not think that the symbicort is helping him only has 2 - 3 days left has appt on 9/28/2020 to see Dr. Burch and talk about the medications.      ----- Message from Tye Malik sent at 9/21/2020  1:09 PM CDT -----  Type: Needs Medical Advice  Who Called:  Patient    Pharmacy name and phone #:    [id number] - [place], [place] - [address] AT [facility] [address]  [address]  [place] [address]  Phone: [phone] Fax: [phone]          Best Call Back Number: [phone]  Additional Information: Patient states that he would like a callback regarding questions about his medication:  budesonide-formoterol 160-4.5 mcg (SYMBICORT) 160-4.5 mcg/actuation HFAA

## 2020-09-24 ENCOUNTER — PATIENT OUTREACH (OUTPATIENT)
Dept: ADMINISTRATIVE | Facility: OTHER | Age: 80
End: 2020-09-24

## 2020-09-24 NOTE — PROGRESS NOTES
Chart was reviewed for overdue Proactive [facility] Encounters ([facility]NAASA)  topics  Updates were requested from care everywhere  Health Maintenance has been updated  LIN[place] immunization registry triggered

## 2020-09-28 ENCOUNTER — OFFICE VISIT (OUTPATIENT)
Dept: PULMONOLOGY | Facility: CLINIC | Age: 80
End: 2020-09-28
Payer: MEDICARE

## 2020-09-28 VITALS
WEIGHT: 177.94 LBS | OXYGEN SATURATION: 97 % | HEIGHT: 71 IN | HEART RATE: 62 BPM | DIASTOLIC BLOOD PRESSURE: 68 MMHG | SYSTOLIC BLOOD PRESSURE: 132 MMHG | BODY MASS INDEX: 24.91 KG/M2

## 2020-09-28 DIAGNOSIS — Z79.52 SEVERE PERSISTENT ASTHMA DEPENDENT ON SYSTEMIC STEROIDS WITH ACUTE EXACERBATION: ICD-10-CM

## 2020-09-28 DIAGNOSIS — J82.83 EOSINOPHILIC ASTHMA: Primary | ICD-10-CM

## 2020-09-28 DIAGNOSIS — J45.51 SEVERE PERSISTENT ASTHMA DEPENDENT ON SYSTEMIC STEROIDS WITH ACUTE EXACERBATION: ICD-10-CM

## 2020-09-28 DIAGNOSIS — Z86.19 HISTORY OF SHINGLES: ICD-10-CM

## 2020-09-28 PROCEDURE — 1159F PR MEDICATION LIST DOCUMENTED IN MEDICAL RECORD: ICD-10-PCS | Mod: S$GLB,,, | Performed by: INTERNAL MEDICINE

## 2020-09-28 PROCEDURE — 99499 UNLISTED E&M SERVICE: CPT | Mod: S$GLB,,, | Performed by: INTERNAL MEDICINE

## 2020-09-28 PROCEDURE — 1101F PR PT FALLS ASSESS DOC 0-1 FALLS W/OUT INJ PAST YR: ICD-10-PCS | Mod: CPTII,S$GLB,, | Performed by: INTERNAL MEDICINE

## 2020-09-28 PROCEDURE — 99214 OFFICE O/P EST MOD 30 MIN: CPT | Mod: S$GLB,,, | Performed by: INTERNAL MEDICINE

## 2020-09-28 PROCEDURE — 1159F MED LIST DOCD IN RCRD: CPT | Mod: S$GLB,,, | Performed by: INTERNAL MEDICINE

## 2020-09-28 PROCEDURE — 1126F AMNT PAIN NOTED NONE PRSNT: CPT | Mod: S$GLB,,, | Performed by: INTERNAL MEDICINE

## 2020-09-28 PROCEDURE — 1101F PT FALLS ASSESS-DOCD LE1/YR: CPT | Mod: CPTII,S$GLB,, | Performed by: INTERNAL MEDICINE

## 2020-09-28 PROCEDURE — 99499 RISK ADDL DX/OHS AUDIT: ICD-10-PCS | Mod: S$GLB,,, | Performed by: INTERNAL MEDICINE

## 2020-09-28 PROCEDURE — 3078F DIAST BP <80 MM HG: CPT | Mod: CPTII,S$GLB,, | Performed by: INTERNAL MEDICINE

## 2020-09-28 PROCEDURE — 99999 PR PBB SHADOW E&M-EST. PATIENT-LVL IV: CPT | Mod: PBBFAC,,, | Performed by: INTERNAL MEDICINE

## 2020-09-28 PROCEDURE — 99214 PR OFFICE/OUTPT VISIT, EST, LEVL IV, 30-39 MIN: ICD-10-PCS | Mod: S$GLB,,, | Performed by: INTERNAL MEDICINE

## 2020-09-28 PROCEDURE — 3075F SYST BP GE 130 - 139MM HG: CPT | Mod: CPTII,S$GLB,, | Performed by: INTERNAL MEDICINE

## 2020-09-28 PROCEDURE — 99999 PR PBB SHADOW E&M-EST. PATIENT-LVL IV: ICD-10-PCS | Mod: PBBFAC,,, | Performed by: INTERNAL MEDICINE

## 2020-09-28 PROCEDURE — 3078F PR MOST RECENT DIASTOLIC BLOOD PRESSURE < 80 MM HG: ICD-10-PCS | Mod: CPTII,S$GLB,, | Performed by: INTERNAL MEDICINE

## 2020-09-28 PROCEDURE — 3075F PR MOST RECENT SYSTOLIC BLOOD PRESS GE 130-139MM HG: ICD-10-PCS | Mod: CPTII,S$GLB,, | Performed by: INTERNAL MEDICINE

## 2020-09-28 PROCEDURE — 1126F PR PAIN SEVERITY QUANTIFIED, NO PAIN PRESENT: ICD-10-PCS | Mod: S$GLB,,, | Performed by: INTERNAL MEDICINE

## 2020-09-28 RX ORDER — BENRALIZUMAB 30 MG/ML
30 INJECTION, SOLUTION SUBCUTANEOUS
Qty: 1 SYRINGE | Refills: 8 | Status: SHIPPED | OUTPATIENT
Start: 2020-09-28 | End: 2020-10-08

## 2020-09-28 RX ORDER — AZITHROMYCIN 500 MG/1
TABLET, FILM COATED ORAL
COMMUNITY
Start: 2020-09-16 | End: 2020-09-28 | Stop reason: SDUPTHER

## 2020-09-28 RX ORDER — VALACYCLOVIR HYDROCHLORIDE 1 G/1
1000 TABLET, FILM COATED ORAL 3 TIMES DAILY
Qty: 21 TABLET | Refills: 0 | Status: SHIPPED | OUTPATIENT
Start: 2020-09-28 | End: 2022-07-12

## 2020-09-28 RX ORDER — FLUTICASONE PROPIONATE AND SALMETEROL 500; 50 UG/1; UG/1
1 POWDER RESPIRATORY (INHALATION) 2 TIMES DAILY
Qty: 60 EACH | Refills: 11 | Status: SHIPPED | OUTPATIENT
Start: 2020-09-28 | End: 2021-02-23

## 2020-09-28 RX ORDER — AZITHROMYCIN 500 MG/1
500 TABLET, FILM COATED ORAL DAILY
Qty: 3 TABLET | Refills: 5 | Status: SHIPPED | OUTPATIENT
Start: 2020-09-28 | End: 2021-02-23 | Stop reason: ALTCHOICE

## 2020-09-28 NOTE — PROGRESS NOTES
2020    Maycol Hodge  Office Note    Chief Complaint   Patient presents with    Follow-up     4 week    Shortness of Breath    Asthma    Cough    Sputum Production     light yellow     HPI:   2020 took another course prednisone, has been on prednisone  Monthly since march.  Finished last 2 wks ago, feels will need [place] course soon. Uses symbicort regular.  Had sleep study few wks ago.fev 42 from 57% on 2017,      Sleep study 2020 with ahi 2/hr- within normal range.     2020 had to use prednisone since March, having severe fatigue - reports sleeps well, sleep non restorative, snores with no sleep study in past.  Romo to mail box - wife sleeps separate room. No snore arousal.    Uses symbicort regular.  Uses albuterol rescue minimally less than once a wk.  Pt starts prednisone when cough becomes excessive.  Eats well, no wt loss. Sleeps 7 hrs/d.  Goes to [employer] for hr 3/wk.  Pt having vivid dreams.   Patient Instructions   Check lung capacity   Check sleep study - best prior to prednisone  May use prednisone - start 20/d dropping to 10 mg daily once better.  If you are found to have sleep apnea- less than 5 episodes an hour would be normal- would try cpap therapy.  2020 51 yo son  suddenly  in [place]- son avid - death.  Toxicology pending.  Had 2 spells ppt prednisone use since last visit with good results.  Took erythro also.  Having some grief- eats and sleeps ok . Wife in good shape. Uses symbicort regular.  Patient Instructions   Asthma not too bad but you needed prednisone twice since March.  Special shots may help-  Could ask to get fasenra covered.  May consider starting if pattern worsens.    symbicort needed regular especially in winter/spring.+      3/2/2020- having exhaustion after 10-15 min doing yd wk.  Saw cardiology- had a fib at colonoscopy in dec but sinus mile since. - eliquis started.  Pt had syncope 10 days - saw Dr Vogel in f/u.  Last wk  had some noct wheezes.   Uses symbicort 2 bid and singulair.  No no prednisone use last yr.  Sleeps ok, nerves ok - denies depression.  Had hives last July.  Stress echo in 11/22/2019 was good save high bp?    Patient Instructions   Fatigue and wheezes are noted.  Breathing not typical cause for loss of consciousness.  Heart rate is low - may contribute to fatigue but not that bad?    Chronic sinuses seem ok.  Would recommend prednisone 20 mg daily for 3 days now,  May repeat but would like to see better function and activity.    Call if not perking up.   oxygen level walking in office went from 97 to 93 % - not bad.  bp standing didn't fall today.    Blood wk last month all looked good.    Feb 1, 2019- Onset one week, sore throat, congestion, cough (fits, severe, productive light yellow in color), took azithromycin with minimal benefit. Current Prednisone use.  January 24, 2019- Feeling well, no exacerbations requiring antibiotic and steroid therapy. No cough, no nocturnal arousals, no SOB.   Sept 27, 2018- had exacerbations in July and August needing azithro and prednisone- had cough clear mucous with sl yellow, no breathing issues.  Unusual to have exacerbation summer.  Jan 5, 2018-- took azithro and pred x 3 - good results.  Doing well  Jan 19,2017 hpi, had acute onset 5 days ago with sinus runny, cough, wheezes, sob, no fever, no n/v, pos headache.  Muscle aches and joint aches.  Took prednisone, took inhaler, neb rx was old. Run down, poor appetite,   Took prednisone and azithro x3, still cough but better,  Down but better.  Mucous clear.   Dec 16, 2016HPI:pt with asthma with no usual noct arousals or rescue use.  Exacerbates 3x yr in spring mainly. Progresses for a week on rescue and then uses pred and doxy (recent doxy reaction) to clear.  I cared for years ago.  No recent pft.  Pt feels breathing better than  Usual yrs past.      The chief compliant  problem varies with instablilty at time    PFSH:  Past  Medical History:   Diagnosis Date    A-fib     Asthma     Hematuria     Hypertension          Past Surgical History:   Procedure Laterality Date    COLONOSCOPY      COLONOSCOPY N/A 2020    Procedure: COLONOSCOPY;  Surgeon: Willis Mullen MD;  Location: [facility];  Service: Endoscopy;  Laterality: N/A;    CYSTOSCOPY      negative    EYE SURGERY      bilateral cataracts    HERNIA REPAIR      righht inguinal    JOINT REPLACEMENT Left     shoulder    left cataract surgery      PROSTATE SURGERY      TURP     Social History     Tobacco Use    Smoking status: Former Smoker     Types: Cigarettes     Quit date:      Years since quittin.7    Smokeless tobacco: Never Used    Tobacco comment: Quit around    Substance Use Topics    Alcohol use: Yes     Alcohol/week: 14.0 standard drinks     Types: 7 Glasses of wine, 7 Shots of liquor per week    Drug use: No     Family History   Problem Relation Age of Onset    Stroke Father     Cancer Neg Hx     Diabetes Neg Hx     Heart disease Neg Hx     Hypertension Neg Hx      Review of patient's allergies indicates:   Allergen Reactions    Doxycycline Rash     Causes rash, hives and itching    Diltiazem      pruritis    Hydrochlorothiazide      Causes elevate uric acid, gout      Levaquin [levofloxacin] Swelling    Norvasc [amlodipine] Swelling       Performance Status:The patient's activity level is functions out of house.      Review of Systems:    Constitutional: Negative for activity change, appetite change, chills, diaphoresis, fatigue, fever and unexpected weight change.   HENT: Negative for dental problem, postnasal drip, trouble swallowing and voice change. Positive for  rhinorrhea, sinus pressure, sinus pain, sneezing, sore throat,   Respiratory: Negative for apnea,  shortness of breath, wheezing and stridor.  Positive for cough, chest tightness,  Cardiovascular: Negative for chest pain, palpitations and leg swelling.  "  Gastrointestinal: Negative for abdominal distention, abdominal pain, constipation and nausea.   Musculoskeletal: Negative for gait problem, myalgias and neck pain.   Skin: Negative for color change and pallor.   Allergic/Immunologic: Negative for environmental allergies and food allergies.   Neurological: Negative for dizziness, speech difficulty, weakness, light-headedness, numbness and headaches.   Hematological: Negative for adenopathy. Does not bruise/bleed easily.   Psychiatric/Behavioral: Negative for dysphoric mood and sleep disturbance. The patient is not nervous/anxious.     Exam:Comprehensive exam done.   /68 (BP Location: Right arm, Patient Position: Sitting)   Pulse 62   Ht 5' 11" (1.803 m)   Wt 80.7 kg (177 lb 14.6 oz)   SpO2 97% Comment: on room air at rest  BMI 24.81 kg/m²   Exam included Vitals as listed, and patient's appearance and affect and alertness and mood, oral exam for yeast and hygiene and pharynx lesions and Mallapatti (M) score, neck with inspection for jvd and masses and thyroid abnormalities and lymph nodes (supraclavicular and infraclavicular nodes and axillary also examined and noted if abn), chest exam included symmetry and effort and fremitus and percussion and auscultation, cardiac exam included rhythm and gallops and murmur and rubs and jvd and edema, abdominal exam for mass and hepatosplenomegaly and tenderness and hernias and bowel sounds, Musculoskeletal exam with muscle tone and posture and mobility/gait and  strength, and skin for rashes and cyanosis and pallor and turgor, extremity for clubbing.  Findings were normal except for pertinent findings listed below:  M3,  chest is symmetric, no distress, normal percussion, normal fremitus, and no murmur, no edema, lung sounds scattered wheezes new 9/28.   Rest good.      Radiographs (ct chest and cxr) reviewed from 10/16/2017 normal  cxr 5/20/2020 nad    Labs Reviewed  Lab Results   Component Value Date    WBC " 9.94 07/24/2020    HGB 14.0 07/24/2020    HCT 43.5 07/24/2020    MCV 96 07/24/2020     07/24/2020   · holter 1/27/2020- The diary was properly completed. The tape was adequate (0 days , 48 hours, 0 minutes).  · Predominant Rhythm Sinus rhythm with heart rates varying between 43 and 113 bpm with an average of 56 bpm.  · Ventricular Arrhythmias There were very rare PVCs totalling 58 and averaging 1.21 per hour.  · Supraventricular Arrhythmias There were occasional PACs totalling 638 and averaging 13.29 per hour.  · Occassional atrial pair and short run of atrial tachycardia,the longest 7 beats with no associated symptoms..  There was an episode of SOB reported. The corresponding rhythm strips revealed the following: During the event ([employer]), the rhythm was sinus tachycardia at 113 bpm with without ectopy.     Results for ARNULFO SALEH (MRN [id number]) as of 9/21/2018 11:38   Ref. Range 9/21/2018 08:59   Eosinophil% Latest Ref Range: 0.0 - 8.0 % 9.2 (H)   Eos # Latest Ref Range: 0.0 - 0.5 K/uL 0.6 (H)     PFT  March 31, 2017 fev 57%,     11/22/19-Conclusion     · Concentric left ventricular remodeling.  · Normal left ventricular systolic function. The estimated ejection fraction is 60%  · Normal LV diastolic function.  · No wall motion abnormalities.  · Normal right ventricular systolic function.  · The stress echo portion of this study is negative for myocardial ischemia.  · The patient's exercise capacity was above average. functional capacity of 10 METS  · The patient reached the end of the protocol.  · There were no arrhythmias during stress.  · The EKG portion of this study is negative for myocardial ischemia.  · Exercise portion of stress test stopped due to an increase in systolic and diastolic blood pressure above protocol.  · Hypertensive response changed from stress echo in 3/2018.       Plan:  Clinical impression is apparently straight forward and impression with management as below.    Maycol madison  seen today for follow-up, shortness of breath, asthma, cough and sputum production.    Diagnoses and all orders for this visit:    Eosinophilic asthma  -     benralizumab (FASENRA) 30 mg/mL Syrg injection; Inject 1 mL (30 mg total) into the skin every 28 days. Every 4 weeks for 3 doses followed by every 8 weeks thereafter  -     valACYclovir (VALTREX) 1000 MG tablet; Take 1 tablet (1,000 mg total) by mouth 3 (three) times daily. Take for shingles. for 7 days  -     azithromycin (ZITHROMAX) 500 MG tablet; Take 1 tablet (500 mg total) by mouth once daily.  -     fluticasone-salmeterol diskus inhaler 500-50 mcg; Inhale 1 puff into the lungs 2 (two) times daily. Controller-- stop symbicort if uses this medicaiton    Severe persistent asthma dependent on systemic steroids with acute exacerbation  -     benralizumab (FASENRA) 30 mg/mL Syrg injection; Inject 1 mL (30 mg total) into the skin every 28 days. Every 4 weeks for 3 doses followed by every 8 weeks thereafter  -     valACYclovir (VALTREX) 1000 MG tablet; Take 1 tablet (1,000 mg total) by mouth 3 (three) times daily. Take for shingles. for 7 days  -     azithromycin (ZITHROMAX) 500 MG tablet; Take 1 tablet (500 mg total) by mouth once daily.    History of shingles  -     valACYclovir (VALTREX) 1000 MG tablet; Take 1 tablet (1,000 mg total) by mouth 3 (three) times daily. Take for shingles. for 7 days          Maycol was seen today for follow-up, shortness of breath, asthma, cough and sputum production.    Diagnoses and all orders for this visit:    Eosinophilic asthma  -     benralizumab (FASENRA) 30 mg/mL Syrg injection; Inject 1 mL (30 mg total) into the skin every 28 days. Every 4 weeks for 3 doses followed by every 8 weeks thereafter  -     valACYclovir (VALTREX) 1000 MG tablet; Take 1 tablet (1,000 mg total) by mouth 3 (three) times daily. Take for shingles. for 7 days  -     azithromycin (ZITHROMAX) 500 MG tablet; Take 1 tablet (500 mg total) by mouth once  daily.  -     fluticasone-salmeterol diskus inhaler 500-50 mcg; Inhale 1 puff into the lungs 2 (two) times daily. Controller-- stop symbicort if uses this medicaiton    Severe persistent asthma dependent on systemic steroids with acute exacerbation  -     benralizumab (FASENRA) 30 mg/mL Syrg injection; Inject 1 mL (30 mg total) into the skin every 28 days. Every 4 weeks for 3 doses followed by every 8 weeks thereafter  -     valACYclovir (VALTREX) 1000 MG tablet; Take 1 tablet (1,000 mg total) by mouth 3 (three) times daily. Take for shingles. for 7 days  -     azithromycin (ZITHROMAX) 500 MG tablet; Take 1 tablet (500 mg total) by mouth once daily.    History of shingles  -     valACYclovir (VALTREX) 1000 MG tablet; Take 1 tablet (1,000 mg total) by mouth 3 (three) times daily. Take for shingles. for 7 days        Follow up in about 4 weeks (around 10/26/2020), or if symptoms worsen or fail to improve.    Discussed with patient above for education the following:      Patient Instructions   Asthma very unstable..  Will place order for fasenra- sample shot would help.  Shot would be monthly x 3 then every 8 wks.    Use valtrex if shingles - call for any question.      Sound like you need treatment again soon- start prednisone soon or Fasenra shots.     will give paper script for advair - may replace symboicort if cheaper?     No sleep apnea of significance.      Fasenra is an interleukin 5 inhibitor.

## 2020-09-28 NOTE — PATIENT INSTRUCTIONS
Asthma very unstable..  Will place order for fasenra- sample shot would help.  Shot would be monthly x 3 then every 8 wks.    Use valtrex if shingles - call for any question.      Sound like you need treatment again soon- start prednisone soon or Fasenra shots.     will give paper script for advair - may replace symboicort if cheaper?     No sleep apnea of significance.      Fasenra is an interleukin 5 inhibitor.

## 2020-09-30 ENCOUNTER — TELEPHONE (OUTPATIENT)
Dept: PULMONOLOGY | Facility: CLINIC | Age: 80
End: 2020-09-30

## 2020-09-30 NOTE — TELEPHONE ENCOUNTER
Informed the patient Yes you can get your flu shot.      ----- Message from Carlos Gardner sent at 9/30/2020  1:42 PM CDT -----   Name of Who is Calling:     What is the request in detail: patient request call back in reference to want to know if can get flu shot with another vaccine he is getting  Please contact to further discuss and advise      Can the clinic reply by [facility]: no     What Number to Call Back if not in [facility]:  [phone]

## 2020-10-02 ENCOUNTER — TELEPHONE (OUTPATIENT)
Dept: PHARMACY | Facility: CLINIC | Age: 80
End: 2020-10-02

## 2020-10-07 NOTE — TELEPHONE ENCOUNTER
Amalia: [id number]. FASENRA INJ 30MG/ML is approved through 12/31/2021. Copay [id number]. Fwd to FA for review

## 2020-10-08 ENCOUNTER — TELEPHONE (OUTPATIENT)
Dept: PULMONOLOGY | Facility: CLINIC | Age: 80
End: 2020-10-08

## 2020-10-08 RX ORDER — BENRALIZUMAB 30 MG/ML
1 INJECTION, SOLUTION SUBCUTANEOUS
Qty: 1 ML | Refills: 11 | Status: SHIPPED | OUTPATIENT
Start: 2020-10-08 | End: 2021-11-12 | Stop reason: SDUPTHER

## 2020-10-09 ENCOUNTER — CLINICAL SUPPORT (OUTPATIENT)
Dept: PULMONOLOGY | Facility: CLINIC | Age: 80
End: 2020-10-09
Payer: MEDICARE

## 2020-10-09 VITALS
HEART RATE: 53 BPM | WEIGHT: 180.25 LBS | SYSTOLIC BLOOD PRESSURE: 123 MMHG | DIASTOLIC BLOOD PRESSURE: 65 MMHG | BODY MASS INDEX: 25.14 KG/M2 | OXYGEN SATURATION: 96 %

## 2020-10-09 DIAGNOSIS — J82.83 EOSINOPHILIC ASTHMA: Primary | ICD-10-CM

## 2020-10-09 DIAGNOSIS — J45.50 SEVERE PERSISTENT ASTHMA WITHOUT COMPLICATION: Primary | ICD-10-CM

## 2020-10-09 PROCEDURE — 96372 THER/PROPH/DIAG INJ SC/IM: CPT | Mod: S$GLB,,, | Performed by: NURSE PRACTITIONER

## 2020-10-09 PROCEDURE — 96372 PR INJECTION,THERAP/PROPH/DIAG2ST, IM OR SUBCUT: ICD-10-PCS | Mod: S$GLB,,, | Performed by: NURSE PRACTITIONER

## 2020-10-09 PROCEDURE — 99999 PR PBB SHADOW E&M-EST. PATIENT-LVL III: CPT | Mod: PBBFAC,,,

## 2020-10-09 PROCEDURE — 99999 PR PBB SHADOW E&M-EST. PATIENT-LVL III: ICD-10-PCS | Mod: PBBFAC,,,

## 2020-10-09 NOTE — PROGRESS NOTES
Pt in today for first fasenra shot. Shot given in left arms. Vital signs taken at baseline and Q15 minutes while pt waits in room

## 2020-10-16 ENCOUNTER — TELEPHONE (OUTPATIENT)
Dept: PULMONOLOGY | Facility: CLINIC | Age: 80
End: 2020-10-16

## 2020-10-16 NOTE — TELEPHONE ENCOUNTER
Informed it was the pen.      ----- Message from [facility]osa sent at 10/16/2020 11:38 AM CDT -----  [facility] called to get clarity on the benralizumab (FASENRA PEN) 30 mg/mL AtIn. They want to know if it needs to be the pre filled or pen please reach out to them at [phone] Ref # [id number]

## 2020-10-28 ENCOUNTER — OFFICE VISIT (OUTPATIENT)
Dept: PULMONOLOGY | Facility: CLINIC | Age: 80
End: 2020-10-28
Payer: MEDICARE

## 2020-10-28 VITALS
WEIGHT: 177.25 LBS | BODY MASS INDEX: 24.72 KG/M2 | HEART RATE: 64 BPM | SYSTOLIC BLOOD PRESSURE: 134 MMHG | OXYGEN SATURATION: 97 % | DIASTOLIC BLOOD PRESSURE: 64 MMHG

## 2020-10-28 DIAGNOSIS — R09.89 CHRONIC SINUS COMPLAINTS: Primary | ICD-10-CM

## 2020-10-28 DIAGNOSIS — J45.50 SEVERE PERSISTENT ASTHMA WITHOUT COMPLICATION: ICD-10-CM

## 2020-10-28 PROCEDURE — 1159F PR MEDICATION LIST DOCUMENTED IN MEDICAL RECORD: ICD-10-PCS | Mod: S$GLB,,, | Performed by: INTERNAL MEDICINE

## 2020-10-28 PROCEDURE — 99213 OFFICE O/P EST LOW 20 MIN: CPT | Mod: S$GLB,,, | Performed by: INTERNAL MEDICINE

## 2020-10-28 PROCEDURE — 1100F PTFALLS ASSESS-DOCD GE2>/YR: CPT | Mod: CPTII,S$GLB,, | Performed by: INTERNAL MEDICINE

## 2020-10-28 PROCEDURE — 1126F AMNT PAIN NOTED NONE PRSNT: CPT | Mod: S$GLB,,, | Performed by: INTERNAL MEDICINE

## 2020-10-28 PROCEDURE — 1126F PR PAIN SEVERITY QUANTIFIED, NO PAIN PRESENT: ICD-10-PCS | Mod: S$GLB,,, | Performed by: INTERNAL MEDICINE

## 2020-10-28 PROCEDURE — 99499 RISK ADDL DX/OHS AUDIT: ICD-10-PCS | Mod: S$GLB,,, | Performed by: INTERNAL MEDICINE

## 2020-10-28 PROCEDURE — 3075F PR MOST RECENT SYSTOLIC BLOOD PRESS GE 130-139MM HG: ICD-10-PCS | Mod: CPTII,S$GLB,, | Performed by: INTERNAL MEDICINE

## 2020-10-28 PROCEDURE — 3078F PR MOST RECENT DIASTOLIC BLOOD PRESSURE < 80 MM HG: ICD-10-PCS | Mod: CPTII,S$GLB,, | Performed by: INTERNAL MEDICINE

## 2020-10-28 PROCEDURE — 1159F MED LIST DOCD IN RCRD: CPT | Mod: S$GLB,,, | Performed by: INTERNAL MEDICINE

## 2020-10-28 PROCEDURE — 1100F PR PT FALLS ASSESS DOC 2+ FALLS/FALL W/INJURY/YR: ICD-10-PCS | Mod: CPTII,S$GLB,, | Performed by: INTERNAL MEDICINE

## 2020-10-28 PROCEDURE — 99999 PR PBB SHADOW E&M-EST. PATIENT-LVL IV: CPT | Mod: PBBFAC,,, | Performed by: INTERNAL MEDICINE

## 2020-10-28 PROCEDURE — 3078F DIAST BP <80 MM HG: CPT | Mod: CPTII,S$GLB,, | Performed by: INTERNAL MEDICINE

## 2020-10-28 PROCEDURE — 99999 PR PBB SHADOW E&M-EST. PATIENT-LVL IV: ICD-10-PCS | Mod: PBBFAC,,, | Performed by: INTERNAL MEDICINE

## 2020-10-28 PROCEDURE — 3288F PR FALLS RISK ASSESSMENT DOCUMENTED: ICD-10-PCS | Mod: CPTII,S$GLB,, | Performed by: INTERNAL MEDICINE

## 2020-10-28 PROCEDURE — 3288F FALL RISK ASSESSMENT DOCD: CPT | Mod: CPTII,S$GLB,, | Performed by: INTERNAL MEDICINE

## 2020-10-28 PROCEDURE — 3075F SYST BP GE 130 - 139MM HG: CPT | Mod: CPTII,S$GLB,, | Performed by: INTERNAL MEDICINE

## 2020-10-28 PROCEDURE — 99499 UNLISTED E&M SERVICE: CPT | Mod: S$GLB,,, | Performed by: INTERNAL MEDICINE

## 2020-10-28 PROCEDURE — 99213 PR OFFICE/OUTPT VISIT, EST, LEVL III, 20-29 MIN: ICD-10-PCS | Mod: S$GLB,,, | Performed by: INTERNAL MEDICINE

## 2020-10-28 NOTE — PROGRESS NOTES
10/28/2020    Maycol Hodge  Office Note    Chief Complaint   Patient presents with    Follow-up     feeling better since injection, denies any sob or cough     HPI:  10/28/2020 no prednisone since last visit, fasenra helped dramatically.  No prednisoen and breathing much better.  - pt got shot here 10/8/2020       2020 took another course prednisone, has been on prednisone  Monthly since march.  Finished last 2 wks ago, feels will need [place] course soon. Uses symbicort regular.  Had sleep study few wks ago.fev 42 from 57% on 2017,      Sleep study 2020 with ahi 2/hr- within normal range.   Patient Instructions   Asthma very unstable..  Will place order for fasenra- sample shot would help.  Shot would be monthly x 3 then every 8 wks.    Use valtrex if shingles - call for any question.      Sound like you need treatment again soon- start prednisone soon or Fasenra shots.     will give paper script for advair - may replace symboicort if cheaper?     No sleep apnea of significance.      Fasenra is an interleukin 5 inhibitor.  2020 had to use prednisone since March, having severe fatigue - reports sleeps well, sleep non restorative, snores with no sleep study in past.  Romo to mail box - wife sleeps separate room. No snore arousal.    Uses symbicort regular.  Uses albuterol rescue minimally less than once a wk.  Pt starts prednisone when cough becomes excessive.  Eats well, no wt loss. Sleeps 7 hrs/d.  Goes to [employer] for hr 3/wk.  Pt having vivid dreams.   Patient Instructions   Check lung capacity   Check sleep study - best prior to prednisone  May use prednisone - start 20/d dropping to 10 mg daily once better.  If you are found to have sleep apnea- less than 5 episodes an hour would be normal- would try cpap therapy.  2020 49 yo son  suddenly  in [place]- son avid - death.  Toxicology pending.  Had 2 spells ppt prednisone use since last visit with good results.  Took  erythro also.  Having some grief- eats and sleeps ok . Wife in good shape. Uses symbicort regular.  Patient Instructions   Asthma not too bad but you needed prednisone twice since March.  Special shots may help-  Could ask to get fasenra covered.  May consider starting if pattern worsens.    symbicort needed regular especially in winter/spring.+      3/2/2020- having exhaustion after 10-15 min doing yd wk.  Saw cardiology- had a fib at colonoscopy in dec but sinus mile since. - eliquis started.  Pt had syncope 10 days - saw Dr Vogel in f/u.  Last wk had some noct wheezes.   Uses symbicort 2 bid and singulair.  No no prednisone use last yr.  Sleeps ok, nerves ok - denies depression.  Had hives last July.  Stress echo in 11/22/2019 was good save high bp?    Patient Instructions   Fatigue and wheezes are noted.  Breathing not typical cause for loss of consciousness.  Heart rate is low - may contribute to fatigue but not that bad?    Chronic sinuses seem ok.  Would recommend prednisone 20 mg daily for 3 days now,  May repeat but would like to see better function and activity.    Call if not perking up.   oxygen level walking in office went from 97 to 93 % - not bad.  bp standing didn't fall today.    Blood wk last month all looked good.    Feb 1, 2019- Onset one week, sore throat, congestion, cough (fits, severe, productive light yellow in color), took azithromycin with minimal benefit. Current Prednisone use.  January 24, 2019- Feeling well, no exacerbations requiring antibiotic and steroid therapy. No cough, no nocturnal arousals, no SOB.   Sept 27, 2018- had exacerbations in July and August needing azithro and prednisone- had cough clear mucous with sl yellow, no breathing issues.  Unusual to have exacerbation summer.  Jan 5, 2018-- took azithro and pred x 3 - good results.  Doing well  Jan 19,2017 hpi, had acute onset 5 days ago with sinus runny, cough, wheezes, sob, no fever, no n/v, pos headache.  Muscle aches  and joint aches.  Took prednisone, took inhaler, neb rx was old. Run down, poor appetite,   Took prednisone and azithro x3, still cough but better,  Down but better.  Mucous clear.   Dec 16, 2016HPI:pt with asthma with no usual noct arousals or rescue use.  Exacerbates 3x yr in spring mainly. Progresses for a week on rescue and then uses pred and doxy (recent doxy reaction) to clear.  I cared for years ago.  No recent pft.  Pt feels breathing better than  Usual yrs past.      The chief compliant  problem varies with instablilty at time    PFSH:  Past Medical History:   Diagnosis Date    A-fib     Asthma     Hematuria     Hypertension          Past Surgical History:   Procedure Laterality Date    COLONOSCOPY      COLONOSCOPY N/A 2020    Procedure: COLONOSCOPY;  Surgeon: Willis Mullen MD;  Location: [facility];  Service: Endoscopy;  Laterality: N/A;    CYSTOSCOPY      negative    EYE SURGERY      bilateral cataracts    HERNIA REPAIR      righht inguinal    JOINT REPLACEMENT Left     shoulder    left cataract surgery      PROSTATE SURGERY      TURP     Social History     Tobacco Use    Smoking status: Former Smoker     Types: Cigarettes     Quit date:      Years since quittin.8    Smokeless tobacco: Never Used    Tobacco comment: Quit around    Substance Use Topics    Alcohol use: Yes     Alcohol/week: 14.0 standard drinks     Types: 7 Glasses of wine, 7 Shots of liquor per week    Drug use: No     Family History   Problem Relation Age of Onset    Stroke Father     Cancer Neg Hx     Diabetes Neg Hx     Heart disease Neg Hx     Hypertension Neg Hx      Review of patient's allergies indicates:   Allergen Reactions    Doxycycline Rash     Causes rash, hives and itching    Diltiazem      pruritis    Hydrochlorothiazide      Causes elevate uric acid, gout      Levaquin [levofloxacin] Swelling    Norvasc [amlodipine] Swelling       Performance Status:The patient's activity  level is functions out of house.      Review of Systems:    Constitutional: Negative for activity change, appetite change, chills, diaphoresis, fatigue, fever and unexpected weight change.   HENT: Negative for dental problem, postnasal drip, trouble swallowing and voice change. Positive for  rhinorrhea, sinus pressure, sinus pain, sneezing, sore throat,   Respiratory: Negative for apnea,  shortness of breath, wheezing and stridor.  Positive for cough, chest tightness,  Cardiovascular: Negative for chest pain, palpitations and leg swelling.   Gastrointestinal: Negative for abdominal distention, abdominal pain, constipation and nausea.   Musculoskeletal: Negative for gait problem, myalgias and neck pain.   Skin: Negative for color change and pallor.   Allergic/Immunologic: Negative for environmental allergies and food allergies.   Neurological: Negative for dizziness, speech difficulty, weakness, light-headedness, numbness and headaches.   Hematological: Negative for adenopathy. Does not bruise/bleed easily.   Psychiatric/Behavioral: Negative for dysphoric mood and sleep disturbance. The patient is not nervous/anxious.     Exam:Comprehensive exam done.   /64 (BP Location: Right arm, Patient Position: Sitting)   Pulse 64   Wt 80.4 kg (177 lb 4 oz)   SpO2 97% Comment: on room air at rest  BMI 24.72 kg/m²   Exam included Vitals as listed, and patient's appearance and affect and alertness and mood, oral exam for yeast and hygiene and pharynx lesions and Mallapatti (M) score, neck with inspection for jvd and masses and thyroid abnormalities and lymph nodes (supraclavicular and infraclavicular nodes and axillary also examined and noted if abn), chest exam included symmetry and effort and fremitus and percussion and auscultation, cardiac exam included rhythm and gallops and murmur and rubs and jvd and edema, abdominal exam for mass and hepatosplenomegaly and tenderness and hernias and bowel sounds, Musculoskeletal  exam with muscle tone and posture and mobility/gait and  strength, and skin for rashes and cyanosis and pallor and turgor, extremity for clubbing.  Findings were normal except for pertinent findings listed below:  M3,  chest is symmetric, no distress, normal percussion, normal fremitus, and no murmur, no edema, lung sounds scattered wheezes new 9/28.   Rest good.      Radiographs (ct chest and cxr) reviewed from 10/16/2017 normal  cxr 5/20/2020 nad    Labs Reviewed  Lab Results   Component Value Date    WBC 9.94 07/24/2020    HGB 14.0 07/24/2020    HCT 43.5 07/24/2020    MCV 96 07/24/2020     07/24/2020   · holter 1/27/2020- The diary was properly completed. The tape was adequate (0 days , 48 hours, 0 minutes).  · Predominant Rhythm Sinus rhythm with heart rates varying between 43 and 113 bpm with an average of 56 bpm.  · Ventricular Arrhythmias There were very rare PVCs totalling 58 and averaging 1.21 per hour.  · Supraventricular Arrhythmias There were occasional PACs totalling 638 and averaging 13.29 per hour.  · Occassional atrial pair and short run of atrial tachycardia,the longest 7 beats with no associated symptoms..  There was an episode of SOB reported. The corresponding rhythm strips revealed the following: During the event ([employer]), the rhythm was sinus tachycardia at 113 bpm with without ectopy.     Results for ARNULFO SALEH (MRN [id number]) as of 9/21/2018 11:38   Ref. Range 9/21/2018 08:59   Eosinophil% Latest Ref Range: 0.0 - 8.0 % 9.2 (H)   Eos # Latest Ref Range: 0.0 - 0.5 K/uL 0.6 (H)     PFT  March 31, 2017 fev 57%,     11/22/19-Conclusion     · Concentric left ventricular remodeling.  · Normal left ventricular systolic function. The estimated ejection fraction is 60%  · Normal LV diastolic function.  · No wall motion abnormalities.  · Normal right ventricular systolic function.  · The stress echo portion of this study is negative for myocardial ischemia.  · The patient's exercise  capacity was above average. functional capacity of 10 METS  · The patient reached the end of the protocol.  · There were no arrhythmias during stress.  · The EKG portion of this study is negative for myocardial ischemia.  · Exercise portion of stress test stopped due to an increase in systolic and diastolic blood pressure above protocol.  · Hypertensive response changed from stress echo in 3/2018.       Plan:  Clinical impression is apparently straight forward and impression with management as below.    Maycol was seen today for follow-up.    Diagnoses and all orders for this visit:    Chronic sinus complaints    Severe persistent asthma without complication          Maycol was seen today for follow-up.    Diagnoses and all orders for this visit:    Chronic sinus complaints    Severe persistent asthma without complication        Follow up in about 6 months (around 4/28/2021), or if symptoms worsen or fail to improve.    Discussed with patient above for education the following:      Patient Instructions   Need to continue fasenra as has had dramatic response.

## 2020-11-03 ENCOUNTER — TELEPHONE (OUTPATIENT)
Dept: PULMONOLOGY | Facility: CLINIC | Age: 80
End: 2020-11-03

## 2020-11-03 DIAGNOSIS — I10 ESSENTIAL HYPERTENSION: ICD-10-CM

## 2020-11-03 RX ORDER — LOSARTAN POTASSIUM 50 MG/1
50 TABLET ORAL DAILY
Qty: 90 TABLET | Refills: 3 | Status: SHIPPED | OUTPATIENT
Start: 2020-11-03 | End: 2021-11-16

## 2020-11-03 NOTE — TELEPHONE ENCOUNTER
[facility] of Losartan.  Please advise.     LOV--8-   FOV-- 2-

## 2020-11-03 NOTE — TELEPHONE ENCOUNTER
Noted   ----- Message from Catina Campbell sent at 11/3/2020  1:13 PM CST -----  Regarding: Fasenra PAP approved through 12/31/20  Good afternoon Dr. Burch and staff,    [facility]: Fasenra assistance approved by [employer] until 12/31/20.    The patient has been notified of their approval, and confirmed a delivery of the medication was received from the . Please follow up with the [employer] program for all future refills through the patient's enrollment.      Thanks,  Catina Campbell CPhT.  OSP [id number]

## 2020-11-03 NOTE — TELEPHONE ENCOUNTER
FYI: Fasenra assistance approved by [employer] until 12/31/20.    The patient has been notified of their approval, and confirmed a delivery of the medication was received from the . Please follow up with the [employer] program for all future refills through the patient's enrollment.      Thanks,  Catina Campbell CPhT.  OSP [id number]

## 2020-12-21 NOTE — TELEPHONE ENCOUNTER
Patient notified and states understanding.  Lab appointment scheduled.  
Repeat TSH is almost normal. I would recommend repeating this in 3 months.  
Additional Notes: Patient consent was obtained to proceed with the visit and recommended plan of care after discussion of all risks and benefits, including the risks of COVID-19 exposure.
Detail Level: Simple

## 2020-12-30 DIAGNOSIS — E03.9 HYPOTHYROIDISM, UNSPECIFIED TYPE: ICD-10-CM

## 2020-12-30 RX ORDER — LEVOTHYROXINE SODIUM 50 UG/1
50 TABLET ORAL DAILY
Qty: 30 TABLET | Refills: 11 | Status: SHIPPED | OUTPATIENT
Start: 2020-12-30 | End: 2021-02-23 | Stop reason: SDUPTHER

## 2021-01-05 ENCOUNTER — PATIENT MESSAGE (OUTPATIENT)
Dept: FAMILY MEDICINE | Facility: CLINIC | Age: 81
End: 2021-01-05

## 2021-01-21 DIAGNOSIS — I48.91 ATRIAL FIBRILLATION, UNSPECIFIED TYPE: ICD-10-CM

## 2021-01-22 ENCOUNTER — PATIENT MESSAGE (OUTPATIENT)
Dept: ADMINISTRATIVE | Facility: OTHER | Age: 81
End: 2021-01-22

## 2021-02-03 ENCOUNTER — IMMUNIZATION (OUTPATIENT)
Dept: PHARMACY | Facility: CLINIC | Age: 81
End: 2021-02-03
Payer: MEDICARE

## 2021-02-03 DIAGNOSIS — Z23 NEED FOR VACCINATION: Primary | ICD-10-CM

## 2021-02-17 ENCOUNTER — LAB VISIT (OUTPATIENT)
Dept: LAB | Facility: HOSPITAL | Age: 81
End: 2021-02-17
Attending: FAMILY MEDICINE
Payer: MEDICARE

## 2021-02-17 DIAGNOSIS — E06.3 HYPOTHYROIDISM DUE TO HASHIMOTO'S THYROIDITIS: ICD-10-CM

## 2021-02-17 DIAGNOSIS — E03.8 HYPOTHYROIDISM DUE TO HASHIMOTO'S THYROIDITIS: ICD-10-CM

## 2021-02-17 DIAGNOSIS — E78.2 MIXED HYPERLIPIDEMIA: ICD-10-CM

## 2021-02-17 DIAGNOSIS — Z12.5 PROSTATE CANCER SCREENING: ICD-10-CM

## 2021-02-17 LAB
ALBUMIN SERPL BCP-MCNC: 3.7 G/DL (ref 3.5–5.2)
ALP SERPL-CCNC: 74 U/L (ref 55–135)
ALT SERPL W/O P-5'-P-CCNC: 17 U/L (ref 10–44)
ANION GAP SERPL CALC-SCNC: 8 MMOL/L (ref 8–16)
AST SERPL-CCNC: 20 U/L (ref 10–40)
BASOPHILS # BLD AUTO: 0.05 K/UL (ref 0–0.2)
BASOPHILS NFR BLD: 0.8 % (ref 0–1.9)
BILIRUB SERPL-MCNC: 1.4 MG/DL (ref 0.1–1)
BUN SERPL-MCNC: 13 MG/DL (ref 8–23)
CALCIUM SERPL-MCNC: 8.8 MG/DL (ref 8.7–10.5)
CHLORIDE SERPL-SCNC: 105 MMOL/L (ref 95–110)
CHOLEST SERPL-MCNC: 197 MG/DL (ref 120–199)
CHOLEST/HDLC SERPL: 3.6 {RATIO} (ref 2–5)
CO2 SERPL-SCNC: 29 MMOL/L (ref 23–29)
COMPLEXED PSA SERPL-MCNC: 12.4 NG/ML (ref 0–4)
CREAT SERPL-MCNC: 1.2 MG/DL (ref 0.5–1.4)
DIFFERENTIAL METHOD: ABNORMAL
EOSINOPHIL # BLD AUTO: 0 K/UL (ref 0–0.5)
EOSINOPHIL NFR BLD: 0.2 % (ref 0–8)
ERYTHROCYTE [DISTWIDTH] IN BLOOD BY AUTOMATED COUNT: 14.7 % (ref 11.5–14.5)
EST. GFR  (AFRICAN AMERICAN): >60 ML/MIN/1.73 M^2
EST. GFR  (NON AFRICAN AMERICAN): 56 ML/MIN/1.73 M^2
GLUCOSE SERPL-MCNC: 95 MG/DL (ref 70–110)
HCT VFR BLD AUTO: 51.8 % (ref 40–54)
HDLC SERPL-MCNC: 55 MG/DL (ref 40–75)
HDLC SERPL: 27.9 % (ref 20–50)
HGB BLD-MCNC: 16.1 G/DL (ref 14–18)
IMM GRANULOCYTES # BLD AUTO: 0.01 K/UL (ref 0–0.04)
IMM GRANULOCYTES NFR BLD AUTO: 0.2 % (ref 0–0.5)
LDLC SERPL CALC-MCNC: 122.2 MG/DL (ref 63–159)
LYMPHOCYTES # BLD AUTO: 2.4 K/UL (ref 1–4.8)
LYMPHOCYTES NFR BLD: 38.1 % (ref 18–48)
MCH RBC QN AUTO: 29.4 PG (ref 27–31)
MCHC RBC AUTO-ENTMCNC: 31.1 G/DL (ref 32–36)
MCV RBC AUTO: 95 FL (ref 82–98)
MONOCYTES # BLD AUTO: 0.7 K/UL (ref 0.3–1)
MONOCYTES NFR BLD: 11.4 % (ref 4–15)
NEUTROPHILS # BLD AUTO: 3.2 K/UL (ref 1.8–7.7)
NEUTROPHILS NFR BLD: 49.3 % (ref 38–73)
NONHDLC SERPL-MCNC: 142 MG/DL
NRBC BLD-RTO: 0 /100 WBC
PLATELET # BLD AUTO: 235 K/UL (ref 150–350)
PMV BLD AUTO: 10.7 FL (ref 9.2–12.9)
POTASSIUM SERPL-SCNC: 4.1 MMOL/L (ref 3.5–5.1)
PROT SERPL-MCNC: 7.2 G/DL (ref 6–8.4)
RBC # BLD AUTO: 5.48 M/UL (ref 4.6–6.2)
SODIUM SERPL-SCNC: 142 MMOL/L (ref 136–145)
T4 FREE SERPL-MCNC: 0.87 NG/DL (ref 0.71–1.51)
TRIGL SERPL-MCNC: 99 MG/DL (ref 30–150)
TSH SERPL DL<=0.005 MIU/L-ACNC: 5.41 UIU/ML (ref 0.4–4)
WBC # BLD AUTO: 6.41 K/UL (ref 3.9–12.7)

## 2021-02-17 PROCEDURE — 85025 COMPLETE CBC W/AUTO DIFF WBC: CPT

## 2021-02-17 PROCEDURE — 84153 ASSAY OF PSA TOTAL: CPT

## 2021-02-17 PROCEDURE — 84443 ASSAY THYROID STIM HORMONE: CPT

## 2021-02-17 PROCEDURE — 80061 LIPID PANEL: CPT

## 2021-02-17 PROCEDURE — 36415 COLL VENOUS BLD VENIPUNCTURE: CPT | Mod: PO

## 2021-02-17 PROCEDURE — 80053 COMPREHEN METABOLIC PANEL: CPT

## 2021-02-17 PROCEDURE — 84439 ASSAY OF FREE THYROXINE: CPT

## 2021-02-19 ENCOUNTER — OFFICE VISIT (OUTPATIENT)
Dept: CARDIOLOGY | Facility: CLINIC | Age: 81
End: 2021-02-19
Payer: MEDICARE

## 2021-02-19 VITALS
HEART RATE: 56 BPM | HEIGHT: 71 IN | BODY MASS INDEX: 25.58 KG/M2 | WEIGHT: 182.75 LBS | SYSTOLIC BLOOD PRESSURE: 198 MMHG | DIASTOLIC BLOOD PRESSURE: 87 MMHG

## 2021-02-19 DIAGNOSIS — I10 HYPERTENSION, UNSPECIFIED TYPE: ICD-10-CM

## 2021-02-19 DIAGNOSIS — I48.0 PAROXYSMAL ATRIAL FIBRILLATION: Primary | ICD-10-CM

## 2021-02-19 DIAGNOSIS — E78.2 MIXED HYPERLIPIDEMIA: ICD-10-CM

## 2021-02-19 DIAGNOSIS — Z86.73 HISTORY OF TIA (TRANSIENT ISCHEMIC ATTACK): ICD-10-CM

## 2021-02-19 DIAGNOSIS — R53.83 FATIGUE, UNSPECIFIED TYPE: ICD-10-CM

## 2021-02-19 PROCEDURE — 3288F FALL RISK ASSESSMENT DOCD: CPT | Mod: CPTII,S$GLB,, | Performed by: INTERNAL MEDICINE

## 2021-02-19 PROCEDURE — 1126F AMNT PAIN NOTED NONE PRSNT: CPT | Mod: S$GLB,,, | Performed by: INTERNAL MEDICINE

## 2021-02-19 PROCEDURE — 99999 PR PBB SHADOW E&M-EST. PATIENT-LVL III: ICD-10-PCS | Mod: PBBFAC,,, | Performed by: INTERNAL MEDICINE

## 2021-02-19 PROCEDURE — 3077F SYST BP >= 140 MM HG: CPT | Mod: CPTII,S$GLB,, | Performed by: INTERNAL MEDICINE

## 2021-02-19 PROCEDURE — 3288F PR FALLS RISK ASSESSMENT DOCUMENTED: ICD-10-PCS | Mod: CPTII,S$GLB,, | Performed by: INTERNAL MEDICINE

## 2021-02-19 PROCEDURE — 99999 PR PBB SHADOW E&M-EST. PATIENT-LVL III: CPT | Mod: PBBFAC,,, | Performed by: INTERNAL MEDICINE

## 2021-02-19 PROCEDURE — 99499 RISK ADDL DX/OHS AUDIT: ICD-10-PCS | Mod: S$GLB,,, | Performed by: INTERNAL MEDICINE

## 2021-02-19 PROCEDURE — 99214 PR OFFICE/OUTPT VISIT, EST, LEVL IV, 30-39 MIN: ICD-10-PCS | Mod: S$GLB,,, | Performed by: INTERNAL MEDICINE

## 2021-02-19 PROCEDURE — 99499 UNLISTED E&M SERVICE: CPT | Mod: S$GLB,,, | Performed by: INTERNAL MEDICINE

## 2021-02-19 PROCEDURE — 3079F PR MOST RECENT DIASTOLIC BLOOD PRESSURE 80-89 MM HG: ICD-10-PCS | Mod: CPTII,S$GLB,, | Performed by: INTERNAL MEDICINE

## 2021-02-19 PROCEDURE — 1126F PR PAIN SEVERITY QUANTIFIED, NO PAIN PRESENT: ICD-10-PCS | Mod: S$GLB,,, | Performed by: INTERNAL MEDICINE

## 2021-02-19 PROCEDURE — 3077F PR MOST RECENT SYSTOLIC BLOOD PRESSURE >= 140 MM HG: ICD-10-PCS | Mod: CPTII,S$GLB,, | Performed by: INTERNAL MEDICINE

## 2021-02-19 PROCEDURE — 1101F PR PT FALLS ASSESS DOC 0-1 FALLS W/OUT INJ PAST YR: ICD-10-PCS | Mod: CPTII,S$GLB,, | Performed by: INTERNAL MEDICINE

## 2021-02-19 PROCEDURE — 1159F MED LIST DOCD IN RCRD: CPT | Mod: S$GLB,,, | Performed by: INTERNAL MEDICINE

## 2021-02-19 PROCEDURE — 99214 OFFICE O/P EST MOD 30 MIN: CPT | Mod: S$GLB,,, | Performed by: INTERNAL MEDICINE

## 2021-02-19 PROCEDURE — 3079F DIAST BP 80-89 MM HG: CPT | Mod: CPTII,S$GLB,, | Performed by: INTERNAL MEDICINE

## 2021-02-19 PROCEDURE — 1159F PR MEDICATION LIST DOCUMENTED IN MEDICAL RECORD: ICD-10-PCS | Mod: S$GLB,,, | Performed by: INTERNAL MEDICINE

## 2021-02-19 PROCEDURE — 1101F PT FALLS ASSESS-DOCD LE1/YR: CPT | Mod: CPTII,S$GLB,, | Performed by: INTERNAL MEDICINE

## 2021-02-23 ENCOUNTER — OFFICE VISIT (OUTPATIENT)
Dept: FAMILY MEDICINE | Facility: CLINIC | Age: 81
End: 2021-02-23
Payer: MEDICARE

## 2021-02-23 VITALS
TEMPERATURE: 98 F | OXYGEN SATURATION: 95 % | WEIGHT: 179.69 LBS | BODY MASS INDEX: 25.16 KG/M2 | SYSTOLIC BLOOD PRESSURE: 120 MMHG | RESPIRATION RATE: 16 BRPM | HEART RATE: 59 BPM | HEIGHT: 71 IN | DIASTOLIC BLOOD PRESSURE: 68 MMHG

## 2021-02-23 DIAGNOSIS — E78.2 MIXED HYPERLIPIDEMIA: ICD-10-CM

## 2021-02-23 DIAGNOSIS — N40.0 BENIGN PROSTATIC HYPERPLASIA WITHOUT LOWER URINARY TRACT SYMPTOMS: ICD-10-CM

## 2021-02-23 DIAGNOSIS — R97.20 ELEVATED PSA: Primary | ICD-10-CM

## 2021-02-23 DIAGNOSIS — J45.50 SEVERE PERSISTENT ASTHMA WITHOUT COMPLICATION: ICD-10-CM

## 2021-02-23 DIAGNOSIS — E03.9 HYPOTHYROIDISM, UNSPECIFIED TYPE: ICD-10-CM

## 2021-02-23 DIAGNOSIS — I10 HYPERTENSION, UNSPECIFIED TYPE: ICD-10-CM

## 2021-02-23 DIAGNOSIS — I48.0 PAROXYSMAL ATRIAL FIBRILLATION: ICD-10-CM

## 2021-02-23 PROCEDURE — 99999 PR PBB SHADOW E&M-EST. PATIENT-LVL IV: CPT | Mod: PBBFAC,,, | Performed by: FAMILY MEDICINE

## 2021-02-23 PROCEDURE — 99999 PR PBB SHADOW E&M-EST. PATIENT-LVL IV: ICD-10-PCS | Mod: PBBFAC,,, | Performed by: FAMILY MEDICINE

## 2021-02-23 PROCEDURE — 99214 OFFICE O/P EST MOD 30 MIN: CPT | Mod: S$GLB,,, | Performed by: FAMILY MEDICINE

## 2021-02-23 PROCEDURE — 1126F PR PAIN SEVERITY QUANTIFIED, NO PAIN PRESENT: ICD-10-PCS | Mod: S$GLB,,, | Performed by: FAMILY MEDICINE

## 2021-02-23 PROCEDURE — 1159F MED LIST DOCD IN RCRD: CPT | Mod: S$GLB,,, | Performed by: FAMILY MEDICINE

## 2021-02-23 PROCEDURE — 1101F PR PT FALLS ASSESS DOC 0-1 FALLS W/OUT INJ PAST YR: ICD-10-PCS | Mod: CPTII,S$GLB,, | Performed by: FAMILY MEDICINE

## 2021-02-23 PROCEDURE — 99214 PR OFFICE/OUTPT VISIT, EST, LEVL IV, 30-39 MIN: ICD-10-PCS | Mod: S$GLB,,, | Performed by: FAMILY MEDICINE

## 2021-02-23 PROCEDURE — 3074F PR MOST RECENT SYSTOLIC BLOOD PRESSURE < 130 MM HG: ICD-10-PCS | Mod: CPTII,S$GLB,, | Performed by: FAMILY MEDICINE

## 2021-02-23 PROCEDURE — 1159F PR MEDICATION LIST DOCUMENTED IN MEDICAL RECORD: ICD-10-PCS | Mod: S$GLB,,, | Performed by: FAMILY MEDICINE

## 2021-02-23 PROCEDURE — 3074F SYST BP LT 130 MM HG: CPT | Mod: CPTII,S$GLB,, | Performed by: FAMILY MEDICINE

## 2021-02-23 PROCEDURE — 3078F PR MOST RECENT DIASTOLIC BLOOD PRESSURE < 80 MM HG: ICD-10-PCS | Mod: CPTII,S$GLB,, | Performed by: FAMILY MEDICINE

## 2021-02-23 PROCEDURE — 1101F PT FALLS ASSESS-DOCD LE1/YR: CPT | Mod: CPTII,S$GLB,, | Performed by: FAMILY MEDICINE

## 2021-02-23 PROCEDURE — 3288F FALL RISK ASSESSMENT DOCD: CPT | Mod: CPTII,S$GLB,, | Performed by: FAMILY MEDICINE

## 2021-02-23 PROCEDURE — 3288F PR FALLS RISK ASSESSMENT DOCUMENTED: ICD-10-PCS | Mod: CPTII,S$GLB,, | Performed by: FAMILY MEDICINE

## 2021-02-23 PROCEDURE — 1126F AMNT PAIN NOTED NONE PRSNT: CPT | Mod: S$GLB,,, | Performed by: FAMILY MEDICINE

## 2021-02-23 PROCEDURE — 3078F DIAST BP <80 MM HG: CPT | Mod: CPTII,S$GLB,, | Performed by: FAMILY MEDICINE

## 2021-02-23 RX ORDER — LEVOTHYROXINE SODIUM 75 UG/1
75 TABLET ORAL DAILY
Qty: 90 TABLET | Refills: 3 | Status: SHIPPED | OUTPATIENT
Start: 2021-02-23 | End: 2021-05-30 | Stop reason: SDUPTHER

## 2021-03-03 ENCOUNTER — IMMUNIZATION (OUTPATIENT)
Dept: PHARMACY | Facility: CLINIC | Age: 81
End: 2021-03-03
Payer: MEDICARE

## 2021-03-03 DIAGNOSIS — Z23 NEED FOR VACCINATION: Primary | ICD-10-CM

## 2021-03-04 ENCOUNTER — CLINICAL SUPPORT (OUTPATIENT)
Dept: CARDIOLOGY | Facility: CLINIC | Age: 81
End: 2021-03-04
Attending: INTERNAL MEDICINE
Payer: MEDICARE

## 2021-03-04 DIAGNOSIS — I48.0 PAROXYSMAL ATRIAL FIBRILLATION: ICD-10-CM

## 2021-03-04 PROCEDURE — 93224 HOLTER MONITOR - 48 HOUR (CUPID ONLY): ICD-10-PCS | Mod: S$GLB,,, | Performed by: INTERNAL MEDICINE

## 2021-03-04 PROCEDURE — 93224 XTRNL ECG REC UP TO 48 HRS: CPT | Mod: S$GLB,,, | Performed by: INTERNAL MEDICINE

## 2021-03-11 ENCOUNTER — TELEPHONE (OUTPATIENT)
Dept: CARDIOLOGY | Facility: CLINIC | Age: 81
End: 2021-03-11

## 2021-04-28 ENCOUNTER — OFFICE VISIT (OUTPATIENT)
Dept: PULMONOLOGY | Facility: CLINIC | Age: 81
End: 2021-04-28
Payer: MEDICARE

## 2021-04-28 VITALS
SYSTOLIC BLOOD PRESSURE: 161 MMHG | BODY MASS INDEX: 24.45 KG/M2 | HEART RATE: 56 BPM | HEIGHT: 71 IN | OXYGEN SATURATION: 97 % | WEIGHT: 174.63 LBS | DIASTOLIC BLOOD PRESSURE: 71 MMHG

## 2021-04-28 DIAGNOSIS — J45.50 SEVERE PERSISTENT ASTHMA WITHOUT COMPLICATION: Primary | ICD-10-CM

## 2021-04-28 DIAGNOSIS — R09.89 CHRONIC SINUS COMPLAINTS: ICD-10-CM

## 2021-04-28 PROCEDURE — 99499 UNLISTED E&M SERVICE: CPT | Mod: S$GLB,,, | Performed by: INTERNAL MEDICINE

## 2021-04-28 PROCEDURE — 1100F PR PT FALLS ASSESS DOC 2+ FALLS/FALL W/INJURY/YR: ICD-10-PCS | Mod: CPTII,S$GLB,, | Performed by: INTERNAL MEDICINE

## 2021-04-28 PROCEDURE — 1126F PR PAIN SEVERITY QUANTIFIED, NO PAIN PRESENT: ICD-10-PCS | Mod: S$GLB,,, | Performed by: INTERNAL MEDICINE

## 2021-04-28 PROCEDURE — 3288F PR FALLS RISK ASSESSMENT DOCUMENTED: ICD-10-PCS | Mod: CPTII,S$GLB,, | Performed by: INTERNAL MEDICINE

## 2021-04-28 PROCEDURE — 99213 OFFICE O/P EST LOW 20 MIN: CPT | Mod: S$GLB,,, | Performed by: INTERNAL MEDICINE

## 2021-04-28 PROCEDURE — 3288F FALL RISK ASSESSMENT DOCD: CPT | Mod: CPTII,S$GLB,, | Performed by: INTERNAL MEDICINE

## 2021-04-28 PROCEDURE — 1126F AMNT PAIN NOTED NONE PRSNT: CPT | Mod: S$GLB,,, | Performed by: INTERNAL MEDICINE

## 2021-04-28 PROCEDURE — 1159F MED LIST DOCD IN RCRD: CPT | Mod: S$GLB,,, | Performed by: INTERNAL MEDICINE

## 2021-04-28 PROCEDURE — 99999 PR PBB SHADOW E&M-EST. PATIENT-LVL IV: ICD-10-PCS | Mod: PBBFAC,,, | Performed by: INTERNAL MEDICINE

## 2021-04-28 PROCEDURE — 1159F PR MEDICATION LIST DOCUMENTED IN MEDICAL RECORD: ICD-10-PCS | Mod: S$GLB,,, | Performed by: INTERNAL MEDICINE

## 2021-04-28 PROCEDURE — 99499 RISK ADDL DX/OHS AUDIT: ICD-10-PCS | Mod: S$GLB,,, | Performed by: INTERNAL MEDICINE

## 2021-04-28 PROCEDURE — 1100F PTFALLS ASSESS-DOCD GE2>/YR: CPT | Mod: CPTII,S$GLB,, | Performed by: INTERNAL MEDICINE

## 2021-04-28 PROCEDURE — 99999 PR PBB SHADOW E&M-EST. PATIENT-LVL IV: CPT | Mod: PBBFAC,,, | Performed by: INTERNAL MEDICINE

## 2021-04-28 PROCEDURE — 99213 PR OFFICE/OUTPT VISIT, EST, LEVL III, 20-29 MIN: ICD-10-PCS | Mod: S$GLB,,, | Performed by: INTERNAL MEDICINE

## 2021-05-20 ENCOUNTER — LAB VISIT (OUTPATIENT)
Dept: LAB | Facility: HOSPITAL | Age: 81
End: 2021-05-20
Attending: FAMILY MEDICINE
Payer: MEDICARE

## 2021-05-20 DIAGNOSIS — E03.9 HYPOTHYROIDISM, UNSPECIFIED TYPE: ICD-10-CM

## 2021-05-20 LAB
ALBUMIN SERPL BCP-MCNC: 3.5 G/DL (ref 3.5–5.2)
ALP SERPL-CCNC: 67 U/L (ref 55–135)
ALT SERPL W/O P-5'-P-CCNC: 13 U/L (ref 10–44)
ANION GAP SERPL CALC-SCNC: 8 MMOL/L (ref 8–16)
AST SERPL-CCNC: 17 U/L (ref 10–40)
BILIRUB SERPL-MCNC: 1.4 MG/DL (ref 0.1–1)
BUN SERPL-MCNC: 14 MG/DL (ref 8–23)
CALCIUM SERPL-MCNC: 9 MG/DL (ref 8.7–10.5)
CHLORIDE SERPL-SCNC: 104 MMOL/L (ref 95–110)
CO2 SERPL-SCNC: 27 MMOL/L (ref 23–29)
CREAT SERPL-MCNC: 0.9 MG/DL (ref 0.5–1.4)
EST. GFR  (AFRICAN AMERICAN): >60 ML/MIN/1.73 M^2
EST. GFR  (NON AFRICAN AMERICAN): >60 ML/MIN/1.73 M^2
GLUCOSE SERPL-MCNC: 71 MG/DL (ref 70–110)
POTASSIUM SERPL-SCNC: 4.1 MMOL/L (ref 3.5–5.1)
PROT SERPL-MCNC: 6.8 G/DL (ref 6–8.4)
SODIUM SERPL-SCNC: 139 MMOL/L (ref 136–145)

## 2021-05-20 PROCEDURE — 80053 COMPREHEN METABOLIC PANEL: CPT | Performed by: FAMILY MEDICINE

## 2021-05-20 PROCEDURE — 84443 ASSAY THYROID STIM HORMONE: CPT | Performed by: FAMILY MEDICINE

## 2021-05-20 PROCEDURE — 36415 COLL VENOUS BLD VENIPUNCTURE: CPT | Mod: PO | Performed by: FAMILY MEDICINE

## 2021-05-20 PROCEDURE — 84439 ASSAY OF FREE THYROXINE: CPT | Performed by: FAMILY MEDICINE

## 2021-05-21 LAB
T4 FREE SERPL-MCNC: 1.16 NG/DL (ref 0.71–1.51)
TSH SERPL DL<=0.005 MIU/L-ACNC: 0.37 UIU/ML (ref 0.4–4)

## 2021-05-24 ENCOUNTER — TELEPHONE (OUTPATIENT)
Dept: FAMILY MEDICINE | Facility: CLINIC | Age: 81
End: 2021-05-24

## 2021-05-24 DIAGNOSIS — J45.51 SEVERE PERSISTENT ASTHMA WITH ACUTE EXACERBATION: ICD-10-CM

## 2021-05-24 DIAGNOSIS — E03.9 HYPOTHYROIDISM, UNSPECIFIED TYPE: ICD-10-CM

## 2021-05-24 RX ORDER — ALBUTEROL SULFATE 90 UG/1
2 POWDER, METERED RESPIRATORY (INHALATION) EVERY 4 HOURS PRN
Qty: 1 EACH | Refills: 11 | Status: SHIPPED | OUTPATIENT
Start: 2021-05-24 | End: 2021-11-24 | Stop reason: SDUPTHER

## 2021-05-25 DIAGNOSIS — I10 ESSENTIAL HYPERTENSION: ICD-10-CM

## 2021-05-27 ENCOUNTER — TELEPHONE (OUTPATIENT)
Dept: FAMILY MEDICINE | Facility: CLINIC | Age: 81
End: 2021-05-27

## 2021-05-27 DIAGNOSIS — I10 ESSENTIAL HYPERTENSION: ICD-10-CM

## 2021-05-29 RX ORDER — TERAZOSIN 5 MG/1
5 CAPSULE ORAL DAILY
Qty: 90 CAPSULE | Refills: 3 | Status: SHIPPED | OUTPATIENT
Start: 2021-05-29 | End: 2021-06-03 | Stop reason: SDUPTHER

## 2021-05-30 RX ORDER — LEVOTHYROXINE SODIUM 50 UG/1
TABLET ORAL
Qty: 30 TABLET | Refills: 3 | Status: SHIPPED | OUTPATIENT
Start: 2021-05-30 | End: 2022-06-07

## 2021-05-30 RX ORDER — LEVOTHYROXINE SODIUM 75 UG/1
TABLET ORAL
Qty: 60 TABLET | Refills: 3 | Status: SHIPPED | OUTPATIENT
Start: 2021-05-30 | End: 2022-06-07

## 2021-05-31 ENCOUNTER — TELEPHONE (OUTPATIENT)
Dept: FAMILY MEDICINE | Facility: CLINIC | Age: 81
End: 2021-05-31

## 2021-06-03 RX ORDER — TERAZOSIN 5 MG/1
5 CAPSULE ORAL DAILY
Qty: 90 CAPSULE | Refills: 3 | Status: SHIPPED | OUTPATIENT
Start: 2021-06-03 | End: 2022-07-12 | Stop reason: SDUPTHER

## 2021-06-07 ENCOUNTER — TELEPHONE (OUTPATIENT)
Dept: FAMILY MEDICINE | Facility: CLINIC | Age: 81
End: 2021-06-07

## 2021-07-09 ENCOUNTER — TELEPHONE (OUTPATIENT)
Dept: FAMILY MEDICINE | Facility: CLINIC | Age: 81
End: 2021-07-09

## 2021-08-10 ENCOUNTER — TELEPHONE (OUTPATIENT)
Dept: FAMILY MEDICINE | Facility: CLINIC | Age: 81
End: 2021-08-10

## 2021-08-10 DIAGNOSIS — R94.6 BORDERLINE ABNORMAL TFTS: Primary | ICD-10-CM

## 2021-08-19 ENCOUNTER — LAB VISIT (OUTPATIENT)
Dept: LAB | Facility: HOSPITAL | Age: 81
End: 2021-08-19
Attending: FAMILY MEDICINE
Payer: MEDICARE

## 2021-08-19 DIAGNOSIS — R94.6 BORDERLINE ABNORMAL TFTS: ICD-10-CM

## 2021-08-19 LAB
ALBUMIN SERPL BCP-MCNC: 3.6 G/DL (ref 3.5–5.2)
ALP SERPL-CCNC: 61 U/L (ref 55–135)
ALT SERPL W/O P-5'-P-CCNC: 18 U/L (ref 10–44)
ANION GAP SERPL CALC-SCNC: 9 MMOL/L (ref 8–16)
AST SERPL-CCNC: 21 U/L (ref 10–40)
BILIRUB SERPL-MCNC: 1.4 MG/DL (ref 0.1–1)
BUN SERPL-MCNC: 17 MG/DL (ref 8–23)
CALCIUM SERPL-MCNC: 9.4 MG/DL (ref 8.7–10.5)
CHLORIDE SERPL-SCNC: 104 MMOL/L (ref 95–110)
CO2 SERPL-SCNC: 25 MMOL/L (ref 23–29)
CREAT SERPL-MCNC: 1 MG/DL (ref 0.5–1.4)
EST. GFR  (AFRICAN AMERICAN): >60 ML/MIN/1.73 M^2
EST. GFR  (NON AFRICAN AMERICAN): >60 ML/MIN/1.73 M^2
GLUCOSE SERPL-MCNC: 100 MG/DL (ref 70–110)
POTASSIUM SERPL-SCNC: 4.2 MMOL/L (ref 3.5–5.1)
PROT SERPL-MCNC: 6.7 G/DL (ref 6–8.4)
SODIUM SERPL-SCNC: 138 MMOL/L (ref 136–145)
T4 FREE SERPL-MCNC: 0.88 NG/DL (ref 0.71–1.51)
TSH SERPL DL<=0.005 MIU/L-ACNC: 2.6 UIU/ML (ref 0.4–4)

## 2021-08-19 PROCEDURE — 80053 COMPREHEN METABOLIC PANEL: CPT | Performed by: FAMILY MEDICINE

## 2021-08-19 PROCEDURE — 84443 ASSAY THYROID STIM HORMONE: CPT | Performed by: FAMILY MEDICINE

## 2021-08-19 PROCEDURE — 84439 ASSAY OF FREE THYROXINE: CPT | Performed by: FAMILY MEDICINE

## 2021-08-19 PROCEDURE — 36415 COLL VENOUS BLD VENIPUNCTURE: CPT | Mod: PO | Performed by: FAMILY MEDICINE

## 2021-08-23 ENCOUNTER — PATIENT OUTREACH (OUTPATIENT)
Dept: ADMINISTRATIVE | Facility: OTHER | Age: 81
End: 2021-08-23

## 2021-08-26 ENCOUNTER — OFFICE VISIT (OUTPATIENT)
Dept: FAMILY MEDICINE | Facility: CLINIC | Age: 81
End: 2021-08-26
Payer: MEDICARE

## 2021-08-26 VITALS
SYSTOLIC BLOOD PRESSURE: 120 MMHG | RESPIRATION RATE: 18 BRPM | DIASTOLIC BLOOD PRESSURE: 60 MMHG | HEIGHT: 71 IN | TEMPERATURE: 99 F | WEIGHT: 175.5 LBS | HEART RATE: 62 BPM | BODY MASS INDEX: 24.57 KG/M2 | OXYGEN SATURATION: 95 %

## 2021-08-26 DIAGNOSIS — E06.3 HYPOTHYROIDISM DUE TO HASHIMOTO'S THYROIDITIS: ICD-10-CM

## 2021-08-26 DIAGNOSIS — I10 HYPERTENSION, UNSPECIFIED TYPE: Primary | ICD-10-CM

## 2021-08-26 DIAGNOSIS — R06.09 DOE (DYSPNEA ON EXERTION): ICD-10-CM

## 2021-08-26 DIAGNOSIS — J45.50 SEVERE PERSISTENT ASTHMA WITHOUT COMPLICATION: ICD-10-CM

## 2021-08-26 DIAGNOSIS — I48.0 PAROXYSMAL ATRIAL FIBRILLATION: ICD-10-CM

## 2021-08-26 DIAGNOSIS — N40.0 BENIGN PROSTATIC HYPERPLASIA WITHOUT LOWER URINARY TRACT SYMPTOMS: ICD-10-CM

## 2021-08-26 DIAGNOSIS — E03.8 HYPOTHYROIDISM DUE TO HASHIMOTO'S THYROIDITIS: ICD-10-CM

## 2021-08-26 DIAGNOSIS — E78.2 MIXED HYPERLIPIDEMIA: ICD-10-CM

## 2021-08-26 DIAGNOSIS — E06.3 HASHIMOTO'S THYROIDITIS: ICD-10-CM

## 2021-08-26 PROCEDURE — 3078F PR MOST RECENT DIASTOLIC BLOOD PRESSURE < 80 MM HG: ICD-10-PCS | Mod: CPTII,S$GLB,, | Performed by: FAMILY MEDICINE

## 2021-08-26 PROCEDURE — 1160F RVW MEDS BY RX/DR IN RCRD: CPT | Mod: CPTII,S$GLB,, | Performed by: FAMILY MEDICINE

## 2021-08-26 PROCEDURE — 3074F SYST BP LT 130 MM HG: CPT | Mod: CPTII,S$GLB,, | Performed by: FAMILY MEDICINE

## 2021-08-26 PROCEDURE — 3288F FALL RISK ASSESSMENT DOCD: CPT | Mod: CPTII,S$GLB,, | Performed by: FAMILY MEDICINE

## 2021-08-26 PROCEDURE — 1101F PT FALLS ASSESS-DOCD LE1/YR: CPT | Mod: CPTII,S$GLB,, | Performed by: FAMILY MEDICINE

## 2021-08-26 PROCEDURE — 3074F PR MOST RECENT SYSTOLIC BLOOD PRESSURE < 130 MM HG: ICD-10-PCS | Mod: CPTII,S$GLB,, | Performed by: FAMILY MEDICINE

## 2021-08-26 PROCEDURE — 99999 PR PBB SHADOW E&M-EST. PATIENT-LVL III: ICD-10-PCS | Mod: PBBFAC,,, | Performed by: FAMILY MEDICINE

## 2021-08-26 PROCEDURE — 1101F PR PT FALLS ASSESS DOC 0-1 FALLS W/OUT INJ PAST YR: ICD-10-PCS | Mod: CPTII,S$GLB,, | Performed by: FAMILY MEDICINE

## 2021-08-26 PROCEDURE — 99214 OFFICE O/P EST MOD 30 MIN: CPT | Mod: S$GLB,,, | Performed by: FAMILY MEDICINE

## 2021-08-26 PROCEDURE — 1126F AMNT PAIN NOTED NONE PRSNT: CPT | Mod: CPTII,S$GLB,, | Performed by: FAMILY MEDICINE

## 2021-08-26 PROCEDURE — 99999 PR PBB SHADOW E&M-EST. PATIENT-LVL III: CPT | Mod: PBBFAC,,, | Performed by: FAMILY MEDICINE

## 2021-08-26 PROCEDURE — 1160F PR REVIEW ALL MEDS BY PRESCRIBER/CLIN PHARMACIST DOCUMENTED: ICD-10-PCS | Mod: CPTII,S$GLB,, | Performed by: FAMILY MEDICINE

## 2021-08-26 PROCEDURE — 1159F MED LIST DOCD IN RCRD: CPT | Mod: CPTII,S$GLB,, | Performed by: FAMILY MEDICINE

## 2021-08-26 PROCEDURE — 1159F PR MEDICATION LIST DOCUMENTED IN MEDICAL RECORD: ICD-10-PCS | Mod: CPTII,S$GLB,, | Performed by: FAMILY MEDICINE

## 2021-08-26 PROCEDURE — 3078F DIAST BP <80 MM HG: CPT | Mod: CPTII,S$GLB,, | Performed by: FAMILY MEDICINE

## 2021-08-26 PROCEDURE — 1126F PR PAIN SEVERITY QUANTIFIED, NO PAIN PRESENT: ICD-10-PCS | Mod: CPTII,S$GLB,, | Performed by: FAMILY MEDICINE

## 2021-08-26 PROCEDURE — 99214 PR OFFICE/OUTPT VISIT, EST, LEVL IV, 30-39 MIN: ICD-10-PCS | Mod: S$GLB,,, | Performed by: FAMILY MEDICINE

## 2021-08-26 PROCEDURE — 3288F PR FALLS RISK ASSESSMENT DOCUMENTED: ICD-10-PCS | Mod: CPTII,S$GLB,, | Performed by: FAMILY MEDICINE

## 2021-08-27 ENCOUNTER — OFFICE VISIT (OUTPATIENT)
Dept: CARDIOLOGY | Facility: CLINIC | Age: 81
End: 2021-08-27
Payer: MEDICARE

## 2021-08-27 VITALS
HEART RATE: 75 BPM | BODY MASS INDEX: 24.51 KG/M2 | WEIGHT: 175.06 LBS | SYSTOLIC BLOOD PRESSURE: 124 MMHG | DIASTOLIC BLOOD PRESSURE: 75 MMHG | HEIGHT: 71 IN

## 2021-08-27 DIAGNOSIS — I10 HYPERTENSION, UNSPECIFIED TYPE: ICD-10-CM

## 2021-08-27 DIAGNOSIS — E78.2 MIXED HYPERLIPIDEMIA: ICD-10-CM

## 2021-08-27 DIAGNOSIS — R53.83 FATIGUE, UNSPECIFIED TYPE: ICD-10-CM

## 2021-08-27 DIAGNOSIS — I48.91 ATRIAL FIBRILLATION, UNSPECIFIED TYPE: Primary | ICD-10-CM

## 2021-08-27 PROCEDURE — 99999 PR PBB SHADOW E&M-EST. PATIENT-LVL IV: ICD-10-PCS | Mod: PBBFAC,,, | Performed by: INTERNAL MEDICINE

## 2021-08-27 PROCEDURE — 3288F PR FALLS RISK ASSESSMENT DOCUMENTED: ICD-10-PCS | Mod: CPTII,S$GLB,, | Performed by: INTERNAL MEDICINE

## 2021-08-27 PROCEDURE — 3078F PR MOST RECENT DIASTOLIC BLOOD PRESSURE < 80 MM HG: ICD-10-PCS | Mod: CPTII,S$GLB,, | Performed by: INTERNAL MEDICINE

## 2021-08-27 PROCEDURE — 1126F PR PAIN SEVERITY QUANTIFIED, NO PAIN PRESENT: ICD-10-PCS | Mod: CPTII,S$GLB,, | Performed by: INTERNAL MEDICINE

## 2021-08-27 PROCEDURE — 93005 ELECTROCARDIOGRAM TRACING: CPT | Mod: PO

## 2021-08-27 PROCEDURE — 1159F PR MEDICATION LIST DOCUMENTED IN MEDICAL RECORD: ICD-10-PCS | Mod: CPTII,S$GLB,, | Performed by: INTERNAL MEDICINE

## 2021-08-27 PROCEDURE — 99214 OFFICE O/P EST MOD 30 MIN: CPT | Mod: S$GLB,,, | Performed by: INTERNAL MEDICINE

## 2021-08-27 PROCEDURE — 93010 EKG 12-LEAD: ICD-10-PCS | Mod: S$GLB,,, | Performed by: INTERNAL MEDICINE

## 2021-08-27 PROCEDURE — 3074F PR MOST RECENT SYSTOLIC BLOOD PRESSURE < 130 MM HG: ICD-10-PCS | Mod: CPTII,S$GLB,, | Performed by: INTERNAL MEDICINE

## 2021-08-27 PROCEDURE — 1159F MED LIST DOCD IN RCRD: CPT | Mod: CPTII,S$GLB,, | Performed by: INTERNAL MEDICINE

## 2021-08-27 PROCEDURE — 3074F SYST BP LT 130 MM HG: CPT | Mod: CPTII,S$GLB,, | Performed by: INTERNAL MEDICINE

## 2021-08-27 PROCEDURE — 93010 ELECTROCARDIOGRAM REPORT: CPT | Mod: S$GLB,,, | Performed by: INTERNAL MEDICINE

## 2021-08-27 PROCEDURE — 1101F PR PT FALLS ASSESS DOC 0-1 FALLS W/OUT INJ PAST YR: ICD-10-PCS | Mod: CPTII,S$GLB,, | Performed by: INTERNAL MEDICINE

## 2021-08-27 PROCEDURE — 1126F AMNT PAIN NOTED NONE PRSNT: CPT | Mod: CPTII,S$GLB,, | Performed by: INTERNAL MEDICINE

## 2021-08-27 PROCEDURE — 1101F PT FALLS ASSESS-DOCD LE1/YR: CPT | Mod: CPTII,S$GLB,, | Performed by: INTERNAL MEDICINE

## 2021-08-27 PROCEDURE — 3288F FALL RISK ASSESSMENT DOCD: CPT | Mod: CPTII,S$GLB,, | Performed by: INTERNAL MEDICINE

## 2021-08-27 PROCEDURE — 99214 PR OFFICE/OUTPT VISIT, EST, LEVL IV, 30-39 MIN: ICD-10-PCS | Mod: S$GLB,,, | Performed by: INTERNAL MEDICINE

## 2021-08-27 PROCEDURE — 3078F DIAST BP <80 MM HG: CPT | Mod: CPTII,S$GLB,, | Performed by: INTERNAL MEDICINE

## 2021-08-27 PROCEDURE — 99999 PR PBB SHADOW E&M-EST. PATIENT-LVL IV: CPT | Mod: PBBFAC,,, | Performed by: INTERNAL MEDICINE

## 2021-09-21 ENCOUNTER — CLINICAL SUPPORT (OUTPATIENT)
Dept: CARDIOLOGY | Facility: HOSPITAL | Age: 81
End: 2021-09-21
Attending: INTERNAL MEDICINE
Payer: MEDICARE

## 2021-09-21 VITALS — BODY MASS INDEX: 24.5 KG/M2 | HEIGHT: 71 IN | WEIGHT: 175 LBS

## 2021-09-21 DIAGNOSIS — I48.91 ATRIAL FIBRILLATION, UNSPECIFIED TYPE: ICD-10-CM

## 2021-09-21 PROCEDURE — 93227 HOLTER MONITOR - 48 HOUR (CUPID ONLY): ICD-10-PCS | Mod: ,,, | Performed by: INTERNAL MEDICINE

## 2021-09-21 PROCEDURE — 93226 XTRNL ECG REC<48 HR SCAN A/R: CPT | Mod: PO

## 2021-09-21 PROCEDURE — 93306 ECHO (CUPID ONLY): ICD-10-PCS | Mod: 26,,, | Performed by: INTERNAL MEDICINE

## 2021-09-21 PROCEDURE — 93306 TTE W/DOPPLER COMPLETE: CPT | Mod: PO

## 2021-09-21 PROCEDURE — 93227 XTRNL ECG REC<48 HR R&I: CPT | Mod: ,,, | Performed by: INTERNAL MEDICINE

## 2021-09-21 PROCEDURE — 93306 TTE W/DOPPLER COMPLETE: CPT | Mod: 26,,, | Performed by: INTERNAL MEDICINE

## 2021-09-23 LAB
ASCENDING AORTA: 3.55 CM
AV INDEX (PROSTH): 0.76
AV MEAN GRADIENT: 4 MMHG
AV PEAK GRADIENT: 7 MMHG
AV VALVE AREA: 2.77 CM2
AV VELOCITY RATIO: 0.76
BSA FOR ECHO PROCEDURE: 1.99 M2
CV ECHO LV RWT: 0.33 CM
DOP CALC AO PEAK VEL: 1.36 M/S
DOP CALC AO VTI: 33.61 CM
DOP CALC LVOT AREA: 3.7 CM2
DOP CALC LVOT DIAMETER: 2.16 CM
DOP CALC LVOT PEAK VEL: 1.04 M/S
DOP CALC LVOT STROKE VOLUME: 93.21 CM3
DOP CALCLVOT PEAK VEL VTI: 25.45 CM
E WAVE DECELERATION TIME: 144.62 MSEC
E/A RATIO: 1.18
E/E' RATIO: 11.25 M/S
ECHO LV POSTERIOR WALL: 0.77 CM (ref 0.6–1.1)
EJECTION FRACTION: 70 %
FRACTIONAL SHORTENING: 43 % (ref 28–44)
INTERVENTRICULAR SEPTUM: 0.77 CM (ref 0.6–1.1)
LA MAJOR: 4.92 CM
LA MINOR: 4.55 CM
LA WIDTH: 3.94 CM
LEFT ATRIUM SIZE: 2.69 CM
LEFT ATRIUM VOLUME INDEX: 21.4 ML/M2
LEFT ATRIUM VOLUME: 42.59 CM3
LEFT INTERNAL DIMENSION IN SYSTOLE: 2.64 CM (ref 2.1–4)
LEFT VENTRICLE DIASTOLIC VOLUME INDEX: 50.29 ML/M2
LEFT VENTRICLE DIASTOLIC VOLUME: 100.08 ML
LEFT VENTRICLE MASS INDEX: 57 G/M2
LEFT VENTRICLE SYSTOLIC VOLUME INDEX: 12.8 ML/M2
LEFT VENTRICLE SYSTOLIC VOLUME: 25.47 ML
LEFT VENTRICULAR INTERNAL DIMENSION IN DIASTOLE: 4.65 CM (ref 3.5–6)
LEFT VENTRICULAR MASS: 114.28 G
LV LATERAL E/E' RATIO: 11.25 M/S
LV SEPTAL E/E' RATIO: 11.25 M/S
MV A" WAVE DURATION": 17.13 MSEC
MV PEAK A VEL: 0.76 M/S
MV PEAK E VEL: 0.9 M/S
MV STENOSIS PRESSURE HALF TIME: 41.94 MS
MV VALVE AREA P 1/2 METHOD: 5.25 CM2
PISA TR MAX VEL: 2.62 M/S
PULM VEIN S/D RATIO: 0.79
PV PEAK D VEL: 0.42 M/S
PV PEAK S VEL: 0.33 M/S
RA MAJOR: 4.22 CM
RA PRESSURE: 3 MMHG
RA WIDTH: 3.63 CM
RIGHT VENTRICULAR END-DIASTOLIC DIMENSION: 4.52 CM
RV TISSUE DOPPLER FREE WALL SYSTOLIC VELOCITY 1 (APICAL 4 CHAMBER VIEW): 14.43 CM/S
SINUS: 3.66 CM
STJ: 3.24 CM
TDI LATERAL: 0.08 M/S
TDI SEPTAL: 0.08 M/S
TDI: 0.08 M/S
TR MAX PG: 27 MMHG
TRICUSPID ANNULAR PLANE SYSTOLIC EXCURSION: 2.69 CM
TV REST PULMONARY ARTERY PRESSURE: 30 MMHG

## 2021-09-24 ENCOUNTER — TELEPHONE (OUTPATIENT)
Dept: CARDIOLOGY | Facility: CLINIC | Age: 81
End: 2021-09-24

## 2021-09-24 LAB
OHS CV EVENT MONITOR DAY: 0
OHS CV HOLTER LENGTH DECIMAL HOURS: 48
OHS CV HOLTER LENGTH HOURS: 48
OHS CV HOLTER LENGTH MINUTES: 0
OHS CV HOLTER SINUS AVERAGE HR: 57
OHS CV HOLTER SINUS MAX HR: 107
OHS CV HOLTER SINUS MIN HR: 40

## 2021-09-28 ENCOUNTER — OFFICE VISIT (OUTPATIENT)
Dept: CARDIOLOGY | Facility: CLINIC | Age: 81
End: 2021-09-28
Payer: MEDICARE

## 2021-09-28 VITALS
BODY MASS INDEX: 24.47 KG/M2 | HEART RATE: 52 BPM | HEIGHT: 71 IN | SYSTOLIC BLOOD PRESSURE: 179 MMHG | WEIGHT: 174.81 LBS | DIASTOLIC BLOOD PRESSURE: 86 MMHG

## 2021-09-28 DIAGNOSIS — E78.2 MIXED HYPERLIPIDEMIA: ICD-10-CM

## 2021-09-28 DIAGNOSIS — I10 HYPERTENSION, UNSPECIFIED TYPE: Primary | ICD-10-CM

## 2021-09-28 DIAGNOSIS — I48.0 PAROXYSMAL ATRIAL FIBRILLATION: ICD-10-CM

## 2021-09-28 PROCEDURE — 1159F MED LIST DOCD IN RCRD: CPT | Mod: CPTII,S$GLB,, | Performed by: INTERNAL MEDICINE

## 2021-09-28 PROCEDURE — 3079F PR MOST RECENT DIASTOLIC BLOOD PRESSURE 80-89 MM HG: ICD-10-PCS | Mod: CPTII,S$GLB,, | Performed by: INTERNAL MEDICINE

## 2021-09-28 PROCEDURE — 1126F AMNT PAIN NOTED NONE PRSNT: CPT | Mod: CPTII,S$GLB,, | Performed by: INTERNAL MEDICINE

## 2021-09-28 PROCEDURE — 1159F PR MEDICATION LIST DOCUMENTED IN MEDICAL RECORD: ICD-10-PCS | Mod: CPTII,S$GLB,, | Performed by: INTERNAL MEDICINE

## 2021-09-28 PROCEDURE — 1101F PT FALLS ASSESS-DOCD LE1/YR: CPT | Mod: CPTII,S$GLB,, | Performed by: INTERNAL MEDICINE

## 2021-09-28 PROCEDURE — 3288F FALL RISK ASSESSMENT DOCD: CPT | Mod: CPTII,S$GLB,, | Performed by: INTERNAL MEDICINE

## 2021-09-28 PROCEDURE — 3288F PR FALLS RISK ASSESSMENT DOCUMENTED: ICD-10-PCS | Mod: CPTII,S$GLB,, | Performed by: INTERNAL MEDICINE

## 2021-09-28 PROCEDURE — 99999 PR PBB SHADOW E&M-EST. PATIENT-LVL III: CPT | Mod: PBBFAC,,, | Performed by: INTERNAL MEDICINE

## 2021-09-28 PROCEDURE — 3077F PR MOST RECENT SYSTOLIC BLOOD PRESSURE >= 140 MM HG: ICD-10-PCS | Mod: CPTII,S$GLB,, | Performed by: INTERNAL MEDICINE

## 2021-09-28 PROCEDURE — 3079F DIAST BP 80-89 MM HG: CPT | Mod: CPTII,S$GLB,, | Performed by: INTERNAL MEDICINE

## 2021-09-28 PROCEDURE — 1101F PR PT FALLS ASSESS DOC 0-1 FALLS W/OUT INJ PAST YR: ICD-10-PCS | Mod: CPTII,S$GLB,, | Performed by: INTERNAL MEDICINE

## 2021-09-28 PROCEDURE — 99999 PR PBB SHADOW E&M-EST. PATIENT-LVL III: ICD-10-PCS | Mod: PBBFAC,,, | Performed by: INTERNAL MEDICINE

## 2021-09-28 PROCEDURE — 3077F SYST BP >= 140 MM HG: CPT | Mod: CPTII,S$GLB,, | Performed by: INTERNAL MEDICINE

## 2021-09-28 PROCEDURE — 99214 PR OFFICE/OUTPT VISIT, EST, LEVL IV, 30-39 MIN: ICD-10-PCS | Mod: S$GLB,,, | Performed by: INTERNAL MEDICINE

## 2021-09-28 PROCEDURE — 99214 OFFICE O/P EST MOD 30 MIN: CPT | Mod: S$GLB,,, | Performed by: INTERNAL MEDICINE

## 2021-09-28 PROCEDURE — 1126F PR PAIN SEVERITY QUANTIFIED, NO PAIN PRESENT: ICD-10-PCS | Mod: CPTII,S$GLB,, | Performed by: INTERNAL MEDICINE

## 2021-09-30 ENCOUNTER — TELEPHONE (OUTPATIENT)
Dept: FAMILY MEDICINE | Facility: CLINIC | Age: 81
End: 2021-09-30

## 2021-10-11 ENCOUNTER — HOSPITAL ENCOUNTER (OUTPATIENT)
Dept: PULMONOLOGY | Facility: HOSPITAL | Age: 81
Discharge: HOME | End: 2021-10-11
Attending: FAMILY MEDICINE
Payer: MEDICARE

## 2021-10-11 DIAGNOSIS — R06.09 DOE (DYSPNEA ON EXERTION): ICD-10-CM

## 2021-10-11 LAB
BRPFT: NORMAL
DLCO ADJ PRE: 15.76 ML/(MIN*MMHG)
DLCO SINGLE BREATH LLN: 18.42
DLCO SINGLE BREATH PRE REF: 62.1 %
DLCO SINGLE BREATH REF: 25.35
DLCOC SBVA LLN: 2.37
DLCOC SBVA PRE REF: 112.6 %
DLCOC SBVA REF: 3.47
DLCOC SINGLE BREATH LLN: 18.42
DLCOC SINGLE BREATH PRE REF: 62.1 %
DLCOC SINGLE BREATH REF: 25.35
DLCOVA LLN: 2.37
DLCOVA PRE REF: 112.6 %
DLCOVA PRE: 3.91 ML/(MIN*MMHG*L)
DLCOVA REF: 3.47
DLVAADJ PRE: 3.91 ML/(MIN*MMHG*L)
ERVN2 LLN: -16449.08
ERVN2 PRE REF: 34.9 %
ERVN2 PRE: 0.32 L
ERVN2 REF: 0.91
FEF 25 75 CHG: -10.2 %
FEF 25 75 LLN: 0.7
FEF 25 75 POST REF: 34 %
FEF 25 75 PRE REF: 37.9 %
FEF 25 75 REF: 1.95
FET100 CHG: 45.8 %
FEV1 CHG: 3.4 %
FEV1 FVC CHG: -5.4 %
FEV1 FVC LLN: 59
FEV1 FVC POST REF: 85.1 %
FEV1 FVC PRE REF: 89.9 %
FEV1 FVC REF: 74
FEV1 LLN: 1.95
FEV1 POST REF: 57.7 %
FEV1 PRE REF: 55.8 %
FEV1 REF: 2.85
FRCN2 LLN: 2.88
FRCN2 PRE REF: 62.9 %
FRCN2 REF: 3.87
FVC CHG: 9.3 %
FVC LLN: 2.78
FVC POST REF: 67 %
FVC PRE REF: 61.3 %
FVC REF: 3.89
IVC PRE: 2.38 L
IVC SINGLE BREATH LLN: 2.78
IVC SINGLE BREATH PRE REF: 61.1 %
IVC SINGLE BREATH REF: 3.89
MVV LLN: 97
MVV PRE REF: 55.4 %
MVV REF: 114
PEF CHG: 6.8 %
PEF LLN: 4.67
PEF POST REF: 76.9 %
PEF PRE REF: 72 %
PEF REF: 7.04
POST FEF 25 75: 0.66 L/S
POST FET 100: 10.65 SEC
POST FEV1 FVC: 63.03 %
POST FEV1: 1.65 L
POST FVC: 2.61 L
POST PEF: 5.42 L/S
PRE DLCO: 15.76 ML/(MIN*MMHG)
PRE FEF 25 75: 0.74 L/S
PRE FET 100: 7.3 SEC
PRE FEV1 FVC: 66.6 %
PRE FEV1: 1.59 L
PRE FRC N2: 2.43 L
PRE FVC: 2.39 L
PRE MVV: 63 L/MIN
PRE PEF: 5.07 L/S
RVN2 LLN: 2.28
RVN2 PRE REF: 67.2 %
RVN2 PRE: 1.99 L
RVN2 REF: 2.95
RVN2TLCN2 LLN: 37.35
RVN2TLCN2 PRE REF: 97.3 %
RVN2TLCN2 PRE: 45.1 %
RVN2TLCN2 REF: 46.33
TLCN2 LLN: 6.15
TLCN2 PRE REF: 60.3 %
TLCN2 PRE: 4.4 L
TLCN2 REF: 7.3
VA PRE: 4.03 L
VA SINGLE BREATH LLN: 7.15
VA SINGLE BREATH PRE REF: 56.4 %
VA SINGLE BREATH REF: 7.15
VCMAXN2 LLN: 2.78
VCMAXN2 PRE REF: 62.1 %
VCMAXN2 PRE: 2.42 L
VCMAXN2 REF: 3.89

## 2021-10-11 PROCEDURE — 94729 DIFFUSING CAPACITY: CPT

## 2021-10-11 PROCEDURE — 94727 GAS DIL/WSHOT DETER LNG VOL: CPT | Mod: 26,,, | Performed by: INTERNAL MEDICINE

## 2021-10-11 PROCEDURE — 94727 GAS DIL/WSHOT DETER LNG VOL: CPT

## 2021-10-11 PROCEDURE — 94060 EVALUATION OF WHEEZING: CPT | Mod: 26,,, | Performed by: INTERNAL MEDICINE

## 2021-10-11 PROCEDURE — 94727 PR PULM FUNCTION TEST BY GAS: ICD-10-PCS | Mod: 26,,, | Performed by: INTERNAL MEDICINE

## 2021-10-11 PROCEDURE — 94729 DIFFUSING CAPACITY: CPT | Mod: 26,,, | Performed by: INTERNAL MEDICINE

## 2021-10-11 PROCEDURE — 94729 PR C02/MEMBANE DIFFUSE CAPACITY: ICD-10-PCS | Mod: 26,,, | Performed by: INTERNAL MEDICINE

## 2021-10-11 PROCEDURE — 94060 EVALUATION OF WHEEZING: CPT

## 2021-10-11 PROCEDURE — 94060 PR EVAL OF BRONCHOSPASM: ICD-10-PCS | Mod: 26,,, | Performed by: INTERNAL MEDICINE

## 2021-10-12 ENCOUNTER — TELEPHONE (OUTPATIENT)
Dept: CARDIOLOGY | Facility: CLINIC | Age: 81
End: 2021-10-12

## 2021-10-14 ENCOUNTER — TELEPHONE (OUTPATIENT)
Dept: FAMILY MEDICINE | Facility: CLINIC | Age: 81
End: 2021-10-14

## 2021-10-18 ENCOUNTER — TELEPHONE (OUTPATIENT)
Dept: PULMONOLOGY | Facility: CLINIC | Age: 81
End: 2021-10-18

## 2021-10-25 ENCOUNTER — OFFICE VISIT (OUTPATIENT)
Dept: PULMONOLOGY | Facility: CLINIC | Age: 81
End: 2021-10-25
Payer: MEDICARE

## 2021-10-25 ENCOUNTER — PATIENT OUTREACH (OUTPATIENT)
Dept: ADMINISTRATIVE | Facility: OTHER | Age: 81
End: 2021-10-25
Payer: MEDICARE

## 2021-10-25 VITALS
HEART RATE: 56 BPM | SYSTOLIC BLOOD PRESSURE: 142 MMHG | BODY MASS INDEX: 24.6 KG/M2 | WEIGHT: 175.69 LBS | OXYGEN SATURATION: 95 % | HEIGHT: 71 IN | DIASTOLIC BLOOD PRESSURE: 68 MMHG

## 2021-10-25 DIAGNOSIS — J45.50 SEVERE PERSISTENT ASTHMA WITHOUT COMPLICATION: ICD-10-CM

## 2021-10-25 DIAGNOSIS — G47.10 HYPERSOMNOLENCE DISORDER: ICD-10-CM

## 2021-10-25 DIAGNOSIS — E06.3 HASHIMOTO'S THYROIDITIS: ICD-10-CM

## 2021-10-25 DIAGNOSIS — Z12.5 ENCOUNTER FOR SCREENING FOR MALIGNANT NEOPLASM OF PROSTATE: ICD-10-CM

## 2021-10-25 DIAGNOSIS — R53.83 FATIGUE, UNSPECIFIED TYPE: Primary | ICD-10-CM

## 2021-10-25 DIAGNOSIS — N40.0 PROSTATISM: ICD-10-CM

## 2021-10-25 DIAGNOSIS — Z86.39 PERSONAL HISTORY OF OTHER ENDOCRINE, NUTRITIONAL AND METABOLIC DISEASE: ICD-10-CM

## 2021-10-25 PROCEDURE — 99999 PR PBB SHADOW E&M-EST. PATIENT-LVL IV: CPT | Mod: PBBFAC,,, | Performed by: INTERNAL MEDICINE

## 2021-10-25 PROCEDURE — 1101F PT FALLS ASSESS-DOCD LE1/YR: CPT | Mod: CPTII,S$GLB,, | Performed by: INTERNAL MEDICINE

## 2021-10-25 PROCEDURE — 99499 UNLISTED E&M SERVICE: CPT | Mod: S$GLB,,, | Performed by: INTERNAL MEDICINE

## 2021-10-25 PROCEDURE — 3077F SYST BP >= 140 MM HG: CPT | Mod: CPTII,S$GLB,, | Performed by: INTERNAL MEDICINE

## 2021-10-25 PROCEDURE — 99999 PR PBB SHADOW E&M-EST. PATIENT-LVL IV: ICD-10-PCS | Mod: PBBFAC,,, | Performed by: INTERNAL MEDICINE

## 2021-10-25 PROCEDURE — 1159F MED LIST DOCD IN RCRD: CPT | Mod: CPTII,S$GLB,, | Performed by: INTERNAL MEDICINE

## 2021-10-25 PROCEDURE — 99213 OFFICE O/P EST LOW 20 MIN: CPT | Mod: S$GLB,,, | Performed by: INTERNAL MEDICINE

## 2021-10-25 PROCEDURE — 1126F PR PAIN SEVERITY QUANTIFIED, NO PAIN PRESENT: ICD-10-PCS | Mod: CPTII,S$GLB,, | Performed by: INTERNAL MEDICINE

## 2021-10-25 PROCEDURE — 3288F FALL RISK ASSESSMENT DOCD: CPT | Mod: CPTII,S$GLB,, | Performed by: INTERNAL MEDICINE

## 2021-10-25 PROCEDURE — 99499 RISK ADDL DX/OHS AUDIT: ICD-10-PCS | Mod: S$GLB,,, | Performed by: INTERNAL MEDICINE

## 2021-10-25 PROCEDURE — 3288F PR FALLS RISK ASSESSMENT DOCUMENTED: ICD-10-PCS | Mod: CPTII,S$GLB,, | Performed by: INTERNAL MEDICINE

## 2021-10-25 PROCEDURE — 3078F PR MOST RECENT DIASTOLIC BLOOD PRESSURE < 80 MM HG: ICD-10-PCS | Mod: CPTII,S$GLB,, | Performed by: INTERNAL MEDICINE

## 2021-10-25 PROCEDURE — 3078F DIAST BP <80 MM HG: CPT | Mod: CPTII,S$GLB,, | Performed by: INTERNAL MEDICINE

## 2021-10-25 PROCEDURE — 1101F PR PT FALLS ASSESS DOC 0-1 FALLS W/OUT INJ PAST YR: ICD-10-PCS | Mod: CPTII,S$GLB,, | Performed by: INTERNAL MEDICINE

## 2021-10-25 PROCEDURE — 1126F AMNT PAIN NOTED NONE PRSNT: CPT | Mod: CPTII,S$GLB,, | Performed by: INTERNAL MEDICINE

## 2021-10-25 PROCEDURE — 99213 PR OFFICE/OUTPT VISIT, EST, LEVL III, 20-29 MIN: ICD-10-PCS | Mod: S$GLB,,, | Performed by: INTERNAL MEDICINE

## 2021-10-25 PROCEDURE — 3077F PR MOST RECENT SYSTOLIC BLOOD PRESSURE >= 140 MM HG: ICD-10-PCS | Mod: CPTII,S$GLB,, | Performed by: INTERNAL MEDICINE

## 2021-10-25 PROCEDURE — 1159F PR MEDICATION LIST DOCUMENTED IN MEDICAL RECORD: ICD-10-PCS | Mod: CPTII,S$GLB,, | Performed by: INTERNAL MEDICINE

## 2021-10-26 ENCOUNTER — HOSPITAL ENCOUNTER (OUTPATIENT)
Dept: RADIOLOGY | Facility: HOSPITAL | Age: 81
Discharge: HOME | End: 2021-10-26
Attending: INTERNAL MEDICINE
Payer: MEDICARE

## 2021-10-26 ENCOUNTER — TELEPHONE (OUTPATIENT)
Dept: PULMONOLOGY | Facility: CLINIC | Age: 81
End: 2021-10-26
Payer: MEDICARE

## 2021-10-26 DIAGNOSIS — R53.83 FATIGUE, UNSPECIFIED TYPE: ICD-10-CM

## 2021-10-26 PROCEDURE — 71046 X-RAY EXAM CHEST 2 VIEWS: CPT | Mod: 26,,, | Performed by: RADIOLOGY

## 2021-10-26 PROCEDURE — 71046 X-RAY EXAM CHEST 2 VIEWS: CPT | Mod: TC,FY

## 2021-10-26 PROCEDURE — 71046 XR CHEST PA AND LATERAL: ICD-10-PCS | Mod: 26,,, | Performed by: RADIOLOGY

## 2021-10-27 ENCOUNTER — TELEPHONE (OUTPATIENT)
Dept: PULMONOLOGY | Facility: CLINIC | Age: 81
End: 2021-10-27
Payer: MEDICARE

## 2021-11-02 ENCOUNTER — TELEPHONE (OUTPATIENT)
Dept: PULMONOLOGY | Facility: CLINIC | Age: 81
End: 2021-11-02
Payer: MEDICARE

## 2021-11-02 RX ORDER — MODAFINIL 100 MG/1
200 TABLET ORAL DAILY
Qty: 30 TABLET | Refills: 0 | Status: SHIPPED | OUTPATIENT
Start: 2021-11-02 | End: 2021-12-06 | Stop reason: SDUPTHER

## 2021-11-08 ENCOUNTER — IMMUNIZATION (OUTPATIENT)
Dept: PRIMARY CARE CLINIC | Facility: CLINIC | Age: 81
End: 2021-11-08
Payer: MEDICARE

## 2021-11-08 DIAGNOSIS — Z23 NEED FOR VACCINATION: Primary | ICD-10-CM

## 2021-11-08 PROCEDURE — 0013A PR IMMUNIZ ADMIN, SARS-COV-2 COVID-19 VACC, 100MCG/0.5ML, 3RD DOSE: ICD-10-PCS | Mod: S$GLB,,, | Performed by: FAMILY MEDICINE

## 2021-11-08 PROCEDURE — 91301 COVID-19, MRNA, LNP-S, PF, 100 MCG/0.5 ML DOSE VACCINE: ICD-10-PCS | Mod: S$GLB,,, | Performed by: FAMILY MEDICINE

## 2021-11-08 PROCEDURE — 91301 COVID-19, MRNA, LNP-S, PF, 100 MCG/0.5 ML DOSE VACCINE: CPT | Mod: S$GLB,,, | Performed by: FAMILY MEDICINE

## 2021-11-08 PROCEDURE — 0013A PR IMMUNIZ ADMIN, SARS-COV-2 COVID-19 VACC, 100MCG/0.5ML, 3RD DOSE: CPT | Mod: S$GLB,,, | Performed by: FAMILY MEDICINE

## 2021-11-12 DIAGNOSIS — J82.83 EOSINOPHILIC ASTHMA: ICD-10-CM

## 2021-11-12 DIAGNOSIS — Z79.52 SEVERE PERSISTENT ASTHMA DEPENDENT ON SYSTEMIC STEROIDS WITH ACUTE EXACERBATION: ICD-10-CM

## 2021-11-12 DIAGNOSIS — J45.51 SEVERE PERSISTENT ASTHMA DEPENDENT ON SYSTEMIC STEROIDS WITH ACUTE EXACERBATION: ICD-10-CM

## 2021-11-15 RX ORDER — BENRALIZUMAB 30 MG/ML
1 INJECTION, SOLUTION SUBCUTANEOUS
Qty: 1 ML | Refills: 6 | OUTPATIENT
Start: 2021-11-15 | End: 2021-12-06 | Stop reason: SDUPTHER

## 2021-11-18 ENCOUNTER — SPECIALTY PHARMACY (OUTPATIENT)
Dept: PHARMACY | Facility: CLINIC | Age: 81
End: 2021-11-18
Payer: MEDICARE

## 2021-11-18 NOTE — TELEPHONE ENCOUNTER
Milton, this is Alf Medina with [facility].  We are working on your prescription that your doctor has sent us. We will be working with your insurance to get this approved for you. We will be calling you along the way with updates on your medication.  If you have any questions, you can reach us at [phone].    Welcome call outcome: Left voicemail

## 2021-11-19 ENCOUNTER — PES CALL (OUTPATIENT)
Dept: ADMINISTRATIVE | Facility: CLINIC | Age: 81
End: 2021-11-19
Payer: MEDICARE

## 2021-11-23 NOTE — TELEPHONE ENCOUNTER
"First PA coming back with an N/A decision due to the "alloted submission time being ." Will resubmit   "

## 2021-11-24 ENCOUNTER — TELEPHONE (OUTPATIENT)
Dept: PULMONOLOGY | Facility: CLINIC | Age: 81
End: 2021-11-24
Payer: MEDICARE

## 2021-11-24 DIAGNOSIS — J45.50 SEVERE PERSISTENT ASTHMA WITHOUT COMPLICATION: ICD-10-CM

## 2021-11-24 DIAGNOSIS — J45.51 SEVERE PERSISTENT ASTHMA WITH ACUTE EXACERBATION: ICD-10-CM

## 2021-11-24 DIAGNOSIS — J01.00 ACUTE NON-RECURRENT MAXILLARY SINUSITIS: ICD-10-CM

## 2021-11-24 RX ORDER — AZITHROMYCIN 500 MG/1
TABLET, FILM COATED ORAL
Qty: 3 TABLET | Refills: 3 | Status: SHIPPED | OUTPATIENT
Start: 2021-11-24 | End: 2021-12-02 | Stop reason: ALTCHOICE

## 2021-11-24 RX ORDER — BUDESONIDE AND FORMOTEROL FUMARATE DIHYDRATE 160; 4.5 UG/1; UG/1
2 AEROSOL RESPIRATORY (INHALATION) EVERY 12 HOURS
Qty: 30.6 G | Refills: 3 | Status: SHIPPED | OUTPATIENT
Start: 2021-11-24 | End: 2021-12-06 | Stop reason: SDUPTHER

## 2021-11-24 RX ORDER — ALBUTEROL SULFATE 90 UG/1
2 POWDER, METERED RESPIRATORY (INHALATION) EVERY 4 HOURS PRN
Qty: 1 EACH | Refills: 11 | Status: SHIPPED | OUTPATIENT
Start: 2021-11-24 | End: 2021-12-06 | Stop reason: SDUPTHER

## 2021-11-24 RX ORDER — PREDNISONE 20 MG/1
TABLET ORAL
Qty: 21 TABLET | Refills: 1 | Status: SHIPPED | OUTPATIENT
Start: 2021-11-24 | End: 2022-04-22

## 2021-11-24 NOTE — TELEPHONE ENCOUNTER
PA denied d/t patient having no maintenance inhaler. Inbasket sent to physician to clarify    New PA Submitted  CMM Key: [id number]

## 2021-12-02 ENCOUNTER — TELEPHONE (OUTPATIENT)
Dept: PULMONOLOGY | Facility: CLINIC | Age: 81
End: 2021-12-02
Payer: MEDICARE

## 2021-12-02 ENCOUNTER — OFFICE VISIT (OUTPATIENT)
Dept: FAMILY MEDICINE | Facility: CLINIC | Age: 81
End: 2021-12-02
Payer: MEDICARE

## 2021-12-02 VITALS
HEIGHT: 71 IN | HEART RATE: 59 BPM | DIASTOLIC BLOOD PRESSURE: 80 MMHG | TEMPERATURE: 99 F | RESPIRATION RATE: 20 BRPM | OXYGEN SATURATION: 95 % | SYSTOLIC BLOOD PRESSURE: 148 MMHG | WEIGHT: 173.94 LBS | BODY MASS INDEX: 24.35 KG/M2

## 2021-12-02 DIAGNOSIS — E06.3 HASHIMOTO'S THYROIDITIS: ICD-10-CM

## 2021-12-02 DIAGNOSIS — Z23 FLU VACCINE NEED: ICD-10-CM

## 2021-12-02 DIAGNOSIS — I48.0 PAROXYSMAL ATRIAL FIBRILLATION: ICD-10-CM

## 2021-12-02 DIAGNOSIS — E06.3 HYPOTHYROIDISM DUE TO HASHIMOTO'S THYROIDITIS: ICD-10-CM

## 2021-12-02 DIAGNOSIS — I10 HYPERTENSION, UNSPECIFIED TYPE: Primary | ICD-10-CM

## 2021-12-02 DIAGNOSIS — E03.8 HYPOTHYROIDISM DUE TO HASHIMOTO'S THYROIDITIS: ICD-10-CM

## 2021-12-02 DIAGNOSIS — N40.0 BENIGN PROSTATIC HYPERPLASIA WITHOUT LOWER URINARY TRACT SYMPTOMS: ICD-10-CM

## 2021-12-02 DIAGNOSIS — E78.2 MIXED HYPERLIPIDEMIA: ICD-10-CM

## 2021-12-02 DIAGNOSIS — J45.50 SEVERE PERSISTENT ASTHMA WITHOUT COMPLICATION: ICD-10-CM

## 2021-12-02 PROCEDURE — 99214 OFFICE O/P EST MOD 30 MIN: CPT | Mod: 25,S$GLB,, | Performed by: FAMILY MEDICINE

## 2021-12-02 PROCEDURE — 99999 PR PBB SHADOW E&M-EST. PATIENT-LVL III: CPT | Mod: PBBFAC,,, | Performed by: FAMILY MEDICINE

## 2021-12-02 PROCEDURE — G0008 ADMIN INFLUENZA VIRUS VAC: HCPCS | Mod: S$GLB,,, | Performed by: FAMILY MEDICINE

## 2021-12-02 PROCEDURE — G0008 FLU VACCINE - QUADRIVALENT - ADJUVANTED: ICD-10-PCS | Mod: S$GLB,,, | Performed by: FAMILY MEDICINE

## 2021-12-02 PROCEDURE — 99999 PR PBB SHADOW E&M-EST. PATIENT-LVL III: ICD-10-PCS | Mod: PBBFAC,,, | Performed by: FAMILY MEDICINE

## 2021-12-02 PROCEDURE — 90694 FLU VACCINE - QUADRIVALENT - ADJUVANTED: ICD-10-PCS | Mod: S$GLB,,, | Performed by: FAMILY MEDICINE

## 2021-12-02 PROCEDURE — 90694 VACC AIIV4 NO PRSRV 0.5ML IM: CPT | Mod: S$GLB,,, | Performed by: FAMILY MEDICINE

## 2021-12-02 PROCEDURE — 99214 PR OFFICE/OUTPT VISIT, EST, LEVL IV, 30-39 MIN: ICD-10-PCS | Mod: 25,S$GLB,, | Performed by: FAMILY MEDICINE

## 2021-12-05 ENCOUNTER — PATIENT OUTREACH (OUTPATIENT)
Dept: ADMINISTRATIVE | Facility: OTHER | Age: 81
End: 2021-12-05
Payer: MEDICARE

## 2021-12-06 ENCOUNTER — OFFICE VISIT (OUTPATIENT)
Dept: PULMONOLOGY | Facility: CLINIC | Age: 81
End: 2021-12-06
Payer: MEDICARE

## 2021-12-06 VITALS
WEIGHT: 175.13 LBS | BODY MASS INDEX: 24.52 KG/M2 | HEART RATE: 58 BPM | DIASTOLIC BLOOD PRESSURE: 79 MMHG | SYSTOLIC BLOOD PRESSURE: 169 MMHG | HEIGHT: 71 IN | OXYGEN SATURATION: 97 %

## 2021-12-06 DIAGNOSIS — G47.10 HYPERSOMNOLENCE DISORDER: ICD-10-CM

## 2021-12-06 DIAGNOSIS — J45.51 SEVERE PERSISTENT ASTHMA WITH ACUTE EXACERBATION: ICD-10-CM

## 2021-12-06 DIAGNOSIS — J44.89 EXACERBATION OF CHRONIC BRONCHIOLITIS: ICD-10-CM

## 2021-12-06 DIAGNOSIS — J45.51 SEVERE PERSISTENT ASTHMA DEPENDENT ON SYSTEMIC STEROIDS WITH ACUTE EXACERBATION: ICD-10-CM

## 2021-12-06 DIAGNOSIS — Z79.52 SEVERE PERSISTENT ASTHMA DEPENDENT ON SYSTEMIC STEROIDS WITH ACUTE EXACERBATION: ICD-10-CM

## 2021-12-06 DIAGNOSIS — J82.83 EOSINOPHILIC ASTHMA: ICD-10-CM

## 2021-12-06 DIAGNOSIS — J45.50 SEVERE PERSISTENT ASTHMA WITHOUT COMPLICATION: Primary | ICD-10-CM

## 2021-12-06 PROCEDURE — 99999 PR PBB SHADOW E&M-EST. PATIENT-LVL IV: ICD-10-PCS | Mod: PBBFAC,,, | Performed by: INTERNAL MEDICINE

## 2021-12-06 PROCEDURE — 99999 PR PBB SHADOW E&M-EST. PATIENT-LVL IV: CPT | Mod: PBBFAC,,, | Performed by: INTERNAL MEDICINE

## 2021-12-06 PROCEDURE — 99499 UNLISTED E&M SERVICE: CPT | Mod: S$GLB,,, | Performed by: INTERNAL MEDICINE

## 2021-12-06 PROCEDURE — 99499 RISK ADDL DX/OHS AUDIT: ICD-10-PCS | Mod: S$GLB,,, | Performed by: INTERNAL MEDICINE

## 2021-12-06 PROCEDURE — 99213 OFFICE O/P EST LOW 20 MIN: CPT | Mod: S$GLB,,, | Performed by: INTERNAL MEDICINE

## 2021-12-06 PROCEDURE — 99213 PR OFFICE/OUTPT VISIT, EST, LEVL III, 20-29 MIN: ICD-10-PCS | Mod: S$GLB,,, | Performed by: INTERNAL MEDICINE

## 2021-12-06 RX ORDER — FLUTICASONE FUROATE AND VILANTEROL TRIFENATATE 200; 25 UG/1; UG/1
1 POWDER RESPIRATORY (INHALATION) DAILY
Qty: 60 EACH | Refills: 11 | Status: SHIPPED | OUTPATIENT
Start: 2021-12-06 | End: 2022-07-12

## 2021-12-06 RX ORDER — ALBUTEROL SULFATE 90 UG/1
2 POWDER, METERED RESPIRATORY (INHALATION) EVERY 4 HOURS PRN
Qty: 1 EACH | Refills: 11 | Status: SHIPPED | OUTPATIENT
Start: 2021-12-06 | End: 2022-08-15

## 2021-12-06 RX ORDER — BENRALIZUMAB 30 MG/ML
1 INJECTION, SOLUTION SUBCUTANEOUS
Qty: 1 ML | Refills: 6 | OUTPATIENT
Start: 2022-02-06 | End: 2022-01-31 | Stop reason: SDUPTHER

## 2021-12-06 RX ORDER — MODAFINIL 100 MG/1
100 TABLET ORAL DAILY
Qty: 30 TABLET | Refills: 2 | Status: SHIPPED | OUTPATIENT
Start: 2021-12-06 | End: 2022-04-22

## 2021-12-06 RX ORDER — FLUTICASONE FUROATE AND VILANTEROL TRIFENATATE 200; 25 UG/1; UG/1
1 POWDER RESPIRATORY (INHALATION) DAILY
Qty: 60 EACH | Refills: 11 | Status: SHIPPED | OUTPATIENT
Start: 2021-12-06 | End: 2021-12-06

## 2021-12-06 RX ORDER — MONTELUKAST SODIUM 10 MG/1
10 TABLET ORAL NIGHTLY
Qty: 30 TABLET | Refills: 0 | Status: SHIPPED | OUTPATIENT
Start: 2021-12-06 | End: 2021-12-06 | Stop reason: SDUPTHER

## 2021-12-06 RX ORDER — MONTELUKAST SODIUM 10 MG/1
10 TABLET ORAL NIGHTLY
Qty: 90 TABLET | Refills: 3 | Status: SHIPPED | OUTPATIENT
Start: 2021-12-06 | End: 2022-01-05

## 2021-12-08 ENCOUNTER — TELEPHONE (OUTPATIENT)
Dept: PHARMACY | Facility: CLINIC | Age: 81
End: 2021-12-08
Payer: MEDICARE

## 2022-01-11 ENCOUNTER — TELEPHONE (OUTPATIENT)
Dept: PULMONOLOGY | Facility: CLINIC | Age: 82
End: 2022-01-11
Payer: MEDICARE

## 2022-01-11 ENCOUNTER — LAB VISIT (OUTPATIENT)
Dept: PRIMARY CARE CLINIC | Facility: OTHER | Age: 82
End: 2022-01-11
Attending: INTERNAL MEDICINE
Payer: MEDICARE

## 2022-01-11 DIAGNOSIS — Z20.822 ENCOUNTER FOR LABORATORY TESTING FOR COVID-19 VIRUS: ICD-10-CM

## 2022-01-11 PROCEDURE — U0003 INFECTIOUS AGENT DETECTION BY NUCLEIC ACID (DNA OR RNA); SEVERE ACUTE RESPIRATORY SYNDROME CORONAVIRUS 2 (SARS-COV-2) (CORONAVIRUS DISEASE [COVID-19]), AMPLIFIED PROBE TECHNIQUE, MAKING USE OF HIGH THROUGHPUT TECHNOLOGIES AS DESCRIBED BY CMS-2020-01-R: HCPCS | Performed by: INTERNAL MEDICINE

## 2022-01-11 NOTE — TELEPHONE ENCOUNTER
Called patient and left v/m. Informed patient to go get COVID tested if he can..   If he starts to feel extremely bad with SOB, high grade fever, etc.. report to ED.      Call back to discuss..      ----- Message from Cari Manzanares sent at 1/11/2022 12:19 PM CST -----  Type: Needs Medical Advice  Who Called:  Maycol  Symptoms (please be specific):  temp around 100, sore throat and body aches he is wondering if you have any suggestions  How long has patient had these symptoms:  3 days  Pharmacy name and phone #:    [id number] - [place] - [address] [facility]  [address]  [place] [address]  Phone: [phone] Fax: [phone]      Best Call Back Number: [phone]  Additional Information: thank you

## 2022-01-12 DIAGNOSIS — U07.1 COVID-19 VIRUS DETECTED: ICD-10-CM

## 2022-01-12 LAB
SARS-COV-2 RNA RESP QL NAA+PROBE: DETECTED
SARS-COV-2- CYCLE NUMBER: 24

## 2022-01-20 ENCOUNTER — TELEPHONE (OUTPATIENT)
Dept: FAMILY MEDICINE | Facility: CLINIC | Age: 82
End: 2022-01-20
Payer: MEDICARE

## 2022-01-20 DIAGNOSIS — I10 ESSENTIAL HYPERTENSION: ICD-10-CM

## 2022-01-20 NOTE — TELEPHONE ENCOUNTER
----- Message from Malika Crook sent at 1/19/2022  4:35 PM CST -----  Contact: Patient  Type:  Needs Medical Advice    Who Called:  Patient      Would the patient rather a call back or a response via [facility]?  Call    Best Call Back Number:  [phone]     Additional Information:  Patient states this week patient states his BP is running high and needs to speak to then nurse about this     BP today at 4:15 197/104 and 4:30 it was 188/104    Patient had 6 days ago and he is not sure if that would make it high or not     Please call to advise

## 2022-01-20 NOTE — TELEPHONE ENCOUNTER
Returned call and spoke to patient regarding B/P readings. Patient stated that his B/P on yesterday 22 were as following;        A.M. readings:  1st readin/98  2nd readin/94\  3rd readin/94    3rd reading at 4pm : 197/104  Repeated at 4:10 pm : 188/104    Please advise;

## 2022-01-23 RX ORDER — LOSARTAN POTASSIUM 100 MG/1
100 TABLET ORAL DAILY
Qty: 90 TABLET | Refills: 3 | Status: SHIPPED | OUTPATIENT
Start: 2022-01-23 | End: 2022-04-22

## 2022-01-24 ENCOUNTER — SPECIALTY PHARMACY (OUTPATIENT)
Dept: PHARMACY | Facility: CLINIC | Age: 82
End: 2022-01-24
Payer: MEDICARE

## 2022-01-24 ENCOUNTER — TELEPHONE (OUTPATIENT)
Dept: FAMILY MEDICINE | Facility: CLINIC | Age: 82
End: 2022-01-24
Payer: MEDICARE

## 2022-01-24 DIAGNOSIS — M79.10 MYALGIA: Primary | ICD-10-CM

## 2022-01-24 NOTE — TELEPHONE ENCOUNTER
Per ELLIOTT Radford via teams: Hi. I will check with provider. Patient was already on Fasenra and had missed a dose I believe. Let me check with provider.    Sent teams to clarify script vs note on dosing schedule

## 2022-01-31 DIAGNOSIS — Z79.52 SEVERE PERSISTENT ASTHMA DEPENDENT ON SYSTEMIC STEROIDS WITH ACUTE EXACERBATION: ICD-10-CM

## 2022-01-31 DIAGNOSIS — J45.51 SEVERE PERSISTENT ASTHMA DEPENDENT ON SYSTEMIC STEROIDS WITH ACUTE EXACERBATION: ICD-10-CM

## 2022-01-31 DIAGNOSIS — J82.83 EOSINOPHILIC ASTHMA: ICD-10-CM

## 2022-01-31 RX ORDER — BENRALIZUMAB 30 MG/ML
1 INJECTION, SOLUTION SUBCUTANEOUS
Qty: 1 ML | Refills: 6 | OUTPATIENT
Start: 2022-02-28 | End: 2022-02-04 | Stop reason: SDUPTHER

## 2022-01-31 RX ORDER — BENRALIZUMAB 30 MG/ML
1 INJECTION, SOLUTION SUBCUTANEOUS ONCE
Qty: 1 EACH | Refills: 0 | Status: SHIPPED | OUTPATIENT
Start: 2022-01-31 | End: 2022-01-31

## 2022-01-31 NOTE — TELEPHONE ENCOUNTER
OSP in network    No deductible    No TrOOP max (Patient remains in catastrophic stage until end of the year    Patient is in between the initial and gap stage of coverage    Will pay 3341.94 in initial, 25% coinsurance in GAP, & 5% in coinsurance in catastrophic     Estimated copay: 1618.83    Copay assistance required. Forwarded to the FA team for review

## 2022-02-10 DIAGNOSIS — Z79.52 SEVERE PERSISTENT ASTHMA DEPENDENT ON SYSTEMIC STEROIDS WITH ACUTE EXACERBATION: ICD-10-CM

## 2022-02-10 DIAGNOSIS — J45.51 SEVERE PERSISTENT ASTHMA DEPENDENT ON SYSTEMIC STEROIDS WITH ACUTE EXACERBATION: ICD-10-CM

## 2022-02-10 DIAGNOSIS — J82.83 EOSINOPHILIC ASTHMA: ICD-10-CM

## 2022-02-10 RX ORDER — BENRALIZUMAB 30 MG/ML
1 INJECTION, SOLUTION SUBCUTANEOUS
Qty: 1 ML | Refills: 5 | Status: SHIPPED | OUTPATIENT
Start: 2022-02-28 | End: 2022-04-25 | Stop reason: SDUPTHER

## 2022-02-21 NOTE — TELEPHONE ENCOUNTER
2/21 - Outgoing call to pt regarding Fasenra. Pt stated he has been getting his medication through [email or web address] and that he reached out to Dr. Burch for a new prescription to be sent over to [employer].     Outgoing call to [employer], spoke to Hollis (rep), confirmed that pt is active and approved from 1/1/22 - 12/31/22. Also confirmed that a new rx need to be sent over to [facility], fax number: [phone]    FA close out    PTL

## 2022-02-23 DIAGNOSIS — D84.9 IMMUNOSUPPRESSED STATUS: ICD-10-CM

## 2022-03-07 ENCOUNTER — OFFICE VISIT (OUTPATIENT)
Dept: PULMONOLOGY | Facility: CLINIC | Age: 82
End: 2022-03-07
Payer: MEDICARE

## 2022-03-07 VITALS
WEIGHT: 177.13 LBS | OXYGEN SATURATION: 98 % | HEIGHT: 71 IN | HEART RATE: 55 BPM | BODY MASS INDEX: 24.8 KG/M2 | SYSTOLIC BLOOD PRESSURE: 167 MMHG | DIASTOLIC BLOOD PRESSURE: 77 MMHG

## 2022-03-07 DIAGNOSIS — J45.50 SEVERE PERSISTENT ASTHMA WITHOUT COMPLICATION: Primary | ICD-10-CM

## 2022-03-07 DIAGNOSIS — G47.10 HYPERSOMNOLENCE DISORDER: ICD-10-CM

## 2022-03-07 DIAGNOSIS — J82.83 EOSINOPHILIC ASTHMA: ICD-10-CM

## 2022-03-07 PROCEDURE — 1159F PR MEDICATION LIST DOCUMENTED IN MEDICAL RECORD: ICD-10-PCS | Mod: CPTII,S$GLB,, | Performed by: INTERNAL MEDICINE

## 2022-03-07 PROCEDURE — 3077F PR MOST RECENT SYSTOLIC BLOOD PRESSURE >= 140 MM HG: ICD-10-PCS | Mod: CPTII,S$GLB,, | Performed by: INTERNAL MEDICINE

## 2022-03-07 PROCEDURE — 99999 PR PBB SHADOW E&M-EST. PATIENT-LVL IV: CPT | Mod: PBBFAC,,, | Performed by: INTERNAL MEDICINE

## 2022-03-07 PROCEDURE — 3288F PR FALLS RISK ASSESSMENT DOCUMENTED: ICD-10-PCS | Mod: CPTII,S$GLB,, | Performed by: INTERNAL MEDICINE

## 2022-03-07 PROCEDURE — 99213 PR OFFICE/OUTPT VISIT, EST, LEVL III, 20-29 MIN: ICD-10-PCS | Mod: S$GLB,,, | Performed by: INTERNAL MEDICINE

## 2022-03-07 PROCEDURE — 3077F SYST BP >= 140 MM HG: CPT | Mod: CPTII,S$GLB,, | Performed by: INTERNAL MEDICINE

## 2022-03-07 PROCEDURE — 1159F MED LIST DOCD IN RCRD: CPT | Mod: CPTII,S$GLB,, | Performed by: INTERNAL MEDICINE

## 2022-03-07 PROCEDURE — 1101F PR PT FALLS ASSESS DOC 0-1 FALLS W/OUT INJ PAST YR: ICD-10-PCS | Mod: CPTII,S$GLB,, | Performed by: INTERNAL MEDICINE

## 2022-03-07 PROCEDURE — 1126F PR PAIN SEVERITY QUANTIFIED, NO PAIN PRESENT: ICD-10-PCS | Mod: CPTII,S$GLB,, | Performed by: INTERNAL MEDICINE

## 2022-03-07 PROCEDURE — 99999 PR PBB SHADOW E&M-EST. PATIENT-LVL IV: ICD-10-PCS | Mod: PBBFAC,,, | Performed by: INTERNAL MEDICINE

## 2022-03-07 PROCEDURE — 3078F PR MOST RECENT DIASTOLIC BLOOD PRESSURE < 80 MM HG: ICD-10-PCS | Mod: CPTII,S$GLB,, | Performed by: INTERNAL MEDICINE

## 2022-03-07 PROCEDURE — 3078F DIAST BP <80 MM HG: CPT | Mod: CPTII,S$GLB,, | Performed by: INTERNAL MEDICINE

## 2022-03-07 PROCEDURE — 1101F PT FALLS ASSESS-DOCD LE1/YR: CPT | Mod: CPTII,S$GLB,, | Performed by: INTERNAL MEDICINE

## 2022-03-07 PROCEDURE — 1126F AMNT PAIN NOTED NONE PRSNT: CPT | Mod: CPTII,S$GLB,, | Performed by: INTERNAL MEDICINE

## 2022-03-07 PROCEDURE — 3288F FALL RISK ASSESSMENT DOCD: CPT | Mod: CPTII,S$GLB,, | Performed by: INTERNAL MEDICINE

## 2022-03-07 PROCEDURE — 99213 OFFICE O/P EST LOW 20 MIN: CPT | Mod: S$GLB,,, | Performed by: INTERNAL MEDICINE

## 2022-03-07 RX ORDER — LOSARTAN POTASSIUM 50 MG/1
50 TABLET ORAL DAILY
COMMUNITY
Start: 2022-01-24 | End: 2022-04-22

## 2022-03-07 NOTE — PATIENT INSTRUCTIONS
Would try provigil to improve alertness and vigilance.  May need follow up for refills as needed.        Would discuss bp with Dr Fox June 2 follow  up      Would be reasonable to use breo every other day -- if stable breo qod for a month - could skip if having no asthma symptoms or albuterol use.     You have had dramatic benefit from Fasenra-- need to continue.

## 2022-03-07 NOTE — PROGRESS NOTES
3/7/2022    Maycol Hodge  Office Note    Chief Complaint   Patient presents with    Follow-up     3 month f/u      HPI:    3/7/2022 did not use provigil as felt did not have narcolepsy.  No prednisone.  bp 171/99-- pcp told to repeat  And still bp up.  Energy varies -- trys to get to gym an hour with variable results.  Asthma controlled.  Weaned off symbicort with breo use-- very stable and wishes to wean off.    No hives.        12/62021 -- had fasenra 4 wks ago.  Had been able use no prednisone nor rescue nor controller til 2 wks ago when had sore throat followed by nasal congestion then cough/wheezes-- pt cleared with prednisone/azithromycin and prn albuterol.  Patient Instructions   You started Fasenra last September with extreme dramatic benefit-- your asthma was under excellent control and ---your controller and rescue and action plans were able to be stopped.  You had mild exacerbation due to uri last 3 wks.      Would recommend continuing Fasenra as you were on prednisone every 2 - 3 months prior to Fasenra.     If your insurance mandates you be on controller -- would recommend using singulair and symbicort-- symbicort was started 3 wks.              Would recommend trial provigil for excess sleepiness-- your recent sleep study did not show significant sleep apnea with AHI 2.1.  Will try to get approval through speciality pharmacy.   Start provigil one daily to improve alertness and focus.         Addendum -sleep study 8/20/2020 had ahi 2.1.   Could repeat in house sleep study.  Suspect would do well with provigil -- would recommend trial 100 mg daily to treat hypersomnolence.      10/25/2021 having fatigue, sometimes wake up fatigued and sometimes develops during day.  Denies nerve/anxiety issues.  Appetite ok.  No cough/wheezes/sob.  No spasm in over a yr or more.  No inhaler use.  Fatigue occurs 1/3 days.  Voids adequately.  Will sit about throughout day once fatigued.  Fatigue occurring last 3  months or so, had colonoscopy last yr, has chr elevation psa which is monitor q yr from q 6 months in past. Saw cardiology.     will nap twice daily -- has excess sleepiness.  Patient Instructions   Would use prednisone once daily for 3 days if any fatigue-- may do  Once or twice.  Breathing test was better at 56% than 42% last yr-- but you may still have some airway problems..  Would try inhalers if fatigued-- try nebulizer.  Also try prednisone----- if prednisone works-- use inhalers more.   Would screen for inflammation, testosterone (may not replace as psa up???), blood counts/chemistries, thyroid  (continue medications), diabetes screen, and chest xray.  Should f/u Dr Rojo soon if fatigue not clearing?????  psa not oredered as not able.   Addendum -sleep study 8/20/2020 had ahi 2.1.   Could repeat in house sleep study.  Suspect would do well with provigil -- would recommend trial 100 mg daily to treat hypersomnolence.      4/28/2021- fasenra going well, no steroids, 4-5 times daily has transparent mucous produced. No sleep apnea but some excessive sleepiness appreciated. Sleep 10, arouses 3 to void (had turp/meds in past)with difficulty going back. Off symbicort. Uses albuterol maybe once a wk or so.   Patient Instructions   Continue Gym.    Lungs should be good.  Need to continue fasenra.    Thyroid could be rechecked.     Home study-- no sleep apnea, could have restless legs?  10/28/2020 no prednisone since last visit, fasenra helped dramatically.  No prednisoen and breathing much better.  - pt got shot here 10/8/2020  Patient Instructions   Need to continue fasenra as has had dramatic response.     9/28/2020 took another course prednisone, has been on prednisone  Monthly since march.  Finished last 2 wks ago, feels will need [place] course soon. Uses symbicort regular.  Had sleep study few wks ago.fev 42 from 57% on 2017,      Sleep study 8/20/2020 with ahi 2/hr- within normal range.   Patient  Instructions   Asthma very unstable..  Will place order for fasenra- sample shot would help.  Shot would be monthly x 3 then every 8 wks.    Use valtrex if shingles - call for any question.      Sound like you need treatment again soon- start prednisone soon or Fasenra shots.     will give paper script for advair - may replace symboicort if cheaper?     No sleep apnea of significance.      Fasenra is an interleukin 5 inhibitor.  2020 had to use prednisone since March, having severe fatigue - reports sleeps well, sleep non restorative, snores with no sleep study in past.  Romo to mail box - wife sleeps separate room. No snore arousal.    Uses symbicort regular.  Uses albuterol rescue minimally less than once a wk.  Pt starts prednisone when cough becomes excessive.  Eats well, no wt loss. Sleeps 7 hrs/d.  Goes to [facility] for hr 3/wk.  Pt having vivid dreams.   Patient Instructions   Check lung capacity   Check sleep study - best prior to prednisone  May use prednisone - start 20/d dropping to 10 mg daily once better.  If you are found to have sleep apnea- less than 5 episodes an hour would be normal- would try cpap therapy.  2020 51 yo son  suddenly  in [place]- son avid - death.  Toxicology pending.  Had 2 spells ppt prednisone use since last visit with good results.  Took erythro also.  Having some grief- eats and sleeps ok . Wife in good shape. Uses symbicort regular.  Patient Instructions   Asthma not too bad but you needed prednisone twice since March.  Special shots may help-  Could ask to get fasenra covered.  May consider starting if pattern worsens.    symbicort needed regular especially in winter/spring.+      3/2/2020- having exhaustion after 10-15 min doing yd wk.  Saw cardiology- had a fib at colonoscopy in dec but sinus mile since. - eliquis started.  Pt had syncope 10 days - saw Dr Vogel in f/u.  Last wk had some noct wheezes.   Uses symbicort 2 bid and singulair.  No no  prednisone use last yr.  Sleeps ok, nerves ok - denies depression.  Had hives last July.  Stress echo in 11/22/2019 was good save high bp?    Patient Instructions   Fatigue and wheezes are noted.  Breathing not typical cause for loss of consciousness.  Heart rate is low - may contribute to fatigue but not that bad?    Chronic sinuses seem ok.  Would recommend prednisone 20 mg daily for 3 days now,  May repeat but would like to see better function and activity.    Call if not perking up.   oxygen level walking in office went from 97 to 93 % - not bad.  bp standing didn't fall today.    Blood wk last month all looked good.    Feb 1, 2019- Onset one week, sore throat, congestion, cough (fits, severe, productive light yellow in color), took azithromycin with minimal benefit. Current Prednisone use.  January 24, 2019- Feeling well, no exacerbations requiring antibiotic and steroid therapy. No cough, no nocturnal arousals, no SOB.   Sept 27, 2018- had exacerbations in July and August needing azithro and prednisone- had cough clear mucous with sl yellow, no breathing issues.  Unusual to have exacerbation summer.  Jan 5, 2018-- took azithro and pred x 3 - good results.  Doing well  Jan 19,2017 hpi, had acute onset 5 days ago with sinus runny, cough, wheezes, sob, no fever, no n/v, pos headache.  Muscle aches and joint aches.  Took prednisone, took inhaler, neb rx was old. Run down, poor appetite,   Took prednisone and azithro x3, still cough but better,  Down but better.  Mucous clear.   Dec 16, 2016HPI:pt with asthma with no usual noct arousals or rescue use.  Exacerbates 3x yr in spring mainly. Progresses for a week on rescue and then uses pred and doxy (recent doxy reaction) to clear.  I cared for years ago.  No recent pft.  Pt feels breathing better than  Usual yrs past.      The chief compliant  problem varies with instablilty at time    PFSH:  Past Medical History:   Diagnosis Date    A-fib     Asthma     Hematuria      Hypertension          Past Surgical History:   Procedure Laterality Date    COLONOSCOPY      COLONOSCOPY N/A 2020    Procedure: COLONOSCOPY;  Surgeon: Willis Mullen MD;  Location: [facility];  Service: Endoscopy;  Laterality: N/A;    CYSTOSCOPY      negative    EYE SURGERY      bilateral cataracts    HERNIA REPAIR      righht inguinal    JOINT REPLACEMENT Left     shoulder    left cataract surgery      PROSTATE SURGERY      TURP     Social History     Tobacco Use    Smoking status: Former Smoker     Types: Cigarettes     Quit date:      Years since quittin.2    Smokeless tobacco: Never Used    Tobacco comment: Quit around    Substance Use Topics    Alcohol use: Yes     Alcohol/week: 14.0 standard drinks     Types: 7 Glasses of wine, 7 Shots of liquor per week    Drug use: No     Family History   Problem Relation Age of Onset    Stroke Father     Cancer Neg Hx     Diabetes Neg Hx     Heart disease Neg Hx     Hypertension Neg Hx      Review of patient's allergies indicates:   Allergen Reactions    Doxycycline Rash     Causes rash, hives and itching    Diltiazem      pruritis    Hydrochlorothiazide      Causes elevate uric acid, gout      Levaquin [levofloxacin] Swelling    Norvasc [amlodipine] Swelling       Performance Status:The patient's activity level is functions out of house.      Review of Systems:    Constitutional: Negative for activity change, appetite change, chills, diaphoresis, fatigue, fever and unexpected weight change.   HENT: Negative for dental problem, postnasal drip, trouble swallowing and voice change. Positive for  rhinorrhea, sinus pressure, sinus pain, sneezing, sore throat,   Respiratory: Negative for apnea,  shortness of breath, wheezing and stridor.  Positive for cough, chest tightness,  Cardiovascular: Negative for chest pain, palpitations and leg swelling.   Gastrointestinal: Negative for abdominal distention, abdominal pain, constipation  "and nausea.   Musculoskeletal: Negative for gait problem, myalgias and neck pain.   Skin: Negative for color change and pallor.   Allergic/Immunologic: Negative for environmental allergies and food allergies.   Neurological: Negative for dizziness, speech difficulty, weakness, light-headedness, numbness and headaches.   Hematological: Negative for adenopathy. Does not bruise/bleed easily.   Psychiatric/Behavioral: Negative for dysphoric mood and sleep disturbance. The patient is not nervous/anxious.     Exam:Comprehensive exam done.   BP (!) 167/77 (BP Location: Right arm, Patient Position: Sitting, BP Method: Medium (Automatic))   Pulse (!) 55   Ht 5' 11" (1.803 m)   Wt 80.3 kg (177 lb 2.2 oz)   SpO2 98% Comment: on room air at rest  BMI 24.71 kg/m²   Exam included Vitals as listed, and patient's appearance and affect and alertness and mood, oral exam for yeast and hygiene and pharynx lesions and Mallapatti (M) score, neck with inspection for jvd and masses and thyroid abnormalities and lymph nodes (supraclavicular and infraclavicular nodes and axillary also examined and noted if abn), chest exam included symmetry and effort and fremitus and percussion and auscultation, cardiac exam included rhythm and gallops and murmur and rubs and jvd and edema, abdominal exam for mass and hepatosplenomegaly and tenderness and hernias and bowel sounds, Musculoskeletal exam with muscle tone and posture and mobility/gait and  strength, and skin for rashes and cyanosis and pallor and turgor, extremity for clubbing.  Findings were normal except for pertinent findings listed below:  M3,  chest is symmetric, no distress, normal percussion, normal fremitus, and no murmur, no edema, lung sounds good.    Rest good.      Radiographs (ct chest and cxr) reviewed from 10/16/2017 normal  cxr 5/20/2020 nad    Labs Reviewed  Lab Results   Component Value Date    WBC 7.17 10/26/2021    HGB 14.9 10/26/2021    HCT 45.4 10/26/2021    MCV " 94 10/26/2021     10/26/2021   · holter 1/27/2020- The diary was properly completed. The tape was adequate (0 days , 48 hours, 0 minutes).  · Predominant Rhythm Sinus rhythm with heart rates varying between 43 and 113 bpm with an average of 56 bpm.  · Ventricular Arrhythmias There were very rare PVCs totalling 58 and averaging 1.21 per hour.  · Supraventricular Arrhythmias There were occasional PACs totalling 638 and averaging 13.29 per hour.  · Occassional atrial pair and short run of atrial tachycardia,the longest 7 beats with no associated symptoms..  There was an episode of SOB reported. The corresponding rhythm strips revealed the following: During the event (At the [employer]), the rhythm was sinus tachycardia at 113 bpm with without ectopy.     Results for ARNULFO SALEH (MRN [id number]) as of 9/21/2018 11:38   Ref. Range 9/21/2018 08:59   Eosinophil% Latest Ref Range: 0.0 - 8.0 % 9.2 (H)   Eos # Latest Ref Range: 0.0 - 0.5 K/uL 0.6 (H)     PFT  March 31, 2017 fev 57%,     11/22/19-Conclusion     · Concentric left ventricular remodeling.  · Normal left ventricular systolic function. The estimated ejection fraction is 60%  · Normal LV diastolic function.  · No wall motion abnormalities.  · Normal right ventricular systolic function.  · The stress echo portion of this study is negative for myocardial ischemia.  · The patient's exercise capacity was above average. functional capacity of 10 METS  · The patient reached the end of the protocol.  · There were no arrhythmias during stress.  · The EKG portion of this study is negative for myocardial ischemia.  · Exercise portion of stress test stopped due to an increase in systolic and diastolic blood pressure above protocol.  · Hypertensive response changed from stress echo in 3/2018.     Spirometry with bronchodilator, lung volume by gas dilution, diffusion capacity were accomplished October 11, 2021. The FEV1 to FVC ratio was 67% indicating airflow obstruction. The  FEV1 was 56% of predicted at 1.6 L-this makes airflow obstruction   moderately severe. Bronchodilator response was not statistically significant at 9%. Total lung capacity on lung volume by gas dilution was 60% of predicted and low. Diffusion was 62% of predicted and low.   Â    There is moderately severe airflow obstruction. There is no significant bronchodilator response. Total lung capacity is reduced to 60% predicted and diffusion is reduced to 62% predicted. Clinical correlation recommended.       Spirometry with bronchodilator, lung volume by gas dilution, diffusion capacity measured August 17, 2020.  The FEV1 to FVC ratio was 65% indicating airflow obstruction.  The FEV1 measured 42% predicted at 1.2 L making airflow obstruction severe.  There    was a 13% improvement in the FEV1 following bronchodilator, this is a significant bronchodilator response.  Total lung capacity was reduced to 57% predicted suggesting restriction.  Residual volume was a little bit elevated at 77% of predicted.     Diffusion was reduced to 61% of predicted, uncorrected for anemia present.       Plan:  Clinical impression is apparently straight forward and impression with management as below.    Maycol was seen today for follow-up.    Diagnoses and all orders for this visit:    Severe persistent asthma without complication    Eosinophilic asthma          Maycol was seen today for follow-up.    Diagnoses and all orders for this visit:    Severe persistent asthma without complication    Eosinophilic asthma        Follow up in about 6 months (around 9/7/2022).    Discussed with patient above for education the following:      Patient Instructions   Would try provigil to improve alertness and vigilance.  May need follow up for refills as needed.        Would discuss bp with Dr Fox June 2 follow  up      Would be reasonable to use breo every other day -- if stable breo qod for a month - could skip if having no asthma symptoms or albuterol  use.     You have had dramatic benefit from Fasenra-- need to continue.

## 2022-03-30 ENCOUNTER — PES CALL (OUTPATIENT)
Dept: ADMINISTRATIVE | Facility: CLINIC | Age: 82
End: 2022-03-30
Payer: MEDICARE

## 2022-04-11 ENCOUNTER — OFFICE VISIT (OUTPATIENT)
Dept: FAMILY MEDICINE | Facility: CLINIC | Age: 82
End: 2022-04-11
Payer: MEDICARE

## 2022-04-11 VITALS
OXYGEN SATURATION: 96 % | SYSTOLIC BLOOD PRESSURE: 150 MMHG | HEART RATE: 52 BPM | HEIGHT: 71 IN | WEIGHT: 173.94 LBS | BODY MASS INDEX: 24.35 KG/M2 | DIASTOLIC BLOOD PRESSURE: 88 MMHG | TEMPERATURE: 98 F

## 2022-04-11 DIAGNOSIS — E78.2 MIXED HYPERLIPIDEMIA: ICD-10-CM

## 2022-04-11 DIAGNOSIS — I10 HYPERTENSION, UNSPECIFIED TYPE: ICD-10-CM

## 2022-04-11 DIAGNOSIS — I70.0 CALCIFICATION OF AORTA: ICD-10-CM

## 2022-04-11 DIAGNOSIS — J44.89 CHRONIC BRONCHIOLITIS: ICD-10-CM

## 2022-04-11 DIAGNOSIS — Z00.00 ENCOUNTER FOR PREVENTIVE HEALTH EXAMINATION: Primary | ICD-10-CM

## 2022-04-11 DIAGNOSIS — I48.0 PAROXYSMAL ATRIAL FIBRILLATION: ICD-10-CM

## 2022-04-11 PROCEDURE — 99999 PR PBB SHADOW E&M-EST. PATIENT-LVL V: CPT | Mod: PBBFAC,,, | Performed by: NURSE PRACTITIONER

## 2022-04-11 PROCEDURE — 1159F PR MEDICATION LIST DOCUMENTED IN MEDICAL RECORD: ICD-10-PCS | Mod: CPTII,S$GLB,, | Performed by: NURSE PRACTITIONER

## 2022-04-11 PROCEDURE — 1160F RVW MEDS BY RX/DR IN RCRD: CPT | Mod: CPTII,S$GLB,, | Performed by: NURSE PRACTITIONER

## 2022-04-11 PROCEDURE — 1101F PR PT FALLS ASSESS DOC 0-1 FALLS W/OUT INJ PAST YR: ICD-10-PCS | Mod: CPTII,S$GLB,, | Performed by: NURSE PRACTITIONER

## 2022-04-11 PROCEDURE — 99999 PR PBB SHADOW E&M-EST. PATIENT-LVL V: ICD-10-PCS | Mod: PBBFAC,,, | Performed by: NURSE PRACTITIONER

## 2022-04-11 PROCEDURE — 3077F SYST BP >= 140 MM HG: CPT | Mod: CPTII,S$GLB,, | Performed by: NURSE PRACTITIONER

## 2022-04-11 PROCEDURE — 1126F AMNT PAIN NOTED NONE PRSNT: CPT | Mod: CPTII,S$GLB,, | Performed by: NURSE PRACTITIONER

## 2022-04-11 PROCEDURE — 1170F FXNL STATUS ASSESSED: CPT | Mod: CPTII,S$GLB,, | Performed by: NURSE PRACTITIONER

## 2022-04-11 PROCEDURE — 3079F PR MOST RECENT DIASTOLIC BLOOD PRESSURE 80-89 MM HG: ICD-10-PCS | Mod: CPTII,S$GLB,, | Performed by: NURSE PRACTITIONER

## 2022-04-11 PROCEDURE — 99499 RISK ADDL DX/OHS AUDIT: ICD-10-PCS | Mod: S$GLB,,, | Performed by: NURSE PRACTITIONER

## 2022-04-11 PROCEDURE — G0439 PR MEDICARE ANNUAL WELLNESS SUBSEQUENT VISIT: ICD-10-PCS | Mod: S$GLB,,, | Performed by: NURSE PRACTITIONER

## 2022-04-11 PROCEDURE — G0439 PPPS, SUBSEQ VISIT: HCPCS | Mod: S$GLB,,, | Performed by: NURSE PRACTITIONER

## 2022-04-11 PROCEDURE — 1170F PR FUNCTIONAL STATUS ASSESSED: ICD-10-PCS | Mod: CPTII,S$GLB,, | Performed by: NURSE PRACTITIONER

## 2022-04-11 PROCEDURE — 1160F PR REVIEW ALL MEDS BY PRESCRIBER/CLIN PHARMACIST DOCUMENTED: ICD-10-PCS | Mod: CPTII,S$GLB,, | Performed by: NURSE PRACTITIONER

## 2022-04-11 PROCEDURE — 3288F PR FALLS RISK ASSESSMENT DOCUMENTED: ICD-10-PCS | Mod: CPTII,S$GLB,, | Performed by: NURSE PRACTITIONER

## 2022-04-11 PROCEDURE — 3288F FALL RISK ASSESSMENT DOCD: CPT | Mod: CPTII,S$GLB,, | Performed by: NURSE PRACTITIONER

## 2022-04-11 PROCEDURE — 1101F PT FALLS ASSESS-DOCD LE1/YR: CPT | Mod: CPTII,S$GLB,, | Performed by: NURSE PRACTITIONER

## 2022-04-11 PROCEDURE — 3077F PR MOST RECENT SYSTOLIC BLOOD PRESSURE >= 140 MM HG: ICD-10-PCS | Mod: CPTII,S$GLB,, | Performed by: NURSE PRACTITIONER

## 2022-04-11 PROCEDURE — 3079F DIAST BP 80-89 MM HG: CPT | Mod: CPTII,S$GLB,, | Performed by: NURSE PRACTITIONER

## 2022-04-11 PROCEDURE — 99499 UNLISTED E&M SERVICE: CPT | Mod: S$GLB,,, | Performed by: NURSE PRACTITIONER

## 2022-04-11 PROCEDURE — 1126F PR PAIN SEVERITY QUANTIFIED, NO PAIN PRESENT: ICD-10-PCS | Mod: CPTII,S$GLB,, | Performed by: NURSE PRACTITIONER

## 2022-04-11 PROCEDURE — 1159F MED LIST DOCD IN RCRD: CPT | Mod: CPTII,S$GLB,, | Performed by: NURSE PRACTITIONER

## 2022-04-11 NOTE — PATIENT INSTRUCTIONS
Counseling and Referral of Other Preventative  (Italic type indicates deductible and co-insurance are waived)    Patient Name: Maycol Hodge  Today's Date: 4/11/2022    Health Maintenance       Date Due Completion Date    Shingles Vaccine (1 of 2) Never done ---    TETANUS VACCINE 08/18/2025 8/18/2015    Lipid Panel 02/17/2026 2/17/2021        No orders of the defined types were placed in this encounter.    The following information is provided to all patients.  This information is to help you find resources for any of the problems found today that may be affecting your health:                Living healthy guide: www.[facility].[place].[facility]      Understanding Diabetes: www.diabetes.org      Eating healthy: www.cdc.gov/healthyweight      CDC home safety checklist: www.cdc.gov/steadi/patient.html      Agency on Aging: www.goea.[place].[facility]      Alcoholics anonymous (AA): www.aa.org      Physical Activity: www.su.nih.gov/yb6pwvy      Tobacco use: www.quitwithusla.org

## 2022-04-11 NOTE — PROGRESS NOTES
"  Maycol Hodge presented for a  Medicare AWV and comprehensive Health Risk Assessment today. The following components were reviewed and updated:    · Medical history  · Family History  · Social history  · Allergies and Current Medications  · Health Risk Assessment  · Health Maintenance  · Care Team         ** See Completed Assessments for Annual Wellness Visit within the encounter summary.**         The following assessments were completed:  · Living Situation  · CAGE  · Depression Screening  · Timed Get Up and Go  · Whisper Test  · Cognitive Function Screening  · Nutrition Screening  · ADL Screening  · PAQ Screening            Vitals:    04/11/22 1454   BP: (!) 150/88   Pulse: (!) 52   Temp: 98.1 °F (36.7 °C)   SpO2: 96%   Weight: 78.9 kg (173 lb 15.1 oz)   Height: 5' 11" (1.803 m)     Body mass index is 24.26 kg/m².  Physical Exam  Constitutional:       Appearance: He is well-developed.   HENT:      Head: Normocephalic and atraumatic.      Right Ear: Hearing normal.      Left Ear: Hearing normal.      Nose: Nose normal.   Eyes:      General: Lids are normal.      Conjunctiva/sclera: Conjunctivae normal.      Pupils: Pupils are equal, round, and reactive to light.   Cardiovascular:      Rate and Rhythm: Normal rate.   Pulmonary:      Effort: Pulmonary effort is normal.   Abdominal:      Palpations: Abdomen is soft.   Musculoskeletal:         General: Normal range of motion.      Cervical back: Normal range of motion and neck supple.   Skin:     General: Skin is warm and dry.   Neurological:      Mental Status: He is alert and oriented to person, place, and time.               Diagnoses and health risks identified today and associated recommendations/orders:    1. Encounter for preventive health examination  Discussed health maintenance guidelines appropriate for age.        2. Paroxysmal atrial fibrillation  Stable, continue current medication  Followed by cardiology     3. Chronic bronchiolitis  Stable, " continue current medication  Followed by pulmonology     4. Calcification of aorta  Stable, continue to monitor  Followed by pcp    5. Hypertension, unspecified type  Uncontrolled, patient brings in logs which demonstrate labile blood pressures  He does monitor his sodium intake  He is taking his medication as directed  Will facilitate follow up with pcp team for further eval and medication management     6. Mixed hyperlipidemia  Controlled, continue current medication regimen    Followed by pcp        Provided Maycol with a 5-10 year written screening schedule and personal prevention plan. Recommendations were developed using the USPSTF age appropriate recommendations. Education, counseling, and referrals were provided as needed. After Visit Summary printed and given to patient which includes a list of additional screenings\tests needed.    Follow up for One year for Annual Wellness Visit.    Galina Arguello, NP    I offered to discuss advanced care planning, including how to pick a person who would make decisions for you if you were unable to make them for yourself, called a health care power of , and what kind of decisions you might make such as use of life sustaining treatments such as ventilators and tube feeding when faced with a life limiting illness recorded on a living will that they will need to know. (How you want to be cared for as you near the end of your natural life)     X  Patient has advanced directives written and agrees to provide copies to the institution.

## 2022-04-22 ENCOUNTER — OFFICE VISIT (OUTPATIENT)
Dept: FAMILY MEDICINE | Facility: CLINIC | Age: 82
End: 2022-04-22
Payer: MEDICARE

## 2022-04-22 VITALS
OXYGEN SATURATION: 97 % | HEART RATE: 49 BPM | BODY MASS INDEX: 24.14 KG/M2 | DIASTOLIC BLOOD PRESSURE: 72 MMHG | WEIGHT: 173.06 LBS | SYSTOLIC BLOOD PRESSURE: 130 MMHG

## 2022-04-22 DIAGNOSIS — I10 HYPERTENSION, UNSPECIFIED TYPE: Primary | ICD-10-CM

## 2022-04-22 PROCEDURE — 3288F FALL RISK ASSESSMENT DOCD: CPT | Mod: CPTII,S$GLB,, | Performed by: NURSE PRACTITIONER

## 2022-04-22 PROCEDURE — 99999 PR PBB SHADOW E&M-EST. PATIENT-LVL III: ICD-10-PCS | Mod: PBBFAC,,, | Performed by: NURSE PRACTITIONER

## 2022-04-22 PROCEDURE — 3075F PR MOST RECENT SYSTOLIC BLOOD PRESS GE 130-139MM HG: ICD-10-PCS | Mod: CPTII,S$GLB,, | Performed by: NURSE PRACTITIONER

## 2022-04-22 PROCEDURE — 3078F PR MOST RECENT DIASTOLIC BLOOD PRESSURE < 80 MM HG: ICD-10-PCS | Mod: CPTII,S$GLB,, | Performed by: NURSE PRACTITIONER

## 2022-04-22 PROCEDURE — 1101F PR PT FALLS ASSESS DOC 0-1 FALLS W/OUT INJ PAST YR: ICD-10-PCS | Mod: CPTII,S$GLB,, | Performed by: NURSE PRACTITIONER

## 2022-04-22 PROCEDURE — 99999 PR PBB SHADOW E&M-EST. PATIENT-LVL III: CPT | Mod: PBBFAC,,, | Performed by: NURSE PRACTITIONER

## 2022-04-22 PROCEDURE — 1160F PR REVIEW ALL MEDS BY PRESCRIBER/CLIN PHARMACIST DOCUMENTED: ICD-10-PCS | Mod: CPTII,S$GLB,, | Performed by: NURSE PRACTITIONER

## 2022-04-22 PROCEDURE — 1160F RVW MEDS BY RX/DR IN RCRD: CPT | Mod: CPTII,S$GLB,, | Performed by: NURSE PRACTITIONER

## 2022-04-22 PROCEDURE — 3288F PR FALLS RISK ASSESSMENT DOCUMENTED: ICD-10-PCS | Mod: CPTII,S$GLB,, | Performed by: NURSE PRACTITIONER

## 2022-04-22 PROCEDURE — 99213 OFFICE O/P EST LOW 20 MIN: CPT | Mod: S$GLB,,, | Performed by: NURSE PRACTITIONER

## 2022-04-22 PROCEDURE — 3075F SYST BP GE 130 - 139MM HG: CPT | Mod: CPTII,S$GLB,, | Performed by: NURSE PRACTITIONER

## 2022-04-22 PROCEDURE — 1159F MED LIST DOCD IN RCRD: CPT | Mod: CPTII,S$GLB,, | Performed by: NURSE PRACTITIONER

## 2022-04-22 PROCEDURE — 1101F PT FALLS ASSESS-DOCD LE1/YR: CPT | Mod: CPTII,S$GLB,, | Performed by: NURSE PRACTITIONER

## 2022-04-22 PROCEDURE — 1159F PR MEDICATION LIST DOCUMENTED IN MEDICAL RECORD: ICD-10-PCS | Mod: CPTII,S$GLB,, | Performed by: NURSE PRACTITIONER

## 2022-04-22 PROCEDURE — 99213 PR OFFICE/OUTPT VISIT, EST, LEVL III, 20-29 MIN: ICD-10-PCS | Mod: S$GLB,,, | Performed by: NURSE PRACTITIONER

## 2022-04-22 PROCEDURE — 3078F DIAST BP <80 MM HG: CPT | Mod: CPTII,S$GLB,, | Performed by: NURSE PRACTITIONER

## 2022-04-22 RX ORDER — LOSARTAN POTASSIUM 50 MG/1
TABLET ORAL
Qty: 90 TABLET | Refills: 3 | Status: SHIPPED | OUTPATIENT
Start: 2022-04-22 | End: 2023-07-19

## 2022-04-22 NOTE — PROGRESS NOTES
Subjective:       Patient ID: Maycol Hodge is a 82 y.o. male.    Chief Complaint: Hypertension    HPI   Patient brought BP log running high after noon.    Takes losartan in the 7 am  tezosin and atenolol at 10 pm            Past Medical History:   Diagnosis Date    A-fib     Asthma     Hematuria     Hypertension        Review of patient's allergies indicates:   Allergen Reactions    Doxycycline Rash     Causes rash, hives and itching    Augmentin [amoxicillin-pot clavulanate] Nausea And Vomiting    Diltiazem      pruritis    Hydrochlorothiazide      Causes elevate uric acid, gout      Levaquin [levofloxacin] Swelling    Norvasc [amlodipine] Swelling    Pravastatin Rash         Current Outpatient Medications:     albuterol sulfate (PROAIR RESPICLICK) 90 mcg/actuation inhaler, Inhale 2 puffs into the lungs every 4 (four) hours as needed (For wheezing, coughing and shortness of breath). Rescue, Disp: 1 each, Rfl: 11    atenoloL (TENORMIN) 25 MG tablet, TAKE 1/2 TABLET BY MOUTH EVERY NIGHT, Disp: 45 tablet, Rfl: 3    benralizumab (FASENRA PEN) 30 mg/mL AtIn, Inject 1 pen into the skin as needed (every 56 days)., Disp: 1 mL, Rfl: 5    ELIQUIS 5 mg Tab, TAKE 1 TABLET(5 MG) BY MOUTH TWICE DAILY, Disp: 180 tablet, Rfl: 3    GLUC HCL/GLUC JAMESON/MM-UWE-L-GLUC (GLUCOSAMINE COMPLEX ORAL), Take 1 capsule by mouth once daily., Disp: , Rfl:     latanoprost 0.005 % ophthalmic solution, Place 1 drop into both eyes every evening. , Disp: , Rfl:     levothyroxine (SYNTHROID) 50 MCG tablet, Take 1 po qam on Tues and Thurs, Disp: 30 tablet, Rfl: 3    levothyroxine (SYNTHROID) 75 MCG tablet, 1 po qam on M, W, F, Sa, Jameson, Disp: 60 tablet, Rfl: 3    terazosin (HYTRIN) 5 MG capsule, Take 1 capsule (5 mg total) by mouth once daily., Disp: 90 capsule, Rfl: 3    albuterol-ipratropium (DUO-NEB) 2.5 mg-0.5 mg/3 mL nebulizer solution, Take 3 mLs by nebulization every 6 (six) hours as needed., Disp: 120 vial, Rfl: 5     ascorbic acid, vitamin C, (VITAMIN C) 1000 MG tablet, Take 1,000 mg by mouth once daily., Disp: , Rfl:     famotidine (PEPCID) 20 MG tablet, Take 1 tablet (20 mg total) by mouth 2 (two) times daily., Disp: 60 tablet, Rfl: 11    fluticasone furoate-vilanteroL (BREO ELLIPTA) 200-25 mcg/dose DsDv diskus inhaler, Inhale 1 puff into the lungs once daily. Controller (Patient not taking: Reported on 4/11/2022), Disp: 60 each, Rfl: 11    losartan (COZAAR) 50 MG tablet, Take 1 tab (50mg) at 7 am and 1 tab(50mg) at noon, Disp: 90 tablet, Rfl: 3    valACYclovir (VALTREX) 1000 MG tablet, Take 1 tablet (1,000 mg total) by mouth 3 (three) times daily. Take for shingles. for 7 days, Disp: 21 tablet, Rfl: 0    Review of Systems   Constitutional: Negative for unexpected weight change.   HENT: Negative for trouble swallowing.    Eyes: Negative for visual disturbance.   Respiratory: Negative for shortness of breath.    Cardiovascular: Negative for chest pain, palpitations and leg swelling.   Gastrointestinal: Negative for blood in stool.   Genitourinary: Negative for hematuria.   Skin: Negative for rash.   Allergic/Immunologic: Negative for immunocompromised state.   Neurological: Negative for headaches.   Hematological: Does not bruise/bleed easily.   Psychiatric/Behavioral: Negative for agitation. The patient is not nervous/anxious.        Objective:      /72   Pulse (!) 49   Wt 78.5 kg (173 lb 1 oz)   SpO2 97%   BMI 24.14 kg/m²   Physical Exam  Constitutional:       Appearance: He is well-developed.   Eyes:      Conjunctiva/sclera: Conjunctivae normal.      Pupils: Pupils are equal, round, and reactive to light.   Cardiovascular:      Rate and Rhythm: Normal rate and regular rhythm.      Heart sounds: Normal heart sounds.   Pulmonary:      Effort: Pulmonary effort is normal.      Breath sounds: Normal breath sounds.   Abdominal:      General: Bowel sounds are normal.      Palpations: Abdomen is soft.    Musculoskeletal:         General: Normal range of motion.      Cervical back: Normal range of motion and neck supple.   Skin:     General: Skin is warm and dry.   Neurological:      Mental Status: He is alert and oriented to person, place, and time.   Psychiatric:         Behavior: Behavior normal.         Thought Content: Thought content normal.         Judgment: Judgment normal.         Assessment:       1. Hypertension, unspecified type    2. BMI 24.0-24.9, adult        Plan:       Hypertension, unspecified type  -     losartan (COZAAR) 50 MG tablet; Take 1 tab (50mg) at 7 am and 1 tab(50mg) at noon  Dispense: 90 tablet; Refill: 3  2 weeks BP log  Low sodium diet  BP Readings from Last 3 Encounters:   04/22/22 130/72   04/11/22 (!) 150/88   03/07/22 (!) 167/77     BMI 24.0-24.9, adult  Stable, continue management        Patient readiness: acceptance and barriers:none    During the course of the visit the patient was educated and counseled about the following:     Hypertension:   Dietary sodium restriction.  Regular aerobic exercise.    Goals: Hypertension: Reduce Blood Pressure    Did patient meet goals/outcomes: Yes    The following self management tools provided: blood pressure log    Patient Instructions (the written plan) was given to the patient/family.     Time spent with patient: 15 minutes    Barriers to medications present (no )    Adverse reactions to current medications (no)    Over the counter medications reviewed (Yes)

## 2022-04-25 DIAGNOSIS — J82.83 EOSINOPHILIC ASTHMA: ICD-10-CM

## 2022-04-25 DIAGNOSIS — Z79.52 SEVERE PERSISTENT ASTHMA DEPENDENT ON SYSTEMIC STEROIDS WITH ACUTE EXACERBATION: ICD-10-CM

## 2022-04-25 DIAGNOSIS — J45.51 SEVERE PERSISTENT ASTHMA DEPENDENT ON SYSTEMIC STEROIDS WITH ACUTE EXACERBATION: ICD-10-CM

## 2022-04-25 NOTE — TELEPHONE ENCOUNTER
Tried calling just rings no answer. But I sent rx to dr. Burch to approval .     ----- Message from Juana Batista MA sent at 4/25/2022  4:06 PM CDT -----  Type: Needs Medical Advice  Who Called:  Maycol Welch Call Back Number: [phone]  Additional Information: patient would like someone to contact [facility] for one of his medications.  He was told that he did not have any refills on the medications and they were trying to reach the doctor and staff with no response.  Patient would like to know if her should continue the medication as he was under the impression he was supposed to take this medication for one full year

## 2022-04-26 RX ORDER — BENRALIZUMAB 30 MG/ML
1 INJECTION, SOLUTION SUBCUTANEOUS
Qty: 1 ML | Refills: 11 | Status: SHIPPED | OUTPATIENT
Start: 2022-04-26 | End: 2022-12-06 | Stop reason: SDUPTHER

## 2022-05-02 ENCOUNTER — TELEPHONE (OUTPATIENT)
Dept: FAMILY MEDICINE | Facility: CLINIC | Age: 82
End: 2022-05-02
Payer: MEDICARE

## 2022-05-03 ENCOUNTER — SPECIALTY PHARMACY (OUTPATIENT)
Dept: PHARMACY | Facility: CLINIC | Age: 82
End: 2022-05-03
Payer: MEDICARE

## 2022-05-03 NOTE — TELEPHONE ENCOUNTER
Per Teresita REEDER At [place] and [place], [employer] Delivered Fasenra and signed by patient at 9:34 am on 5/3    [facility] to discuss the above this the patient.     ANGEL Arguello, PharmD  Caller: patient (Yesterday, 10:42 AM)     ----- Message -----   From: Aicha Sotelo   Sent: 5/2/2022  10:45 AM CDT   To: [facility] Staff   Subject: advise                                           Type: Needs Medical Advice   Who Called:  patient   Pharmacy name and phone #:     [facility], [place] - [address] [facility] Call Back Number: [phone]   Additional Information: Patient states [employer] is still waiting on a PA to fill patient's benralizumab (FASENRA PEN) 30 mg/mL AtIn. Patient was suppose to take a dose on 04/29/22 but did not have his medication. Please call patient to advise.Thanks!

## 2022-05-05 ENCOUNTER — TELEPHONE (OUTPATIENT)
Dept: FAMILY MEDICINE | Facility: CLINIC | Age: 82
End: 2022-05-05
Payer: MEDICARE

## 2022-05-05 NOTE — TELEPHONE ENCOUNTER
Spoke to patients wife about Nurse visit that was scheduled for Blood pressure on 6/6/22 at 9:30am

## 2022-05-26 ENCOUNTER — LAB VISIT (OUTPATIENT)
Dept: LAB | Facility: HOSPITAL | Age: 82
End: 2022-05-26
Attending: FAMILY MEDICINE
Payer: MEDICARE

## 2022-05-26 DIAGNOSIS — E78.2 MIXED HYPERLIPIDEMIA: ICD-10-CM

## 2022-05-26 DIAGNOSIS — E03.8 HYPOTHYROIDISM DUE TO HASHIMOTO'S THYROIDITIS: ICD-10-CM

## 2022-05-26 DIAGNOSIS — M79.10 MYALGIA: ICD-10-CM

## 2022-05-26 DIAGNOSIS — E06.3 HYPOTHYROIDISM DUE TO HASHIMOTO'S THYROIDITIS: ICD-10-CM

## 2022-05-26 LAB
ALBUMIN SERPL BCP-MCNC: 3.4 G/DL (ref 3.5–5.2)
ALP SERPL-CCNC: 57 U/L (ref 55–135)
ALT SERPL W/O P-5'-P-CCNC: 15 U/L (ref 10–44)
ANION GAP SERPL CALC-SCNC: 11 MMOL/L (ref 8–16)
AST SERPL-CCNC: 20 U/L (ref 10–40)
BASOPHILS # BLD AUTO: 0.04 K/UL (ref 0–0.2)
BASOPHILS NFR BLD: 0.6 % (ref 0–1.9)
BILIRUB SERPL-MCNC: 1.9 MG/DL (ref 0.1–1)
BUN SERPL-MCNC: 17 MG/DL (ref 8–23)
CALCIUM SERPL-MCNC: 9.1 MG/DL (ref 8.7–10.5)
CHLORIDE SERPL-SCNC: 103 MMOL/L (ref 95–110)
CHOLEST SERPL-MCNC: 193 MG/DL (ref 120–199)
CHOLEST/HDLC SERPL: 3.3 {RATIO} (ref 2–5)
CK SERPL-CCNC: 121 U/L (ref 20–200)
CO2 SERPL-SCNC: 27 MMOL/L (ref 23–29)
CREAT SERPL-MCNC: 1.2 MG/DL (ref 0.5–1.4)
DIFFERENTIAL METHOD: ABNORMAL
EOSINOPHIL # BLD AUTO: 0 K/UL (ref 0–0.5)
EOSINOPHIL NFR BLD: 0.2 % (ref 0–8)
ERYTHROCYTE [DISTWIDTH] IN BLOOD BY AUTOMATED COUNT: 14.3 % (ref 11.5–14.5)
EST. GFR  (AFRICAN AMERICAN): >60 ML/MIN/1.73 M^2
EST. GFR  (NON AFRICAN AMERICAN): 55.2 ML/MIN/1.73 M^2
GLUCOSE SERPL-MCNC: 84 MG/DL (ref 70–110)
HCT VFR BLD AUTO: 45.5 % (ref 40–54)
HDLC SERPL-MCNC: 58 MG/DL (ref 40–75)
HDLC SERPL: 30.1 % (ref 20–50)
HGB BLD-MCNC: 14.5 G/DL (ref 14–18)
IMM GRANULOCYTES # BLD AUTO: 0.02 K/UL (ref 0–0.04)
IMM GRANULOCYTES NFR BLD AUTO: 0.3 % (ref 0–0.5)
LDLC SERPL CALC-MCNC: 112.6 MG/DL (ref 63–159)
LYMPHOCYTES # BLD AUTO: 2.2 K/UL (ref 1–4.8)
LYMPHOCYTES NFR BLD: 34.3 % (ref 18–48)
MCH RBC QN AUTO: 30.2 PG (ref 27–31)
MCHC RBC AUTO-ENTMCNC: 31.9 G/DL (ref 32–36)
MCV RBC AUTO: 95 FL (ref 82–98)
MONOCYTES # BLD AUTO: 0.7 K/UL (ref 0.3–1)
MONOCYTES NFR BLD: 10.6 % (ref 4–15)
NEUTROPHILS # BLD AUTO: 3.5 K/UL (ref 1.8–7.7)
NEUTROPHILS NFR BLD: 54 % (ref 38–73)
NONHDLC SERPL-MCNC: 135 MG/DL
NRBC BLD-RTO: 0 /100 WBC
PLATELET # BLD AUTO: 236 K/UL (ref 150–450)
PMV BLD AUTO: 10.9 FL (ref 9.2–12.9)
POTASSIUM SERPL-SCNC: 4.2 MMOL/L (ref 3.5–5.1)
PROT SERPL-MCNC: 6.7 G/DL (ref 6–8.4)
RBC # BLD AUTO: 4.8 M/UL (ref 4.6–6.2)
SODIUM SERPL-SCNC: 141 MMOL/L (ref 136–145)
T4 FREE SERPL-MCNC: 0.91 NG/DL (ref 0.71–1.51)
TRIGL SERPL-MCNC: 112 MG/DL (ref 30–150)
TSH SERPL DL<=0.005 MIU/L-ACNC: 3.85 UIU/ML (ref 0.4–4)
WBC # BLD AUTO: 6.44 K/UL (ref 3.9–12.7)

## 2022-05-26 PROCEDURE — 82550 ASSAY OF CK (CPK): CPT | Performed by: FAMILY MEDICINE

## 2022-05-26 PROCEDURE — 80053 COMPREHEN METABOLIC PANEL: CPT | Performed by: FAMILY MEDICINE

## 2022-05-26 PROCEDURE — 85025 COMPLETE CBC W/AUTO DIFF WBC: CPT | Performed by: FAMILY MEDICINE

## 2022-05-26 PROCEDURE — 80061 LIPID PANEL: CPT | Performed by: FAMILY MEDICINE

## 2022-05-26 PROCEDURE — 84443 ASSAY THYROID STIM HORMONE: CPT | Performed by: FAMILY MEDICINE

## 2022-05-26 PROCEDURE — 36415 COLL VENOUS BLD VENIPUNCTURE: CPT | Mod: PO | Performed by: FAMILY MEDICINE

## 2022-05-26 PROCEDURE — 84439 ASSAY OF FREE THYROXINE: CPT | Performed by: FAMILY MEDICINE

## 2022-06-06 ENCOUNTER — TELEPHONE (OUTPATIENT)
Dept: PULMONOLOGY | Facility: CLINIC | Age: 82
End: 2022-06-06
Payer: MEDICARE

## 2022-06-06 DIAGNOSIS — E03.9 HYPOTHYROIDISM, UNSPECIFIED TYPE: ICD-10-CM

## 2022-06-06 NOTE — TELEPHONE ENCOUNTER
No new care gaps identified.  [facility] Embedded Care Gaps. Reference number: [id number]. 6/06/2022   2:17:14 PM CDT

## 2022-06-06 NOTE — TELEPHONE ENCOUNTER
Spoke to patient regarding his stats, send message to dr. Burch, regarding issues and maybe ordering six min walk.     ----- Message from Dahlia Abbott sent at 6/6/2022 12:45 PM CDT -----  Regarding: advice  Contact: Patient/[phone] (home)  Type: Needs Medical Advice  Who Called:  Patient/[phone] (home)     Symptoms (please be specific):  getting tired real easy  How long has patient had these symptoms:  for a while    Additional Information: Getting low readings on his oxygen count in his blood. Wants to know if he should come in. Please call to advise.

## 2022-06-06 NOTE — TELEPHONE ENCOUNTER
Yes pt still on fasenra, not due for another 3wks, will start predisone see Dr. Burch on 6/22  ----- Message from Osvaldo Burch MD sent at 6/6/2022  4:40 PM CDT -----  Regarding: RE: advice  Contact: Patient/[phone] (home)  Office f./u asap.  Should take prednisone, is he still on fasenra?    ----- Message -----  From: Keke Duarte MA  Sent: 6/6/2022   1:52 PM CDT  To: Osvaldo Bucrh MD  Subject: FW: advice                                       Spoke to patient he states that hes been getting really tired more and short of breath when he's doing things around the house or short walk. Lowest he states o2 got was 88.     Would you like to do a six min walk?     ----- Message -----  From: Dahlia Abbott  Sent: 6/6/2022  12:48 PM CDT  To: [facility] Staff  Subject: advice                                           Type: Needs Medical Advice  Who Called:  Patient/[phone] (home)     Symptoms (please be specific):  getting tired real easy  How long has patient had these symptoms:  for a while    Additional Information: Getting low readings on his oxygen count in his blood. Wants to know if he should come in. Please call to advise.

## 2022-06-07 RX ORDER — LEVOTHYROXINE SODIUM 50 UG/1
TABLET ORAL
Qty: 26 TABLET | Refills: 1 | Status: SHIPPED | OUTPATIENT
Start: 2022-06-07 | End: 2022-09-20 | Stop reason: SDUPTHER

## 2022-06-07 RX ORDER — LEVOTHYROXINE SODIUM 75 UG/1
TABLET ORAL
Qty: 65 TABLET | Refills: 1 | Status: SHIPPED | OUTPATIENT
Start: 2022-06-07 | End: 2022-09-20

## 2022-06-07 NOTE — TELEPHONE ENCOUNTER
Refill Authorization Note   Maycol Hodge  is requesting a refill authorization.  Brief Assessment and Rationale for Refill:  Approve     Medication Therapy Plan:  Matches previous order false failure: Verifed dose/sig    Medication Reconciliation Completed: No   Comments:     No Care Gaps recommended.     Note composed:10:47 AM 06/07/2022

## 2022-06-22 ENCOUNTER — OFFICE VISIT (OUTPATIENT)
Dept: FAMILY MEDICINE | Facility: CLINIC | Age: 82
End: 2022-06-22
Payer: MEDICARE

## 2022-06-22 ENCOUNTER — OFFICE VISIT (OUTPATIENT)
Dept: PULMONOLOGY | Facility: CLINIC | Age: 82
End: 2022-06-22
Payer: MEDICARE

## 2022-06-22 VITALS
OXYGEN SATURATION: 96 % | WEIGHT: 173.5 LBS | BODY MASS INDEX: 24.29 KG/M2 | SYSTOLIC BLOOD PRESSURE: 140 MMHG | HEIGHT: 71 IN | HEART RATE: 60 BPM | DIASTOLIC BLOOD PRESSURE: 78 MMHG | TEMPERATURE: 98 F

## 2022-06-22 VITALS
BODY MASS INDEX: 24.31 KG/M2 | HEART RATE: 59 BPM | SYSTOLIC BLOOD PRESSURE: 176 MMHG | WEIGHT: 173.63 LBS | RESPIRATION RATE: 10 BRPM | DIASTOLIC BLOOD PRESSURE: 83 MMHG | OXYGEN SATURATION: 97 % | HEIGHT: 71 IN

## 2022-06-22 DIAGNOSIS — J82.83 EOSINOPHILIC ASTHMA: ICD-10-CM

## 2022-06-22 DIAGNOSIS — J45.50 SEVERE PERSISTENT ASTHMA WITHOUT COMPLICATION: ICD-10-CM

## 2022-06-22 DIAGNOSIS — I10 ESSENTIAL HYPERTENSION: ICD-10-CM

## 2022-06-22 DIAGNOSIS — G47.10 HYPERSOMNOLENCE DISORDER: ICD-10-CM

## 2022-06-22 DIAGNOSIS — I48.0 PAROXYSMAL ATRIAL FIBRILLATION: ICD-10-CM

## 2022-06-22 DIAGNOSIS — N40.0 BENIGN PROSTATIC HYPERPLASIA WITHOUT LOWER URINARY TRACT SYMPTOMS: ICD-10-CM

## 2022-06-22 DIAGNOSIS — R53.82 CHRONIC FATIGUE: ICD-10-CM

## 2022-06-22 DIAGNOSIS — I99.8 BLOOD PRESSURE INSTABILITY: Primary | ICD-10-CM

## 2022-06-22 DIAGNOSIS — J45.50 SEVERE PERSISTENT ASTHMA WITHOUT COMPLICATION: Primary | ICD-10-CM

## 2022-06-22 DIAGNOSIS — E03.9 HYPOTHYROIDISM, UNSPECIFIED TYPE: ICD-10-CM

## 2022-06-22 PROCEDURE — 99213 OFFICE O/P EST LOW 20 MIN: CPT | Mod: S$GLB,,, | Performed by: INTERNAL MEDICINE

## 2022-06-22 PROCEDURE — 1160F RVW MEDS BY RX/DR IN RCRD: CPT | Mod: CPTII,S$GLB,, | Performed by: NURSE PRACTITIONER

## 2022-06-22 PROCEDURE — 1126F AMNT PAIN NOTED NONE PRSNT: CPT | Mod: CPTII,S$GLB,, | Performed by: NURSE PRACTITIONER

## 2022-06-22 PROCEDURE — 1126F PR PAIN SEVERITY QUANTIFIED, NO PAIN PRESENT: ICD-10-PCS | Mod: CPTII,S$GLB,, | Performed by: INTERNAL MEDICINE

## 2022-06-22 PROCEDURE — 99999 PR PBB SHADOW E&M-EST. PATIENT-LVL V: CPT | Mod: PBBFAC,,, | Performed by: NURSE PRACTITIONER

## 2022-06-22 PROCEDURE — 3288F PR FALLS RISK ASSESSMENT DOCUMENTED: ICD-10-PCS | Mod: CPTII,S$GLB,, | Performed by: INTERNAL MEDICINE

## 2022-06-22 PROCEDURE — 3078F PR MOST RECENT DIASTOLIC BLOOD PRESSURE < 80 MM HG: ICD-10-PCS | Mod: CPTII,S$GLB,, | Performed by: NURSE PRACTITIONER

## 2022-06-22 PROCEDURE — 1126F PR PAIN SEVERITY QUANTIFIED, NO PAIN PRESENT: ICD-10-PCS | Mod: CPTII,S$GLB,, | Performed by: NURSE PRACTITIONER

## 2022-06-22 PROCEDURE — 3079F DIAST BP 80-89 MM HG: CPT | Mod: CPTII,S$GLB,, | Performed by: INTERNAL MEDICINE

## 2022-06-22 PROCEDURE — 1159F PR MEDICATION LIST DOCUMENTED IN MEDICAL RECORD: ICD-10-PCS | Mod: CPTII,S$GLB,, | Performed by: NURSE PRACTITIONER

## 2022-06-22 PROCEDURE — 99999 PR PBB SHADOW E&M-EST. PATIENT-LVL IV: CPT | Mod: PBBFAC,,, | Performed by: INTERNAL MEDICINE

## 2022-06-22 PROCEDURE — 99999 PR PBB SHADOW E&M-EST. PATIENT-LVL V: ICD-10-PCS | Mod: PBBFAC,,, | Performed by: NURSE PRACTITIONER

## 2022-06-22 PROCEDURE — 1101F PT FALLS ASSESS-DOCD LE1/YR: CPT | Mod: CPTII,S$GLB,, | Performed by: NURSE PRACTITIONER

## 2022-06-22 PROCEDURE — 99214 PR OFFICE/OUTPT VISIT, EST, LEVL IV, 30-39 MIN: ICD-10-PCS | Mod: S$GLB,,, | Performed by: NURSE PRACTITIONER

## 2022-06-22 PROCEDURE — 99213 PR OFFICE/OUTPT VISIT, EST, LEVL III, 20-29 MIN: ICD-10-PCS | Mod: S$GLB,,, | Performed by: INTERNAL MEDICINE

## 2022-06-22 PROCEDURE — 3077F SYST BP >= 140 MM HG: CPT | Mod: CPTII,S$GLB,, | Performed by: INTERNAL MEDICINE

## 2022-06-22 PROCEDURE — 99999 PR PBB SHADOW E&M-EST. PATIENT-LVL IV: ICD-10-PCS | Mod: PBBFAC,,, | Performed by: INTERNAL MEDICINE

## 2022-06-22 PROCEDURE — 3078F DIAST BP <80 MM HG: CPT | Mod: CPTII,S$GLB,, | Performed by: NURSE PRACTITIONER

## 2022-06-22 PROCEDURE — 3288F PR FALLS RISK ASSESSMENT DOCUMENTED: ICD-10-PCS | Mod: CPTII,S$GLB,, | Performed by: NURSE PRACTITIONER

## 2022-06-22 PROCEDURE — 99214 OFFICE O/P EST MOD 30 MIN: CPT | Mod: S$GLB,,, | Performed by: NURSE PRACTITIONER

## 2022-06-22 PROCEDURE — 1101F PT FALLS ASSESS-DOCD LE1/YR: CPT | Mod: CPTII,S$GLB,, | Performed by: INTERNAL MEDICINE

## 2022-06-22 PROCEDURE — 3079F PR MOST RECENT DIASTOLIC BLOOD PRESSURE 80-89 MM HG: ICD-10-PCS | Mod: CPTII,S$GLB,, | Performed by: INTERNAL MEDICINE

## 2022-06-22 PROCEDURE — 1101F PR PT FALLS ASSESS DOC 0-1 FALLS W/OUT INJ PAST YR: ICD-10-PCS | Mod: CPTII,S$GLB,, | Performed by: INTERNAL MEDICINE

## 2022-06-22 PROCEDURE — 3077F PR MOST RECENT SYSTOLIC BLOOD PRESSURE >= 140 MM HG: ICD-10-PCS | Mod: CPTII,S$GLB,, | Performed by: INTERNAL MEDICINE

## 2022-06-22 PROCEDURE — 3288F FALL RISK ASSESSMENT DOCD: CPT | Mod: CPTII,S$GLB,, | Performed by: INTERNAL MEDICINE

## 2022-06-22 PROCEDURE — 1159F PR MEDICATION LIST DOCUMENTED IN MEDICAL RECORD: ICD-10-PCS | Mod: CPTII,S$GLB,, | Performed by: INTERNAL MEDICINE

## 2022-06-22 PROCEDURE — 1101F PR PT FALLS ASSESS DOC 0-1 FALLS W/OUT INJ PAST YR: ICD-10-PCS | Mod: CPTII,S$GLB,, | Performed by: NURSE PRACTITIONER

## 2022-06-22 PROCEDURE — 3077F PR MOST RECENT SYSTOLIC BLOOD PRESSURE >= 140 MM HG: ICD-10-PCS | Mod: CPTII,S$GLB,, | Performed by: NURSE PRACTITIONER

## 2022-06-22 PROCEDURE — 1159F MED LIST DOCD IN RCRD: CPT | Mod: CPTII,S$GLB,, | Performed by: NURSE PRACTITIONER

## 2022-06-22 PROCEDURE — 1126F AMNT PAIN NOTED NONE PRSNT: CPT | Mod: CPTII,S$GLB,, | Performed by: INTERNAL MEDICINE

## 2022-06-22 PROCEDURE — 1159F MED LIST DOCD IN RCRD: CPT | Mod: CPTII,S$GLB,, | Performed by: INTERNAL MEDICINE

## 2022-06-22 PROCEDURE — 3077F SYST BP >= 140 MM HG: CPT | Mod: CPTII,S$GLB,, | Performed by: NURSE PRACTITIONER

## 2022-06-22 PROCEDURE — 1160F PR REVIEW ALL MEDS BY PRESCRIBER/CLIN PHARMACIST DOCUMENTED: ICD-10-PCS | Mod: CPTII,S$GLB,, | Performed by: NURSE PRACTITIONER

## 2022-06-22 PROCEDURE — 3288F FALL RISK ASSESSMENT DOCD: CPT | Mod: CPTII,S$GLB,, | Performed by: NURSE PRACTITIONER

## 2022-06-22 RX ORDER — MODAFINIL 100 MG/1
100 TABLET ORAL DAILY
Qty: 30 TABLET | Refills: 0 | Status: SHIPPED | OUTPATIENT
Start: 2022-06-22 | End: 2022-07-12

## 2022-06-22 NOTE — PROGRESS NOTES
6/22/2022    Maycol Hodge  Office Note    Chief Complaint   Patient presents with    Fatigue     HPI:  6/22/2022 used provigil with good results but waned.  Took prednisone with some benefit.    No wheezes and occ sob/albuterol use    3/7/2022 did not use provigil as felt did not have narcolepsy.  No prednisone.  bp 171/99-- pcp told to repeat  And still bp up.  Energy varies -- trys to get to gym an hour with variable results.  Asthma controlled.  Weaned off symbicort with breo use-- very stable and wishes to wean off.    No hives.  Patient Instructions   Would try provigil to improve alertness and vigilance.  May need follow up for refills as needed.  Would discuss bp with Dr Fox June 2 follow  up  Would be reasonable to use breo every other day -- if stable breo qod for a month - could skip if having no asthma symptoms or albuterol use.     You have had dramatic benefit from Fasenra-- need to continue.      12/62021 -- had fasenra 4 wks ago.  Had been able use no prednisone nor rescue nor controller til 2 wks ago when had sore throat followed by nasal congestion then cough/wheezes-- pt cleared with prednisone/azithromycin and prn albuterol.  Patient Instructions   You started Fasenra last September with extreme dramatic benefit-- your asthma was under excellent control and ---your controller and rescue and action plans were able to be stopped.  You had mild exacerbation due to uri last 3 wks.      Would recommend continuing Fasenra as you were on prednisone every 2 - 3 months prior to Fasenra.     If your insurance mandates you be on controller -- would recommend using singulair and symbicort-- symbicort was started 3 wks.              Would recommend trial provigil for excess sleepiness-- your recent sleep study did not show significant sleep apnea with AHI 2.1.  Will try to get approval through speciality pharmacy.   Start provigil one daily to improve alertness and focus.         Addendum -sleep  study 8/20/2020 had ahi 2.1.   Could repeat in house sleep study.  Suspect would do well with provigil -- would recommend trial 100 mg daily to treat hypersomnolence.      10/25/2021 having fatigue, sometimes wake up fatigued and sometimes develops during day.  Denies nerve/anxiety issues.  Appetite ok.  No cough/wheezes/sob.  No spasm in over a yr or more.  No inhaler use.  Fatigue occurs 1/3 days.  Voids adequately.  Will sit about throughout day once fatigued.  Fatigue occurring last 3 months or so, had colonoscopy last yr, has chr elevation psa which is monitor q yr from q 6 months in past. Saw cardiology.     will nap twice daily -- has excess sleepiness.  Patient Instructions   Would use prednisone once daily for 3 days if any fatigue-- may do  Once or twice.  Breathing test was better at 56% than 42% last yr-- but you may still have some airway problems..  Would try inhalers if fatigued-- try nebulizer.  Also try prednisone----- if prednisone works-- use inhalers more.   Would screen for inflammation, testosterone (may not replace as psa up???), blood counts/chemistries, thyroid  (continue medications), diabetes screen, and chest xray.  Should f/u Dr Rojo soon if fatigue not clearing?????  psa not oredered as not able.   Addendum -sleep study 8/20/2020 had ahi 2.1.   Could repeat in house sleep study.  Suspect would do well with provigil -- would recommend trial 100 mg daily to treat hypersomnolence.      4/28/2021- fasenra going well, no steroids, 4-5 times daily has transparent mucous produced. No sleep apnea but some excessive sleepiness appreciated. Sleep 10, arouses 3 to void (had turp/meds in past)with difficulty going back. Off symbicort. Uses albuterol maybe once a wk or so.   Patient Instructions   Continue Gym.    Lungs should be good.  Need to continue fasenra.    Thyroid could be rechecked.     Home study-- no sleep apnea, could have restless legs?  10/28/2020 no prednisone since last visit,  fasenra helped dramatically.  No prednisoen and breathing much better.  - pt got shot here 10/8/2020  Patient Instructions   Need to continue fasenra as has had dramatic response.     2020 took another course prednisone, has been on prednisone  Monthly since march.  Finished last 2 wks ago, feels will need [place] course soon. Uses symbicort regular.  Had sleep study few wks ago.fev 42 from 57% on 2017,      Sleep study 2020 with ahi 2/hr- within normal range.   Patient Instructions   Asthma very unstable..  Will place order for fasenra- sample shot would help.  Shot would be monthly x 3 then every 8 wks.    Use valtrex if shingles - call for any question.      Sound like you need treatment again soon- start prednisone soon or Fasenra shots.     will give paper script for advair - may replace symboicort if cheaper?     No sleep apnea of significance.      Fasenra is an interleukin 5 inhibitor.  2020 had to use prednisone since March, having severe fatigue - reports sleeps well, sleep non restorative, snores with no sleep study in past.  Romo to mail box - wife sleeps separate room. No snore arousal.    Uses symbicort regular.  Uses albuterol rescue minimally less than once a wk.  Pt starts prednisone when cough becomes excessive.  Eats well, no wt loss. Sleeps 7 hrs/d.  Goes to [employer] for hr 3/wk.  Pt having vivid dreams.   Patient Instructions   Check lung capacity   Check sleep study - best prior to prednisone  May use prednisone - start 20/d dropping to 10 mg daily once better.  If you are found to have sleep apnea- less than 5 episodes an hour would be normal- would try cpap therapy.  2020 49 yo son  suddenly  in [place]- son avid - death.  Toxicology pending.  Had 2 spells ppt prednisone use since last visit with good results.  Took erythro also.  Having some grief- eats and sleeps ok . Wife in good shape. Uses symbicort regular.  Patient Instructions   Asthma not too bad  but you needed prednisone twice since March.  Special shots may help-  Could ask to get fasenra covered.  May consider starting if pattern worsens.    symbicort needed regular especially in winter/spring.+      3/2/2020- having exhaustion after 10-15 min doing yd wk.  Saw cardiology- had a fib at colonoscopy in dec but sinus mile since. - eliquis started.  Pt had syncope 10 days - saw Dr Vogel in f/u.  Last wk had some noct wheezes.   Uses symbicort 2 bid and singulair.  No no prednisone use last yr.  Sleeps ok, nerves ok - denies depression.  Had hives last July.  Stress echo in 11/22/2019 was good save high bp?    Patient Instructions   Fatigue and wheezes are noted.  Breathing not typical cause for loss of consciousness.  Heart rate is low - may contribute to fatigue but not that bad?    Chronic sinuses seem ok.  Would recommend prednisone 20 mg daily for 3 days now,  May repeat but would like to see better function and activity.    Call if not perking up.   oxygen level walking in office went from 97 to 93 % - not bad.  bp standing didn't fall today.    Blood wk last month all looked good.    Feb 1, 2019- Onset one week, sore throat, congestion, cough (fits, severe, productive light yellow in color), took azithromycin with minimal benefit. Current Prednisone use.  January 24, 2019- Feeling well, no exacerbations requiring antibiotic and steroid therapy. No cough, no nocturnal arousals, no SOB.   Sept 27, 2018- had exacerbations in July and August needing azithro and prednisone- had cough clear mucous with sl yellow, no breathing issues.  Unusual to have exacerbation summer.  Jan 5, 2018-- took azithro and pred x 3 - good results.  Doing well  Jan 19,2017 hpi, had acute onset 5 days ago with sinus runny, cough, wheezes, sob, no fever, no n/v, pos headache.  Muscle aches and joint aches.  Took prednisone, took inhaler, neb rx was old. Run down, poor appetite,   Took prednisone and azithro x3, still cough but  better,  Down but better.  Mucous clear.   Dec 16, 2016HPI:pt with asthma with no usual noct arousals or rescue use.  Exacerbates 3x yr in spring mainly. Progresses for a week on rescue and then uses pred and doxy (recent doxy reaction) to clear.  I cared for years ago.  No recent pft.  Pt feels breathing better than  Usual yrs past.      The chief compliant  problem varies with instablilty at time    PFSH:  Past Medical History:   Diagnosis Date    A-fib     Asthma     Hematuria     Hypertension          Past Surgical History:   Procedure Laterality Date    COLONOSCOPY      COLONOSCOPY N/A 2020    Procedure: COLONOSCOPY;  Surgeon: Willis Mullen MD;  Location: [facility];  Service: Endoscopy;  Laterality: N/A;    CYSTOSCOPY      negative    EYE SURGERY      bilateral cataracts    HERNIA REPAIR      righht inguinal    JOINT REPLACEMENT Left     shoulder    left cataract surgery      PROSTATE SURGERY      TURP     Social History     Tobacco Use    Smoking status: Former Smoker     Types: Cigarettes     Quit date:      Years since quittin.5    Smokeless tobacco: Never Used    Tobacco comment: Quit around    Substance Use Topics    Alcohol use: Yes     Alcohol/week: 14.0 standard drinks     Types: 7 Glasses of wine, 7 Shots of liquor per week    Drug use: No     Family History   Problem Relation Age of Onset    Multiple sclerosis Mother     Heart disease Father     Stroke Father     No Known Problems Daughter     Cancer Neg Hx     Diabetes Neg Hx     Hypertension Neg Hx      Review of patient's allergies indicates:   Allergen Reactions    Doxycycline Rash     Causes rash, hives and itching    Diltiazem      pruritis    Hydrochlorothiazide      Causes elevate uric acid, gout      Levaquin [levofloxacin] Swelling    Norvasc [amlodipine] Swelling       Performance Status:The patient's activity level is functions out of house.      Review of Systems:    Constitutional:  "Negative for activity change, appetite change, chills, diaphoresis, fatigue, fever and unexpected weight change.   HENT: Negative for dental problem, postnasal drip, trouble swallowing and voice change. Positive for  rhinorrhea, sinus pressure, sinus pain, sneezing, sore throat,   Respiratory: Negative for apnea,  shortness of breath, wheezing and stridor.  Positive for cough, chest tightness,  Cardiovascular: Negative for chest pain, palpitations and leg swelling.   Gastrointestinal: Negative for abdominal distention, abdominal pain, constipation and nausea.   Musculoskeletal: Negative for gait problem, myalgias and neck pain.   Skin: Negative for color change and pallor.   Allergic/Immunologic: Negative for environmental allergies and food allergies.   Neurological: Negative for dizziness, speech difficulty, weakness, light-headedness, numbness and headaches.   Hematological: Negative for adenopathy. Does not bruise/bleed easily.   Psychiatric/Behavioral: Negative for dysphoric mood and sleep disturbance. The patient is not nervous/anxious.     Exam:Comprehensive exam done.   BP (!) 176/83 (BP Location: Left arm, Patient Position: Sitting)   Pulse (!) 59   Resp 10   Ht 5' 11" (1.803 m)   Wt 78.8 kg (173 lb 9.8 oz)   SpO2 97% Comment: on room air at rest  BMI 24.21 kg/m²   Exam included Vitals as listed, and patient's appearance and affect and alertness and mood, oral exam for yeast and hygiene and pharynx lesions and Mallapatti (M) score, neck with inspection for jvd and masses and thyroid abnormalities and lymph nodes (supraclavicular and infraclavicular nodes and axillary also examined and noted if abn), chest exam included symmetry and effort and fremitus and percussion and auscultation, cardiac exam included rhythm and gallops and murmur and rubs and jvd and edema, abdominal exam for mass and hepatosplenomegaly and tenderness and hernias and bowel sounds, Musculoskeletal exam with muscle tone and posture " and mobility/gait and  strength, and skin for rashes and cyanosis and pallor and turgor, extremity for clubbing.  Findings were normal except for pertinent findings listed below:  M3,  chest is symmetric, no distress, normal percussion, normal fremitus, and no murmur, no edema, lung sounds good.    Rest good.      Radiographs (ct chest and cxr) reviewed from 10/16/2017 normal  cxr 5/20/2020 nad    Labs Reviewed  Lab Results   Component Value Date    WBC 6.44 05/26/2022    HGB 14.5 05/26/2022    HCT 45.5 05/26/2022    MCV 95 05/26/2022     05/26/2022   · holter 1/27/2020- The diary was properly completed. The tape was adequate (0 days , 48 hours, 0 minutes).  · Predominant Rhythm Sinus rhythm with heart rates varying between 43 and 113 bpm with an average of 56 bpm.  · Ventricular Arrhythmias There were very rare PVCs totalling 58 and averaging 1.21 per hour.  · Supraventricular Arrhythmias There were occasional PACs totalling 638 and averaging 13.29 per hour.  · Occassional atrial pair and short run of atrial tachycardia,the longest 7 beats with no associated symptoms..  There was an episode of SOB reported. The corresponding rhythm strips revealed the following: During the event (At the [employer]), the rhythm was sinus tachycardia at 113 bpm with without ectopy.     Results for ARNULFO SALEH (MRN [id number]) as of 9/21/2018 11:38   Ref. Range 9/21/2018 08:59   Eosinophil% Latest Ref Range: 0.0 - 8.0 % 9.2 (H)   Eos # Latest Ref Range: 0.0 - 0.5 K/uL 0.6 (H)     PFT  March 31, 2017 fev 57%,     11/22/19-Conclusion     · Concentric left ventricular remodeling.  · Normal left ventricular systolic function. The estimated ejection fraction is 60%  · Normal LV diastolic function.  · No wall motion abnormalities.  · Normal right ventricular systolic function.  · The stress echo portion of this study is negative for myocardial ischemia.  · The patient's exercise capacity was above average. functional capacity of 10  METS  · The patient reached the end of the protocol.  · There were no arrhythmias during stress.  · The EKG portion of this study is negative for myocardial ischemia.  · Exercise portion of stress test stopped due to an increase in systolic and diastolic blood pressure above protocol.  · Hypertensive response changed from stress echo in 3/2018.     Spirometry with bronchodilator, lung volume by gas dilution, diffusion capacity were accomplished October 11, 2021. The FEV1 to FVC ratio was 67% indicating airflow obstruction. The FEV1 was 56% of predicted at 1.6 L-this makes airflow obstruction   moderately severe. Bronchodilator response was not statistically significant at 9%. Total lung capacity on lung volume by gas dilution was 60% of predicted and low. Diffusion was 62% of predicted and low.   Â    There is moderately severe airflow obstruction. There is no significant bronchodilator response. Total lung capacity is reduced to 60% predicted and diffusion is reduced to 62% predicted. Clinical correlation recommended.       Spirometry with bronchodilator, lung volume by gas dilution, diffusion capacity measured August 17, 2020.  The FEV1 to FVC ratio was 65% indicating airflow obstruction.  The FEV1 measured 42% predicted at 1.2 L making airflow obstruction severe.  There    was a 13% improvement in the FEV1 following bronchodilator, this is a significant bronchodilator response.  Total lung capacity was reduced to 57% predicted suggesting restriction.  Residual volume was a little bit elevated at 77% of predicted.     Diffusion was reduced to 61% of predicted, uncorrected for anemia present.       Plan:  Clinical impression is apparently straight forward and impression with management as below.    Maycol was seen today for fatigue.    Diagnoses and all orders for this visit:    Severe persistent asthma without complication  -     Complete PFT with bronchodilator; Future    Hypersomnolence disorder    Eosinophilic  matilde          Maycol was seen today for fatigue.    Diagnoses and all orders for this visit:    Severe persistent asthma without complication  -     Complete PFT with bronchodilator; Future    Hypersomnolence disorder    Eosinophilic asthma        Follow up in about 6 months (around 12/22/2022), or if symptoms worsen or fail to improve.    Discussed with patient above for education the following:      Patient Instructions   Provigil will increase alertness --- not an medication for energy. There is no energy medication-- medications are for illness.      Asthma seems to be doing well  -- you have benefited from fasenra every 2 months, you have been on fasenra 2 yrs in September.      Would check lung capacity at follow up in 6 months.

## 2022-06-22 NOTE — PROGRESS NOTES
Subjective:       Patient ID: Maycol Hodge is a 82 y.o. male.    Chief Complaint: Follow-up    HPI  Patient presents today for follow up. 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  3/7/22 : severe persistent asthma  9/2821 Cardology-[facility] :HTN  Past Medical History:   Diagnosis Date    A-fib     Asthma     Hematuria     Hypertension        Review of patient's allergies indicates:   Allergen Reactions    Doxycycline Rash     Causes rash, hives and itching    Augmentin [amoxicillin-pot clavulanate] Nausea And Vomiting    Diltiazem      pruritis    Hydrochlorothiazide      Causes elevate uric acid, gout      Levaquin [levofloxacin] Swelling    Norvasc [amlodipine] Swelling    Pravastatin Rash         Current Outpatient Medications:     albuterol sulfate (PROAIR RESPICLICK) 90 mcg/actuation inhaler, Inhale 2 puffs into the lungs every 4 (four) hours as needed (For wheezing, coughing and shortness of breath). Rescue, Disp: 1 each, Rfl: 11    ascorbic acid, vitamin C, (VITAMIN C) 1000 MG tablet, Take 1,000 mg by mouth once daily., Disp: , Rfl:     atenoloL (TENORMIN) 25 MG tablet, TAKE 1/2 TABLET BY MOUTH EVERY NIGHT, Disp: 45 tablet, Rfl: 3    benralizumab (FASENRA PEN) 30 mg/mL AtIn, Inject 1 pen into the skin as needed (every 56 days)., Disp: 1 mL, Rfl: 11    ELIQUIS 5 mg Tab, TAKE 1 TABLET(5 MG) BY MOUTH TWICE DAILY, Disp: 180 tablet, Rfl: 3    fluticasone furoate-vilanteroL (BREO ELLIPTA) 200-25 mcg/dose DsDv diskus inhaler, Inhale 1 puff into the lungs once daily. Controller, Disp: 60 each, Rfl: 11    GLUC HCL/GLUC JAMESON/YN-LVU-I-GLUC (GLUCOSAMINE COMPLEX ORAL), Take 1 capsule by mouth once daily., Disp: , Rfl:     latanoprost 0.005 % ophthalmic solution, Place 1 drop into both eyes every evening. , Disp: , Rfl:     levothyroxine (SYNTHROID) 50 MCG tablet, TAKE 1 TABLET BY MOUTH EVERY MORNING ON TUES AND THURS, Disp: 26 tablet, Rfl: 1     "levothyroxine (SYNTHROID) 75 MCG tablet, TAKE 1 TABLET BY MOUTH EVERY MORNING ON MONDAY, WEDNESDAY, FRIDAY SATURDAY AND SUNDAY, Disp: 65 tablet, Rfl: 1    losartan (COZAAR) 50 MG tablet, Take 1 tab (50mg) at 7 am and 1 tab(50mg) at noon, Disp: 90 tablet, Rfl: 3    terazosin (HYTRIN) 5 MG capsule, Take 1 capsule (5 mg total) by mouth once daily., Disp: 90 capsule, Rfl: 3    albuterol-ipratropium (DUO-NEB) 2.5 mg-0.5 mg/3 mL nebulizer solution, Take 3 mLs by nebulization every 6 (six) hours as needed., Disp: 120 vial, Rfl: 5    famotidine (PEPCID) 20 MG tablet, Take 1 tablet (20 mg total) by mouth 2 (two) times daily., Disp: 60 tablet, Rfl: 11    modafiniL (PROVIGIL) 100 MG Tab, Take 1 tablet (100 mg total) by mouth once daily., Disp: 30 tablet, Rfl: 0    valACYclovir (VALTREX) 1000 MG tablet, Take 1 tablet (1,000 mg total) by mouth 3 (three) times daily. Take for shingles. for 7 days, Disp: 21 tablet, Rfl: 0    Review of Systems   Constitutional: Negative for unexpected weight change.   HENT: Negative for trouble swallowing.    Eyes: Negative for visual disturbance.   Respiratory: Negative for shortness of breath.    Cardiovascular: Negative for chest pain, palpitations and leg swelling.   Gastrointestinal: Negative for blood in stool.   Genitourinary: Negative for hematuria.   Skin: Negative for rash.   Allergic/Immunologic: Negative for immunocompromised state.   Neurological: Negative for headaches.   Hematological: Does not bruise/bleed easily.   Psychiatric/Behavioral: Negative for agitation. The patient is not nervous/anxious.        Objective:      BP (!) 140/78 (BP Location: Left arm, Patient Position: Sitting, BP Method: Small (Manual))   Pulse 60   Temp 97.8 °F (36.6 °C) (Oral)   Ht 5' 11" (1.803 m)   Wt 78.7 kg (173 lb 8 oz)   SpO2 96%   BMI 24.20 kg/m²   Physical Exam  Constitutional:       Appearance: He is well-developed.   Eyes:      Conjunctiva/sclera: Conjunctivae normal.      Pupils: " Pupils are equal, round, and reactive to light.   Cardiovascular:      Rate and Rhythm: Normal rate and regular rhythm.      Heart sounds: Normal heart sounds.   Pulmonary:      Effort: Pulmonary effort is normal.      Breath sounds: Normal breath sounds.   Abdominal:      General: Bowel sounds are normal.      Palpations: Abdomen is soft.   Musculoskeletal:         General: Normal range of motion.      Cervical back: Normal range of motion and neck supple.   Skin:     General: Skin is warm and dry.   Neurological:      Mental Status: He is alert and oriented to person, place, and time.   Psychiatric:         Behavior: Behavior normal.         Thought Content: Thought content normal.         Judgment: Judgment normal.         Assessment:       1. Blood pressure instability    2. Chronic fatigue    3. Paroxysmal atrial fibrillation    4. Severe persistent asthma without complication    5. Essential hypertension    6. Benign prostatic hyperplasia without lower urinary tract symptoms    7. Hypothyroidism, unspecified type    8. BMI 24.0-24.9, adult        Plan:       Blood pressure instability  -     Ambulatory referral/consult to Cardiology; Future; Expected date: 06/29/2022    Chronic fatigue  -     modafiniL (PROVIGIL) 100 MG Tab; Take 1 tablet (100 mg total) by mouth once daily.  Dispense: 30 tablet; Refill: 0    Paroxysmal atrial fibrillation  Stable, on eliquis and atenolol  Severe persistent asthma without complication  Stable, continue Pulmology follow up  Benign prostatic hyperplasia without lower urinary tract symptoms  Stable continue medication  Hypothyroidism, unspecified type  Stable continue medication    BMI 24.0-24.9, adult  Stable continue management          Patient readiness: acceptance and barriers:none    During the course of the visit the patient was educated and counseled about the following:     Hypertension:   Dietary sodium restriction.  Regular aerobic exercise.    Goals: Hypertension:  Reduce Blood Pressure    Did patient meet goals/outcomes: No    The following self management tools provided: blood pressure log    Patient Instructions (the written plan) was given to the patient/family.     Time spent with patient: 15 minutes    Barriers to medications present (no )    Adverse reactions to current medications (no)    Over the counter medications reviewed (Yes)

## 2022-06-22 NOTE — PATIENT INSTRUCTIONS
Provigil will increase alertness --- not an medication for energy. There is no energy medication-- medications are for illness.      Asthma seems to be doing well  -- you have benefited from fasenra every 2 months, you have been on fasenra 2 yrs in September.      Would check lung capacity at follow up in 6 months.

## 2022-07-11 NOTE — PROGRESS NOTES
Subjective:    Patient ID:  Maycol Hodge is a 82 y.o. male who presents for evaluation of   Chief Complaint   Patient presents with    Hypertension       HPI:  9/27/21  APPOINTMENT TODAY TO ESTABLISH CARE BY JEROD AGUIAR.  His blood pressure readings and report reports that his blood pressure has been up and down.  Most of his readings are in the 160s to 170s..  S lower in the morning but goes up during the day.  Occasionally goes low around 109 systolic.  He had 1 episode where he is coming off of 10 day prednisone is but down to 90 he was syncopal and incontinent of stool.  He was fatigued..  He gets occasional discomfort in his chest usually at rest..  Is not taking his medication for sleep deprivation or steroids.  Is not taking inhalers for asthma.  He has a history of elevated PSA and multiple TURP prostate biopsy he is no difficulty with LUTS.          Progress Notes  Roel Vogel Jr., MD (Physician)   Cardiology  Subjective:    Patient ID:  Maycol Hodge is a 81 y.o. male who presents for follow-up of Follow-up        HPI 82 yo WM who I had seen in 2017 for HTN who at that time was asymptomatic and not interested in making any med changes. Had EKG on 12- showing AF.He feels he has had intermittent symptoms since August.Had stress echo in November and was in sinus and test negative for ischemia. Holter on 1- showed sinus rhythm. Was seen in February 2020 and again was in sinus. Holter March 2021 showed no AF.Patient states he has been tired and occassionally dizzy.EKG shows he is back in AF.                  1. Atrial fibrillation, unspecified type    2. Mixed hyperlipidemia    3. Hypertension, unspecified type    4. Fatigue, unspecified type       Plan:     Will do echo and check holter to see average heart rate and depending on echo decide if cardioversion would be indicated.       Orders Placed This Encounter   Procedures    Holter monitor - 48 hour    IN OFFICE EKG  12-LEAD (to [facility])    Echo         Number of falls in the past 12 months?: 0   Fall Risk?: No    21  Progress Notes  Roel Vogel Jr., MD (Physician)   Cardiology     Subjective:    Patient ID:  Maycol Hodge is a 81 y.o. male who presents for follow-up Echo shows normal LV function.  of Follow-up, Fatigue, and Dizziness        HPI81 yo with PAF. Last holter sinus rhythm and today in sinus. Still with some SOB but has had lung issues in the past. Echo shows normal LV function          22      Review of patient's allergies indicates:   Allergen Reactions    Doxycycline Rash     Causes rash, hives and itching    Augmentin [amoxicillin-pot clavulanate] Nausea And Vomiting    Diltiazem      pruritis    Hydrochlorothiazide      Causes elevate uric acid, gout      Levaquin [levofloxacin] Swelling    Norvasc [amlodipine] Swelling    Pravastatin Rash       Past Medical History:   Diagnosis Date    A-fib     Asthma     Hematuria     Hypertension      Past Surgical History:   Procedure Laterality Date    COLONOSCOPY      COLONOSCOPY N/A 2020    Procedure: COLONOSCOPY;  Surgeon: Willis Mullen MD;  Location: [facility];  Service: Endoscopy;  Laterality: N/A;    CYSTOSCOPY      negative    EYE SURGERY      bilateral cataracts    HERNIA REPAIR      righht inguinal    JOINT REPLACEMENT Left     shoulder    left cataract surgery      PROSTATE SURGERY      TURP     Social History     Tobacco Use    Smoking status: Former Smoker     Types: Cigarettes     Quit date:      Years since quittin.5    Smokeless tobacco: Never Used    Tobacco comment: Quit around    Substance Use Topics    Alcohol use: Yes     Alcohol/week: 14.0 standard drinks     Types: 7 Glasses of wine, 7 Shots of liquor per week    Drug use: No     Family History   Problem Relation Age of Onset    Multiple sclerosis Mother     Heart disease Father     Stroke Father     No Known Problems Daughter      Cancer Neg Hx     Diabetes Neg Hx     Hypertension Neg Hx         Review of Systems:   Constitution: Negative for diaphoresis and fever.   HEENT: Negative for nosebleeds.    Cardiovascular: Negative for chest pain       No dyspnea on exertion       No leg swelling        No palpitations  Respiratory: Negative for shortness of breath and wheezing.    Hematologic/Lymphatic: Negative for bleeding problem. Does not bruise/bleed easily.   Skin: Negative for color change and rash.   Musculoskeletal: Negative for falls and myalgias.   Gastrointestinal: Negative for hematemesis and hematochezia.   Genitourinary: Negative for hematuria.   Neurological: Negative for dizziness and light-headedness.   Psychiatric/Behavioral: Negative for altered mental status and memory loss.          Objective:        Vitals:    07/12/22 0838   BP: 128/78   Pulse: (!) 56       Lab Results   Component Value Date    WBC 6.44 05/26/2022    HGB 14.5 05/26/2022    HCT 45.5 05/26/2022     05/26/2022    CHOL 193 05/26/2022    TRIG 112 05/26/2022    HDL 58 05/26/2022    ALT 15 05/26/2022    AST 20 05/26/2022     05/26/2022    K 4.2 05/26/2022     05/26/2022    CREATININE 1.2 05/26/2022    BUN 17 05/26/2022    CO2 27 05/26/2022    TSH 3.850 05/26/2022    PSA 12.4 (H) 02/17/2021    HGBA1C 5.3 10/26/2021        ECHOCARDIOGRAM RESULTS  Results for orders placed in visit on 08/27/21    Echo    Interpretation Summary  · The left ventricle is normal in size with normal systolic function.  · The estimated ejection fraction is 70%.  · Normal left ventricular diastolic function.  · Normal right ventricular size with normal right ventricular systolic function.  · Mild right atrial enlargement.  · Mild-to-moderate aortic regurgitation.  · Mild mitral regurgitation.  · Mild tricuspid regurgitation.  · Normal central venous pressure (3 mmHg).  · The estimated PA systolic pressure is 30 mmHg.        CURRENT/PREVIOUS VISIT EKG  NSR lad incomplete  RBBB  Results for orders placed or performed in visit on 08/27/21   IN OFFICE EKG 12-LEAD (to [place])    Collection Time: 08/27/21 11:32 AM    Narrative    Test Reason : Z00.00    Vent. Rate : 060 BPM     Atrial Rate : 098 BPM     P-R Int : 000 ms          QRS Dur : 090 ms      QT Int : 394 ms       P-R-T Axes : 000 -54 057 degrees     QTc Int : 394 ms    Atrial fibrillation  Left anterior fascicular block  Abnormal ECG  When compared with ECG of 28-FEB-2020 14:36,  Atrial fibrillation has replaced Sinus rhythm  Confirmed by COLUMBA CESAR MD ([id number]) on 9/8/2021 1:44:53 PM    Referred By: COLUMBA CESAR           Confirmed By:COLUMBA CESAR MD     No valid procedures specified.   No results found for this or any previous visit.      Physical Exam:  CONSTITUTIONAL: No fever, no chills  HEENT: Normocephalic, atraumatic,pupils reactive to light                 NECK:  No JVD no carotid bruit  CVS: S1S2+, RRR, no murmurs,   LUNGS: Clear  ABDOMEN: Soft, NT, BS+  EXTREMITIES: No cyanosis, edema  : No fletcher catheter  NEURO: AAO X 3  PSY: Normal affect      Medication List with Changes/Refills   New Medications    AMLODIPINE (NORVASC) 2.5 MG TABLET    Take 2 tablets (5 mg total) by mouth once daily.   Current Medications    ALBUTEROL SULFATE (PROAIR RESPICLICK) 90 MCG/ACTUATION INHALER    Inhale 2 puffs into the lungs every 4 (four) hours as needed (For wheezing, coughing and shortness of breath). Rescue    ALBUTEROL-IPRATROPIUM (DUO-NEB) 2.5 MG-0.5 MG/3 ML NEBULIZER SOLUTION    Take 3 mLs by nebulization every 6 (six) hours as needed.    ASCORBIC ACID, VITAMIN C, (VITAMIN C) 1000 MG TABLET    Take 1,000 mg by mouth once daily.    BENRALIZUMAB (FASENRA PEN) 30 MG/ML ATIN    Inject 1 pen into the skin as needed (every 56 days).    ELIQUIS 5 MG TAB    TAKE 1 TABLET(5 MG) BY MOUTH TWICE DAILY    GLUC HCL/GLUC JAMESON/[id number]-[id number] (GLUCOSAMINE COMPLEX ORAL)    Take 1 capsule by mouth once daily.    LATANOPROST 0.005 % OPHTHALMIC  SOLUTION    Place 1 drop into both eyes every evening.     LEVOTHYROXINE (SYNTHROID) 50 MCG TABLET    TAKE 1 TABLET BY MOUTH EVERY MORNING ON TUES AND THURS    LEVOTHYROXINE (SYNTHROID) 75 MCG TABLET    TAKE 1 TABLET BY MOUTH EVERY MORNING ON MONDAY, WEDNESDAY, FRIDAY SATURDAY AND SUNDAY    LOSARTAN (COZAAR) 50 MG TABLET    Take 1 tab (50mg) at 7 am and 1 tab(50mg) at noon   Changed and/or Refilled Medications    Modified Medication Previous Medication    ATENOLOL (TENORMIN) 25 MG TABLET atenoloL (TENORMIN) 25 MG tablet       Take .5 tablet    TAKE 1/2 TABLET BY MOUTH EVERY NIGHT    TERAZOSIN (HYTRIN) 1 MG CAPSULE terazosin (HYTRIN) 5 MG capsule       Take 2 capsules (2 mg total) by mouth once daily.    Take 1 capsule (5 mg total) by mouth once daily.   Discontinued Medications    FAMOTIDINE (PEPCID) 20 MG TABLET    Take 1 tablet (20 mg total) by mouth 2 (two) times daily.    FLUTICASONE FUROATE-VILANTEROL (BREO ELLIPTA) 200-25 MCG/DOSE DSDV DISKUS INHALER    Inhale 1 puff into the lungs once daily. Controller    MODAFINIL (PROVIGIL) 100 MG TAB    Take 1 tablet (100 mg total) by mouth once daily.    VALACYCLOVIR (VALTREX) 1000 MG TABLET    Take 1 tablet (1,000 mg total) by mouth 3 (three) times daily. Take for shingles. for 7 days             Assessment:       1. Paroxysmal atrial fibrillation    2. Primary hypertension    3. Mixed hyperlipidemia    4. Fatigue, unspecified type    5. Eosinophilic asthma    6. Calcification of aorta    7. Hypersomnolence disorder    8. Blood pressure instability    9. Essential hypertension         Plan:     Problem List Items Addressed This Visit        Pulmonary    Eosinophilic asthma    Relevant Orders    Echo    Nuclear Stress - Cardiology Interpreted       Cardiac/Vascular    Hypertension    Relevant Medications    terazosin (HYTRIN) 1 MG capsule    Other Relevant Orders    Echo    Nuclear Stress - Cardiology Interpreted    Hyperlipidemia    Relevant Orders    Echo    Nuclear  Stress - Cardiology Interpreted    Paroxysmal atrial fibrillation - Primary    Relevant Orders    IN OFFICE EKG 12-LEAD (to [place])    Echo    Nuclear Stress - Cardiology Interpreted    Calcification of aorta    Relevant Orders    Echo    Nuclear Stress - Cardiology Interpreted       Other    Fatigue    Relevant Orders    Echo    Nuclear Stress - Cardiology Interpreted    Hypersomnolence disorder    Relevant Orders    Echo    Nuclear Stress - Cardiology Interpreted      Other Visit Diagnoses     Blood pressure instability        Relevant Orders    Echo    Nuclear Stress - Cardiology Interpreted    Essential hypertension        Relevant Medications    terazosin (HYTRIN) 1 MG capsule    Other Relevant Orders    Echo    Nuclear Stress - Cardiology Interpreted          The patient feels of the losartan is not do anything but he has been on it for several years.  He has in crease to losartan to twice a day but did do any good so he is back to once a day.  Recommended to start with multiple of low doses of antihypertensives.  What ACE-inhibitor Aldactone which should stabilize his fluctuations.  Will decrease the Hytrin as he can drop the blood pressure and he is not having a LUTS.  The blood pressure monitoring her  Of the or disease because of his age and history have.    No follow-ups on file.    The patients questions were answered, they verbalized understanding, and agreed with the treatment plan.     THOMAS LANDON MD  [facility]

## 2022-07-12 ENCOUNTER — OFFICE VISIT (OUTPATIENT)
Dept: CARDIOLOGY | Facility: CLINIC | Age: 82
End: 2022-07-12
Payer: MEDICARE

## 2022-07-12 VITALS
OXYGEN SATURATION: 97 % | SYSTOLIC BLOOD PRESSURE: 128 MMHG | DIASTOLIC BLOOD PRESSURE: 78 MMHG | BODY MASS INDEX: 24.43 KG/M2 | WEIGHT: 175.13 LBS | HEART RATE: 56 BPM

## 2022-07-12 DIAGNOSIS — R53.83 FATIGUE, UNSPECIFIED TYPE: ICD-10-CM

## 2022-07-12 DIAGNOSIS — E78.2 MIXED HYPERLIPIDEMIA: ICD-10-CM

## 2022-07-12 DIAGNOSIS — G47.10 HYPERSOMNOLENCE DISORDER: ICD-10-CM

## 2022-07-12 DIAGNOSIS — I10 ESSENTIAL HYPERTENSION: ICD-10-CM

## 2022-07-12 DIAGNOSIS — I10 PRIMARY HYPERTENSION: ICD-10-CM

## 2022-07-12 DIAGNOSIS — I99.8 BLOOD PRESSURE INSTABILITY: Primary | ICD-10-CM

## 2022-07-12 DIAGNOSIS — J82.83 EOSINOPHILIC ASTHMA: ICD-10-CM

## 2022-07-12 DIAGNOSIS — I70.0 CALCIFICATION OF AORTA: ICD-10-CM

## 2022-07-12 DIAGNOSIS — I48.0 PAROXYSMAL ATRIAL FIBRILLATION: ICD-10-CM

## 2022-07-12 PROCEDURE — 1101F PT FALLS ASSESS-DOCD LE1/YR: CPT | Mod: CPTII,S$GLB,, | Performed by: GENERAL PRACTICE

## 2022-07-12 PROCEDURE — 3074F PR MOST RECENT SYSTOLIC BLOOD PRESSURE < 130 MM HG: ICD-10-PCS | Mod: CPTII,S$GLB,, | Performed by: GENERAL PRACTICE

## 2022-07-12 PROCEDURE — 1159F MED LIST DOCD IN RCRD: CPT | Mod: CPTII,S$GLB,, | Performed by: GENERAL PRACTICE

## 2022-07-12 PROCEDURE — 3078F DIAST BP <80 MM HG: CPT | Mod: CPTII,S$GLB,, | Performed by: GENERAL PRACTICE

## 2022-07-12 PROCEDURE — 99215 OFFICE O/P EST HI 40 MIN: CPT | Mod: S$GLB,,, | Performed by: GENERAL PRACTICE

## 2022-07-12 PROCEDURE — 3078F PR MOST RECENT DIASTOLIC BLOOD PRESSURE < 80 MM HG: ICD-10-PCS | Mod: CPTII,S$GLB,, | Performed by: GENERAL PRACTICE

## 2022-07-12 PROCEDURE — 1126F PR PAIN SEVERITY QUANTIFIED, NO PAIN PRESENT: ICD-10-PCS | Mod: CPTII,S$GLB,, | Performed by: GENERAL PRACTICE

## 2022-07-12 PROCEDURE — 1101F PR PT FALLS ASSESS DOC 0-1 FALLS W/OUT INJ PAST YR: ICD-10-PCS | Mod: CPTII,S$GLB,, | Performed by: GENERAL PRACTICE

## 2022-07-12 PROCEDURE — 93000 ELECTROCARDIOGRAM COMPLETE: CPT | Mod: S$GLB,,, | Performed by: GENERAL PRACTICE

## 2022-07-12 PROCEDURE — 1160F RVW MEDS BY RX/DR IN RCRD: CPT | Mod: CPTII,S$GLB,, | Performed by: GENERAL PRACTICE

## 2022-07-12 PROCEDURE — 3288F FALL RISK ASSESSMENT DOCD: CPT | Mod: CPTII,S$GLB,, | Performed by: GENERAL PRACTICE

## 2022-07-12 PROCEDURE — 1159F PR MEDICATION LIST DOCUMENTED IN MEDICAL RECORD: ICD-10-PCS | Mod: CPTII,S$GLB,, | Performed by: GENERAL PRACTICE

## 2022-07-12 PROCEDURE — 1126F AMNT PAIN NOTED NONE PRSNT: CPT | Mod: CPTII,S$GLB,, | Performed by: GENERAL PRACTICE

## 2022-07-12 PROCEDURE — 93000 EKG 12-LEAD: ICD-10-PCS | Mod: S$GLB,,, | Performed by: GENERAL PRACTICE

## 2022-07-12 PROCEDURE — 3074F SYST BP LT 130 MM HG: CPT | Mod: CPTII,S$GLB,, | Performed by: GENERAL PRACTICE

## 2022-07-12 PROCEDURE — 99215 PR OFFICE/OUTPT VISIT, EST, LEVL V, 40-54 MIN: ICD-10-PCS | Mod: S$GLB,,, | Performed by: GENERAL PRACTICE

## 2022-07-12 PROCEDURE — 1160F PR REVIEW ALL MEDS BY PRESCRIBER/CLIN PHARMACIST DOCUMENTED: ICD-10-PCS | Mod: CPTII,S$GLB,, | Performed by: GENERAL PRACTICE

## 2022-07-12 PROCEDURE — 3288F PR FALLS RISK ASSESSMENT DOCUMENTED: ICD-10-PCS | Mod: CPTII,S$GLB,, | Performed by: GENERAL PRACTICE

## 2022-07-12 RX ORDER — TERAZOSIN 1 MG/1
2 CAPSULE ORAL DAILY
Qty: 30 CAPSULE | Refills: 11 | Status: SHIPPED | OUTPATIENT
Start: 2022-07-12 | End: 2022-08-15 | Stop reason: SDUPTHER

## 2022-07-12 RX ORDER — ATENOLOL 25 MG/1
TABLET ORAL
Qty: 45 TABLET | Refills: 3 | Status: SHIPPED | OUTPATIENT
Start: 2022-07-12 | End: 2023-05-11

## 2022-07-12 RX ORDER — AMLODIPINE BESYLATE 2.5 MG/1
5 TABLET ORAL DAILY
Qty: 50 TABLET | Refills: 11 | Status: SHIPPED | OUTPATIENT
Start: 2022-07-12 | End: 2022-09-20

## 2022-07-12 RX ORDER — SPIRONOLACTONE 25 MG/1
25 TABLET ORAL DAILY
Qty: 30 TABLET | Refills: 11 | Status: SHIPPED | OUTPATIENT
Start: 2022-07-12 | End: 2023-07-11

## 2022-07-12 RX ORDER — TERAZOSIN 1 MG/1
5 CAPSULE ORAL DAILY
Qty: 30 CAPSULE | Refills: 11 | Status: SHIPPED | OUTPATIENT
Start: 2022-07-12 | End: 2022-07-12

## 2022-07-12 RX ORDER — AMLODIPINE BESYLATE 2.5 MG/1
2.5 TABLET ORAL DAILY
Qty: 50 TABLET | Refills: 11 | Status: SHIPPED | OUTPATIENT
Start: 2022-07-12 | End: 2022-07-12 | Stop reason: SDUPTHER

## 2022-08-02 ENCOUNTER — TELEPHONE (OUTPATIENT)
Dept: CARDIOLOGY | Facility: HOSPITAL | Age: 82
End: 2022-08-02

## 2022-08-02 NOTE — TELEPHONE ENCOUNTER
Left message on voicemail.    Patient advised, test will be at [facility] ([address]   Will need to register on the first floor at the main entrance.   Patient advised that arrival time is 7:20.  Patient advised that he may be here about 3.5-4 hours, and may want to bring something to occupy their time, as there will be periods of waiting.    Patient advised, may take his medications prior to testing if you need to.  Advised if he needs to eat to take his medications, please keep it light, like toast and juice.    Patient advised to avoid all caffeine 12 hours prior to testing.  This includes decaf tea and coffee.    Wear comfortable clothing.   No lotions, oils, or powders to the upper chest area. May wear deodorant.    No metal jewelry, buttons, or zippers to the upper body.  Advised to call the office if any questions.       Will send instructions via 3VR as well.

## 2022-08-03 ENCOUNTER — HOSPITAL ENCOUNTER (OUTPATIENT)
Dept: CARDIOLOGY | Facility: HOSPITAL | Age: 82
Discharge: HOME | End: 2022-08-03
Attending: GENERAL PRACTICE
Payer: MEDICARE

## 2022-08-03 ENCOUNTER — HOSPITAL ENCOUNTER (OUTPATIENT)
Dept: RADIOLOGY | Facility: HOSPITAL | Age: 82
Discharge: HOME | End: 2022-08-03
Attending: GENERAL PRACTICE
Payer: MEDICARE

## 2022-08-03 VITALS — HEIGHT: 71 IN | WEIGHT: 175 LBS | BODY MASS INDEX: 24.5 KG/M2

## 2022-08-03 DIAGNOSIS — I48.0 PAROXYSMAL ATRIAL FIBRILLATION: ICD-10-CM

## 2022-08-03 DIAGNOSIS — I10 ESSENTIAL HYPERTENSION: ICD-10-CM

## 2022-08-03 DIAGNOSIS — I10 PRIMARY HYPERTENSION: ICD-10-CM

## 2022-08-03 DIAGNOSIS — E78.2 MIXED HYPERLIPIDEMIA: ICD-10-CM

## 2022-08-03 DIAGNOSIS — J82.83 EOSINOPHILIC ASTHMA: ICD-10-CM

## 2022-08-03 DIAGNOSIS — I99.8 BLOOD PRESSURE INSTABILITY: ICD-10-CM

## 2022-08-03 DIAGNOSIS — R53.83 FATIGUE, UNSPECIFIED TYPE: ICD-10-CM

## 2022-08-03 DIAGNOSIS — G47.10 HYPERSOMNOLENCE DISORDER: ICD-10-CM

## 2022-08-03 DIAGNOSIS — I70.0 CALCIFICATION OF AORTA: ICD-10-CM

## 2022-08-03 PROCEDURE — 93017 CV STRESS TEST TRACING ONLY: CPT

## 2022-08-03 PROCEDURE — 93016 CV STRESS TEST SUPVJ ONLY: CPT | Mod: ,,, | Performed by: NURSE PRACTITIONER

## 2022-08-03 PROCEDURE — A9502 TC99M TETROFOSMIN: HCPCS

## 2022-08-03 PROCEDURE — 78452 STRESS TEST WITH MYOCARDIAL PERFUSION (CUPID ONLY): ICD-10-PCS | Mod: 26,,, | Performed by: GENERAL PRACTICE

## 2022-08-03 PROCEDURE — 93306 TTE W/DOPPLER COMPLETE: CPT | Mod: 26,,, | Performed by: GENERAL PRACTICE

## 2022-08-03 PROCEDURE — 93018 STRESS TEST WITH MYOCARDIAL PERFUSION (CUPID ONLY): ICD-10-PCS | Mod: ,,, | Performed by: GENERAL PRACTICE

## 2022-08-03 PROCEDURE — 93016 CV STRESS TEST SUPVJ ONLY: CPT | Mod: ,,, | Performed by: GENERAL PRACTICE

## 2022-08-03 PROCEDURE — 78452 HT MUSCLE IMAGE SPECT MULT: CPT | Mod: 26,,, | Performed by: GENERAL PRACTICE

## 2022-08-03 PROCEDURE — 93306 TTE W/DOPPLER COMPLETE: CPT

## 2022-08-03 PROCEDURE — 93016 STRESS TEST WITH MYOCARDIAL PERFUSION (CUPID ONLY): ICD-10-PCS | Mod: ,,, | Performed by: GENERAL PRACTICE

## 2022-08-03 PROCEDURE — 93018 CV STRESS TEST I&R ONLY: CPT | Mod: ,,, | Performed by: GENERAL PRACTICE

## 2022-08-03 PROCEDURE — 93306 ECHO (CUPID ONLY): ICD-10-PCS | Mod: 26,,, | Performed by: GENERAL PRACTICE

## 2022-08-03 PROCEDURE — 93016 PR CARDIAC STRESS TST,DR SUPERV ONLY: ICD-10-PCS | Mod: ,,, | Performed by: NURSE PRACTITIONER

## 2022-08-03 RX ORDER — REGADENOSON 0.08 MG/ML
0.4 INJECTION, SOLUTION INTRAVENOUS ONCE
Status: COMPLETED | OUTPATIENT
Start: 2022-08-03 | End: 2022-08-03

## 2022-08-03 RX ADMIN — REGADENOSON 0.4 MG: 0.08 INJECTION, SOLUTION INTRAVENOUS at 07:08

## 2022-08-04 LAB
AORTIC VALVE CUSP SEPERATION: 1.9 CM
AV INDEX (PROSTH): 0.84
AV MEAN GRADIENT: 8 MMHG
AV PEAK GRADIENT: 8 MMHG
AV REGURGITATION PRESSURE HALF TIME: 699 MS
AV VALVE AREA: 2.65 CM2
AV VELOCITY RATIO: 0.45
BSA FOR ECHO PROCEDURE: 1.99 M2
CV ECHO LV RWT: 0.86 CM
DOP CALC AO PEAK VEL: 1.41 M/S
DOP CALC AO VTI: 28.8 CM
DOP CALC LVOT AREA: 3.1 CM2
DOP CALC LVOT DIAMETER: 2 CM
DOP CALC LVOT PEAK VEL: 0.63 M/S
DOP CALC LVOT STROKE VOLUME: 76.3 CM3
DOP CALCLVOT PEAK VEL VTI: 24.3 CM
E WAVE DECELERATION TIME: 253 MS
ECHO LV POSTERIOR WALL: 1.69 CM (ref 0.6–1.1)
EJECTION FRACTION: 70 %
FRACTIONAL SHORTENING: 39 % (ref 28–44)
INTERVENTRICULAR SEPTUM: 1.5 CM (ref 0.6–1.1)
IVRT: 121 MS
LEFT ATRIUM SIZE: 3.3 CM
LEFT INTERNAL DIMENSION IN SYSTOLE: 2.37 CM (ref 2.1–4)
LEFT VENTRICLE MASS INDEX: 125 G/M2
LEFT VENTRICULAR INTERNAL DIMENSION IN DIASTOLE: 3.91 CM (ref 3.5–6)
LEFT VENTRICULAR MASS: 248.65 G
MV PEAK A VEL: 0.74 M/S
MV STENOSIS PRESSURE HALF TIME: 57 MS
MV VALVE AREA P 1/2 METHOD: 3.86 CM2
PISA TR MAX VEL: 2.1 M/S
RA PRESSURE: 3 MMHG
RIGHT VENTRICULAR END-DIASTOLIC DIMENSION: 2.48 CM
TDI LATERAL: 0.06 M/S
TDI SEPTAL: 0.06 M/S
TDI: 0.06 M/S
TR MAX PG: 18 MMHG
TV REST PULMONARY ARTERY PRESSURE: 21 MMHG

## 2022-08-05 LAB
CV PHARM DOSE: 0.4 MG
CV STRESS BASE HR: 50 BPM
DIASTOLIC BLOOD PRESSURE: 92 MMHG
EJECTION FRACTION- HIGH: 59 %
END DIASTOLIC INDEX-HIGH: 155 ML/M2
END DIASTOLIC INDEX-LOW: 91 ML/M2
END SYSTOLIC INDEX-HIGH: 78 ML/M2
END SYSTOLIC INDEX-LOW: 40 ML/M2
NUC STRESS DIASTOLIC VOLUME INDEX: 68
NUC STRESS EJECTION FRACTION: 75 %
NUC STRESS SYSTOLIC VOLUME INDEX: 17
OHS CV CPX 1 MINUTE RECOVERY HEART RATE: 64 BPM
OHS CV CPX 85 PERCENT MAX PREDICTED HEART RATE MALE: 116
OHS CV CPX MAX PREDICTED HEART RATE: 136
OHS CV CPX PATIENT IS FEMALE: 0
OHS CV CPX PATIENT IS MALE: 1
OHS CV CPX PEAK DIASTOLIC BLOOD PRESSURE: 82 MMHG
OHS CV CPX PEAK HEAR RATE: 64 BPM
OHS CV CPX PEAK RATE PRESSURE PRODUCT: 9088
OHS CV CPX PEAK SYSTOLIC BLOOD PRESSURE: 142 MMHG
OHS CV CPX PERCENT MAX PREDICTED HEART RATE ACHIEVED: 47
OHS CV CPX RATE PRESSURE PRODUCT PRESENTING: 8000
RETIRED EF AND QEF - SEE NOTES: 47 %
SYSTOLIC BLOOD PRESSURE: 160 MMHG

## 2022-08-15 ENCOUNTER — OFFICE VISIT (OUTPATIENT)
Dept: CARDIOLOGY | Facility: CLINIC | Age: 82
End: 2022-08-15
Payer: MEDICARE

## 2022-08-15 VITALS
OXYGEN SATURATION: 96 % | BODY MASS INDEX: 24 KG/M2 | SYSTOLIC BLOOD PRESSURE: 142 MMHG | DIASTOLIC BLOOD PRESSURE: 90 MMHG | WEIGHT: 172.06 LBS | HEART RATE: 66 BPM

## 2022-08-15 DIAGNOSIS — J82.83 EOSINOPHILIC ASTHMA: ICD-10-CM

## 2022-08-15 DIAGNOSIS — N40.0 BENIGN PROSTATIC HYPERPLASIA WITHOUT LOWER URINARY TRACT SYMPTOMS: ICD-10-CM

## 2022-08-15 DIAGNOSIS — Z86.73 HISTORY OF TIA (TRANSIENT ISCHEMIC ATTACK): ICD-10-CM

## 2022-08-15 DIAGNOSIS — I70.0 CALCIFICATION OF AORTA: ICD-10-CM

## 2022-08-15 DIAGNOSIS — I10 ESSENTIAL HYPERTENSION: ICD-10-CM

## 2022-08-15 DIAGNOSIS — R53.83 FATIGUE, UNSPECIFIED TYPE: Primary | ICD-10-CM

## 2022-08-15 DIAGNOSIS — I48.0 PAROXYSMAL ATRIAL FIBRILLATION: ICD-10-CM

## 2022-08-15 PROCEDURE — 99499 UNLISTED E&M SERVICE: CPT | Mod: S$GLB,,, | Performed by: GENERAL PRACTICE

## 2022-08-15 PROCEDURE — 99499 RISK ADDL DX/OHS AUDIT: ICD-10-PCS | Mod: S$GLB,,, | Performed by: GENERAL PRACTICE

## 2022-08-15 PROCEDURE — 99213 PR OFFICE/OUTPT VISIT, EST, LEVL III, 20-29 MIN: ICD-10-PCS | Mod: S$GLB,,, | Performed by: GENERAL PRACTICE

## 2022-08-15 PROCEDURE — 1101F PT FALLS ASSESS-DOCD LE1/YR: CPT | Mod: CPTII,S$GLB,, | Performed by: GENERAL PRACTICE

## 2022-08-15 PROCEDURE — 1101F PR PT FALLS ASSESS DOC 0-1 FALLS W/OUT INJ PAST YR: ICD-10-PCS | Mod: CPTII,S$GLB,, | Performed by: GENERAL PRACTICE

## 2022-08-15 PROCEDURE — 1159F MED LIST DOCD IN RCRD: CPT | Mod: CPTII,S$GLB,, | Performed by: GENERAL PRACTICE

## 2022-08-15 PROCEDURE — 1160F PR REVIEW ALL MEDS BY PRESCRIBER/CLIN PHARMACIST DOCUMENTED: ICD-10-PCS | Mod: CPTII,S$GLB,, | Performed by: GENERAL PRACTICE

## 2022-08-15 PROCEDURE — 99213 OFFICE O/P EST LOW 20 MIN: CPT | Mod: S$GLB,,, | Performed by: GENERAL PRACTICE

## 2022-08-15 PROCEDURE — 3077F SYST BP >= 140 MM HG: CPT | Mod: CPTII,S$GLB,, | Performed by: GENERAL PRACTICE

## 2022-08-15 PROCEDURE — 1159F PR MEDICATION LIST DOCUMENTED IN MEDICAL RECORD: ICD-10-PCS | Mod: CPTII,S$GLB,, | Performed by: GENERAL PRACTICE

## 2022-08-15 PROCEDURE — 1160F RVW MEDS BY RX/DR IN RCRD: CPT | Mod: CPTII,S$GLB,, | Performed by: GENERAL PRACTICE

## 2022-08-15 PROCEDURE — 3288F FALL RISK ASSESSMENT DOCD: CPT | Mod: CPTII,S$GLB,, | Performed by: GENERAL PRACTICE

## 2022-08-15 PROCEDURE — 3080F DIAST BP >= 90 MM HG: CPT | Mod: CPTII,S$GLB,, | Performed by: GENERAL PRACTICE

## 2022-08-15 PROCEDURE — 3077F PR MOST RECENT SYSTOLIC BLOOD PRESSURE >= 140 MM HG: ICD-10-PCS | Mod: CPTII,S$GLB,, | Performed by: GENERAL PRACTICE

## 2022-08-15 PROCEDURE — 3288F PR FALLS RISK ASSESSMENT DOCUMENTED: ICD-10-PCS | Mod: CPTII,S$GLB,, | Performed by: GENERAL PRACTICE

## 2022-08-15 PROCEDURE — 3080F PR MOST RECENT DIASTOLIC BLOOD PRESSURE >= 90 MM HG: ICD-10-PCS | Mod: CPTII,S$GLB,, | Performed by: GENERAL PRACTICE

## 2022-08-15 RX ORDER — TERAZOSIN 2 MG/1
2 CAPSULE ORAL DAILY
Qty: 30 CAPSULE | Refills: 3 | Status: SHIPPED | OUTPATIENT
Start: 2022-08-15 | End: 2022-11-14

## 2022-08-15 NOTE — PROGRESS NOTES
Subjective:    Patient ID:  Maycol Hodge is a 82 y.o. male who presents for follow-up of   Chief Complaint   Patient presents with    Results     Echo and stress       HPI:  APPOINTMENT TODAY TO ESTABLISH CARE BY JEROD AGUIAR.  His blood pressure readings and report reports that his blood pressure has been up and down.  Most of his readings are in the 160s to 170s..  S lower in the morning but goes up during the day.  Occasionally goes low around 109 systolic.  He had 1 episode where he is coming off of 10 day prednisone is but down to 90 he was syncopal and incontinent of stool.  He was fatigued..  He gets occasional discomfort in his chest usually at rest..  Is not taking his medication for sleep deprivation or steroids.  Is not taking inhalers for asthma.  He has a history of elevated PSA and multiple TURP prostate biopsy he is no difficulty with LUTS.             Progress Notes  Roel Vogel Jr., MD (Physician)   Cardiology  Subjective:    Patient ID:  Maycol Hodge is a 81 y.o. male who presents for follow-up of Follow-up        HPI 82 yo WM who I had seen in 2017 for HTN who at that time was asymptomatic and not interested in making any med changes. Had EKG on 12- showing AF.He feels he has had intermittent symptoms since August.Had stress echo in November and was in sinus and test negative for ischemia. Holter on 1- showed sinus rhythm. Was seen in February 2020 and again was in sinus. Holter March 2021 showed no AF.Patient states he has been tired and occassionally dizzy.EKG shows he is back in AF.                      1. Atrial fibrillation, unspecified type    2. Mixed hyperlipidemia    3. Hypertension, unspecified type    4. Fatigue, unspecified type       Plan:     Will do echo and check holter to see average heart rate and depending on echo decide if cardioversion would be indicated.         Orders Placed This Encounter   Procedures    Holter monitor - 48 hour     IN OFFICE EKG 12-LEAD (to [facility])    Echo          Number of falls in the past 12 months?: 0   Fall Risk?: No     9/8/21  Progress Notes  Roel Vogel Jr., MD (Physician)   Cardiology     Subjective:    Patient ID:  Maycol Hodge is a 81 y.o. male who presents for follow-up Echo shows normal LV function.  of Follow-up, Fatigue, and Dizziness        HPI81 yo with PAF. Last holter sinus rhythm and today in sinus. Still with some SOB but has had lung issues in the past. Echo shows normal LV function        8/15/22      Normal myocardial perfusion scan. There is no evidence of myocardial ischemia or infarction.    The gated perfusion images showed an ejection fraction of 75% post stress. Normal ejection fraction is greater than 47%.    There is normal wall motion at rest and post stress.    LV cavity size is normal at rest and normal at stress.    The EKG portion of this study is negative for ischemia.    The patient reported no chest pain during the stress test.    There were no arrhythmias during stress.       ECHO    · The left ventricle is normal in size with moderate concentric hypertrophy and normal systolic function.  · Normal left ventricular diastolic function.  · The estimated PA systolic pressure is 21 mmHg.  · Normal right ventricular size with normal right ventricular systolic function.  · Normal central venous pressure (3 mmHg).  · The estimated ejection fraction is 70%.  · Plaque present.  · Mild pulmonic regurgitation.  · Moderate aortic regurgitation.  · Mild tricuspid regurgitation.  · Mild right atrial enlargement.    Accession #: [id number]    Maycol Hodge  Holter monitor - 48 hour  Order# [id number]  Reading physician: Roel Vogel Jr., MD Ordering physician: Roel Vogel Jr., MD Study date: 9/21/21       Reason for Exam  Priority: Routine  Dx: Atrial fibrillation, unspecified type [I48.91 (ICD-10-CM)]     Conclusion    · Predominant Rhythm Sinus rhythm with heart  rates varying between 40 and 107 BPM with an average of 57 BPM.  · Ventricular Arrhythmias There were occasional PVCs totalling 693 and averaging 14.44 per hour. The ventricular arrhythmias percentage was 0.4. There were 32 couplets.  · One 4 beat run.  · Supraventricular Arrhythmias There were occasional PACs totalling 659 and averaging 13.73 per hour.  · Occassional atrial couplet. Rare three beat run of atrial tachycardia.  · AV Conduction There were very rare nonconducted PACs.  · Symptoms did not correlate with significant arrhythmias.            Review of patient's allergies indicates:   Allergen Reactions    Doxycycline Rash     Causes rash, hives and itching    Augmentin [amoxicillin-pot clavulanate] Nausea And Vomiting    Diltiazem      pruritis    Hydrochlorothiazide      Causes elevate uric acid, gout      Levaquin [levofloxacin] Swelling    Norvasc [amlodipine] Swelling    Pravastatin Rash       Past Medical History:   Diagnosis Date    A-fib     Asthma     Hematuria     Hypertension      Past Surgical History:   Procedure Laterality Date    COLONOSCOPY      COLONOSCOPY N/A 2020    Procedure: COLONOSCOPY;  Surgeon: Willis Mullen MD;  Location: [facility];  Service: Endoscopy;  Laterality: N/A;    CYSTOSCOPY      negative    EYE SURGERY      bilateral cataracts    HERNIA REPAIR      righht inguinal    JOINT REPLACEMENT Left     shoulder    left cataract surgery      PROSTATE SURGERY      TURP     Social History     Tobacco Use    Smoking status: Former Smoker     Types: Cigarettes     Quit date:      Years since quittin.6    Smokeless tobacco: Never Used    Tobacco comment: Quit around    Substance Use Topics    Alcohol use: Yes     Alcohol/week: 14.0 standard drinks     Types: 7 Glasses of wine, 7 Shots of liquor per week    Drug use: No     Family History   Problem Relation Age of Onset    Multiple sclerosis Mother     Heart disease Father     Stroke  Father     No Known Problems Daughter     Cancer Neg Hx     Diabetes Neg Hx     Hypertension Neg Hx         Review of Systems:   Constitution: Negative for diaphoresis and fever.   HEENT: Negative for nosebleeds.    Cardiovascular: Negative for chest pain       No dyspnea on exertion       No leg swelling        No palpitations  Respiratory: Negative for shortness of breath and wheezing.    Hematologic/Lymphatic: Negative for bleeding problem. Does not bruise/bleed easily.   Skin: Negative for color change and rash.   Musculoskeletal: Negative for falls and myalgias.   Gastrointestinal: Negative for hematemesis and hematochezia.   Genitourinary: Negative for hematuria.   Neurological: Negative for dizziness and light-headedness.   Psychiatric/Behavioral: Negative for altered mental status and memory loss.          Objective:        Vitals:    08/15/22 1146   BP: (!) 142/90   Pulse: 66       Lab Results   Component Value Date    WBC 6.44 05/26/2022    HGB 14.5 05/26/2022    HCT 45.5 05/26/2022     05/26/2022    CHOL 193 05/26/2022    TRIG 112 05/26/2022    HDL 58 05/26/2022    ALT 15 05/26/2022    AST 20 05/26/2022     05/26/2022    K 4.2 05/26/2022     05/26/2022    CREATININE 1.2 05/26/2022    BUN 17 05/26/2022    CO2 27 05/26/2022    TSH 3.850 05/26/2022    PSA 12.4 (H) 02/17/2021    HGBA1C 5.3 10/26/2021        ECHOCARDIOGRAM RESULTS  Results for orders placed during the hospital encounter of 08/03/22    Echo    Interpretation Summary  · The left ventricle is normal in size with moderate concentric hypertrophy and normal systolic function.  · Normal left ventricular diastolic function.  · The estimated PA systolic pressure is 21 mmHg.  · Normal right ventricular size with normal right ventricular systolic function.  · Normal central venous pressure (3 mmHg).  · The estimated ejection fraction is 70%.  · Plaque present.  · Mild pulmonic regurgitation.  · Moderate aortic regurgitation.  ·  Mild tricuspid regurgitation.  · Mild right atrial enlargement.        CURRENT/PREVIOUS VISIT EKG  Results for orders placed or performed in visit on 07/12/22   IN OFFICE EKG 12-LEAD (to [place])    Collection Time: 07/12/22  8:45 AM    Narrative    Test Reason : I48.0,    Vent. Rate : 063 BPM     Atrial Rate : 063 BPM     P-R Int : 200 ms          QRS Dur : 090 ms      QT Int : 414 ms       P-R-T Axes : 081 -39 047 degrees     QTc Int : 423 ms    Normal sinus rhythm  Left axis deviation  Abnormal ECG  When compared with ECG of 27-AUG-2021 11:32,  Sinus rhythm has replaced Atrial fibrillation  Confirmed by Sun MG, Amrit BIGGS ([id number]) on 7/28/2022 7:36:12 PM    Referred By: JEROD AGUIAR           Confirmed By:Amrit Garsia MD     No valid procedures specified.   Results for orders placed during the hospital encounter of 08/03/22    Nuclear Stress - Cardiology Interpreted    Interpretation Summary    Normal myocardial perfusion scan. There is no evidence of myocardial ischemia or infarction.    The gated perfusion images showed an ejection fraction of 75% post stress. Normal ejection fraction is greater than 47%.    There is normal wall motion at rest and post stress.    LV cavity size is normal at rest and normal at stress.    The EKG portion of this study is negative for ischemia.    The patient reported no chest pain during the stress test.    There were no arrhythmias during stress.      Physical Exam:  CONSTITUTIONAL: No fever, no chills  HEENT: Normocephalic, atraumatic,pupils reactive to light                 NECK:  No JVD no carotid bruit  CVS: S1S2+, RRR, no murmurs,   LUNGS: Clear  ABDOMEN: Soft, NT, BS+  EXTREMITIES: No cyanosis, edema  : No fletcher catheter  NEURO: AAO X 3  PSY: Normal affect      Medication List with Changes/Refills   Current Medications    AMLODIPINE (NORVASC) 2.5 MG TABLET    Take 2 tablets (5 mg total) by mouth once daily.    ASCORBIC ACID, VITAMIN C, (VITAMIN C) 1000 MG  TABLET    Take 1,000 mg by mouth once daily.    ATENOLOL (TENORMIN) 25 MG TABLET    Take .5 tablet    BENRALIZUMAB (FASENRA PEN) 30 MG/ML ATIN    Inject 1 pen into the skin as needed (every 56 days).    ELIQUIS 5 MG TAB    TAKE 1 TABLET(5 MG) BY MOUTH TWICE DAILY    GLUC HCL/GLUC JAMESON/TI-NPD-G-GLUC (GLUCOSAMINE COMPLEX ORAL)    Take 1 capsule by mouth once daily.    LATANOPROST 0.005 % OPHTHALMIC SOLUTION    Place 1 drop into both eyes every evening.     LEVOTHYROXINE (SYNTHROID) 50 MCG TABLET    TAKE 1 TABLET BY MOUTH EVERY MORNING ON TUES AND THURS    LEVOTHYROXINE (SYNTHROID) 75 MCG TABLET    TAKE 1 TABLET BY MOUTH EVERY MORNING ON MONDAY, WEDNESDAY, FRIDAY SATURDAY AND SUNDAY    LOSARTAN (COZAAR) 50 MG TABLET    Take 1 tab (50mg) at 7 am and 1 tab(50mg) at noon    SPIRONOLACTONE (ALDACTONE) 25 MG TABLET    Take 1 tablet (25 mg total) by mouth once daily.    TERAZOSIN (HYTRIN) 1 MG CAPSULE    Take 2 capsules (2 mg total) by mouth once daily.   Discontinued Medications    ALBUTEROL SULFATE (PROAIR RESPICLICK) 90 MCG/ACTUATION INHALER    Inhale 2 puffs into the lungs every 4 (four) hours as needed (For wheezing, coughing and shortness of breath). Rescue    ALBUTEROL-IPRATROPIUM (DUO-NEB) 2.5 MG-0.5 MG/3 ML NEBULIZER SOLUTION    Take 3 mLs by nebulization every 6 (six) hours as needed.             Assessment:       1. Fatigue, unspecified type    2. Paroxysmal atrial fibrillation    3. Essential hypertension    4. History of TIA (transient ischemic attack)    5. Calcification of aorta    6. Eosinophilic asthma    7. Benign prostatic hyperplasia without lower urinary tract symptoms         Plan:     Problem List Items Addressed This Visit        Neuro    History of TIA (transient ischemic attack)       Pulmonary    Eosinophilic asthma       Cardiac/Vascular    Essential hypertension    Paroxysmal atrial fibrillation    Calcification of aorta       Renal/    BPH (benign prostatic hyperplasia)       Other    Fatigue -  Primary        Discussed echo and nuclear results . No obvious reason for fatigue.  Continue current regimin.Moderate Exercise.OK.  Recheck echo in year or 2. HYTRIN RX READS 1 MG X 5 PILLS . IS TAKING ONE PILL. REFILLED HYTRIN FOR 2MG DAILY.    Follow up in about 6 months (around 2/15/2023).    The patients questions were answered, they verbalized understanding, and agreed with the treatment plan.     THOMAS LANDON MD  [facility]

## 2022-08-24 ENCOUNTER — TELEPHONE (OUTPATIENT)
Dept: FAMILY MEDICINE | Facility: CLINIC | Age: 82
End: 2022-08-24
Payer: MEDICARE

## 2022-08-24 NOTE — TELEPHONE ENCOUNTER
----- Message from Mikel Sen sent at 8/23/2022  2:43 PM CDT -----  Type: Needs Medical Advice  Who Called:  Pt    Best Call Back Number: [phone]     Additional Information: Sts that Dr Garsia told him that Vit B12 shots could poss help with his fatigue situation.  Wants to know if he needs and prescription for that or what does Dr Fox suggest.  Please advise -- Thank you

## 2022-08-24 NOTE — TELEPHONE ENCOUNTER
Called patient to schedule an appointment to discuss vitamin B12 shots. No answer, left voicemail to return call.

## 2022-09-07 ENCOUNTER — TELEPHONE (OUTPATIENT)
Dept: FAMILY MEDICINE | Facility: CLINIC | Age: 82
End: 2022-09-07
Payer: MEDICARE

## 2022-09-07 DIAGNOSIS — R26.81 UNSTEADINESS ON FEET: ICD-10-CM

## 2022-09-07 DIAGNOSIS — R53.82 CHRONIC FATIGUE: Primary | ICD-10-CM

## 2022-09-07 NOTE — TELEPHONE ENCOUNTER
----- Message from Jericho Wise sent at 9/7/2022 11:26 AM CDT -----  Contact: pt at [phone]  Type: Needs Medical Advice  Who Called:  pt    Best Call Back Number: [phone]    Additional Information: pt is calling the office stating he never received a call back in reference to last message about B12 shot. Please call back and advise.

## 2022-09-09 ENCOUNTER — LAB VISIT (OUTPATIENT)
Dept: LAB | Facility: HOSPITAL | Age: 82
End: 2022-09-09
Attending: NURSE PRACTITIONER
Payer: MEDICARE

## 2022-09-09 DIAGNOSIS — R53.82 CHRONIC FATIGUE: ICD-10-CM

## 2022-09-09 DIAGNOSIS — R26.81 UNSTEADINESS ON FEET: ICD-10-CM

## 2022-09-09 LAB — VIT B12 SERPL-MCNC: 683 PG/ML (ref 210–950)

## 2022-09-09 PROCEDURE — 82607 VITAMIN B-12: CPT | Performed by: NURSE PRACTITIONER

## 2022-09-09 PROCEDURE — 36415 COLL VENOUS BLD VENIPUNCTURE: CPT | Mod: PO | Performed by: NURSE PRACTITIONER

## 2022-09-12 NOTE — PROGRESS NOTES
Informed patient of lab results. Patient stated that he went to the gym this morning and could not stay due to fatigue.  Patient stated that he is still sleepy longer than usual.

## 2022-09-16 NOTE — PROGRESS NOTES
Spoke with patient regarding appointment being scheduled. Patient is scheduled to see Dr. Fox on 9/20/22

## 2022-09-20 ENCOUNTER — OFFICE VISIT (OUTPATIENT)
Dept: FAMILY MEDICINE | Facility: CLINIC | Age: 82
End: 2022-09-20
Payer: MEDICARE

## 2022-09-20 DIAGNOSIS — E78.2 MIXED HYPERLIPIDEMIA: ICD-10-CM

## 2022-09-20 DIAGNOSIS — N40.0 BENIGN PROSTATIC HYPERPLASIA WITHOUT LOWER URINARY TRACT SYMPTOMS: ICD-10-CM

## 2022-09-20 DIAGNOSIS — E03.9 HYPOTHYROIDISM, UNSPECIFIED TYPE: ICD-10-CM

## 2022-09-20 DIAGNOSIS — I48.0 PAROXYSMAL ATRIAL FIBRILLATION: Primary | ICD-10-CM

## 2022-09-20 DIAGNOSIS — Z23 FLU VACCINE NEED: ICD-10-CM

## 2022-09-20 DIAGNOSIS — I10 HYPERTENSION, UNSPECIFIED TYPE: ICD-10-CM

## 2022-09-20 DIAGNOSIS — I10 ESSENTIAL HYPERTENSION: ICD-10-CM

## 2022-09-20 PROCEDURE — 1160F PR REVIEW ALL MEDS BY PRESCRIBER/CLIN PHARMACIST DOCUMENTED: ICD-10-PCS | Mod: CPTII,S$GLB,, | Performed by: FAMILY MEDICINE

## 2022-09-20 PROCEDURE — 99214 OFFICE O/P EST MOD 30 MIN: CPT | Mod: S$GLB,,, | Performed by: FAMILY MEDICINE

## 2022-09-20 PROCEDURE — 1101F PR PT FALLS ASSESS DOC 0-1 FALLS W/OUT INJ PAST YR: ICD-10-PCS | Mod: CPTII,S$GLB,, | Performed by: FAMILY MEDICINE

## 2022-09-20 PROCEDURE — 3288F FALL RISK ASSESSMENT DOCD: CPT | Mod: CPTII,S$GLB,, | Performed by: FAMILY MEDICINE

## 2022-09-20 PROCEDURE — 1159F MED LIST DOCD IN RCRD: CPT | Mod: CPTII,S$GLB,, | Performed by: FAMILY MEDICINE

## 2022-09-20 PROCEDURE — 1101F PT FALLS ASSESS-DOCD LE1/YR: CPT | Mod: CPTII,S$GLB,, | Performed by: FAMILY MEDICINE

## 2022-09-20 PROCEDURE — 1126F PR PAIN SEVERITY QUANTIFIED, NO PAIN PRESENT: ICD-10-PCS | Mod: CPTII,S$GLB,, | Performed by: FAMILY MEDICINE

## 2022-09-20 PROCEDURE — G0008 FLU VACCINE - QUADRIVALENT - ADJUVANTED: ICD-10-PCS | Mod: S$GLB,,, | Performed by: FAMILY MEDICINE

## 2022-09-20 PROCEDURE — 99999 PR PBB SHADOW E&M-EST. PATIENT-LVL III: CPT | Mod: PBBFAC,,, | Performed by: FAMILY MEDICINE

## 2022-09-20 PROCEDURE — 3288F PR FALLS RISK ASSESSMENT DOCUMENTED: ICD-10-PCS | Mod: CPTII,S$GLB,, | Performed by: FAMILY MEDICINE

## 2022-09-20 PROCEDURE — 3074F PR MOST RECENT SYSTOLIC BLOOD PRESSURE < 130 MM HG: ICD-10-PCS | Mod: CPTII,S$GLB,, | Performed by: FAMILY MEDICINE

## 2022-09-20 PROCEDURE — 3078F DIAST BP <80 MM HG: CPT | Mod: CPTII,S$GLB,, | Performed by: FAMILY MEDICINE

## 2022-09-20 PROCEDURE — 1159F PR MEDICATION LIST DOCUMENTED IN MEDICAL RECORD: ICD-10-PCS | Mod: CPTII,S$GLB,, | Performed by: FAMILY MEDICINE

## 2022-09-20 PROCEDURE — 90694 FLU VACCINE - QUADRIVALENT - ADJUVANTED: ICD-10-PCS | Mod: S$GLB,,, | Performed by: FAMILY MEDICINE

## 2022-09-20 PROCEDURE — 3078F PR MOST RECENT DIASTOLIC BLOOD PRESSURE < 80 MM HG: ICD-10-PCS | Mod: CPTII,S$GLB,, | Performed by: FAMILY MEDICINE

## 2022-09-20 PROCEDURE — 99999 PR PBB SHADOW E&M-EST. PATIENT-LVL III: ICD-10-PCS | Mod: PBBFAC,,, | Performed by: FAMILY MEDICINE

## 2022-09-20 PROCEDURE — 3074F SYST BP LT 130 MM HG: CPT | Mod: CPTII,S$GLB,, | Performed by: FAMILY MEDICINE

## 2022-09-20 PROCEDURE — 1160F RVW MEDS BY RX/DR IN RCRD: CPT | Mod: CPTII,S$GLB,, | Performed by: FAMILY MEDICINE

## 2022-09-20 PROCEDURE — G0008 ADMIN INFLUENZA VIRUS VAC: HCPCS | Mod: S$GLB,,, | Performed by: FAMILY MEDICINE

## 2022-09-20 PROCEDURE — 99214 PR OFFICE/OUTPT VISIT, EST, LEVL IV, 30-39 MIN: ICD-10-PCS | Mod: S$GLB,,, | Performed by: FAMILY MEDICINE

## 2022-09-20 PROCEDURE — 1126F AMNT PAIN NOTED NONE PRSNT: CPT | Mod: CPTII,S$GLB,, | Performed by: FAMILY MEDICINE

## 2022-09-20 PROCEDURE — 90694 VACC AIIV4 NO PRSRV 0.5ML IM: CPT | Mod: S$GLB,,, | Performed by: FAMILY MEDICINE

## 2022-09-20 RX ORDER — LEVOTHYROXINE SODIUM 50 UG/1
TABLET ORAL
Qty: 26 TABLET | Refills: 3 | Status: SHIPPED | OUTPATIENT
Start: 2022-09-20 | End: 2023-09-26

## 2022-09-20 NOTE — PROGRESS NOTES
Subjective:       Patient ID: Maycol Hodge is a 82 y.o. male.    Chief Complaint: Follow-up (F/U Test results) and Fatigue    HPI  Review of Systems   Constitutional:  Negative for fatigue and unexpected weight change.   Respiratory:  Negative for chest tightness and shortness of breath.    Cardiovascular:  Negative for chest pain, palpitations and leg swelling.   Gastrointestinal:  Negative for abdominal pain.   Musculoskeletal:  Negative for arthralgias.   Neurological:  Negative for dizziness, syncope, light-headedness and headaches.     Patient Active Problem List   Diagnosis    Chronic allergic rhinitis    Essential hypertension    Severe persistent asthma without complication    BPH (benign prostatic hyperplasia)    History of TIA (transient ischemic attack)    Hyperlipidemia    Cataract    Urticaria    Hypothyroidism    Autoimmune urticaria    Anaphylactic syndrome    Hashimoto's thyroiditis    Chronic bronchiolitis    Change in bowel habits    Acute urticaria    Paroxysmal atrial fibrillation    Hx of colonic polyps    Chronic sinus complaints    Fatigue    Hypersomnolence disorder    Severe persistent asthma dependent on systemic steroids with acute exacerbation    Eosinophilic asthma    Calcification of aorta    Blood pressure instability     Patient is here for a chronic conditions follow up.    BP log 120s/70s and pulse 50-60 s      Reviewed labs 5/22 b12, ck, TFTs, lipids, cmp, cbc normal  A1c 5.3     C/o fatigue, low energy, worsening exercise tolerance and occ sx of orthostasis. Only change in meds is fasenra added for asthma 10/20. Denies CP. Has chronically slow HR. Taking atenolol 25mg 1/2 po qd. Card Dr. Vogel, now Baker- c/o tiredness, fatigue and slow pulse on atenolol. will discuss need. PAF on eliquis     PSA 12.4 urology Dr. Truong-has had multiple biopsies-no prostate cancer. 12.4 is low for him     Hst no sig james. No machine needed    Hsitory:   Eye Dr. Hester-glaucoma         Allergy Dr. Unger- History: July 16th HA. 19th hives and whelps.  Allergy Dr. Unger who was consulted during hospital stay.  Admitted after syncope in triage at [facility]. Diagnosed with anaphylaxis and shock from unknown etiology. Denies throat or tongue swelling or SOB. No known food allergies.   Had slow pulse and low BP.  Given steroids. Released 22nd.  Had purpuric lesions concerning for possible vasculitis. Complement as normal.  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 Has had erratic and high BP.  C/o fatigue and sleepiness.  BX neg for vasculitis.  On bid antihistamine zyrtec and claritin.  Rash cleared immediately after admission. Has had no further hives x 2 months. On daily antihistamines     Endocrine Dr. Duncan -Has + TPO Ab 9/18,hypothyroid     Pulm Dr. Burch -asthma. Having moire mucous prod and occ wheezing on symbicort 160, singulair. Takes claritin prn and uses flonase daily.      Card Dr. Vogel h/o TIA . PAf on eliquis  Objective:      Physical Exam  Vitals and nursing note reviewed.   Constitutional:       Appearance: He is well-developed.   Cardiovascular:      Rate and Rhythm: Normal rate and regular rhythm.      Heart sounds: Normal heart sounds.   Pulmonary:      Effort: Pulmonary effort is normal.      Breath sounds: Normal breath sounds.   Skin:     General: Skin is warm and dry.   Neurological:      Mental Status: He is alert and oriented to person, place, and time.       Assessment:       1. Paroxysmal atrial fibrillation    2. Essential hypertension    3. Benign prostatic hyperplasia without lower urinary tract symptoms    4. Hypertension, unspecified type    5. Hypothyroidism, unspecified type    6. Flu vaccine need    7. Mixed hyperlipidemia        Plan:     1. Paroxysmal atrial fibrillation  Cont current mgmt and card care    2. Essential hypertension  Controlled on current medications.  Continue current medications.      3. Benign prostatic hyperplasia  without lower urinary tract symptoms  Cont current mgmt    4. Hypertension, unspecified type  Controlled on current medications.  Continue current medications.      5. Hypothyroidism, unspecified type  Controlled on current medications.  Continue current medications.    - CBC Auto Differential; Future  - TSH; Future  - T4, Free; Future  - levothyroxine (SYNTHROID) 50 MCG tablet; TAKE 1 TABLET BY MOUTH EVERY MORNING ON TUES AND THURS  Dispense: 26 tablet; Refill: 3    6. Flu vaccine need  Immunize today.  Counseled patient on risks, benefits and side effects.  Patient elected to proceed with vaccination.    - Influenza (FLUAD) - Quadrivalent (Adjuvanted) *Preferred* (65+) (PF)    7. Mixed hyperlipidemia  Controlled on current medications.  Continue current medications.    - Comprehensive Metabolic Panel; Future  - Lipid Panel; Future      Time spent with patient: 20 minutes    Patient with be reevaluated in 6 months or sooner prn    Greater than 50% of this visit was spent counseling as described in above documentation:Yes

## 2022-09-22 VITALS
DIASTOLIC BLOOD PRESSURE: 70 MMHG | HEIGHT: 71 IN | BODY MASS INDEX: 24.13 KG/M2 | RESPIRATION RATE: 18 BRPM | OXYGEN SATURATION: 95 % | WEIGHT: 172.38 LBS | TEMPERATURE: 98 F | SYSTOLIC BLOOD PRESSURE: 120 MMHG | HEART RATE: 59 BPM

## 2022-12-06 ENCOUNTER — OFFICE VISIT (OUTPATIENT)
Dept: PULMONOLOGY | Facility: CLINIC | Age: 82
End: 2022-12-06
Payer: MEDICARE

## 2022-12-06 ENCOUNTER — HOSPITAL ENCOUNTER (OUTPATIENT)
Dept: PULMONOLOGY | Facility: HOSPITAL | Age: 82
Discharge: HOME | End: 2022-12-06
Attending: INTERNAL MEDICINE
Payer: MEDICARE

## 2022-12-06 VITALS
WEIGHT: 174.19 LBS | OXYGEN SATURATION: 97 % | BODY MASS INDEX: 24.39 KG/M2 | SYSTOLIC BLOOD PRESSURE: 128 MMHG | DIASTOLIC BLOOD PRESSURE: 70 MMHG | HEIGHT: 71 IN | HEART RATE: 64 BPM

## 2022-12-06 DIAGNOSIS — J45.51 SEVERE PERSISTENT ASTHMA DEPENDENT ON SYSTEMIC STEROIDS WITH ACUTE EXACERBATION: ICD-10-CM

## 2022-12-06 DIAGNOSIS — J45.50 SEVERE PERSISTENT ASTHMA WITHOUT COMPLICATION: ICD-10-CM

## 2022-12-06 DIAGNOSIS — J82.83 EOSINOPHILIC ASTHMA: ICD-10-CM

## 2022-12-06 DIAGNOSIS — Z79.52 SEVERE PERSISTENT ASTHMA DEPENDENT ON SYSTEMIC STEROIDS WITH ACUTE EXACERBATION: ICD-10-CM

## 2022-12-06 PROCEDURE — 1159F MED LIST DOCD IN RCRD: CPT | Mod: CPTII,S$GLB,, | Performed by: INTERNAL MEDICINE

## 2022-12-06 PROCEDURE — 3074F PR MOST RECENT SYSTOLIC BLOOD PRESSURE < 130 MM HG: ICD-10-PCS | Mod: CPTII,S$GLB,, | Performed by: INTERNAL MEDICINE

## 2022-12-06 PROCEDURE — 1101F PR PT FALLS ASSESS DOC 0-1 FALLS W/OUT INJ PAST YR: ICD-10-PCS | Mod: CPTII,S$GLB,, | Performed by: INTERNAL MEDICINE

## 2022-12-06 PROCEDURE — 94729 PR C02/MEMBANE DIFFUSE CAPACITY: ICD-10-PCS | Mod: 26,,, | Performed by: INTERNAL MEDICINE

## 2022-12-06 PROCEDURE — 94729 DIFFUSING CAPACITY: CPT | Mod: 26,,, | Performed by: INTERNAL MEDICINE

## 2022-12-06 PROCEDURE — 99999 PR PBB SHADOW E&M-EST. PATIENT-LVL III: ICD-10-PCS | Mod: PBBFAC,,, | Performed by: INTERNAL MEDICINE

## 2022-12-06 PROCEDURE — 3288F PR FALLS RISK ASSESSMENT DOCUMENTED: ICD-10-PCS | Mod: CPTII,S$GLB,, | Performed by: INTERNAL MEDICINE

## 2022-12-06 PROCEDURE — 3078F PR MOST RECENT DIASTOLIC BLOOD PRESSURE < 80 MM HG: ICD-10-PCS | Mod: CPTII,S$GLB,, | Performed by: INTERNAL MEDICINE

## 2022-12-06 PROCEDURE — 94727 GAS DIL/WSHOT DETER LNG VOL: CPT

## 2022-12-06 PROCEDURE — 99213 PR OFFICE/OUTPT VISIT, EST, LEVL III, 20-29 MIN: ICD-10-PCS | Mod: 25,S$GLB,, | Performed by: INTERNAL MEDICINE

## 2022-12-06 PROCEDURE — 94729 DIFFUSING CAPACITY: CPT

## 2022-12-06 PROCEDURE — 1125F PR PAIN SEVERITY QUANTIFIED, PAIN PRESENT: ICD-10-PCS | Mod: CPTII,S$GLB,, | Performed by: INTERNAL MEDICINE

## 2022-12-06 PROCEDURE — 3074F SYST BP LT 130 MM HG: CPT | Mod: CPTII,S$GLB,, | Performed by: INTERNAL MEDICINE

## 2022-12-06 PROCEDURE — 94727 PR PULM FUNCTION TEST BY GAS: ICD-10-PCS | Mod: 26,,, | Performed by: INTERNAL MEDICINE

## 2022-12-06 PROCEDURE — 3078F DIAST BP <80 MM HG: CPT | Mod: CPTII,S$GLB,, | Performed by: INTERNAL MEDICINE

## 2022-12-06 PROCEDURE — 99213 OFFICE O/P EST LOW 20 MIN: CPT | Mod: 25,S$GLB,, | Performed by: INTERNAL MEDICINE

## 2022-12-06 PROCEDURE — 3288F FALL RISK ASSESSMENT DOCD: CPT | Mod: CPTII,S$GLB,, | Performed by: INTERNAL MEDICINE

## 2022-12-06 PROCEDURE — 94060 EVALUATION OF WHEEZING: CPT

## 2022-12-06 PROCEDURE — 1125F AMNT PAIN NOTED PAIN PRSNT: CPT | Mod: CPTII,S$GLB,, | Performed by: INTERNAL MEDICINE

## 2022-12-06 PROCEDURE — 1101F PT FALLS ASSESS-DOCD LE1/YR: CPT | Mod: CPTII,S$GLB,, | Performed by: INTERNAL MEDICINE

## 2022-12-06 PROCEDURE — 1159F PR MEDICATION LIST DOCUMENTED IN MEDICAL RECORD: ICD-10-PCS | Mod: CPTII,S$GLB,, | Performed by: INTERNAL MEDICINE

## 2022-12-06 PROCEDURE — 94727 GAS DIL/WSHOT DETER LNG VOL: CPT | Mod: 26,,, | Performed by: INTERNAL MEDICINE

## 2022-12-06 PROCEDURE — 94060 PR EVAL OF BRONCHOSPASM: ICD-10-PCS | Mod: 26,,, | Performed by: INTERNAL MEDICINE

## 2022-12-06 PROCEDURE — 99999 PR PBB SHADOW E&M-EST. PATIENT-LVL III: CPT | Mod: PBBFAC,,, | Performed by: INTERNAL MEDICINE

## 2022-12-06 PROCEDURE — 94060 EVALUATION OF WHEEZING: CPT | Mod: 26,,, | Performed by: INTERNAL MEDICINE

## 2022-12-06 RX ORDER — PREDNISONE 20 MG/1
TABLET ORAL
Qty: 36 TABLET | Refills: 0 | Status: SHIPPED | OUTPATIENT
Start: 2022-12-06 | End: 2023-07-19

## 2022-12-06 RX ORDER — FLUTICASONE PROPIONATE AND SALMETEROL 250; 50 UG/1; UG/1
1 POWDER RESPIRATORY (INHALATION) 2 TIMES DAILY
Qty: 60 EACH | Refills: 11 | Status: SHIPPED | OUTPATIENT
Start: 2022-12-06 | End: 2023-12-06

## 2022-12-06 RX ORDER — BENRALIZUMAB 30 MG/ML
1 INJECTION, SOLUTION SUBCUTANEOUS
Qty: 1 ML | Refills: 11 | Status: SHIPPED | OUTPATIENT
Start: 2022-12-06 | End: 2022-12-15 | Stop reason: SDUPTHER

## 2022-12-06 RX ORDER — ALBUTEROL SULFATE 90 UG/1
AEROSOL, METERED RESPIRATORY (INHALATION)
Qty: 18 G | Refills: 11 | Status: SHIPPED | OUTPATIENT
Start: 2022-12-06 | End: 2023-10-11 | Stop reason: SDUPTHER

## 2022-12-06 RX ORDER — ALBUTEROL SULFATE 2.5 MG/.5ML
2.5 SOLUTION RESPIRATORY (INHALATION) ONCE
Status: COMPLETED | OUTPATIENT
Start: 2022-12-06 | End: 2022-12-06

## 2022-12-06 RX ADMIN — ALBUTEROL SULFATE 2.5 MG: 2.5 SOLUTION RESPIRATORY (INHALATION) at 11:12

## 2022-12-06 NOTE — PATIENT INSTRUCTIONS
Would recommend continue Fasenra as no asthma issues since starting over 2-3 yrs ago.    Breathing test today is better yet.

## 2022-12-06 NOTE — PROGRESS NOTES
12/6/2022    Maycol Hodeg  Office Note    Chief Complaint   Patient presents with    Follow-up     6 month f/u          HPI:    12/6/2022 goes to gym daily, doing better.  Had pft better now than last yr.      6/22/2022 used provigil with good results but waned.  Took prednisone with some benefit.    No wheezes and occ sob/albuterol use  Patient Instructions   Provigil will increase alertness --- not an medication for energy. There is no energy medication-- medications are for illness.      Asthma seems to be doing well  -- you have benefited from fasenra every 2 months, you have been on fasenra 2 yrs in September.      Would check lung capacity at follow up in 6 months.    3/7/2022 did not use provigil as felt did not have narcolepsy.  No prednisone.  bp 171/99-- pcp told to repeat  And still bp up.  Energy varies -- trys to get to gym an hour with variable results.  Asthma controlled.  Weaned off symbicort with breo use-- very stable and wishes to wean off.    No hives.  Patient Instructions   Would try provigil to improve alertness and vigilance.  May need follow up for refills as needed.  Would discuss bp with Dr Fox June 2 follow  up  Would be reasonable to use breo every other day -- if stable breo qod for a month - could skip if having no asthma symptoms or albuterol use.     You have had dramatic benefit from Fasenra-- need to continue.      12/62021 -- had fasenra 4 wks ago.  Had been able use no prednisone nor rescue nor controller til 2 wks ago when had sore throat followed by nasal congestion then cough/wheezes-- pt cleared with prednisone/azithromycin and prn albuterol.  Patient Instructions   You started Fasenra last September with extreme dramatic benefit-- your asthma was under excellent control and ---your controller and rescue and action plans were able to be stopped.  You had mild exacerbation due to uri last 3 wks.      Would recommend continuing Fasenra as you were on prednisone every 2  - 3 months prior to Fasenra.     If your insurance mandates you be on controller -- would recommend using singulair and symbicort-- symbicort was started 3 wks.              Would recommend trial provigil for excess sleepiness-- your recent sleep study did not show significant sleep apnea with AHI 2.1.  Will try to get approval through speciality pharmacy.   Start provigil one daily to improve alertness and focus.         Addendum -sleep study 8/20/2020 had ahi 2.1.   Could repeat in house sleep study.  Suspect would do well with provigil -- would recommend trial 100 mg daily to treat hypersomnolence.      10/25/2021 having fatigue, sometimes wake up fatigued and sometimes develops during day.  Denies nerve/anxiety issues.  Appetite ok.  No cough/wheezes/sob.  No spasm in over a yr or more.  No inhaler use.  Fatigue occurs 1/3 days.  Voids adequately.  Will sit about throughout day once fatigued.  Fatigue occurring last 3 months or so, had colonoscopy last yr, has chr elevation psa which is monitor q yr from q 6 months in past. Saw cardiology.     will nap twice daily -- has excess sleepiness.  Patient Instructions   Would use prednisone once daily for 3 days if any fatigue-- may do  Once or twice.  Breathing test was better at 56% than 42% last yr-- but you may still have some airway problems..  Would try inhalers if fatigued-- try nebulizer.  Also try prednisone----- if prednisone works-- use inhalers more.   Would screen for inflammation, testosterone (may not replace as psa up???), blood counts/chemistries, thyroid  (continue medications), diabetes screen, and chest xray.  Should f/u Dr Rojo soon if fatigue not clearing?????  psa not oredered as not able.   Addendum -sleep study 8/20/2020 had ahi 2.1.   Could repeat in house sleep study.  Suspect would do well with provigil -- would recommend trial 100 mg daily to treat hypersomnolence.      4/28/2021- fasenra going well, no steroids, 4-5 times daily has  transparent mucous produced. No sleep apnea but some excessive sleepiness appreciated. Sleep 10, arouses 3 to void (had turp/meds in past)with difficulty going back. Off symbicort. Uses albuterol maybe once a wk or so.   Patient Instructions   Continue Gym.    Lungs should be good.  Need to continue fasenra.    Thyroid could be rechecked.     Home study-- no sleep apnea, could have restless legs?  10/28/2020 no prednisone since last visit, fasenra helped dramatically.  No prednisoen and breathing much better.  - pt got shot here 10/8/2020  Patient Instructions   Need to continue fasenra as has had dramatic response.     9/28/2020 took another course prednisone, has been on prednisone  Monthly since march.  Finished last 2 wks ago, feels will need [place] course soon. Uses symbicort regular.  Had sleep study few wks ago.fev 42 from 57% on 2017,      Sleep study 8/20/2020 with ahi 2/hr- within normal range.   Patient Instructions   Asthma very unstable..  Will place order for fasenra- sample shot would help.  Shot would be monthly x 3 then every 8 wks.    Use valtrex if shingles - call for any question.      Sound like you need treatment again soon- start prednisone soon or Fasenra shots.     will give paper script for advair - may replace symboicort if cheaper?     No sleep apnea of significance.      Fasenra is an interleukin 5 inhibitor.  8/11/2020 had to use prednisone since March, having severe fatigue - reports sleeps well, sleep non restorative, snores with no sleep study in past.  Romo to mail box - wife sleeps separate room. No snore arousal.    Uses symbicort regular.  Uses albuterol rescue minimally less than once a wk.  Pt starts prednisone when cough becomes excessive.  Eats well, no wt loss. Sleeps 7 hrs/d.  Goes to [place] for hr 3/wk.  Pt having vivid dreams.   Patient Instructions   Check lung capacity   Check sleep study - best prior to prednisone  May use prednisone - start 20/d dropping to 10 mg  daily once better.  If you are found to have sleep apnea- less than 5 episodes an hour would be normal- would try cpap therapy.  2020 49 yo son  suddenly  in [place]- son avihailee - death.  Toxicology pending.  Had 2 spells ppt prednisone use since last visit with good results.  Took erythro also.  Having some grief- eats and sleeps ok . Wife in good shape. Uses symbicort regular.  Patient Instructions   Asthma not too bad but you needed prednisone twice since March.  Special shots may help-  Could ask to get fasenra covered.  May consider starting if pattern worsens.    symbicort needed regular especially in winter/spring.+      3/2/2020- having exhaustion after 10-15 min doing yd wk.  Saw cardiology- had a fib at colonoscopy in dec but sinus mile since. - eliquis started.  Pt had syncope 10 days - saw Dr Vogel in f/u.  Last wk had some noct wheezes.   Uses symbicort 2 bid and singulair.  No no prednisone use last yr.  Sleeps ok, nerves ok - denies depression.  Had hives last July.  Stress echo in 2019 was good save high bp?    Patient Instructions   Fatigue and wheezes are noted.  Breathing not typical cause for loss of consciousness.  Heart rate is low - may contribute to fatigue but not that bad?    Chronic sinuses seem ok.  Would recommend prednisone 20 mg daily for 3 days now,  May repeat but would like to see better function and activity.    Call if not perking up.   oxygen level walking in office went from 97 to 93 % - not bad.  bp standing didn't fall today.    Blood wk last month all looked good.    2019- Onset one week, sore throat, congestion, cough (fits, severe, productive light yellow in color), took azithromycin with minimal benefit. Current Prednisone use.  2019- Feeling well, no exacerbations requiring antibiotic and steroid therapy. No cough, no nocturnal arousals, no SOB.   2018- had exacerbations in July and August needing azithro and  prednisone- had cough clear mucous with sl yellow, no breathing issues.  Unusual to have exacerbation summer.  2018-- took azithro and pred x 3 - good results.  Doing well   hpi, had acute onset 5 days ago with sinus runny, cough, wheezes, sob, no fever, no n/v, pos headache.  Muscle aches and joint aches.  Took prednisone, took inhaler, neb rx was old. Run down, poor appetite,   Took prednisone and azithro x3, still cough but better,  Down but better.  Mucous clear.   Dec 16, 2016HPI:pt with asthma with no usual noct arousals or rescue use.  Exacerbates 3x yr in spring mainly. Progresses for a week on rescue and then uses pred and doxy (recent doxy reaction) to clear.  I cared for years ago.  No recent pft.  Pt feels breathing better than  Usual yrs past.      The chief compliant  problem varies with instablilty at time    PFSH:  Past Medical History:   Diagnosis Date    A-fib     Asthma     Hematuria     Hypertension          Past Surgical History:   Procedure Laterality Date    COLONOSCOPY      COLONOSCOPY N/A 2020    Procedure: COLONOSCOPY;  Surgeon: Willis Mullen MD;  Location: [facility];  Service: Endoscopy;  Laterality: N/A;    CYSTOSCOPY      negative    EYE SURGERY      bilateral cataracts    HERNIA REPAIR      righht inguinal    JOINT REPLACEMENT Left     shoulder    left cataract surgery      PROSTATE SURGERY      TURP     Social History     Tobacco Use    Smoking status: Former     Types: Cigarettes     Quit date:      Years since quittin.9    Smokeless tobacco: Never    Tobacco comments:     Quit around    Substance Use Topics    Alcohol use: Yes     Alcohol/week: 14.0 standard drinks     Types: 7 Glasses of wine, 7 Shots of liquor per week    Drug use: No     Family History   Problem Relation Age of Onset    Multiple sclerosis Mother     Heart disease Father     Stroke Father     No Known Problems Daughter     Cancer Neg Hx     Diabetes Neg Hx     Hypertension  "Neg Hx      Review of patient's allergies indicates:   Allergen Reactions    Doxycycline Rash     Causes rash, hives and itching    Diltiazem      pruritis    Hydrochlorothiazide      Causes elevate uric acid, gout      Levaquin [levofloxacin] Swelling    Norvasc [amlodipine] Swelling       Performance Status:The patient's activity level is functions out of house.      Review of Systems:    Constitutional: Negative for activity change, appetite change, chills, diaphoresis, fatigue, fever and unexpected weight change.   HENT: Negative for dental problem, postnasal drip, trouble swallowing and voice change. Positive for  rhinorrhea, sinus pressure, sinus pain, sneezing, sore throat,   Respiratory: Negative for apnea,  shortness of breath, wheezing and stridor.  Positive for cough, chest tightness,  Cardiovascular: Negative for chest pain, palpitations and leg swelling.   Gastrointestinal: Negative for abdominal distention, abdominal pain, constipation and nausea.   Musculoskeletal: Negative for gait problem, myalgias and neck pain.   Skin: Negative for color change and pallor.   Allergic/Immunologic: Negative for environmental allergies and food allergies.   Neurological: Negative for dizziness, speech difficulty, weakness, light-headedness, numbness and headaches.   Hematological: Negative for adenopathy. Does not bruise/bleed easily.   Psychiatric/Behavioral: Negative for dysphoric mood and sleep disturbance. The patient is not nervous/anxious.     Exam:Comprehensive exam done.   /70 (BP Location: Left arm, Patient Position: Sitting, BP Method: Medium (Automatic))   Pulse 64   Ht 5' 11" (1.803 m)   Wt 79 kg (174 lb 2.6 oz)   SpO2 97% Comment: on room air at rest  BMI 24.29 kg/m²   Exam included Vitals as listed, and patient's appearance and affect and alertness and mood, oral exam for yeast and hygiene and pharynx lesions and Mallapatti (M) score, neck with inspection for jvd and masses and thyroid " abnormalities and lymph nodes (supraclavicular and infraclavicular nodes and axillary also examined and noted if abn), chest exam included symmetry and effort and fremitus and percussion and auscultation, cardiac exam included rhythm and gallops and murmur and rubs and jvd and edema, abdominal exam for mass and hepatosplenomegaly and tenderness and hernias and bowel sounds, Musculoskeletal exam with muscle tone and posture and mobility/gait and  strength, and skin for rashes and cyanosis and pallor and turgor, extremity for clubbing.  Findings were normal except for pertinent findings listed below:  M3,  chest is symmetric, no distress, normal percussion, normal fremitus, and no murmur, no edema, lung sounds good.    Rest good.      Radiographs (ct chest and cxr) reviewed from 10/16/2017 normal  cxr 5/20/2020 nad    Labs Reviewed  Lab Results   Component Value Date    WBC 6.44 05/26/2022    HGB 14.5 05/26/2022    HCT 45.5 05/26/2022    MCV 95 05/26/2022     05/26/2022   holter 1/27/2020- The diary was properly completed. The tape was adequate (0 days , 48 hours, 0 minutes).  Predominant Rhythm Sinus rhythm with heart rates varying between 43 and 113 bpm with an average of 56 bpm.  Ventricular Arrhythmias There were very rare PVCs totalling 58 and averaging 1.21 per hour.  Supraventricular Arrhythmias There were occasional PACs totalling 638 and averaging 13.29 per hour.  Occassional atrial pair and short run of atrial tachycardia,the longest 7 beats with no associated symptoms..  There was an episode of SOB reported. The corresponding rhythm strips revealed the following: During the event (At the [employer]), the rhythm was sinus tachycardia at 113 bpm with without ectopy.     Results for ARNULFO SALEH (MRN [id number]) as of 9/21/2018 11:38   Ref. Range 9/21/2018 08:59   Eosinophil% Latest Ref Range: 0.0 - 8.0 % 9.2 (H)   Eos # Latest Ref Range: 0.0 - 0.5 K/uL 0.6 (H)     PFT  March 31, 2017 fev 57%,      11/22/19-Conclusion     Concentric left ventricular remodeling.  Normal left ventricular systolic function. The estimated ejection fraction is 60%  Normal LV diastolic function.  No wall motion abnormalities.  Normal right ventricular systolic function.  The stress echo portion of this study is negative for myocardial ischemia.  The patient's exercise capacity was above average. functional capacity of 10 METS  The patient reached the end of the protocol.  There were no arrhythmias during stress.  The EKG portion of this study is negative for myocardial ischemia.  Exercise portion of stress test stopped due to an increase in systolic and diastolic blood pressure above protocol.  Hypertensive response changed from stress echo in 3/2018.     Spirometry with bronchodilator, lung volume by gas dilution, diffusion capacity were accomplished October 11, 2021. The FEV1 to FVC ratio was 67% indicating airflow obstruction. The FEV1 was 56% of predicted at 1.6 L-this makes airflow obstruction   moderately severe. Bronchodilator response was not statistically significant at 9%. Total lung capacity on lung volume by gas dilution was 60% of predicted and low. Diffusion was 62% of predicted and low.   Â    There is moderately severe airflow obstruction. There is no significant bronchodilator response. Total lung capacity is reduced to 60% predicted and diffusion is reduced to 62% predicted. Clinical correlation recommended.       Spirometry with bronchodilator, lung volume by gas dilution, diffusion capacity measured August 17, 2020.  The FEV1 to FVC ratio was 65% indicating airflow obstruction.  The FEV1 measured 42% predicted at 1.2 L making airflow obstruction severe.  There    was a 13% improvement in the FEV1 following bronchodilator, this is a significant bronchodilator response.  Total lung capacity was reduced to 57% predicted suggesting restriction.  Residual volume was a little bit elevated at 77% of predicted.      Diffusion was reduced to 61% of predicted, uncorrected for anemia present.       Plan:  Clinical impression is apparently straight forward and impression with management as below.    Maycol was seen today for follow-up.    Diagnoses and all orders for this visit:    Eosinophilic asthma  -     benralizumab (FASENRA PEN) 30 mg/mL AtIn; Inject 1 pen into the skin as needed (every 56 days).  -     albuterol (PROVENTIL/VENTOLIN HFA) 90 mcg/actuation inhaler; 2 puffs every 4 hours as needed for cough, wheeze, or shortness of breath  -     predniSONE (DELTASONE) 20 MG tablet; Take  3 daily for 3 days then 2 daily for 3 days then one daily for 3 days.  Repeat for asthma    Severe persistent asthma dependent on systemic steroids with acute exacerbation  -     benralizumab (FASENRA PEN) 30 mg/mL AtIn; Inject 1 pen into the skin as needed (every 56 days).  -     albuterol (PROVENTIL/VENTOLIN HFA) 90 mcg/actuation inhaler; 2 puffs every 4 hours as needed for cough, wheeze, or shortness of breath  -     predniSONE (DELTASONE) 20 MG tablet; Take  3 daily for 3 days then 2 daily for 3 days then one daily for 3 days.  Repeat for asthma          Maycol was seen today for follow-up.    Diagnoses and all orders for this visit:    Eosinophilic asthma  -     benralizumab (FASENRA PEN) 30 mg/mL AtIn; Inject 1 pen into the skin as needed (every 56 days).  -     albuterol (PROVENTIL/VENTOLIN HFA) 90 mcg/actuation inhaler; 2 puffs every 4 hours as needed for cough, wheeze, or shortness of breath  -     predniSONE (DELTASONE) 20 MG tablet; Take  3 daily for 3 days then 2 daily for 3 days then one daily for 3 days.  Repeat for asthma    Severe persistent asthma dependent on systemic steroids with acute exacerbation  -     benralizumab (FASENRA PEN) 30 mg/mL AtIn; Inject 1 pen into the skin as needed (every 56 days).  -     albuterol (PROVENTIL/VENTOLIN HFA) 90 mcg/actuation inhaler; 2 puffs every 4 hours as needed for cough, wheeze, or  shortness of breath  -     predniSONE (DELTASONE) 20 MG tablet; Take  3 daily for 3 days then 2 daily for 3 days then one daily for 3 days.  Repeat for asthma        Follow up in about 1 year (around 12/6/2023), or if symptoms worsen or fail to improve.    Discussed with patient above for education the following:      Patient Instructions   Would recommend continue Fasenra as no asthma issues since starting over 2-3 yrs ago.    Breathing test today is better yet.

## 2022-12-07 ENCOUNTER — SPECIALTY PHARMACY (OUTPATIENT)
Dept: PHARMACY | Facility: CLINIC | Age: 82
End: 2022-12-07
Payer: MEDICARE

## 2022-12-08 LAB
BRPFT: NORMAL
DLCO ADJ PRE: 16.17 ML/(MIN*MMHG)
DLCO SINGLE BREATH LLN: 18.23
DLCO SINGLE BREATH PRE REF: 64.3 %
DLCO SINGLE BREATH REF: 25.15
DLCOC SBVA LLN: 2.35
DLCOC SBVA PRE REF: 115.4 %
DLCOC SBVA REF: 3.44
DLCOC SINGLE BREATH LLN: 18.23
DLCOC SINGLE BREATH PRE REF: 64.3 %
DLCOC SINGLE BREATH REF: 25.15
DLCOVA LLN: 2.35
DLCOVA PRE REF: 115.4 %
DLCOVA PRE: 3.98 ML/(MIN*MMHG*L)
DLCOVA REF: 3.44
DLVAADJ PRE: 3.98 ML/(MIN*MMHG*L)
ERVN2 LLN: -16449.1
ERVN2 PRE REF: 52.7 %
ERVN2 PRE: 0.47 L
ERVN2 REF: 0.9
FEF 25 75 CHG: 31 %
FEF 25 75 LLN: 0.68
FEF 25 75 POST REF: 53.9 %
FEF 25 75 PRE REF: 41.2 %
FEF 25 75 REF: 1.91
FET100 CHG: -19.4 %
FEV1 CHG: 9.8 %
FEV1 FVC CHG: 11.3 %
FEV1 FVC LLN: 59
FEV1 FVC POST REF: 94.5 %
FEV1 FVC PRE REF: 84.9 %
FEV1 FVC REF: 74
FEV1 LLN: 1.92
FEV1 POST REF: 65.1 %
FEV1 PRE REF: 59.3 %
FEV1 REF: 2.82
FRCN2 LLN: 2.89
FRCN2 PRE REF: 69 %
FRCN2 REF: 3.88
FVC CHG: -1.4 %
FVC LLN: 2.75
FVC POST REF: 68 %
FVC PRE REF: 69 %
FVC REF: 3.86
IVC PRE: 2.49 L
IVC SINGLE BREATH LLN: 2.75
IVC SINGLE BREATH PRE REF: 64.4 %
IVC SINGLE BREATH REF: 3.86
MVV LLN: 96
MVV PRE REF: 53.3 %
MVV REF: 113
PEF CHG: 25.6 %
PEF LLN: 4.53
PEF POST REF: 85.3 %
PEF PRE REF: 67.9 %
PEF REF: 6.91
POST FEF 25 75: 1.03 L/S
POST FET 100: 7.42 SEC
POST FEV1 FVC: 69.87 %
POST FEV1: 1.83 L
POST FVC: 2.63 L
POST PEF: 5.89 L/S
PRE DLCO: 16.17 ML/(MIN*MMHG)
PRE FEF 25 75: 0.79 L/S
PRE FET 100: 9.2 SEC
PRE FEV1 FVC: 62.77 %
PRE FEV1: 1.67 L
PRE FRC N2: 2.68 L
PRE FVC: 2.66 L
PRE MVV: 60 L/MIN
PRE PEF: 4.69 L/S
RVN2 LLN: 2.3
RVN2 PRE REF: 61.4 %
RVN2 PRE: 1.83 L
RVN2 REF: 2.98
RVN2TLCN2 LLN: 37.74
RVN2TLCN2 PRE REF: 87.1 %
RVN2TLCN2 PRE: 40.7 %
RVN2TLCN2 REF: 46.72
TLCN2 LLN: 6.15
TLCN2 PRE REF: 61.5 %
TLCN2 PRE: 4.49 L
TLCN2 REF: 7.3
VA PRE: 4.07 L
VA SINGLE BREATH LLN: 7.15
VA SINGLE BREATH PRE REF: 56.9 %
VA SINGLE BREATH REF: 7.15
VCMAXN2 LLN: 2.75
VCMAXN2 PRE REF: 69 %
VCMAXN2 PRE: 2.66 L
VCMAXN2 REF: 3.86

## 2022-12-14 NOTE — TELEPHONE ENCOUNTER
Fasenra RX received. PA required. PA submitted via [email or web address]. Key: [id number]    Hello, this is Asia Alonso with [facility].  We are working on your prescription that your doctor has sent us. We will be working with your insurance to get this approved for you. We will be calling you along the way with updates on your medication.  If you have any questions, you can reach us at [phone].    Welcome call outcome: Patient/caregiver reached    Patient reported he received the last Fasenra injection on 12/9/2022.

## 2022-12-14 NOTE — TELEPHONE ENCOUNTER
Fasenra PA approved through 12/31/2023. Reference #: [id number].    Test claim: $1,068.88    Benefit Investigation    Fasenra [employer]  No LIS Level  Estimated copay: $1,068.88   Patient is in the coverage gap  [facility] in network    Outgoing call to patient, he would like to re-enroll in the [facility] PAP. [place] in [place] for 2023. Requested new prescription from provider to route to [facility].

## 2022-12-15 NOTE — TELEPHONE ENCOUNTER
Fasenra prescription received and routed to [employer]. Outgoing call to notify patient re-enrollment for 2023 is complete, LVM. Will close [facility] referral.

## 2023-01-30 ENCOUNTER — TELEPHONE (OUTPATIENT)
Dept: FAMILY MEDICINE | Facility: CLINIC | Age: 83
End: 2023-01-30
Payer: MEDICARE

## 2023-01-30 NOTE — TELEPHONE ENCOUNTER
----- Message from Heather Manuel sent at 1/30/2023  3:57 PM CST -----  Contact: Patient  Type:  Patient Call          Who Called: Patient         Does the patient know what this is regarding?: Requesting a call back to reschedule appt ; pt said that he received a letter saying that his 3/27 appt was cancelled ; chery advise           Would the patient rather a call back or a response via [facility]? Call           Best Call Back Number: [phone]             Additional Information:

## 2023-03-10 ENCOUNTER — TELEPHONE (OUTPATIENT)
Dept: FAMILY MEDICINE | Facility: CLINIC | Age: 83
End: 2023-03-10
Payer: MEDICARE

## 2023-03-10 NOTE — TELEPHONE ENCOUNTER
Called and spoke to pt's wife about setting up AWV  Appt set up for 5/11/2023 @10:30am w/Ms Arguello

## 2023-03-21 ENCOUNTER — TELEPHONE (OUTPATIENT)
Dept: GASTROENTEROLOGY | Facility: CLINIC | Age: 83
End: 2023-03-21
Payer: MEDICARE

## 2023-03-21 NOTE — TELEPHONE ENCOUNTER
Assisted patient in scheduling on 5/26. Provider not in office. Sent portal message to reschedule.

## 2023-03-21 NOTE — TELEPHONE ENCOUNTER
----- Message from Martha Vargas, Patient Care Assistant sent at 3/21/2023  2:54 PM CDT -----  Regarding: orders  Contact: pt  Type: Needs Medical Advice    Who Called:  pt     Best Call Back Number: [phone] (home)     Additional Information: pt states she would like a callback regarding orders for an colonoscopy. Please call pt to advise. Thanks!

## 2023-03-22 ENCOUNTER — PATIENT MESSAGE (OUTPATIENT)
Dept: GASTROENTEROLOGY | Facility: CLINIC | Age: 83
End: 2023-03-22
Payer: MEDICARE

## 2023-03-23 ENCOUNTER — TELEPHONE (OUTPATIENT)
Dept: GASTROENTEROLOGY | Facility: CLINIC | Age: 83
End: 2023-03-23
Payer: MEDICARE

## 2023-03-23 DIAGNOSIS — Z86.010 HX OF COLONIC POLYPS: Primary | ICD-10-CM

## 2023-03-23 NOTE — TELEPHONE ENCOUNTER
----- Message from Shyla Evans MA sent at 3/23/2023  1:43 PM CDT -----  Regarding: RE: cardiology clearance  Patient is scheduled for a 6 month follow up with Dr. Becerril in April will submit clearance then if approved by Dr. Becerril   ----- Message -----  From: Delfino Watters LPN  Sent: 3/23/2023  12:08 PM CDT  To: [facility] Staff  Subject: cardiology clearance                             Hello!     This patient is scheduled to have a colonoscopy on 5/30/2023. Please send clearance to hold Eliquis for 2 days before his procedure. Thanks!     [place], LPN [facility]

## 2023-04-10 ENCOUNTER — OFFICE VISIT (OUTPATIENT)
Dept: CARDIOLOGY | Facility: CLINIC | Age: 83
End: 2023-04-10
Payer: MEDICARE

## 2023-04-10 VITALS
WEIGHT: 173 LBS | OXYGEN SATURATION: 97 % | DIASTOLIC BLOOD PRESSURE: 82 MMHG | BODY MASS INDEX: 24.22 KG/M2 | HEART RATE: 58 BPM | SYSTOLIC BLOOD PRESSURE: 140 MMHG | HEIGHT: 71 IN | RESPIRATION RATE: 16 BRPM

## 2023-04-10 DIAGNOSIS — E03.2 HYPOTHYROIDISM DUE TO NON-MEDICATION EXOGENOUS SUBSTANCES: ICD-10-CM

## 2023-04-10 DIAGNOSIS — J45.50 SEVERE PERSISTENT ASTHMA WITHOUT COMPLICATION: ICD-10-CM

## 2023-04-10 DIAGNOSIS — I48.0 PAROXYSMAL ATRIAL FIBRILLATION: ICD-10-CM

## 2023-04-10 DIAGNOSIS — I70.0 CALCIFICATION OF AORTA: ICD-10-CM

## 2023-04-10 DIAGNOSIS — I10 ESSENTIAL HYPERTENSION: ICD-10-CM

## 2023-04-10 PROBLEM — Z79.52 SEVERE PERSISTENT ASTHMA DEPENDENT ON SYSTEMIC STEROIDS WITH ACUTE EXACERBATION: Status: RESOLVED | Noted: 2021-12-06 | Resolved: 2023-04-10

## 2023-04-10 PROBLEM — J45.51 SEVERE PERSISTENT ASTHMA DEPENDENT ON SYSTEMIC STEROIDS WITH ACUTE EXACERBATION: Status: RESOLVED | Noted: 2021-12-06 | Resolved: 2023-04-10

## 2023-04-10 PROCEDURE — 93000 EKG 12-LEAD: ICD-10-PCS | Mod: S$GLB,,, | Performed by: INTERNAL MEDICINE

## 2023-04-10 PROCEDURE — 1159F PR MEDICATION LIST DOCUMENTED IN MEDICAL RECORD: ICD-10-PCS | Mod: CPTII,S$GLB,, | Performed by: INTERNAL MEDICINE

## 2023-04-10 PROCEDURE — 99214 PR OFFICE/OUTPT VISIT, EST, LEVL IV, 30-39 MIN: ICD-10-PCS | Mod: S$GLB,,, | Performed by: INTERNAL MEDICINE

## 2023-04-10 PROCEDURE — 3288F FALL RISK ASSESSMENT DOCD: CPT | Mod: CPTII,S$GLB,, | Performed by: INTERNAL MEDICINE

## 2023-04-10 PROCEDURE — 93000 ELECTROCARDIOGRAM COMPLETE: CPT | Mod: S$GLB,,, | Performed by: INTERNAL MEDICINE

## 2023-04-10 PROCEDURE — 1126F AMNT PAIN NOTED NONE PRSNT: CPT | Mod: CPTII,S$GLB,, | Performed by: INTERNAL MEDICINE

## 2023-04-10 PROCEDURE — 1101F PR PT FALLS ASSESS DOC 0-1 FALLS W/OUT INJ PAST YR: ICD-10-PCS | Mod: CPTII,S$GLB,, | Performed by: INTERNAL MEDICINE

## 2023-04-10 PROCEDURE — 1159F MED LIST DOCD IN RCRD: CPT | Mod: CPTII,S$GLB,, | Performed by: INTERNAL MEDICINE

## 2023-04-10 PROCEDURE — 99999 PR PBB SHADOW E&M-EST. PATIENT-LVL IV: CPT | Mod: PBBFAC,,, | Performed by: INTERNAL MEDICINE

## 2023-04-10 PROCEDURE — 1101F PT FALLS ASSESS-DOCD LE1/YR: CPT | Mod: CPTII,S$GLB,, | Performed by: INTERNAL MEDICINE

## 2023-04-10 PROCEDURE — 3079F PR MOST RECENT DIASTOLIC BLOOD PRESSURE 80-89 MM HG: ICD-10-PCS | Mod: CPTII,S$GLB,, | Performed by: INTERNAL MEDICINE

## 2023-04-10 PROCEDURE — 3288F PR FALLS RISK ASSESSMENT DOCUMENTED: ICD-10-PCS | Mod: CPTII,S$GLB,, | Performed by: INTERNAL MEDICINE

## 2023-04-10 PROCEDURE — 99999 PR PBB SHADOW E&M-EST. PATIENT-LVL IV: ICD-10-PCS | Mod: PBBFAC,,, | Performed by: INTERNAL MEDICINE

## 2023-04-10 PROCEDURE — 3077F PR MOST RECENT SYSTOLIC BLOOD PRESSURE >= 140 MM HG: ICD-10-PCS | Mod: CPTII,S$GLB,, | Performed by: INTERNAL MEDICINE

## 2023-04-10 PROCEDURE — 99214 OFFICE O/P EST MOD 30 MIN: CPT | Mod: S$GLB,,, | Performed by: INTERNAL MEDICINE

## 2023-04-10 PROCEDURE — 1160F RVW MEDS BY RX/DR IN RCRD: CPT | Mod: CPTII,S$GLB,, | Performed by: INTERNAL MEDICINE

## 2023-04-10 PROCEDURE — 3077F SYST BP >= 140 MM HG: CPT | Mod: CPTII,S$GLB,, | Performed by: INTERNAL MEDICINE

## 2023-04-10 PROCEDURE — 3079F DIAST BP 80-89 MM HG: CPT | Mod: CPTII,S$GLB,, | Performed by: INTERNAL MEDICINE

## 2023-04-10 PROCEDURE — 1126F PR PAIN SEVERITY QUANTIFIED, NO PAIN PRESENT: ICD-10-PCS | Mod: CPTII,S$GLB,, | Performed by: INTERNAL MEDICINE

## 2023-04-10 PROCEDURE — 1160F PR REVIEW ALL MEDS BY PRESCRIBER/CLIN PHARMACIST DOCUMENTED: ICD-10-PCS | Mod: CPTII,S$GLB,, | Performed by: INTERNAL MEDICINE

## 2023-04-10 NOTE — PROGRESS NOTES
Subjective:    Patient ID:  Maycol Hodge is a 82 y.o. male patient here for evaluation Follow-up, Fatigue, Shortness of Breath, and Chest Pain (Occasional chest pain)      History of Present Illness:     82-year-old gentleman who complains of major fatigue symptoms tiredness and shortness of breath is noted.  Has history of paroxysmal atrial fibrillation and is monitor his blood pressures carefully reportedly blood pressures been stable.  Occasional palpitations are noted but not racing and there are days where he is very fatigued and spent most of the day sleeping.    Denies having chest discomfort arm neck or jaw pain no swelling noted in the lower extremities.    No cough or congestion no fevers or chills noted.  He has been on 10 arm and the dose was reduced to 25 mg half tablet to every other day his heart rate is 58 beats per minute    Patient had negative myocardial perfusion study last August.  And echocardiogram showed preserved LV function more recently      Review of patient's allergies indicates:   Allergen Reactions    Doxycycline Rash     Causes rash, hives and itching    Augmentin [amoxicillin-pot clavulanate] Nausea And Vomiting    Diltiazem      pruritis    Hydrochlorothiazide      Causes elevate uric acid, gout      Levaquin [levofloxacin] Swelling    Norvasc [amlodipine] Swelling    Pravastatin Rash       Past Medical History:   Diagnosis Date    A-fib     Asthma     Hematuria     Hypertension      Past Surgical History:   Procedure Laterality Date    COLONOSCOPY      COLONOSCOPY N/A 1/21/2020    Procedure: COLONOSCOPY;  Surgeon: Willis Mullen MD;  Location: [facility];  Service: Endoscopy;  Laterality: N/A;    CYSTOSCOPY      negative    EYE SURGERY      bilateral cataracts    HERNIA REPAIR      righht inguinal    JOINT REPLACEMENT Left     shoulder    left cataract surgery      PROSTATE SURGERY      TURP     Social History     Tobacco Use    Smoking status: Former     Types: Cigarettes      Quit date:      Years since quittin.3    Smokeless tobacco: Never    Tobacco comments:     Quit around    Substance Use Topics    Alcohol use: Yes     Alcohol/week: 14.0 standard drinks     Types: 7 Glasses of wine, 7 Shots of liquor per week    Drug use: No   Review of Systems     Constitutional: Negative for chills, no fevers but predominantly fatigue and tiredness.    Eyes: No double vision, No blurred vision  Neuro: No headaches, No dizziness  Respiratory: Negative for cough, shortness of breath and wheezing.    Cardiovascular: Negative for chest pain.  Intermittent episodes of palpitations   Gastrointestinal: Negative for abdominal pain, No melena, diarrhea, nausea and vomiting.   Genitourinary: Negative for dysuria and frequency, Negative for hematuria  Skin: Negative for bruising, Negative for edema or discoloration noted.          Objective        Vitals:    04/10/23 1429   BP: (!) 140/82   Pulse: (!) 58   Resp: 16       LIPIDS - LAST 2   Lab Results   Component Value Date    CHOL 193 2022    CHOL 197 2021    HDL 58 2022    HDL 55 2021    LDLCALC 112.6 2022    LDLCALC 122.2 2021    TRIG 112 2022    TRIG 99 2021    CHOLHDL 30.1 2022    CHOLHDL 27.9 2021       CBC - LAST 2  Lab Results   Component Value Date    WBC 6.44 2022    WBC 7.17 10/26/2021    RBC 4.80 2022    RBC 4.84 10/26/2021    HGB 14.5 2022    HGB 14.9 10/26/2021    HCT 45.5 2022    HCT 45.4 10/26/2021    MCV 95 2022    MCV 94 10/26/2021    MCH 30.2 2022    MCH 30.8 10/26/2021    MCHC 31.9 (L) 2022    MCHC 32.8 10/26/2021    RDW 14.3 2022    RDW 14.4 10/26/2021     2022     10/26/2021    MPV 10.9 2022    MPV 10.2 10/26/2021    GRAN 3.5 2022    GRAN 54.0 2022    LYMPH 2.2 2022    LYMPH 34.3 2022    MONO 0.7 2022    MONO 10.6 2022    BASO 0.04 2022    BASO  0.05 10/26/2021    NRBC 0 05/26/2022    NRBC 0 10/26/2021       CHEMISTRY & LIVER FUNCTION - LAST 2  Lab Results   Component Value Date     05/26/2022     10/26/2021    K 4.2 05/26/2022    K 4.0 10/26/2021     05/26/2022     10/26/2021    CO2 27 05/26/2022    CO2 24 10/26/2021    ANIONGAP 11 05/26/2022    ANIONGAP 10 10/26/2021    BUN 17 05/26/2022    BUN 17 10/26/2021    CREATININE 1.2 05/26/2022    CREATININE 1.1 10/26/2021    GLU 84 05/26/2022     10/26/2021    CALCIUM 9.1 05/26/2022    CALCIUM 9.1 10/26/2021    ALBUMIN 3.4 (L) 05/26/2022    ALBUMIN 3.6 10/26/2021    ALBUMIN 3.9 10/26/2021    PROT 6.7 05/26/2022    PROT 6.9 10/26/2021    ALKPHOS 57 05/26/2022    ALKPHOS 62 10/26/2021    ALT 15 05/26/2022    ALT 16 10/26/2021    AST 20 05/26/2022    AST 18 10/26/2021    BILITOT 1.9 (H) 05/26/2022    BILITOT 1.7 (H) 10/26/2021        CARDIAC PROFILE - LAST 2  Lab Results   Component Value Date     05/26/2022        COAGULATION - LAST 2  No results found for: LABPT, INR, APTT    ENDOCRINE & PSA - LAST 2  Lab Results   Component Value Date    HGBA1C 5.3 10/26/2021    TSH 3.850 05/26/2022    TSH 2.099 10/26/2021    PSA 12.4 (H) 02/17/2021        ECHOCARDIOGRAM RESULTS  Results for orders placed during the hospital encounter of 08/03/22    Echo    Interpretation Summary  · The left ventricle is normal in size with moderate concentric hypertrophy and normal systolic function.  · Normal left ventricular diastolic function.  · The estimated PA systolic pressure is 21 mmHg.  · Normal right ventricular size with normal right ventricular systolic function.  · Normal central venous pressure (3 mmHg).  · The estimated ejection fraction is 70%.  · Plaque present.  · Mild pulmonic regurgitation.  · Moderate aortic regurgitation.  · Mild tricuspid regurgitation.  · Mild right atrial enlargement.      CURRENT/PREVIOUS VISIT EKG  Results for orders placed or performed in visit on 07/12/22   IN  OFFICE EKG 12-LEAD (to [facility])    Collection Time: 07/12/22  8:45 AM    Narrative    Test Reason : I48.0,    Vent. Rate : 063 BPM     Atrial Rate : 063 BPM     P-R Int : 200 ms          QRS Dur : 090 ms      QT Int : 414 ms       P-R-T Axes : 081 -39 047 degrees     QTc Int : 423 ms    Normal sinus rhythm  Left axis deviation  Abnormal ECG  When compared with ECG of 27-AUG-2021 11:32,  Sinus rhythm has replaced Atrial fibrillation  Confirmed by Sun MG, Amrit BIGGS ([id number]) on 7/28/2022 7:36:12 PM    Referred By: JEROD AGUIAR           Confirmed By:Amrit Garsia MD     No valid procedures specified.   Results for orders placed during the hospital encounter of 08/03/22    Nuclear Stress - Cardiology Interpreted    Interpretation Summary    Normal myocardial perfusion scan. There is no evidence of myocardial ischemia or infarction.    The gated perfusion images showed an ejection fraction of 75% post stress. Normal ejection fraction is greater than 47%.    There is normal wall motion at rest and post stress.    LV cavity size is normal at rest and normal at stress.    The EKG portion of this study is negative for ischemia.    The patient reported no chest pain during the stress test.    There were no arrhythmias during stress.    No valid procedures specified.          PREVIOUS STRESS TEST              PREVIOUS ANGIOGRAM        Physical examination:      Constitutional:  Well-built well-nourished in no apparent distress, alert and oriented    Neck: no carotid bruit, no JVD, no masses    Lungs: diminished breath sounds bibasilar, rhonchi bilaterally  Chest Wall: no tenderness  CARDIAC:  regular rate and rhythm, S1, S2 normal, no murmur, click, rub or gallop  Abdomen: soft, non-tender; bowel sounds normal; no masses,  no organomegaly, no guarding or rebound noted  Extremities: Extremities normal, atraumatic, no cyanosis, clubbing, or edema  Skin: Skin color, texture, turgor normal. No rashes or lesions  Neuro: Alert  and responsive.  Moving all extremities        I HAVE REVIEWED :    The vital signs, nurses notes, and all the pertinent radiology and labs.        Current Outpatient Medications   Medication Instructions    albuterol (PROVENTIL/VENTOLIN HFA) 90 mcg/actuation inhaler 2 puffs every 4 hours as needed for cough, wheeze, or shortness of breath    ascorbic acid (vitamin C) (VITAMIN C) 1,000 mg, Oral, Daily    atenoloL (TENORMIN) 25 MG tablet Take .5 tablet    benralizumab 30 mg/mL AtIn Inject 1 pen into the skin as needed (every 56 days).    ELIQUIS 5 mg Tab TAKE 1 TABLET(5 MG) BY MOUTH TWICE DAILY    fluticasone-salmeterol diskus inhaler 250-50 mcg 1 puff, Inhalation, 2 times daily, Controller    GLUC HCL/GLUC JAMESON/[id number]-[id number] (GLUCOSAMINE COMPLEX ORAL) 1 capsule, Oral, Daily    latanoprost 0.005 % ophthalmic solution 1 drop, Both Eyes, Nightly    levothyroxine (SYNTHROID) 50 MCG tablet TAKE 1 TABLET BY MOUTH EVERY MORNING ON TUES AND THURS    levothyroxine (SYNTHROID) 75 MCG tablet TAKE 1 TABLET BY MOUTH EVERY MORNING ON MONDAY, WEDNESDAY, FRIDAY, SATURDAY, AND SUNDAY    losartan (COZAAR) 50 MG tablet Take 1 tab (50mg) at 7 am and 1 tab(50mg) at noon    predniSONE (DELTASONE) 20 MG tablet Take  3 daily for 3 days then 2 daily for 3 days then one daily for 3 days.  Repeat for asthma    spironolactone (ALDACTONE) 25 mg, Oral, Daily    terazosin (HYTRIN) 2 MG capsule TAKE 1 CAPSULE(2 MG) BY MOUTH EVERY DAY      04/10/2023 EKG shows sinus bradycardia rate of 56 beats per minute first-degree AV block left axis deviation incomplete right bundle branch block morphology.    Assessment & Plan     Paroxysmal atrial fibrillation  Patient is maintaining sinus bradycardia rate of 56 beats per minute at this time.  Recommend to continue on Eliquis 5 mg p.o. daily   Discontinue on atenolol wean it off completely.  Add magnesium oxide 400 mg daily   The heart rate improves may consider low-dose of Cardizem at a later date.    Recheck  all the labs including chemistry and magnesium levels as well as TSH levels.    Essential hypertension  Blood pressure is 140/82 mm Hg continue on Hytrin 2 mg at bedtime, losartan 50 mg once a day this could go up to twice a day regimen as needed to keep his blood pressure under control 130 systolic or less.    Calcification of aorta  Aggressive lipid management and blood pressure control recheck lipid levels.    Hypothyroidism  He is due to have repeat blood work and continue on levothyroxine 125 mcg in the meantime.    Severe persistent asthma without complication  He is off steroids at this time.  Appears to be fairly stable from pulmonary standpoint.          Follow up in about 3 months (around 7/10/2023).

## 2023-04-10 NOTE — ASSESSMENT & PLAN NOTE
Patient is maintaining sinus bradycardia rate of 56 beats per minute at this time.  Recommend to continue on Eliquis 5 mg p.o. daily   Discontinue on atenolol wean it off completely.  Add magnesium oxide 400 mg daily   The heart rate improves may consider low-dose of Cardizem at a later date.    Recheck all the labs including chemistry and magnesium levels as well as TSH levels.

## 2023-04-10 NOTE — ASSESSMENT & PLAN NOTE
Blood pressure is 140/82 mm Hg continue on Hytrin 2 mg at bedtime, losartan 50 mg once a day this could go up to twice a day regimen as needed to keep his blood pressure under control 130 systolic or less.

## 2023-05-04 ENCOUNTER — LAB VISIT (OUTPATIENT)
Dept: LAB | Facility: HOSPITAL | Age: 83
End: 2023-05-04
Attending: FAMILY MEDICINE
Payer: MEDICARE

## 2023-05-04 DIAGNOSIS — E03.9 HYPOTHYROIDISM, UNSPECIFIED TYPE: ICD-10-CM

## 2023-05-04 DIAGNOSIS — E78.2 MIXED HYPERLIPIDEMIA: ICD-10-CM

## 2023-05-04 LAB
ALBUMIN SERPL BCP-MCNC: 3.7 G/DL (ref 3.5–5.2)
ALP SERPL-CCNC: 65 U/L (ref 55–135)
ALT SERPL W/O P-5'-P-CCNC: 18 U/L (ref 10–44)
ANION GAP SERPL CALC-SCNC: 7 MMOL/L (ref 8–16)
AST SERPL-CCNC: 19 U/L (ref 10–40)
BASOPHILS # BLD AUTO: 0.05 K/UL (ref 0–0.2)
BASOPHILS NFR BLD: 0.7 % (ref 0–1.9)
BILIRUB SERPL-MCNC: 1.5 MG/DL (ref 0.1–1)
BUN SERPL-MCNC: 15 MG/DL (ref 8–23)
CALCIUM SERPL-MCNC: 9.4 MG/DL (ref 8.7–10.5)
CHLORIDE SERPL-SCNC: 104 MMOL/L (ref 95–110)
CHOLEST SERPL-MCNC: 207 MG/DL (ref 120–199)
CHOLEST/HDLC SERPL: 3.2 {RATIO} (ref 2–5)
CO2 SERPL-SCNC: 28 MMOL/L (ref 23–29)
CREAT SERPL-MCNC: 1.1 MG/DL (ref 0.5–1.4)
DIFFERENTIAL METHOD: ABNORMAL
EOSINOPHIL # BLD AUTO: 0 K/UL (ref 0–0.5)
EOSINOPHIL NFR BLD: 0 % (ref 0–8)
ERYTHROCYTE [DISTWIDTH] IN BLOOD BY AUTOMATED COUNT: 14.9 % (ref 11.5–14.5)
EST. GFR  (NO RACE VARIABLE): >60 ML/MIN/1.73 M^2
GLUCOSE SERPL-MCNC: 96 MG/DL (ref 70–110)
HCT VFR BLD AUTO: 47.1 % (ref 40–54)
HDLC SERPL-MCNC: 64 MG/DL (ref 40–75)
HDLC SERPL: 30.9 % (ref 20–50)
HGB BLD-MCNC: 15.3 G/DL (ref 14–18)
IMM GRANULOCYTES # BLD AUTO: 0.02 K/UL (ref 0–0.04)
IMM GRANULOCYTES NFR BLD AUTO: 0.3 % (ref 0–0.5)
LDLC SERPL CALC-MCNC: 122.4 MG/DL (ref 63–159)
LYMPHOCYTES # BLD AUTO: 2.4 K/UL (ref 1–4.8)
LYMPHOCYTES NFR BLD: 35.2 % (ref 18–48)
MCH RBC QN AUTO: 30.5 PG (ref 27–31)
MCHC RBC AUTO-ENTMCNC: 32.5 G/DL (ref 32–36)
MCV RBC AUTO: 94 FL (ref 82–98)
MONOCYTES # BLD AUTO: 0.7 K/UL (ref 0.3–1)
MONOCYTES NFR BLD: 10.5 % (ref 4–15)
NEUTROPHILS # BLD AUTO: 3.6 K/UL (ref 1.8–7.7)
NEUTROPHILS NFR BLD: 53.3 % (ref 38–73)
NONHDLC SERPL-MCNC: 143 MG/DL
NRBC BLD-RTO: 0 /100 WBC
PLATELET # BLD AUTO: 249 K/UL (ref 150–450)
PMV BLD AUTO: 10.4 FL (ref 9.2–12.9)
POTASSIUM SERPL-SCNC: 4.2 MMOL/L (ref 3.5–5.1)
PROT SERPL-MCNC: 7 G/DL (ref 6–8.4)
RBC # BLD AUTO: 5.02 M/UL (ref 4.6–6.2)
SODIUM SERPL-SCNC: 139 MMOL/L (ref 136–145)
T4 FREE SERPL-MCNC: 1.01 NG/DL (ref 0.71–1.51)
TRIGL SERPL-MCNC: 103 MG/DL (ref 30–150)
TSH SERPL DL<=0.005 MIU/L-ACNC: 2.67 UIU/ML (ref 0.4–4)
WBC # BLD AUTO: 6.76 K/UL (ref 3.9–12.7)

## 2023-05-04 PROCEDURE — 84443 ASSAY THYROID STIM HORMONE: CPT | Performed by: FAMILY MEDICINE

## 2023-05-04 PROCEDURE — 84439 ASSAY OF FREE THYROXINE: CPT | Performed by: FAMILY MEDICINE

## 2023-05-04 PROCEDURE — 80061 LIPID PANEL: CPT | Performed by: FAMILY MEDICINE

## 2023-05-04 PROCEDURE — 36415 COLL VENOUS BLD VENIPUNCTURE: CPT | Mod: PO | Performed by: FAMILY MEDICINE

## 2023-05-04 PROCEDURE — 85025 COMPLETE CBC W/AUTO DIFF WBC: CPT | Performed by: FAMILY MEDICINE

## 2023-05-04 PROCEDURE — 80053 COMPREHEN METABOLIC PANEL: CPT | Performed by: FAMILY MEDICINE

## 2023-05-10 ENCOUNTER — TELEPHONE (OUTPATIENT)
Dept: ADMINISTRATIVE | Facility: CLINIC | Age: 83
End: 2023-05-10
Payer: MEDICARE

## 2023-05-11 ENCOUNTER — OFFICE VISIT (OUTPATIENT)
Dept: FAMILY MEDICINE | Facility: CLINIC | Age: 83
End: 2023-05-11
Payer: MEDICARE

## 2023-05-11 VITALS
SYSTOLIC BLOOD PRESSURE: 122 MMHG | OXYGEN SATURATION: 97 % | HEIGHT: 71 IN | WEIGHT: 172.19 LBS | HEART RATE: 67 BPM | DIASTOLIC BLOOD PRESSURE: 74 MMHG | BODY MASS INDEX: 24.1 KG/M2 | TEMPERATURE: 98 F

## 2023-05-11 VITALS
BODY MASS INDEX: 24.23 KG/M2 | DIASTOLIC BLOOD PRESSURE: 70 MMHG | HEIGHT: 71 IN | WEIGHT: 173.06 LBS | RESPIRATION RATE: 18 BRPM | SYSTOLIC BLOOD PRESSURE: 138 MMHG | OXYGEN SATURATION: 96 % | TEMPERATURE: 99 F | HEART RATE: 72 BPM

## 2023-05-11 DIAGNOSIS — I48.0 PAROXYSMAL ATRIAL FIBRILLATION: ICD-10-CM

## 2023-05-11 DIAGNOSIS — R26.9 ABNORMALITY OF GAIT AND MOBILITY: ICD-10-CM

## 2023-05-11 DIAGNOSIS — I10 ESSENTIAL HYPERTENSION: ICD-10-CM

## 2023-05-11 DIAGNOSIS — J82.83 EOSINOPHILIC ASTHMA: ICD-10-CM

## 2023-05-11 DIAGNOSIS — N40.0 BENIGN PROSTATIC HYPERPLASIA WITHOUT LOWER URINARY TRACT SYMPTOMS: ICD-10-CM

## 2023-05-11 DIAGNOSIS — E03.9 HYPOTHYROIDISM, UNSPECIFIED TYPE: ICD-10-CM

## 2023-05-11 DIAGNOSIS — I48.0 PAROXYSMAL ATRIAL FIBRILLATION: Primary | ICD-10-CM

## 2023-05-11 DIAGNOSIS — D69.2 NONTHROMBOCYTOPENIC PURPURA: ICD-10-CM

## 2023-05-11 DIAGNOSIS — Z00.00 ENCOUNTER FOR PREVENTIVE HEALTH EXAMINATION: Primary | ICD-10-CM

## 2023-05-11 DIAGNOSIS — E06.3 HASHIMOTO'S THYROIDITIS: ICD-10-CM

## 2023-05-11 DIAGNOSIS — J44.89 CHRONIC BRONCHIOLITIS: ICD-10-CM

## 2023-05-11 DIAGNOSIS — E78.2 MIXED HYPERLIPIDEMIA: ICD-10-CM

## 2023-05-11 PROCEDURE — 1101F PT FALLS ASSESS-DOCD LE1/YR: CPT | Mod: CPTII,S$GLB,, | Performed by: FAMILY MEDICINE

## 2023-05-11 PROCEDURE — 3078F PR MOST RECENT DIASTOLIC BLOOD PRESSURE < 80 MM HG: ICD-10-PCS | Mod: CPTII,S$GLB,, | Performed by: NURSE PRACTITIONER

## 2023-05-11 PROCEDURE — 99999 PR PBB SHADOW E&M-EST. PATIENT-LVL III: ICD-10-PCS | Mod: PBBFAC,,, | Performed by: FAMILY MEDICINE

## 2023-05-11 PROCEDURE — 1101F PT FALLS ASSESS-DOCD LE1/YR: CPT | Mod: CPTII,S$GLB,, | Performed by: NURSE PRACTITIONER

## 2023-05-11 PROCEDURE — 99999 PR PBB SHADOW E&M-EST. PATIENT-LVL III: CPT | Mod: PBBFAC,,, | Performed by: FAMILY MEDICINE

## 2023-05-11 PROCEDURE — 99214 OFFICE O/P EST MOD 30 MIN: CPT | Mod: S$GLB,,, | Performed by: FAMILY MEDICINE

## 2023-05-11 PROCEDURE — 1126F AMNT PAIN NOTED NONE PRSNT: CPT | Mod: CPTII,S$GLB,, | Performed by: FAMILY MEDICINE

## 2023-05-11 PROCEDURE — 3075F PR MOST RECENT SYSTOLIC BLOOD PRESS GE 130-139MM HG: ICD-10-PCS | Mod: CPTII,S$GLB,, | Performed by: FAMILY MEDICINE

## 2023-05-11 PROCEDURE — 3288F FALL RISK ASSESSMENT DOCD: CPT | Mod: CPTII,S$GLB,, | Performed by: NURSE PRACTITIONER

## 2023-05-11 PROCEDURE — 1126F PR PAIN SEVERITY QUANTIFIED, NO PAIN PRESENT: ICD-10-PCS | Mod: CPTII,S$GLB,, | Performed by: FAMILY MEDICINE

## 2023-05-11 PROCEDURE — 3078F DIAST BP <80 MM HG: CPT | Mod: CPTII,S$GLB,, | Performed by: NURSE PRACTITIONER

## 2023-05-11 PROCEDURE — 1159F PR MEDICATION LIST DOCUMENTED IN MEDICAL RECORD: ICD-10-PCS | Mod: CPTII,S$GLB,, | Performed by: FAMILY MEDICINE

## 2023-05-11 PROCEDURE — 3288F PR FALLS RISK ASSESSMENT DOCUMENTED: ICD-10-PCS | Mod: CPTII,S$GLB,, | Performed by: FAMILY MEDICINE

## 2023-05-11 PROCEDURE — 3288F FALL RISK ASSESSMENT DOCD: CPT | Mod: CPTII,S$GLB,, | Performed by: FAMILY MEDICINE

## 2023-05-11 PROCEDURE — 1170F FXNL STATUS ASSESSED: CPT | Mod: CPTII,S$GLB,, | Performed by: NURSE PRACTITIONER

## 2023-05-11 PROCEDURE — 1170F PR FUNCTIONAL STATUS ASSESSED: ICD-10-PCS | Mod: CPTII,S$GLB,, | Performed by: NURSE PRACTITIONER

## 2023-05-11 PROCEDURE — 1159F PR MEDICATION LIST DOCUMENTED IN MEDICAL RECORD: ICD-10-PCS | Mod: CPTII,S$GLB,, | Performed by: NURSE PRACTITIONER

## 2023-05-11 PROCEDURE — 1159F MED LIST DOCD IN RCRD: CPT | Mod: CPTII,S$GLB,, | Performed by: FAMILY MEDICINE

## 2023-05-11 PROCEDURE — 1101F PR PT FALLS ASSESS DOC 0-1 FALLS W/OUT INJ PAST YR: ICD-10-PCS | Mod: CPTII,S$GLB,, | Performed by: NURSE PRACTITIONER

## 2023-05-11 PROCEDURE — 1160F PR REVIEW ALL MEDS BY PRESCRIBER/CLIN PHARMACIST DOCUMENTED: ICD-10-PCS | Mod: CPTII,S$GLB,, | Performed by: FAMILY MEDICINE

## 2023-05-11 PROCEDURE — 99214 PR OFFICE/OUTPT VISIT, EST, LEVL IV, 30-39 MIN: ICD-10-PCS | Mod: S$GLB,,, | Performed by: FAMILY MEDICINE

## 2023-05-11 PROCEDURE — G0439 PPPS, SUBSEQ VISIT: HCPCS | Mod: S$GLB,,, | Performed by: NURSE PRACTITIONER

## 2023-05-11 PROCEDURE — 99999 PR PBB SHADOW E&M-EST. PATIENT-LVL V: ICD-10-PCS | Mod: PBBFAC,,, | Performed by: NURSE PRACTITIONER

## 2023-05-11 PROCEDURE — 1160F RVW MEDS BY RX/DR IN RCRD: CPT | Mod: CPTII,S$GLB,, | Performed by: FAMILY MEDICINE

## 2023-05-11 PROCEDURE — 3074F SYST BP LT 130 MM HG: CPT | Mod: CPTII,S$GLB,, | Performed by: NURSE PRACTITIONER

## 2023-05-11 PROCEDURE — 1160F RVW MEDS BY RX/DR IN RCRD: CPT | Mod: CPTII,S$GLB,, | Performed by: NURSE PRACTITIONER

## 2023-05-11 PROCEDURE — 1159F MED LIST DOCD IN RCRD: CPT | Mod: CPTII,S$GLB,, | Performed by: NURSE PRACTITIONER

## 2023-05-11 PROCEDURE — 99999 PR PBB SHADOW E&M-EST. PATIENT-LVL V: CPT | Mod: PBBFAC,,, | Performed by: NURSE PRACTITIONER

## 2023-05-11 PROCEDURE — 3078F PR MOST RECENT DIASTOLIC BLOOD PRESSURE < 80 MM HG: ICD-10-PCS | Mod: CPTII,S$GLB,, | Performed by: FAMILY MEDICINE

## 2023-05-11 PROCEDURE — G0439 PR MEDICARE ANNUAL WELLNESS SUBSEQUENT VISIT: ICD-10-PCS | Mod: S$GLB,,, | Performed by: NURSE PRACTITIONER

## 2023-05-11 PROCEDURE — 1160F PR REVIEW ALL MEDS BY PRESCRIBER/CLIN PHARMACIST DOCUMENTED: ICD-10-PCS | Mod: CPTII,S$GLB,, | Performed by: NURSE PRACTITIONER

## 2023-05-11 PROCEDURE — 1101F PR PT FALLS ASSESS DOC 0-1 FALLS W/OUT INJ PAST YR: ICD-10-PCS | Mod: CPTII,S$GLB,, | Performed by: FAMILY MEDICINE

## 2023-05-11 PROCEDURE — 3288F PR FALLS RISK ASSESSMENT DOCUMENTED: ICD-10-PCS | Mod: CPTII,S$GLB,, | Performed by: NURSE PRACTITIONER

## 2023-05-11 PROCEDURE — 3075F SYST BP GE 130 - 139MM HG: CPT | Mod: CPTII,S$GLB,, | Performed by: FAMILY MEDICINE

## 2023-05-11 PROCEDURE — 3074F PR MOST RECENT SYSTOLIC BLOOD PRESSURE < 130 MM HG: ICD-10-PCS | Mod: CPTII,S$GLB,, | Performed by: NURSE PRACTITIONER

## 2023-05-11 PROCEDURE — 3078F DIAST BP <80 MM HG: CPT | Mod: CPTII,S$GLB,, | Performed by: FAMILY MEDICINE

## 2023-05-11 NOTE — PROGRESS NOTES
"  Maycol Hodge presented for a  Medicare AWV and comprehensive Health Risk Assessment today. The following components were reviewed and updated:    Medical history  Family History  Social history  Allergies and Current Medications  Health Risk Assessment  Health Maintenance  Care Team         ** See Completed Assessments for Annual Wellness Visit within the encounter summary.**         The following assessments were completed:  Living Situation  CAGE  Depression Screening  Timed Get Up and Go  Whisper Test  Cognitive Function Screening  Nutrition Screening  ADL Screening  PAQ Screening            Vitals:    05/11/23 1027   BP: 122/74   Pulse: 67   Temp: 97.7 °F (36.5 °C)   TempSrc: Oral   SpO2: 97%   Weight: 78.1 kg (172 lb 2.9 oz)   Height: 5' 11" (1.803 m)     Body mass index is 24.01 kg/m².  Physical Exam  Constitutional:       Appearance: He is well-developed.   HENT:      Head: Normocephalic and atraumatic.      Right Ear: Hearing normal.      Left Ear: Hearing normal.      Nose: Nose normal.   Eyes:      General: Lids are normal.      Conjunctiva/sclera: Conjunctivae normal.      Pupils: Pupils are equal, round, and reactive to light.   Cardiovascular:      Rate and Rhythm: Normal rate.   Pulmonary:      Effort: Pulmonary effort is normal.   Abdominal:      Palpations: Abdomen is soft.   Musculoskeletal:         General: Normal range of motion.      Cervical back: Normal range of motion and neck supple.   Skin:     General: Skin is warm and dry.   Neurological:      Mental Status: He is alert and oriented to person, place, and time.             Diagnoses and health risks identified today and associated recommendations/orders:    1. Encounter for preventive health examination  Discussed health maintenance guidelines appropriate for age.    Review for Opioid Screening: Patient does not have rx for Opioids.    Review for Substance Use Disorders: Patient does not use substance.      2. Paroxysmal atrial " fibrillation  Stable, continue to monitor  Followed by cardiology     3. Essential hypertension  Controlled, continue current medication regimen  Low salt diet  Increase physical activity  Followed by pcp      4. Chronic bronchiolitis  Stable, continue to monitor  Followed by pulm    5. Nonthrombocytopenic purpura  Stable, continue to monitor     6. Abnormality of gait and mobility  Stable, continue to monitor         Provided Maycol with a 5-10 year written screening schedule and personal prevention plan. Recommendations were developed using the USPSTF age appropriate recommendations. Education, counseling, and referrals were provided as needed. After Visit Summary printed and given to patient which includes a list of additional screenings\tests needed.    Follow up for One year for Annual Wellness Visit.    Galina Arguello NP      I offered to discuss advanced care planning, including how to pick a person who would make decisions for you if you were unable to make them for yourself, called a health care power of , and what kind of decisions you might make such as use of life sustaining treatments such as ventilators and tube feeding when faced with a life limiting illness recorded on a living will that they will need to know. (How you want to be cared for as you near the end of your natural life)     X  Patient has advanced directives written and agrees to provide copies to the institution.

## 2023-05-11 NOTE — PROGRESS NOTES
Subjective:       Patient ID: Maycol Hodge is a 83 y.o. male.    Chief Complaint: Follow-up (6mth f/u )    HPI  Review of Systems   Constitutional:  Negative for fatigue and unexpected weight change.   Respiratory:  Negative for chest tightness and shortness of breath.    Cardiovascular:  Negative for chest pain, palpitations and leg swelling.   Gastrointestinal:  Negative for abdominal pain.   Musculoskeletal:  Negative for arthralgias.   Neurological:  Negative for dizziness, syncope, light-headedness and headaches.     Patient Active Problem List   Diagnosis    Chronic allergic rhinitis    Essential hypertension    Severe persistent asthma without complication    BPH (benign prostatic hyperplasia)    History of TIA (transient ischemic attack)    Hyperlipidemia    Cataract    Urticaria    Hypothyroidism    Autoimmune urticaria    Anaphylactic syndrome    Hashimoto's thyroiditis    Chronic bronchiolitis    Change in bowel habits    Acute urticaria    Paroxysmal atrial fibrillation    Hx of colonic polyps    Chronic sinus complaints    Fatigue    Hypersomnolence disorder    Eosinophilic asthma    Calcification of aorta    Blood pressure instability    Nonthrombocytopenic purpura     Patient is here for a chronic conditions follow up.    BP log 120s/70s and pulse 50-60 s. Has improved after stopping atenolol. HR now 60s-70s. Mild improvement in exercise tolerance. Goes to gym several days per week        Reviewed labs 5/23 TFTs, lipids, cmp, cbc normal     C/o fatigue, low energy, worsening exercise tolerance and occ sx of orthostasis. Only change in meds is fasenra added for asthma 10/20. Denies CP. Has chronically slow HR. Taking atenolol 25mg 1/2 po qd. Card Dr. Vogel, now Baker- c/o tiredness, fatigue and slow pulse on atenolol. PAF on eliquis    GI dr. Mullen colonoscopy scheduled 5/20/23     PSA 12.4 urology Dr. Truong-has had multiple biopsies-no prostate cancer. 12.4 is low for him     Hst- no sig  james. No machine needed     Hsitory:   Eye Dr. Hester-glaucoma        Allergy Dr. Unger- History: July 16th HA. 19th hives and whelps.  Allergy Dr. Unger who was consulted during hospital stay.  Admitted after syncope in triage at [facility]. Diagnosed with anaphylaxis and shock from unknown etiology. Denies throat or tongue swelling or SOB. No known food allergies.   Had slow pulse and low BP.  Given steroids. Released 22nd.  Had purpuric lesions concerning for possible vasculitis. Complement as normal.  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 Has had erratic and high BP.  C/o fatigue and sleepiness.  BX neg for vasculitis.  On bid antihistamine zyrtec and claritin.  Rash cleared immediately after admission. Has had no further hives x 2 months. On daily antihistamines     Endocrine Dr. Duncan -Has + TPO Ab 9/18,hypothyroid     Pulm Dr. Burch -asthma. Having moire mucous prod and occ wheezing on symbicort 160, singulair. Takes claritin prn and uses flonase daily.      Card Dr. Vogel h/o TIA . PAf on eliquis  Objective:      Physical Exam  Vitals and nursing note reviewed.   Constitutional:       Appearance: He is well-developed.   Cardiovascular:      Rate and Rhythm: Normal rate and regular rhythm.      Heart sounds: Normal heart sounds.   Pulmonary:      Effort: Pulmonary effort is normal.      Breath sounds: Normal breath sounds.   Skin:     General: Skin is warm and dry.   Neurological:      Mental Status: He is alert and oriented to person, place, and time.       Assessment:       1. Paroxysmal atrial fibrillation    2. Essential hypertension    3. Benign prostatic hyperplasia without lower urinary tract symptoms    4. Hypothyroidism, unspecified type    5. Mixed hyperlipidemia    6. Hashimoto's thyroiditis    7. Eosinophilic asthma        Plan:         1. Paroxysmal atrial fibrillation  Cont card consult and current mgmt    2. Essential hypertension  Controlled on current medications.   Continue current medications.      3. Benign prostatic hyperplasia without lower urinary tract symptoms  Cont current mgmt    4. Hypothyroidism, unspecified type  Controlled on current medications.  Continue current medications.    - CBC Auto Differential; Future  - TSH; Future  - T4, Free; Future    5. Mixed hyperlipidemia  Controlled on current medications.  Continue current medications.    - Comprehensive Metabolic Panel; Future  - Lipid Panel; Future    6. Hashimoto's thyroiditis  Cont monitoring    7. Eosinophilic asthma  Cont pulm consult and current regimen        Time spent with patient: 20 minutes    Patient with be reevaluated in 6 months or sooner prn    Greater than 50% of this visit was spent counseling as described in above documentation:Yes

## 2023-05-11 NOTE — PATIENT INSTRUCTIONS
Counseling and Referral of Other Preventative  (Italic type indicates deductible and co-insurance are waived)    Patient Name: Maycol Hodge  Today's Date: 5/11/2023    Health Maintenance       Date Due Completion Date    Shingles Vaccine (1 of 2) Never done ---    COVID-19 Vaccine (4 - Booster for Moderna series) 01/03/2022 11/8/2021    TETANUS VACCINE 08/18/2025 8/18/2015    Lipid Panel 05/04/2028 5/4/2023        No orders of the defined types were placed in this encounter.      The following information is provided to all patients.  This information is to help you find resources for any of the problems found today that may be affecting your health:                Living healthy guide: www.[facility].[place].gov      Understanding Diabetes: www.diabetes.org      Eating healthy: www.cdc.gov/healthyweight      [facility] home safety checklist: www.cdc.gov/steadi/patient.html      Agency on Aging: www.goea.[place].[facility]      Alcoholics anonymous (AA): www.aa.org      Physical Activity: www.su.nih.gov/pt5jada      Tobacco use: www.quitwithusla.org

## 2023-05-24 ENCOUNTER — TELEPHONE (OUTPATIENT)
Dept: GASTROENTEROLOGY | Facility: CLINIC | Age: 83
End: 2023-05-24
Payer: MEDICARE

## 2023-05-24 ENCOUNTER — TELEPHONE (OUTPATIENT)
Dept: CARDIOLOGY | Facility: CLINIC | Age: 83
End: 2023-05-24
Payer: MEDICARE

## 2023-05-24 ENCOUNTER — PATIENT MESSAGE (OUTPATIENT)
Dept: GASTROENTEROLOGY | Facility: CLINIC | Age: 83
End: 2023-05-24
Payer: MEDICARE

## 2023-05-24 NOTE — LETTER
May 24, 2023    Maycol Hodge  [address] [place] [id number]             [facility]  [address]  [facility] [address]  Phone: [phone]  Fax: [phone] Patient: Maycol Hodge  : 1940  Referring Doctor: Dr. Mullen  Procedure: Colonoscopy    Current Outpatient Medications   Medication Sig    albuterol (PROVENTIL/VENTOLIN HFA) 90 mcg/actuation inhaler 2 puffs every 4 hours as needed for cough, wheeze, or shortness of breath    ascorbic acid, vitamin C, (VITAMIN C) 1000 MG tablet Take 1,000 mg by mouth once daily.    benralizumab 30 mg/mL AtIn Inject 1 pen into the skin as needed (every 56 days).    ELIQUIS 5 mg Tab TAKE 1 TABLET(5 MG) BY MOUTH TWICE DAILY    fluticasone-salmeterol diskus inhaler 250-50 mcg Inhale 1 puff into the lungs 2 (two) times daily. Controller    GLUC HCL/GLUC JAMESON/[id number]-[id number] (GLUCOSAMINE COMPLEX ORAL) Take 1 capsule by mouth once daily.    latanoprost 0.005 % ophthalmic solution Place 1 drop into both eyes every evening.     levothyroxine (SYNTHROID) 50 MCG tablet TAKE 1 TABLET BY MOUTH EVERY MORNING ON TUES AND THURS    levothyroxine (SYNTHROID) 75 MCG tablet TAKE 1 TABLET BY MOUTH EVERY MORNING ON MONDAY, WEDNESDAY, FRIDAY, SATURDAY, AND     losartan (COZAAR) 50 MG tablet Take 1 tab (50mg) at 7 am and 1 tab(50mg) at noon (Patient not taking: Reported on 2023)    magnesium hydroxide (MAGNESIA ORAL) Take 400 mg by mouth.    predniSONE (DELTASONE) 20 MG tablet Take  3 daily for 3 days then 2 daily for 3 days then one daily for 3 days.  Repeat for asthma (Patient not taking: Reported on 2023)    spironolactone (ALDACTONE) 25 MG tablet Take 1 tablet (25 mg total) by mouth once daily.    terazosin (HYTRIN) 2 MG capsule TAKE 1 CAPSULE(2 MG) BY MOUTH EVERY DAY     No current facility-administered medications for this visit.       This patient has been assessed for risk  factors for clearance of surgery with the following stipulations:    _x__ No contraindications  _x__ Recommendations for Eliquis, hold x _2_ days  __x_ Cleared for surgery with moderate risks      If you have any questions regarding the above, please contact my office at [phone].    Sincerely,    Neelam Pimentel NP

## 2023-05-24 NOTE — TELEPHONE ENCOUNTER
----- Message from Delfino Watters LPN sent at 5/24/2023  4:21 PM CDT -----  Regarding: cardiac clearance  Hello!      This patient is scheduled to have a colonoscopy on 5/30/2023. Please send clearance to hold Eliquis for 2 days before his procedure. Thanks!      ANGEL Saleh   [facility]

## 2023-05-24 NOTE — TELEPHONE ENCOUNTER
----- Message from Fredy Orellana sent at 5/24/2023  2:18 PM CDT -----  Contact: Self  Type: Needs Medical Advice  Who Called:  Patient  Best Call Back Number: [phone]   Additional Information: Called to speak with office regarding laxative needed before c-scope. Please call.

## 2023-05-30 ENCOUNTER — HOSPITAL ENCOUNTER (OUTPATIENT)
Facility: HOSPITAL | Age: 83
Discharge: HOME | End: 2023-05-30
Attending: INTERNAL MEDICINE | Admitting: INTERNAL MEDICINE
Payer: MEDICARE

## 2023-05-30 ENCOUNTER — ANESTHESIA EVENT (OUTPATIENT)
Dept: ENDOSCOPY | Facility: HOSPITAL | Age: 83
End: 2023-05-30
Payer: MEDICARE

## 2023-05-30 ENCOUNTER — ANESTHESIA (OUTPATIENT)
Dept: ENDOSCOPY | Facility: HOSPITAL | Age: 83
End: 2023-05-30
Payer: MEDICARE

## 2023-05-30 VITALS
SYSTOLIC BLOOD PRESSURE: 133 MMHG | TEMPERATURE: 97 F | DIASTOLIC BLOOD PRESSURE: 63 MMHG | HEIGHT: 71 IN | BODY MASS INDEX: 23.8 KG/M2 | HEART RATE: 64 BPM | RESPIRATION RATE: 16 BRPM | OXYGEN SATURATION: 97 % | WEIGHT: 170 LBS

## 2023-05-30 DIAGNOSIS — K64.8 INTERNAL HEMORRHOIDS: ICD-10-CM

## 2023-05-30 DIAGNOSIS — Z86.010 HISTORY OF COLON POLYPS: ICD-10-CM

## 2023-05-30 DIAGNOSIS — K57.90 DIVERTICULOSIS: Primary | ICD-10-CM

## 2023-05-30 PROCEDURE — 37000009 HC ANESTHESIA EA ADD 15 MINS: Performed by: INTERNAL MEDICINE

## 2023-05-30 PROCEDURE — G0105 COLORECTAL SCRN; HI RISK IND: ICD-10-PCS | Mod: ,,, | Performed by: INTERNAL MEDICINE

## 2023-05-30 PROCEDURE — D9220A PRA ANESTHESIA: Mod: CRNA,,, | Performed by: NURSE ANESTHETIST, CERTIFIED REGISTERED

## 2023-05-30 PROCEDURE — D9220A PRA ANESTHESIA: Mod: ANES,,, | Performed by: ANESTHESIOLOGY

## 2023-05-30 PROCEDURE — 63600175 PHARM REV CODE 636 W HCPCS: Performed by: NURSE ANESTHETIST, CERTIFIED REGISTERED

## 2023-05-30 PROCEDURE — G0105 COLORECTAL SCRN; HI RISK IND: HCPCS | Mod: ,,, | Performed by: INTERNAL MEDICINE

## 2023-05-30 PROCEDURE — D9220A PRA ANESTHESIA: ICD-10-PCS | Mod: ANES,,, | Performed by: ANESTHESIOLOGY

## 2023-05-30 PROCEDURE — 25000003 PHARM REV CODE 250: Performed by: INTERNAL MEDICINE

## 2023-05-30 PROCEDURE — 37000008 HC ANESTHESIA 1ST 15 MINUTES: Performed by: INTERNAL MEDICINE

## 2023-05-30 PROCEDURE — G0105 COLORECTAL SCRN; HI RISK IND: HCPCS | Performed by: INTERNAL MEDICINE

## 2023-05-30 PROCEDURE — 25000003 PHARM REV CODE 250: Performed by: NURSE ANESTHETIST, CERTIFIED REGISTERED

## 2023-05-30 PROCEDURE — D9220A PRA ANESTHESIA: ICD-10-PCS | Mod: CRNA,,, | Performed by: NURSE ANESTHETIST, CERTIFIED REGISTERED

## 2023-05-30 RX ORDER — LIDOCAINE HYDROCHLORIDE 20 MG/ML
INJECTION INTRAVENOUS
Status: DISCONTINUED | OUTPATIENT
Start: 2023-05-30 | End: 2023-05-30

## 2023-05-30 RX ORDER — PROPOFOL 10 MG/ML
VIAL (ML) INTRAVENOUS
Status: DISCONTINUED | OUTPATIENT
Start: 2023-05-30 | End: 2023-05-30

## 2023-05-30 RX ORDER — SODIUM CHLORIDE 9 MG/ML
INJECTION, SOLUTION INTRAVENOUS CONTINUOUS
Status: DISCONTINUED | OUTPATIENT
Start: 2023-05-30 | End: 2023-05-30 | Stop reason: HOSPADM

## 2023-05-30 RX ADMIN — PROPOFOL 80 MG: 10 INJECTION, EMULSION INTRAVENOUS at 12:05

## 2023-05-30 RX ADMIN — LIDOCAINE HYDROCHLORIDE 25 MG: 20 INJECTION, SOLUTION INTRAVENOUS at 12:05

## 2023-05-30 RX ADMIN — SODIUM CHLORIDE: 0.9 INJECTION, SOLUTION INTRAVENOUS at 11:05

## 2023-05-30 NOTE — H&P
CC: History of colon polyps - last scope 2020    83 year old male with above. States that symptoms are absent, no alleviating/exacerbating factors. No family history of colorectal CA. Positive personal history of polyps. No bleeding or weight loss.     ROS:  No headache, no fever/chills, no chest pain/SOB, no nausea/vomiting/diarrhea/constipation/GI bleeding/abdominal pain, no dysuria/hematuria.    VSSAF   Exam:   Alert and oriented x 3; no apparent distress   PERRLA, sclera anicteric  CV: Regular rate/rhythm, normal PMI   Lungs: Clear bilaterally with no wheeze/rales   Abdomen: Soft, NT/ND, normal bowel sounds   Ext: No cyanosis, clubbing     Impression:   As above    Plan:   Proceed with endoscopy. Further recs to follow.

## 2023-05-30 NOTE — ANESTHESIA PREPROCEDURE EVALUATION
05/30/2023  Maycol Hodge is a 83 y.o., male.      Pre-op Assessment    I have reviewed the Patient Summary Reports.     I have reviewed the Nursing Notes. I have reviewed the NPO Status.   I have reviewed the Medications.     Review of Systems  Anesthesia Hx:  Denies Family Hx of Anesthesia complications.   Denies Personal Hx of Anesthesia complications.   EENT/Dental:   chronic allergic rhinitis   Cardiovascular:   Hypertension Dysrhythmias atrial fibrillation    Pulmonary:   Asthma moderate and severe    Hepatic/GI:   Bowel Prep.    Neurological:   TIA,    Endocrine:   Hypothyroidism        Physical Exam  General: Well nourished, Cooperative, Alert and Oriented    Airway:  Mallampati: III / II          Anesthesia Plan  Type of Anesthesia, risks & benefits discussed:    Anesthesia Type: Gen Natural Airway  Intra-op Monitoring Plan: Standard ASA Monitors  Induction:  IV  Informed Consent: Informed consent signed with the Patient and all parties understand the risks and agree with anesthesia plan.  All questions answered.   ASA Score: 3    Ready For Surgery From Anesthesia Perspective.     .

## 2023-05-30 NOTE — TRANSFER OF CARE
"Anesthesia Transfer of Care Note    Patient: Maycol Hodge    Procedure(s) Performed: Procedure(s) (LRB):  COLONOSCOPY (N/A)    Anesthesia PACU Handoff    Last vitals:   Visit Vitals  BP (!) 190/88 (BP Location: Left arm, Patient Position: Lying)   Pulse (!) 56   Temp 36.4 °C (97.5 °F) (Skin)   Resp 16   Ht 5' 11" (1.803 m)   Wt 77.1 kg (170 lb)   SpO2 98%   BMI 23.71 kg/m²     "

## 2023-05-30 NOTE — PR[employer]ATION PATIENT INSTRUCTIONS
Discharge Summary/Instructions after an Endoscopic Procedure  Patient Name: Maycol Magallon  Patient MRN: [id number]  Patient YOB: 1940  Tuesday, May 30, 2023  Willis Akins MD  Dear patient,  As a result of recent federal legislation (The Federal Cures Act), you may   receive lab or pathology results from your procedure in your [facility]   account before your physician is able to contact you. Your physician or   their representative will relay the results to you with their   recommendations at their soonest availability.  Thank you,  RESTRICTIONS:  During your procedure today, you received medications for sedation.  These   medications may affect your judgment, balance and coordination.  Therefore,   for 24 hours, you have the following restrictions:   - DO NOT drive a car, operate machinery, make legal/financial decisions,   sign important papers or drink alcohol.    ACTIVITY:  Today: no heavy lifting, straining or running due to procedural   sedation/anesthesia.  The following day: return to full activity including work.  DIET:  Eat and drink normally unless instructed otherwise.     TREATMENT FOR COMMON SIDE EFFECTS:  - Mild abdominal pain, nausea, belching, bloating or excessive gas:  rest,   eat lightly and use a heating pad.  - Sore Throat: treat with throat lozenges and/or gargle with warm salt   water.  - Because air was used during the procedure, expelling large amounts of air   from your rectum or belching is normal.  - If a bowel prep was taken, you may not have a bowel movement for 1-3 days.    This is normal.  SYMPTOMS TO WATCH FOR AND REPORT TO YOUR PHYSICIAN:  1. Abdominal pain or bloating, other than gas cramps.  2. Chest pain.  3. Back pain.  4. Signs of infection such as: chills or fever occurring within 24 hours   after the procedure.  5. Rectal bleeding, which would show as bright red, maroon, or black stools.   (A tablespoon of blood from the rectum is not serious, especially if    hemorrhoids are present.)  6. Vomiting.  7. Weakness or dizziness.  GO DIRECTLY TO THE NEAREST EMERGENCY ROOM IF YOU HAVE ANY OF THE FOLLOWING:      Difficulty breathing              Chills and/or fever over 101 F   Persistent vomiting and/or vomiting blood   Severe abdominal pain   Severe chest pain   Black, tarry stools   Bleeding- more than one tablespoon   Any other symptom or condition that you feel may need urgent attention  Your doctor recommends these additional instructions:  If any biopsies were taken, your doctors clinic will contact you in 1 to 2   weeks with any results.  - Patient has a contact number available for emergencies.  The signs and   symptoms of potential delayed complications were discussed with the   patient.  Return to normal activities tomorrow.  Written discharge   instructions were provided to the patient.   - High fiber diet.   - Continue present medications.   - Resume Eliquis (apixaban) at prior dose today.   - No repeat colonoscopy due to age and the absence of colonic polyps.   - Discharge patient to home (ambulatory).   - Return to GI office PRN.  For questions, problems or results please call your physician - Willis Akins MD at Work:  [phone].  [facility], EMERGENCY ROOM PHONE NUMBER: [phone]  IF A COMPLICATION OR EMERGENCY SITUATION ARISES AND YOU ARE UNABLE TO REACH   YOUR PHYSICIAN - GO DIRECTLY TO THE EMERGENCY ROOM.  Willis Akins MD  5/30/2023 12:26:05 PM  This report has been verified and signed electronically.  Dear patient,  As a result of recent federal legislation (The Federal Cures Act), you may   receive lab or pathology results from your procedure in your [facility]   account before your physician is able to contact you. Your physician or   their representative will relay the results to you with their   recommendations at their soonest availability.  Thank you,  PROVATION

## 2023-05-30 NOTE — PLAN OF CARE
Vss, saba po fluids, denies pain, ambulates easily. IV removed, catheter intact. Discharge instructions provided and states understanding. States ready to go home.  Discharged from facility with family per wheelchair.

## 2023-05-30 NOTE — ANESTHESIA POSTPROCEDURE EVALUATION
Anesthesia Post Evaluation    Patient: Maycol Hodge    Procedure(s) Performed: Procedure(s) (LRB):  COLONOSCOPY (N/A)    Final Anesthesia Type: general      Patient location during evaluation: PACU  Patient participation: Yes- Able to Participate  Level of consciousness: awake and alert  Post-procedure vital signs: reviewed and stable  Pain management: adequate  Airway patency: patent    PONV status at discharge: No PONV  Anesthetic complications: no      Cardiovascular status: blood pressure returned to baseline  Respiratory status: unassisted  Hydration status: euvolemic  Follow-up not needed.          Vitals Value Taken Time   /63 05/30/23 1240   Temp 36.2 °C (97.2 °F) 05/30/23 1240   Pulse 64 05/30/23 1240   Resp 16 05/30/23 1240   SpO2 97 % 05/30/23 1240         Event Time   Out of Recovery 13:11:27         Pain/Nestor Score: Nestor Score: 10 (5/30/2023 12:50 PM)

## 2023-06-06 DIAGNOSIS — I10 ESSENTIAL HYPERTENSION: ICD-10-CM

## 2023-06-07 RX ORDER — TERAZOSIN 2 MG/1
CAPSULE ORAL
Qty: 90 CAPSULE | Refills: 3 | Status: SHIPPED | OUTPATIENT
Start: 2023-06-07 | End: 2023-09-26

## 2023-07-11 RX ORDER — SPIRONOLACTONE 25 MG/1
TABLET ORAL
Qty: 30 TABLET | Refills: 11 | Status: SHIPPED | OUTPATIENT
Start: 2023-07-11

## 2023-07-12 ENCOUNTER — TELEPHONE (OUTPATIENT)
Dept: CARDIOLOGY | Facility: CLINIC | Age: 83
End: 2023-07-12
Payer: MEDICARE

## 2023-07-12 NOTE — TELEPHONE ENCOUNTER
----- Message from Guilherme Long sent at 7/12/2023  3:02 PM CDT -----  Type: Need Medical Advice   Who Called: Patient   Best callback number: [phone]  Additional Information: Patient called to schedule an appointment soon as possible his appointment, was canceled on 7/18, he wants to know if its okay to see NP  Please call to further assist with test results, Thanks

## 2023-07-17 ENCOUNTER — TELEPHONE (OUTPATIENT)
Dept: CARDIOLOGY | Facility: CLINIC | Age: 83
End: 2023-07-17
Payer: MEDICARE

## 2023-07-17 NOTE — TELEPHONE ENCOUNTER
----- Message from Martha Vargas, Patient Care Assistant sent at 7/17/2023 10:01 AM CDT -----  Regarding: advice  Contact: pt  Type: Needs Medical Advice    Who Called:  pt     Symptoms (please be specific):  HR elevated   How long has patient had these symptoms:  a week     Best Call Back Number: [phone] (home) [phone]    Additional Information: please call pt to advise. Thanks!

## 2023-07-18 NOTE — TELEPHONE ENCOUNTER
Heart rate went up to 165 while on the treadmill. He was scheduled to see Merry next week, I found an opening with Madie for tomorrow, he is aware I moved his appointment up. He did say he was having some skipping beats. He went home from the gym and rested. His rates went down into the 70's, no chest pains/pressure reported.

## 2023-07-19 ENCOUNTER — OFFICE VISIT (OUTPATIENT)
Dept: CARDIOLOGY | Facility: CLINIC | Age: 83
End: 2023-07-19
Payer: MEDICARE

## 2023-07-19 VITALS
WEIGHT: 172 LBS | HEIGHT: 71 IN | DIASTOLIC BLOOD PRESSURE: 70 MMHG | BODY MASS INDEX: 24.08 KG/M2 | SYSTOLIC BLOOD PRESSURE: 140 MMHG

## 2023-07-19 DIAGNOSIS — I48.0 PAROXYSMAL ATRIAL FIBRILLATION: ICD-10-CM

## 2023-07-19 DIAGNOSIS — E78.2 MIXED HYPERLIPIDEMIA: ICD-10-CM

## 2023-07-19 DIAGNOSIS — R00.0 TACHYCARDIA: Primary | ICD-10-CM

## 2023-07-19 DIAGNOSIS — I70.0 CALCIFICATION OF AORTA: ICD-10-CM

## 2023-07-19 DIAGNOSIS — I10 ESSENTIAL HYPERTENSION: ICD-10-CM

## 2023-07-19 DIAGNOSIS — R00.1 BRADYCARDIA: ICD-10-CM

## 2023-07-19 PROCEDURE — 99999 PR PBB SHADOW E&M-EST. PATIENT-LVL III: ICD-10-PCS | Mod: PBBFAC,,, | Performed by: NURSE PRACTITIONER

## 2023-07-19 PROCEDURE — 3078F PR MOST RECENT DIASTOLIC BLOOD PRESSURE < 80 MM HG: ICD-10-PCS | Mod: CPTII,S$GLB,, | Performed by: NURSE PRACTITIONER

## 2023-07-19 PROCEDURE — 3288F PR FALLS RISK ASSESSMENT DOCUMENTED: ICD-10-PCS | Mod: CPTII,S$GLB,, | Performed by: NURSE PRACTITIONER

## 2023-07-19 PROCEDURE — 1126F AMNT PAIN NOTED NONE PRSNT: CPT | Mod: CPTII,S$GLB,, | Performed by: NURSE PRACTITIONER

## 2023-07-19 PROCEDURE — 1159F PR MEDICATION LIST DOCUMENTED IN MEDICAL RECORD: ICD-10-PCS | Mod: CPTII,S$GLB,, | Performed by: NURSE PRACTITIONER

## 2023-07-19 PROCEDURE — 3288F FALL RISK ASSESSMENT DOCD: CPT | Mod: CPTII,S$GLB,, | Performed by: NURSE PRACTITIONER

## 2023-07-19 PROCEDURE — 99214 PR OFFICE/OUTPT VISIT, EST, LEVL IV, 30-39 MIN: ICD-10-PCS | Mod: S$GLB,,, | Performed by: NURSE PRACTITIONER

## 2023-07-19 PROCEDURE — 3077F PR MOST RECENT SYSTOLIC BLOOD PRESSURE >= 140 MM HG: ICD-10-PCS | Mod: CPTII,S$GLB,, | Performed by: NURSE PRACTITIONER

## 2023-07-19 PROCEDURE — 93000 EKG 12-LEAD: ICD-10-PCS | Mod: S$GLB,,, | Performed by: INTERNAL MEDICINE

## 2023-07-19 PROCEDURE — 3077F SYST BP >= 140 MM HG: CPT | Mod: CPTII,S$GLB,, | Performed by: NURSE PRACTITIONER

## 2023-07-19 PROCEDURE — 1159F MED LIST DOCD IN RCRD: CPT | Mod: CPTII,S$GLB,, | Performed by: NURSE PRACTITIONER

## 2023-07-19 PROCEDURE — 1160F RVW MEDS BY RX/DR IN RCRD: CPT | Mod: CPTII,S$GLB,, | Performed by: NURSE PRACTITIONER

## 2023-07-19 PROCEDURE — 1126F PR PAIN SEVERITY QUANTIFIED, NO PAIN PRESENT: ICD-10-PCS | Mod: CPTII,S$GLB,, | Performed by: NURSE PRACTITIONER

## 2023-07-19 PROCEDURE — 1160F PR REVIEW ALL MEDS BY PRESCRIBER/CLIN PHARMACIST DOCUMENTED: ICD-10-PCS | Mod: CPTII,S$GLB,, | Performed by: NURSE PRACTITIONER

## 2023-07-19 PROCEDURE — 1101F PR PT FALLS ASSESS DOC 0-1 FALLS W/OUT INJ PAST YR: ICD-10-PCS | Mod: CPTII,S$GLB,, | Performed by: NURSE PRACTITIONER

## 2023-07-19 PROCEDURE — 99999 PR PBB SHADOW E&M-EST. PATIENT-LVL III: CPT | Mod: PBBFAC,,, | Performed by: NURSE PRACTITIONER

## 2023-07-19 PROCEDURE — 3078F DIAST BP <80 MM HG: CPT | Mod: CPTII,S$GLB,, | Performed by: NURSE PRACTITIONER

## 2023-07-19 PROCEDURE — 99214 OFFICE O/P EST MOD 30 MIN: CPT | Mod: S$GLB,,, | Performed by: NURSE PRACTITIONER

## 2023-07-19 PROCEDURE — 1101F PT FALLS ASSESS-DOCD LE1/YR: CPT | Mod: CPTII,S$GLB,, | Performed by: NURSE PRACTITIONER

## 2023-07-19 PROCEDURE — 93000 ELECTROCARDIOGRAM COMPLETE: CPT | Mod: S$GLB,,, | Performed by: INTERNAL MEDICINE

## 2023-07-19 NOTE — PROGRESS NOTES
Subjective:    Patient ID:  Maycol Hodge is a 83 y.o. male   Chief Complaint   Patient presents with    Follow-up    Fatigue    Tachycardia    Irregular Heart Beat       HPI:  Patient seen today for follow-up appointment.  He reports having an episode at the gym where he was asymptomatic but noted that his heart rate was running in the 160s on the treadmill and the stationary bike.  When he got home in the afternoon he noticed that his heart rate was down to the 60s.  He is feeling okay but does find that he becomes tired easily and feels very fatigued.    Review of patient's allergies indicates:   Allergen Reactions    Doxycycline Rash     Causes rash, hives and itching    Augmentin [amoxicillin-pot clavulanate] Nausea And Vomiting    Diltiazem      pruritis    Hydrochlorothiazide      Causes elevate uric acid, gout      Levaquin [levofloxacin] Swelling    Norvasc [amlodipine] Swelling    Pravastatin Rash       Past Medical History:   Diagnosis Date    A-fib     Arthritis     Asthma     Cancer     Hematuria     Hypertension     Hypothyroidism, unspecified     Skin cancer, basal cell     Stroke     Syncope and collapse     Thyroid disease     TIA (transient ischemic attack)      Past Surgical History:   Procedure Laterality Date    COLONOSCOPY      COLONOSCOPY N/A 2020    Procedure: COLONOSCOPY;  Surgeon: Willis Mullen MD;  Location: [facility];  Service: Endoscopy;  Laterality: N/A;    COLONOSCOPY N/A 2023    Procedure: COLONOSCOPY;  Surgeon: Willis Mullen MD;  Location: [facility];  Service: Endoscopy;  Laterality: N/A;    CYSTOSCOPY      negative    EYE SURGERY      bilateral cataracts    HERNIA REPAIR      righht inguinal    JOINT REPLACEMENT Left     shoulder    left cataract surgery      PROSTATE SURGERY      TURP     Social History     Tobacco Use    Smoking status: Former     Types: Cigarettes     Quit date:      Years since quittin.5    Smokeless tobacco: Never    Tobacco  comments:     Quit around 1958   Substance Use Topics    Alcohol use: Yes     Alcohol/week: 7.0 standard drinks     Types: 7 Shots of liquor per week     Comment: daily    Drug use: No     Family History   Problem Relation Age of Onset    Multiple sclerosis Mother     Heart disease Father     Stroke Father     No Known Problems Daughter     Cancer Neg Hx     Diabetes Neg Hx     Hypertension Neg Hx         Review of Systems:   Constitution: Negative for diaphoresis and fever. + easily fatigued  HEENT: Negative for nosebleeds.    Cardiovascular: Negative for chest pain       No dyspnea on exertion       No leg swelling        No palpitations  Respiratory: Negative for shortness of breath and wheezing.    Hematologic/Lymphatic: Negative for bleeding problem. Does not bruise/bleed easily.   Skin: Negative for color change and rash.   Musculoskeletal: Negative for falls and myalgias.   Gastrointestinal: Negative for hematemesis and hematochezia.   Genitourinary: Negative for hematuria.   Neurological: Negative for dizziness and light-headedness.   Psychiatric/Behavioral: Negative for altered mental status and memory loss.          Objective:        Vitals:    07/19/23 1539   BP: (!) 140/70       Lab Results   Component Value Date    WBC 6.76 05/04/2023    HGB 15.3 05/04/2023    HCT 47.1 05/04/2023     05/04/2023    CHOL 207 (H) 05/04/2023    TRIG 103 05/04/2023    HDL 64 05/04/2023    ALT 18 05/04/2023    AST 19 05/04/2023     05/04/2023    K 4.2 05/04/2023     05/04/2023    CREATININE 1.1 05/04/2023    BUN 15 05/04/2023    CO2 28 05/04/2023    TSH 2.666 05/04/2023    PSA 12.4 (H) 02/17/2021    HGBA1C 5.3 10/26/2021        ECHOCARDIOGRAM RESULTS  Results for orders placed during the hospital encounter of 08/03/22    Echo    Interpretation Summary  · The left ventricle is normal in size with moderate concentric hypertrophy and normal systolic function.  · Normal left ventricular diastolic function.  ·  The estimated PA systolic pressure is 21 mmHg.  · Normal right ventricular size with normal right ventricular systolic function.  · Normal central venous pressure (3 mmHg).  · The estimated ejection fraction is 70%.  · Plaque present.  · Mild pulmonic regurgitation.  · Moderate aortic regurgitation.  · Mild tricuspid regurgitation.  · Mild right atrial enlargement.        CURRENT/PREVIOUS VISIT EKG  Results for orders placed or performed in visit on 04/10/23   IN OFFICE EKG 12-LEAD (to [facility])    Collection Time: 04/10/23  2:37 PM    Narrative    Test Reason : I48.0,    Vent. Rate : 056 BPM     Atrial Rate : 056 BPM     P-R Int : 218 ms          QRS Dur : 098 ms      QT Int : 416 ms       P-R-T Axes : 070 -36 037 degrees     QTc Int : 401 ms    Sinus bradycardia with 1st degree A-V block  Left axis deviation  Incomplete right bundle branch block  Abnormal ECG  When compared with ECG of 12-JUL-2022 08:45,  No significant change was found  Confirmed by Chandler Becerril MD ([id number]) on 4/14/2023 12:12:38 PM    Referred By:             Confirmed By:Chandler Becerril MD     No valid procedures specified.   Results for orders placed during the hospital encounter of 08/03/22    Nuclear Stress - Cardiology Interpreted    Interpretation Summary    Normal myocardial perfusion scan. There is no evidence of myocardial ischemia or infarction.    The gated perfusion images showed an ejection fraction of 75% post stress. Normal ejection fraction is greater than 47%.    There is normal wall motion at rest and post stress.    LV cavity size is normal at rest and normal at stress.    The EKG portion of this study is negative for ischemia.    The patient reported no chest pain during the stress test.    There were no arrhythmias during stress.      Physical Exam:  CONSTITUTIONAL: No fever, no chills  HEENT: Normocephalic, atraumatic,pupils reactive to light                 NECK:  No JVD no carotid bruit  CVS: S1S2+, bradycardia, regular  rhythm   LUNGS: Clear  ABDOMEN: Soft, NT, BS+  EXTREMITIES: No cyanosis, edema  : No fletcher catheter  NEURO: AAO X 3  PSY: Normal affect      Medication List with Changes/Refills   Current Medications    ALBUTEROL (PROVENTIL/VENTOLIN HFA) 90 MCG/ACTUATION INHALER    2 puffs every 4 hours as needed for cough, wheeze, or shortness of breath    ASCORBIC ACID, VITAMIN C, (VITAMIN C) 1000 MG TABLET    Take 1,000 mg by mouth once daily.    BENRALIZUMAB 30 MG/ML ATIN    Inject 1 pen into the skin as needed (every 56 days).    ELIQUIS 5 MG TAB    TAKE 1 TABLET(5 MG) BY MOUTH TWICE DAILY    FLUTICASONE-SALMETEROL DISKUS INHALER 250-50 MCG    Inhale 1 puff into the lungs 2 (two) times daily. Controller    GLUC HCL/GLUC JAMESON/[id number] (GLUCOSAMINE COMPLEX ORAL)    Take 1 capsule by mouth once daily.    LATANOPROST 0.005 % OPHTHALMIC SOLUTION    Place 1 drop into both eyes every evening.     LEVOTHYROXINE (SYNTHROID) 50 MCG TABLET    TAKE 1 TABLET BY MOUTH EVERY MORNING ON TUES AND THURS    LEVOTHYROXINE (SYNTHROID) 75 MCG TABLET    TAKE 1 TABLET BY MOUTH EVERY MORNING ON MONDAY, WEDNESDAY, FRIDAY, SATURDAY, AND SUNDAY    MAGNESIUM HYDROXIDE (MAGNESIA ORAL)    Take 400 mg by mouth.    SPIRONOLACTONE (ALDACTONE) 25 MG TABLET    TAKE 1 TABLET(25 MG) BY MOUTH EVERY DAY    TERAZOSIN (HYTRIN) 2 MG CAPSULE    TAKE 1 CAPSULE(2 MG) BY MOUTH EVERY DAY   Discontinued Medications    LOSARTAN (COZAAR) 50 MG TABLET    Take 1 tab (50mg) at 7 am and 1 tab(50mg) at noon    PREDNISONE (DELTASONE) 20 MG TABLET    Take  3 daily for 3 days then 2 daily for 3 days then one daily for 3 days.  Repeat for asthma             Assessment:       1. Tachycardia    2. Essential hypertension    3. Mixed hyperlipidemia    4. Paroxysmal atrial fibrillation    5. Calcification of aorta    6. 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  Will obtain a 14 day event monitor.  In the meantime he is on Eliquis for stroke  prevention with AFib.  He is not on beta-blockers due to bifascicular block and bradycardia.    Problem List Items Addressed This Visit          Unprioritized    Essential hypertension    Hyperlipidemia    Paroxysmal atrial fibrillation    Relevant Orders    Cardiac Monitor - 3-15 Day Adult ([facility] Only)    Calcification of aorta     Other Visit Diagnoses       Tachycardia    -  Primary    Relevant Orders    IN OFFICE EKG 12-LEAD (to [facility])    Cardiac Monitor - 3-15 Day Adult ([facility] Only)    Bradycardia        Relevant Orders    Cardiac Monitor - 3-15 Day Adult ([facility] Only)            Follow up in about 6 weeks (around 8/30/2023).

## 2023-07-20 ENCOUNTER — TELEPHONE (OUTPATIENT)
Dept: CARDIOLOGY | Facility: CLINIC | Age: 83
End: 2023-07-20
Payer: MEDICARE

## 2023-07-20 DIAGNOSIS — I49.5 SSS (SICK SINUS SYNDROME): Primary | ICD-10-CM

## 2023-07-20 DIAGNOSIS — Z86.73 HISTORY OF TIA (TRANSIENT ISCHEMIC ATTACK): ICD-10-CM

## 2023-07-20 DIAGNOSIS — I49.5 SSS (SICK SINUS SYNDROME): ICD-10-CM

## 2023-07-20 DIAGNOSIS — I48.0 PAROXYSMAL ATRIAL FIBRILLATION: ICD-10-CM

## 2023-07-20 DIAGNOSIS — I49.5 TACHY-BRADY SYNDROME: ICD-10-CM

## 2023-07-20 DIAGNOSIS — R00.0 TACHYCARDIA: Primary | ICD-10-CM

## 2023-07-24 ENCOUNTER — OFFICE VISIT (OUTPATIENT)
Dept: CARDIOLOGY | Facility: CLINIC | Age: 83
End: 2023-07-24
Payer: MEDICARE

## 2023-07-24 ENCOUNTER — TELEPHONE (OUTPATIENT)
Dept: CARDIOLOGY | Facility: CLINIC | Age: 83
End: 2023-07-24
Payer: MEDICARE

## 2023-07-24 VITALS
SYSTOLIC BLOOD PRESSURE: 124 MMHG | WEIGHT: 172 LBS | DIASTOLIC BLOOD PRESSURE: 80 MMHG | HEIGHT: 71 IN | OXYGEN SATURATION: 97 % | BODY MASS INDEX: 24.08 KG/M2 | HEART RATE: 46 BPM | RESPIRATION RATE: 16 BRPM

## 2023-07-24 DIAGNOSIS — I49.5 TACHY-BRADY SYNDROME: ICD-10-CM

## 2023-07-24 DIAGNOSIS — I48.0 PAROXYSMAL ATRIAL FIBRILLATION: ICD-10-CM

## 2023-07-24 DIAGNOSIS — E02 SUBCLINICAL IODINE-DEFICIENCY HYPOTHYROIDISM: ICD-10-CM

## 2023-07-24 DIAGNOSIS — I10 ESSENTIAL HYPERTENSION: ICD-10-CM

## 2023-07-24 PROCEDURE — 99999 PR PBB SHADOW E&M-EST. PATIENT-LVL III: CPT | Mod: PBBFAC,,, | Performed by: INTERNAL MEDICINE

## 2023-07-24 PROCEDURE — 1126F PR PAIN SEVERITY QUANTIFIED, NO PAIN PRESENT: ICD-10-PCS | Mod: CPTII,S$GLB,, | Performed by: INTERNAL MEDICINE

## 2023-07-24 PROCEDURE — 3288F FALL RISK ASSESSMENT DOCD: CPT | Mod: CPTII,S$GLB,, | Performed by: INTERNAL MEDICINE

## 2023-07-24 PROCEDURE — 1159F MED LIST DOCD IN RCRD: CPT | Mod: CPTII,S$GLB,, | Performed by: INTERNAL MEDICINE

## 2023-07-24 PROCEDURE — 3079F DIAST BP 80-89 MM HG: CPT | Mod: CPTII,S$GLB,, | Performed by: INTERNAL MEDICINE

## 2023-07-24 PROCEDURE — 1160F PR REVIEW ALL MEDS BY PRESCRIBER/CLIN PHARMACIST DOCUMENTED: ICD-10-PCS | Mod: CPTII,S$GLB,, | Performed by: INTERNAL MEDICINE

## 2023-07-24 PROCEDURE — 1101F PR PT FALLS ASSESS DOC 0-1 FALLS W/OUT INJ PAST YR: ICD-10-PCS | Mod: CPTII,S$GLB,, | Performed by: INTERNAL MEDICINE

## 2023-07-24 PROCEDURE — 1126F AMNT PAIN NOTED NONE PRSNT: CPT | Mod: CPTII,S$GLB,, | Performed by: INTERNAL MEDICINE

## 2023-07-24 PROCEDURE — 3079F PR MOST RECENT DIASTOLIC BLOOD PRESSURE 80-89 MM HG: ICD-10-PCS | Mod: CPTII,S$GLB,, | Performed by: INTERNAL MEDICINE

## 2023-07-24 PROCEDURE — 1160F RVW MEDS BY RX/DR IN RCRD: CPT | Mod: CPTII,S$GLB,, | Performed by: INTERNAL MEDICINE

## 2023-07-24 PROCEDURE — 3288F PR FALLS RISK ASSESSMENT DOCUMENTED: ICD-10-PCS | Mod: CPTII,S$GLB,, | Performed by: INTERNAL MEDICINE

## 2023-07-24 PROCEDURE — 1159F PR MEDICATION LIST DOCUMENTED IN MEDICAL RECORD: ICD-10-PCS | Mod: CPTII,S$GLB,, | Performed by: INTERNAL MEDICINE

## 2023-07-24 PROCEDURE — 99999 PR PBB SHADOW E&M-EST. PATIENT-LVL III: ICD-10-PCS | Mod: PBBFAC,,, | Performed by: INTERNAL MEDICINE

## 2023-07-24 PROCEDURE — 99215 OFFICE O/P EST HI 40 MIN: CPT | Mod: S$GLB,,, | Performed by: INTERNAL MEDICINE

## 2023-07-24 PROCEDURE — 3074F PR MOST RECENT SYSTOLIC BLOOD PRESSURE < 130 MM HG: ICD-10-PCS | Mod: CPTII,S$GLB,, | Performed by: INTERNAL MEDICINE

## 2023-07-24 PROCEDURE — 99215 PR OFFICE/OUTPT VISIT, EST, LEVL V, 40-54 MIN: ICD-10-PCS | Mod: S$GLB,,, | Performed by: INTERNAL MEDICINE

## 2023-07-24 PROCEDURE — 1101F PT FALLS ASSESS-DOCD LE1/YR: CPT | Mod: CPTII,S$GLB,, | Performed by: INTERNAL MEDICINE

## 2023-07-24 PROCEDURE — 3074F SYST BP LT 130 MM HG: CPT | Mod: CPTII,S$GLB,, | Performed by: INTERNAL MEDICINE

## 2023-07-24 NOTE — PROGRESS NOTES
Subjective:    Patient ID:  Maycol Hodge is a 83 y.o. male patient here for evaluation Results (Zio report is showing a 5 second pause. He does feel his pulse skipping beats)      History of Present Illness:      Mr. Maycol Hodge has been added to today's scheduled because of his findings on Zio report.  Is a 83-year-old gentleman with history of paroxysmal atrial fibrillation has intolerance to several drugs including diltiazem and amlodipine is seeking follow-up evaluation.  Patient had Zio monitor placed and monitoring system has shown presence of atrial fibrillation and flutters with varying intensity with average heart rate 120 beats per minute heart rates up to 166 beats is also noted.  He is also noted to have a 5.2 second pause with conversion to sinus rhythm.  Patient has some episodes of lightheadedness transiently and dizziness in the past he had some syncopal episodes.  Are he is sometimes aware of his increased heart rates especially when he address any physical activity such as exercise treadmill or  Stationary bicycle.  No chest discomfort no arm neck or jaw pain noted.  No swelling in the lower extremities    Review of patient's allergies indicates:   Allergen Reactions    Doxycycline Rash     Causes rash, hives and itching    Augmentin [amoxicillin-pot clavulanate] Nausea And Vomiting    Diltiazem      pruritis    Hydrochlorothiazide      Causes elevate uric acid, gout      Levaquin [levofloxacin] Swelling    Norvasc [amlodipine] Swelling    Pravastatin Rash       Past Medical History:   Diagnosis Date    A-fib     Arthritis     Asthma     Cancer     Hematuria     Hypertension     Hypothyroidism, unspecified     Skin cancer, basal cell     Stroke     Syncope and collapse     Thyroid disease     TIA (transient ischemic attack)      Past Surgical History:   Procedure Laterality Date    COLONOSCOPY      COLONOSCOPY N/A 1/21/2020    Procedure: COLONOSCOPY;  Surgeon: Willis Mullen MD;   Location: [facility];  Service: Endoscopy;  Laterality: N/A;    COLONOSCOPY N/A 2023    Procedure: COLONOSCOPY;  Surgeon: Willis Mullen MD;  Location: [facility];  Service: Endoscopy;  Laterality: N/A;    CYSTOSCOPY      negative    EYE SURGERY      bilateral cataracts    HERNIA REPAIR      righht inguinal    JOINT REPLACEMENT Left     shoulder    left cataract surgery      PROSTATE SURGERY      TURP     Social History     Tobacco Use    Smoking status: Former     Types: Cigarettes     Quit date:      Years since quittin.6    Smokeless tobacco: Never    Tobacco comments:     Quit around    Substance Use Topics    Alcohol use: Yes     Alcohol/week: 7.0 standard drinks     Types: 7 Shots of liquor per week     Comment: daily    Drug use: No        Review of Systems   As noted in HPI in addition     Constitutional: Negative for chills, fatigue and fever.   Eyes: No double vision, No blurred vision  Neuro: No headaches, occasional transient dizziness   Respiratory: Negative for cough, shortness of breath and wheezing.    Cardiovascular: Negative for chest pain. Negative for palpitations and leg swelling.   Gastrointestinal: Negative for abdominal pain, No melena, diarrhea, nausea and vomiting.   Genitourinary: Negative for dysuria and frequency, Negative for hematuria  Skin: Negative for bruising, Negative for edema or discoloration noted.   Endocrine: Negative for polyphagia, Negative for heat intolerance, Negative for cold intolerance  Psychiatric: Negative for depression, Negative for anxiety, Negative for memory loss  Musculoskeletal: Negative for neck pain, Negative for muscle weakness, Negative for back pain          Objective        Vitals:    23 1445   BP: 124/80   Pulse: (!) 46   Resp: 16       LIPIDS - LAST 2   Lab Results   Component Value Date    CHOL 207 (H) 2023    CHOL 193 2022    HDL 64 2023    HDL 58 2022    LDLCALC 122.4 2023    LDLCALC 112.6  05/26/2022    TRIG 103 05/04/2023    TRIG 112 05/26/2022    CHOLHDL 30.9 05/04/2023    CHOLHDL 30.1 05/26/2022       CBC - LAST 2  Lab Results   Component Value Date    WBC 6.76 05/04/2023    WBC 6.44 05/26/2022    RBC 5.02 05/04/2023    RBC 4.80 05/26/2022    HGB 15.3 05/04/2023    HGB 14.5 05/26/2022    HCT 47.1 05/04/2023    HCT 45.5 05/26/2022    MCV 94 05/04/2023    MCV 95 05/26/2022    MCH 30.5 05/04/2023    MCH 30.2 05/26/2022    MCHC 32.5 05/04/2023    MCHC 31.9 (L) 05/26/2022    RDW 14.9 (H) 05/04/2023    RDW 14.3 05/26/2022     05/04/2023     05/26/2022    MPV 10.4 05/04/2023    MPV 10.9 05/26/2022    GRAN 3.6 05/04/2023    GRAN 53.3 05/04/2023    LYMPH 2.4 05/04/2023    LYMPH 35.2 05/04/2023    MONO 0.7 05/04/2023    MONO 10.5 05/04/2023    BASO 0.05 05/04/2023    BASO 0.04 05/26/2022    NRBC 0 05/04/2023    NRBC 0 05/26/2022       CHEMISTRY & LIVER FUNCTION - LAST 2  Lab Results   Component Value Date     05/04/2023     05/26/2022    K 4.2 05/04/2023    K 4.2 05/26/2022     05/04/2023     05/26/2022    CO2 28 05/04/2023    CO2 27 05/26/2022    ANIONGAP 7 (L) 05/04/2023    ANIONGAP 11 05/26/2022    BUN 15 05/04/2023    BUN 17 05/26/2022    CREATININE 1.1 05/04/2023    CREATININE 1.2 05/26/2022    GLU 96 05/04/2023    GLU 84 05/26/2022    CALCIUM 9.4 05/04/2023    CALCIUM 9.1 05/26/2022    ALBUMIN 3.7 05/04/2023    ALBUMIN 3.4 (L) 05/26/2022    PROT 7.0 05/04/2023    PROT 6.7 05/26/2022    ALKPHOS 65 05/04/2023    ALKPHOS 57 05/26/2022    ALT 18 05/04/2023    ALT 15 05/26/2022    AST 19 05/04/2023    AST 20 05/26/2022    BILITOT 1.5 (H) 05/04/2023    BILITOT 1.9 (H) 05/26/2022        CARDIAC PROFILE - LAST 2  Lab Results   Component Value Date     05/26/2022        COAGULATION - LAST 2  No results found for: LABPT, INR, APTT    ENDOCRINE & PSA - LAST 2  Lab Results   Component Value Date    HGBA1C 5.3 10/26/2021    TSH 2.666 05/04/2023    TSH 3.850 05/26/2022     PSA 12.4 (H) 02/17/2021        ECHOCARDIOGRAM RESULTS  Results for orders placed during the hospital encounter of 08/03/22    Echo    Interpretation Summary  · The left ventricle is normal in size with moderate concentric hypertrophy and normal systolic function.  · Normal left ventricular diastolic function.  · The estimated PA systolic pressure is 21 mmHg.  · Normal right ventricular size with normal right ventricular systolic function.  · Normal central venous pressure (3 mmHg).  · The estimated ejection fraction is 70%.  · Plaque present.  · Mild pulmonic regurgitation.  · Moderate aortic regurgitation.  · Mild tricuspid regurgitation.  · Mild right atrial enlargement.      CURRENT/PREVIOUS VISIT EKG  Results for orders placed or performed in visit on 07/19/23   IN OFFICE EKG 12-LEAD (to [place])    Collection Time: 07/19/23  3:40 PM    Narrative    Test Reason : R00.0,    Vent. Rate : 056 BPM     Atrial Rate : 056 BPM     P-R Int : 204 ms          QRS Dur : 128 ms      QT Int : 428 ms       P-R-T Axes : 068 -37 028 degrees     QTc Int : 413 ms    Sinus bradycardia with occasional Premature ventricular complexes  Left axis deviation  Right bundle branch block  Abnormal ECG  When compared with ECG of 10-APR-2023 14:37,  Premature ventricular complexes are now Present  Right bundle branch block has replaced Incomplete right bundle branch block  Confirmed by Chandler Becerril MD ([id number]) on 7/21/2023 7:11:02 PM    Referred By:  FABIOLA           Confirmed By:Chandler Becerril MD     No valid procedures specified.   Results for orders placed during the hospital encounter of 08/03/22    Nuclear Stress - Cardiology Interpreted    Interpretation Summary    Normal myocardial perfusion scan. There is no evidence of myocardial ischemia or infarction.    The gated perfusion images showed an ejection fraction of 75% post stress. Normal ejection fraction is greater than 47%.    There is normal wall motion at rest and post stress.    LV  cavity size is normal at rest and normal at stress.    The EKG portion of this study is negative for ischemia.    The patient reported no chest pain during the stress test.    There were no arrhythmias during stress.    No valid procedures specified.    Zio monitoring device has shown atrial flutters in fibrillation heart rates up to 166 beats per minute with an average heart rate of about 120 beats at times.  And a 5.2 second pause with conversion to sinus rhythm.  During this last 24 hours.  He had a minimum heart rate of 61 beats per minute and mean heart rate of 70 7 beats per minute with atrial flutters.      PREVIOUS STRESS TEST              PREVIOUS ANGIOGRAM        PHYSICAL EXAM    GENERAL: well built, well nourished, well-developed in no apparent distress alert and oriented.   HEENT: Normocephalic. Pupils normal and conjunctivae normal.  Mucous membranes normal, no cyanosis or icterus, trachea central,no pallor or icterus is noted..   NECK: No JVD. No bruit..   THYROID: Thyroid not enlarged. No nodules present..   CARDIAC: Regular rate and rhythm. S1 is normal.S2 is normal.No gallops, clicks or murmurs noted at this time.  CHEST ANATOMY: normal.   LUNGS: Clear to auscultation. No wheezing or rhonchi..   ABDOMEN: Soft no masses or organomegaly.  No abdomen pulsations or bruits.  Normal bowel sounds. No pulsations and no masses felt, No guarding or rebound.   EXTREMITIES: No cyanosis, clubbing or edema noted at this time., no calf tenderness bilaterally.   PERIPHERAL VASCULAR SYSTEM: Good palpable distal pulses.   CENTRAL NERVOUS SYSTEM: No focal motor or sensory deficits noted.   SKIN: Skin without lesions, moist, well perfused.   MUSCLE STRENGTH & TONE: No noteable weakness, atrophy or abnormal movement.     I HAVE REVIEWED :    The vital signs, nurses notes, and all the pertinent radiology and labs.  Reviewed and they show paroxysms of atrial fibrillation with a rapid heart rates up to 166 beats per  minute.  At times his mean heart rate is been 67 beats per minute with atrial flutters manifesting.  He also has 5.2 second pause as discussed above      Current Outpatient Medications   Medication Instructions    albuterol (PROVENTIL/VENTOLIN HFA) 90 mcg/actuation inhaler 2 puffs every 4 hours as needed for cough, wheeze, or shortness of breath    ascorbic acid (vitamin C) (VITAMIN C) 1,000 mg, Oral, Daily    benralizumab 30 mg/mL AtIn Inject 1 pen into the skin as needed (every 56 days).    ELIQUIS 5 mg Tab TAKE 1 TABLET(5 MG) BY MOUTH TWICE DAILY    fluticasone-salmeterol diskus inhaler 250-50 mcg 1 puff, Inhalation, 2 times daily, Controller    GLUC HCL/GLUC JAMESON/NV-QEP-W-GLUC (GLUCOSAMINE COMPLEX ORAL) 1 capsule, Oral, Daily    latanoprost 0.005 % ophthalmic solution 1 drop, Both Eyes, Nightly    levothyroxine (SYNTHROID) 50 MCG tablet TAKE 1 TABLET BY MOUTH EVERY MORNING ON TUES AND THURS    levothyroxine (SYNTHROID) 75 MCG tablet TAKE 1 TABLET BY MOUTH EVERY MORNING ON MONDAY, WEDNESDAY, FRIDAY, SATURDAY, AND SUNDAY    magnesium hydroxide (MAGNESIA ORAL) 400 mg, Oral    spironolactone (ALDACTONE) 25 MG tablet TAKE 1 TABLET(25 MG) BY MOUTH EVERY DAY    terazosin (HYTRIN) 2 MG capsule TAKE 1 CAPSULE(2 MG) BY MOUTH EVERY DAY          Assessment & Plan     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  He does have some intolerance to diltiazem and other like drugs.  And he has some primary pulmonary problems although asthma has been better with treatments lately.    Patient will need a dual-chamber pacemaker implantation and further medical therapy to regulate his fast rhythm.  He has been on Eliquis for long-term management however R-wave may have to withhold this for about 3 days prior to placement of the device and subsequently resume to maintain on Eliquis long-term.    Paroxysmal atrial fibrillation  Paroxysmal atrial flutters and fibrillation is noted.  Besides maintaining on  present regimen along with magnesium supplements and pacemaker implantation patient will benefit from amiodarone therapy and lower it to minimal doses as tolerated once the rhythm has been more stable.    Essential hypertension  Blood pressure is stable at 120 4/80 millimeters Hg continue on Hytrin 2 milligrams at nighttime in addition to the current therapy.    Hypothyroidism  Continue on levothyroxine 75 micrograms alternating with 50 micrograms.    I have discussed with the patient and his wife regarding implantation details for the pacemaker.  I have also reviewed the  Risks and benefits of the procedure including, but not limited to the risk of Infection, Bleeding, myocardial perforation, Pericardial Effusion, Pneumothorax, and even death has been explained to the patient.  Informed consent obtained.  Patient's brother Had a pacemaker implanted more recently and he is comfortable in accepting the risks and benefits and all questions have been answered to their satisfaction.  Will schedule at the earliest mutually convenient time to him.  He will be scheduled with Dr. Garsia.  And I have advised the patient in the meantime should he have any clinical symptoms of dizziness lightheadedness or fainting sensation he needs to present to the emergency room for immediate intervention and likewise if any detectable dysrhythmias noted on the monitor this may expedite change this schedules.      No follow-ups on file.

## 2023-07-24 NOTE — ASSESSMENT & PLAN NOTE
Tachy-mile syndrome with atrial flutters is noted.  He does have some intolerance to diltiazem and other like drugs.  And he has some primary pulmonary problems although asthma has been better with treatments lately.    Patient will need a dual-chamber pacemaker implantation and further medical therapy to regulate his fast rhythm.  He has been on Eliquis for long-term management however R-wave may have to withhold this for about 3 days prior to placement of the device and subsequently resume to maintain on Eliquis long-term.

## 2023-07-24 NOTE — H&P (VIEW-ONLY)
Subjective:    Patient ID:  Maycol Hodge is a 83 y.o. male patient here for evaluation Results (Zio report is showing a 5 second pause. He does feel his pulse skipping beats)      History of Present Illness:      Mr. Maycol Hodge has been added to today's scheduled because of his findings on Zio report.  Is a 83-year-old gentleman with history of paroxysmal atrial fibrillation has intolerance to several drugs including diltiazem and amlodipine is seeking follow-up evaluation.  Patient had Zio monitor placed and monitoring system has shown presence of atrial fibrillation and flutters with varying intensity with average heart rate 120 beats per minute heart rates up to 166 beats is also noted.  He is also noted to have a 5.2 second pause with conversion to sinus rhythm.  Patient has some episodes of lightheadedness transiently and dizziness in the past he had some syncopal episodes.  Are he is sometimes aware of his increased heart rates especially when he address any physical activity such as exercise treadmill or  Stationary bicycle.  No chest discomfort no arm neck or jaw pain noted.  No swelling in the lower extremities    Review of patient's allergies indicates:   Allergen Reactions    Doxycycline Rash     Causes rash, hives and itching    Augmentin [amoxicillin-pot clavulanate] Nausea And Vomiting    Diltiazem      pruritis    Hydrochlorothiazide      Causes elevate uric acid, gout      Levaquin [levofloxacin] Swelling    Norvasc [amlodipine] Swelling    Pravastatin Rash       Past Medical History:   Diagnosis Date    A-fib     Arthritis     Asthma     Cancer     Hematuria     Hypertension     Hypothyroidism, unspecified     Skin cancer, basal cell     Stroke     Syncope and collapse     Thyroid disease     TIA (transient ischemic attack)      Past Surgical History:   Procedure Laterality Date    COLONOSCOPY      COLONOSCOPY N/A 1/21/2020    Procedure: COLONOSCOPY;  Surgeon: Willis Mullen MD;   Location: [facility];  Service: Endoscopy;  Laterality: N/A;    COLONOSCOPY N/A 2023    Procedure: COLONOSCOPY;  Surgeon: Willis Mullen MD;  Location: [facility];  Service: Endoscopy;  Laterality: N/A;    CYSTOSCOPY      negative    EYE SURGERY      bilateral cataracts    HERNIA REPAIR      righht inguinal    JOINT REPLACEMENT Left     shoulder    left cataract surgery      PROSTATE SURGERY      TURP     Social History     Tobacco Use    Smoking status: Former     Types: Cigarettes     Quit date:      Years since quittin.6    Smokeless tobacco: Never    Tobacco comments:     Quit around    Substance Use Topics    Alcohol use: Yes     Alcohol/week: 7.0 standard drinks     Types: 7 Shots of liquor per week     Comment: daily    Drug use: No        Review of Systems   As noted in HPI in addition     Constitutional: Negative for chills, fatigue and fever.   Eyes: No double vision, No blurred vision  Neuro: No headaches, occasional transient dizziness   Respiratory: Negative for cough, shortness of breath and wheezing.    Cardiovascular: Negative for chest pain. Negative for palpitations and leg swelling.   Gastrointestinal: Negative for abdominal pain, No melena, diarrhea, nausea and vomiting.   Genitourinary: Negative for dysuria and frequency, Negative for hematuria  Skin: Negative for bruising, Negative for edema or discoloration noted.   Endocrine: Negative for polyphagia, Negative for heat intolerance, Negative for cold intolerance  Psychiatric: Negative for depression, Negative for anxiety, Negative for memory loss  Musculoskeletal: Negative for neck pain, Negative for muscle weakness, Negative for back pain          Objective        Vitals:    23 1445   BP: 124/80   Pulse: (!) 46   Resp: 16       LIPIDS - LAST 2   Lab Results   Component Value Date    CHOL 207 (H) 2023    CHOL 193 2022    HDL 64 2023    HDL 58 2022    LDLCALC 122.4 2023    LDLCALC 112.6  05/26/2022    TRIG 103 05/04/2023    TRIG 112 05/26/2022    CHOLHDL 30.9 05/04/2023    CHOLHDL 30.1 05/26/2022       CBC - LAST 2  Lab Results   Component Value Date    WBC 6.76 05/04/2023    WBC 6.44 05/26/2022    RBC 5.02 05/04/2023    RBC 4.80 05/26/2022    HGB 15.3 05/04/2023    HGB 14.5 05/26/2022    HCT 47.1 05/04/2023    HCT 45.5 05/26/2022    MCV 94 05/04/2023    MCV 95 05/26/2022    MCH 30.5 05/04/2023    MCH 30.2 05/26/2022    MCHC 32.5 05/04/2023    MCHC 31.9 (L) 05/26/2022    RDW 14.9 (H) 05/04/2023    RDW 14.3 05/26/2022     05/04/2023     05/26/2022    MPV 10.4 05/04/2023    MPV 10.9 05/26/2022    GRAN 3.6 05/04/2023    GRAN 53.3 05/04/2023    LYMPH 2.4 05/04/2023    LYMPH 35.2 05/04/2023    MONO 0.7 05/04/2023    MONO 10.5 05/04/2023    BASO 0.05 05/04/2023    BASO 0.04 05/26/2022    NRBC 0 05/04/2023    NRBC 0 05/26/2022       CHEMISTRY & LIVER FUNCTION - LAST 2  Lab Results   Component Value Date     05/04/2023     05/26/2022    K 4.2 05/04/2023    K 4.2 05/26/2022     05/04/2023     05/26/2022    CO2 28 05/04/2023    CO2 27 05/26/2022    ANIONGAP 7 (L) 05/04/2023    ANIONGAP 11 05/26/2022    BUN 15 05/04/2023    BUN 17 05/26/2022    CREATININE 1.1 05/04/2023    CREATININE 1.2 05/26/2022    GLU 96 05/04/2023    GLU 84 05/26/2022    CALCIUM 9.4 05/04/2023    CALCIUM 9.1 05/26/2022    ALBUMIN 3.7 05/04/2023    ALBUMIN 3.4 (L) 05/26/2022    PROT 7.0 05/04/2023    PROT 6.7 05/26/2022    ALKPHOS 65 05/04/2023    ALKPHOS 57 05/26/2022    ALT 18 05/04/2023    ALT 15 05/26/2022    AST 19 05/04/2023    AST 20 05/26/2022    BILITOT 1.5 (H) 05/04/2023    BILITOT 1.9 (H) 05/26/2022        CARDIAC PROFILE - LAST 2  Lab Results   Component Value Date     05/26/2022        COAGULATION - LAST 2  No results found for: LABPT, INR, APTT    ENDOCRINE & PSA - LAST 2  Lab Results   Component Value Date    HGBA1C 5.3 10/26/2021    TSH 2.666 05/04/2023    TSH 3.850 05/26/2022     PSA 12.4 (H) 02/17/2021        ECHOCARDIOGRAM RESULTS  Results for orders placed during the hospital encounter of 08/03/22    Echo    Interpretation Summary  · The left ventricle is normal in size with moderate concentric hypertrophy and normal systolic function.  · Normal left ventricular diastolic function.  · The estimated PA systolic pressure is 21 mmHg.  · Normal right ventricular size with normal right ventricular systolic function.  · Normal central venous pressure (3 mmHg).  · The estimated ejection fraction is 70%.  · Plaque present.  · Mild pulmonic regurgitation.  · Moderate aortic regurgitation.  · Mild tricuspid regurgitation.  · Mild right atrial enlargement.      CURRENT/PREVIOUS VISIT EKG  Results for orders placed or performed in visit on 07/19/23   IN OFFICE EKG 12-LEAD (to [employer])    Collection Time: 07/19/23  3:40 PM    Narrative    Test Reason : R00.0,    Vent. Rate : 056 BPM     Atrial Rate : 056 BPM     P-R Int : 204 ms          QRS Dur : 128 ms      QT Int : 428 ms       P-R-T Axes : 068 -37 028 degrees     QTc Int : 413 ms    Sinus bradycardia with occasional Premature ventricular complexes  Left axis deviation  Right bundle branch block  Abnormal ECG  When compared with ECG of 10-APR-2023 14:37,  Premature ventricular complexes are now Present  Right bundle branch block has replaced Incomplete right bundle branch block  Confirmed by Chandler Becerril MD ([id number]) on 7/21/2023 7:11:02 PM    Referred By:  FABIOLA           Confirmed By:Chandler Becerril MD     No valid procedures specified.   Results for orders placed during the hospital encounter of 08/03/22    Nuclear Stress - Cardiology Interpreted    Interpretation Summary    Normal myocardial perfusion scan. There is no evidence of myocardial ischemia or infarction.    The gated perfusion images showed an ejection fraction of 75% post stress. Normal ejection fraction is greater than 47%.    There is normal wall motion at rest and post stress.    LV  cavity size is normal at rest and normal at stress.    The EKG portion of this study is negative for ischemia.    The patient reported no chest pain during the stress test.    There were no arrhythmias during stress.    No valid procedures specified.    Zio monitoring device has shown atrial flutters in fibrillation heart rates up to 166 beats per minute with an average heart rate of about 120 beats at times.  And a 5.2 second pause with conversion to sinus rhythm.  During this last 24 hours.  He had a minimum heart rate of 61 beats per minute and mean heart rate of 70 7 beats per minute with atrial flutters.      PREVIOUS STRESS TEST              PREVIOUS ANGIOGRAM        PHYSICAL EXAM    GENERAL: well built, well nourished, well-developed in no apparent distress alert and oriented.   HEENT: Normocephalic. Pupils normal and conjunctivae normal.  Mucous membranes normal, no cyanosis or icterus, trachea central,no pallor or icterus is noted..   NECK: No JVD. No bruit..   THYROID: Thyroid not enlarged. No nodules present..   CARDIAC: Regular rate and rhythm. S1 is normal.S2 is normal.No gallops, clicks or murmurs noted at this time.  CHEST ANATOMY: normal.   LUNGS: Clear to auscultation. No wheezing or rhonchi..   ABDOMEN: Soft no masses or organomegaly.  No abdomen pulsations or bruits.  Normal bowel sounds. No pulsations and no masses felt, No guarding or rebound.   EXTREMITIES: No cyanosis, clubbing or edema noted at this time., no calf tenderness bilaterally.   PERIPHERAL VASCULAR SYSTEM: Good palpable distal pulses.   CENTRAL NERVOUS SYSTEM: No focal motor or sensory deficits noted.   SKIN: Skin without lesions, moist, well perfused.   MUSCLE STRENGTH & TONE: No noteable weakness, atrophy or abnormal movement.     I HAVE REVIEWED :    The vital signs, nurses notes, and all the pertinent radiology and labs.  Reviewed and they show paroxysms of atrial fibrillation with a rapid heart rates up to 166 beats per  minute.  At times his mean heart rate is been 67 beats per minute with atrial flutters manifesting.  He also has 5.2 second pause as discussed above      Current Outpatient Medications   Medication Instructions    albuterol (PROVENTIL/VENTOLIN HFA) 90 mcg/actuation inhaler 2 puffs every 4 hours as needed for cough, wheeze, or shortness of breath    ascorbic acid (vitamin C) (VITAMIN C) 1,000 mg, Oral, Daily    benralizumab 30 mg/mL AtIn Inject 1 pen into the skin as needed (every 56 days).    ELIQUIS 5 mg Tab TAKE 1 TABLET(5 MG) BY MOUTH TWICE DAILY    fluticasone-salmeterol diskus inhaler 250-50 mcg 1 puff, Inhalation, 2 times daily, Controller    GLUC HCL/GLUC JAMESON/XT-GYK-A-GLUC (GLUCOSAMINE COMPLEX ORAL) 1 capsule, Oral, Daily    latanoprost 0.005 % ophthalmic solution 1 drop, Both Eyes, Nightly    levothyroxine (SYNTHROID) 50 MCG tablet TAKE 1 TABLET BY MOUTH EVERY MORNING ON TUES AND THURS    levothyroxine (SYNTHROID) 75 MCG tablet TAKE 1 TABLET BY MOUTH EVERY MORNING ON MONDAY, WEDNESDAY, FRIDAY, SATURDAY, AND SUNDAY    magnesium hydroxide (MAGNESIA ORAL) 400 mg, Oral    spironolactone (ALDACTONE) 25 MG tablet TAKE 1 TABLET(25 MG) BY MOUTH EVERY DAY    terazosin (HYTRIN) 2 MG capsule TAKE 1 CAPSULE(2 MG) BY MOUTH EVERY DAY          Assessment & Plan     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  He does have some intolerance to diltiazem and other like drugs.  And he has some primary pulmonary problems although asthma has been better with treatments lately.    Patient will need a dual-chamber pacemaker implantation and further medical therapy to regulate his fast rhythm.  He has been on Eliquis for long-term management however R-wave may have to withhold this for about 3 days prior to placement of the device and subsequently resume to maintain on Eliquis long-term.    Paroxysmal atrial fibrillation  Paroxysmal atrial flutters and fibrillation is noted.  Besides maintaining on  present regimen along with magnesium supplements and pacemaker implantation patient will benefit from amiodarone therapy and lower it to minimal doses as tolerated once the rhythm has been more stable.    Essential hypertension  Blood pressure is stable at 120 4/80 millimeters Hg continue on Hytrin 2 milligrams at nighttime in addition to the current therapy.    Hypothyroidism  Continue on levothyroxine 75 micrograms alternating with 50 micrograms.    I have discussed with the patient and his wife regarding implantation details for the pacemaker.  I have also reviewed the  Risks and benefits of the procedure including, but not limited to the risk of Infection, Bleeding, myocardial perforation, Pericardial Effusion, Pneumothorax, and even death has been explained to the patient.  Informed consent obtained.  Patient's brother Had a pacemaker implanted more recently and he is comfortable in accepting the risks and benefits and all questions have been answered to their satisfaction.  Will schedule at the earliest mutually convenient time to him.  He will be scheduled with Dr. Garsia.  And I have advised the patient in the meantime should he have any clinical symptoms of dizziness lightheadedness or fainting sensation he needs to present to the emergency room for immediate intervention and likewise if any detectable dysrhythmias noted on the monitor this may expedite change this schedules.      No follow-ups on file.

## 2023-07-24 NOTE — TELEPHONE ENCOUNTER
07/24/2023    Received Zio Strips on Mr. Hodge which showed pauses with longest pause of 5.2 sec.  Immediately showed ANASTASIA Richter and she is arranging him to go to [place] for a stat pacer.  The patient has had some pauses in his heart beats that he is feeling.  Instructed him to stay by the phone and we would contact him.    Neelam is working on arrangements for this, and will write the orders for it.  She imformed Tia.    08/01/2023    Patient will be having an upcoming Pacer insert.  I advised him to keep the monitor on til he goes in for the Pacer insert.  Patient informed me he was notified last PM.  He verbalized understanding.

## 2023-07-24 NOTE — ASSESSMENT & PLAN NOTE
Paroxysmal atrial flutters and fibrillation is noted.  Besides maintaining on present regimen along with magnesium supplements and pacemaker implantation patient will benefit from amiodarone therapy and lower it to minimal doses as tolerated once the rhythm has been more stable.

## 2023-07-24 NOTE — ASSESSMENT & PLAN NOTE
Blood pressure is stable at 120 4/80 millimeters Hg continue on Hytrin 2 milligrams at nighttime in addition to the current therapy.   89.4

## 2023-07-31 ENCOUNTER — TELEPHONE (OUTPATIENT)
Dept: CARDIOLOGY | Facility: CLINIC | Age: 83
End: 2023-07-31
Payer: MEDICARE

## 2023-07-31 DIAGNOSIS — R00.1 BRADYCARDIA: ICD-10-CM

## 2023-07-31 DIAGNOSIS — R79.1 ABNORMAL COAGULATION PROFILE: ICD-10-CM

## 2023-07-31 DIAGNOSIS — I45.5 SINUS PAUSE: Primary | ICD-10-CM

## 2023-07-31 RX ORDER — MUPIROCIN 20 MG/G
1 OINTMENT TOPICAL
Status: CANCELLED | OUTPATIENT
Start: 2023-07-31

## 2023-07-31 RX ORDER — SODIUM CHLORIDE 9 MG/ML
INJECTION, SOLUTION INTRAVENOUS CONTINUOUS
Status: DISCONTINUED | OUTPATIENT
Start: 2023-07-31 | End: 2023-08-02 | Stop reason: HOSPADM

## 2023-07-31 NOTE — TELEPHONE ENCOUNTER
----- Message from Musa Gardner sent at 7/31/2023  2:15 PM CDT -----  Regarding: Return Call  Contact: patient  Type:  Patient Returning Call    Who Called:patient  Who Left Message for Patient:office nurse  Does the patient know what this is regarding?:Pace maker order not entered  Would the patient rather a call back or a response via [facility]? Do MD want patient to have this procedure  Best Call Back Number:[phone]  Additional Information: please call to advise.

## 2023-07-31 NOTE — TELEPHONE ENCOUNTER
----- Message from Fredy Orellana sent at 7/31/2023  4:09 PM CDT -----  Contact: Self  Type: Needs Medical Advice  Who Called:  Patient    Best Call Back Number: [phone]   Additional Information:  Called again follow up on pacemaker procedure. Please call pt.

## 2023-08-01 ENCOUNTER — HOSPITAL ENCOUNTER (OUTPATIENT)
Facility: HOSPITAL | Age: 83
Discharge: HOME | End: 2023-08-02
Attending: GENERAL PRACTICE | Admitting: GENERAL PRACTICE
Payer: MEDICARE

## 2023-08-01 ENCOUNTER — TELEPHONE (OUTPATIENT)
Dept: CARDIOLOGY | Facility: CLINIC | Age: 83
End: 2023-08-01
Payer: MEDICARE

## 2023-08-01 DIAGNOSIS — I45.5 SINUS PAUSE: ICD-10-CM

## 2023-08-01 DIAGNOSIS — R00.1 BRADYCARDIA: ICD-10-CM

## 2023-08-01 DIAGNOSIS — I45.5 SINUS PAUSE: Primary | ICD-10-CM

## 2023-08-01 DIAGNOSIS — Z01.810 PREOP CARDIOVASCULAR EXAM: ICD-10-CM

## 2023-08-01 DIAGNOSIS — Z95.0 STATUS POST BIVENTRICULAR CARDIAC PACEMAKER INSERTION: ICD-10-CM

## 2023-08-01 DIAGNOSIS — I49.5 TACHY-BRADY SYNDROME: Primary | ICD-10-CM

## 2023-08-01 LAB
ALBUMIN SERPL BCP-MCNC: 3.7 G/DL (ref 3.5–5.2)
ALP SERPL-CCNC: 54 U/L (ref 55–135)
ALT SERPL W/O P-5'-P-CCNC: 17 U/L (ref 10–44)
ANION GAP SERPL CALC-SCNC: 9 MMOL/L (ref 8–16)
ANION GAP SERPL CALC-SCNC: 9 MMOL/L (ref 8–16)
AST SERPL-CCNC: 22 U/L (ref 10–40)
BASOPHILS # BLD AUTO: 0.05 K/UL (ref 0–0.2)
BASOPHILS # BLD AUTO: 0.05 K/UL (ref 0–0.2)
BASOPHILS NFR BLD: 0.6 % (ref 0–1.9)
BASOPHILS NFR BLD: 0.6 % (ref 0–1.9)
BILIRUB SERPL-MCNC: 1.2 MG/DL (ref 0.1–1)
BILIRUB UR QL STRIP: NEGATIVE
BUN SERPL-MCNC: 18 MG/DL (ref 8–23)
BUN SERPL-MCNC: 18 MG/DL (ref 8–23)
CALCIUM SERPL-MCNC: 9.1 MG/DL (ref 8.7–10.5)
CALCIUM SERPL-MCNC: 9.1 MG/DL (ref 8.7–10.5)
CHLORIDE SERPL-SCNC: 107 MMOL/L (ref 95–110)
CHLORIDE SERPL-SCNC: 107 MMOL/L (ref 95–110)
CLARITY UR: CLEAR
CO2 SERPL-SCNC: 22 MMOL/L (ref 23–29)
CO2 SERPL-SCNC: 22 MMOL/L (ref 23–29)
COLOR UR: YELLOW
CREAT SERPL-MCNC: 1.1 MG/DL (ref 0.5–1.4)
CREAT SERPL-MCNC: 1.1 MG/DL (ref 0.5–1.4)
DIFFERENTIAL METHOD: NORMAL
DIFFERENTIAL METHOD: NORMAL
EOSINOPHIL # BLD AUTO: 0 K/UL (ref 0–0.5)
EOSINOPHIL # BLD AUTO: 0 K/UL (ref 0–0.5)
EOSINOPHIL NFR BLD: 0 % (ref 0–8)
EOSINOPHIL NFR BLD: 0 % (ref 0–8)
ERYTHROCYTE [DISTWIDTH] IN BLOOD BY AUTOMATED COUNT: 14.2 % (ref 11.5–14.5)
ERYTHROCYTE [DISTWIDTH] IN BLOOD BY AUTOMATED COUNT: 14.2 % (ref 11.5–14.5)
EST. GFR  (NO RACE VARIABLE): >60 ML/MIN/1.73 M^2
EST. GFR  (NO RACE VARIABLE): >60 ML/MIN/1.73 M^2
GLUCOSE SERPL-MCNC: 87 MG/DL (ref 70–110)
GLUCOSE SERPL-MCNC: 87 MG/DL (ref 70–110)
GLUCOSE UR QL STRIP: NEGATIVE
HCT VFR BLD AUTO: 46.5 % (ref 40–54)
HCT VFR BLD AUTO: 46.5 % (ref 40–54)
HGB BLD-MCNC: 15.3 G/DL (ref 14–18)
HGB BLD-MCNC: 15.3 G/DL (ref 14–18)
HGB UR QL STRIP: NEGATIVE
IMM GRANULOCYTES # BLD AUTO: 0.02 K/UL (ref 0–0.04)
IMM GRANULOCYTES # BLD AUTO: 0.02 K/UL (ref 0–0.04)
IMM GRANULOCYTES NFR BLD AUTO: 0.2 % (ref 0–0.5)
IMM GRANULOCYTES NFR BLD AUTO: 0.2 % (ref 0–0.5)
INR PPP: 1 (ref 0.8–1.2)
KETONES UR QL STRIP: NEGATIVE
LEUKOCYTE ESTERASE UR QL STRIP: NEGATIVE
LYMPHOCYTES # BLD AUTO: 1.9 K/UL (ref 1–4.8)
LYMPHOCYTES # BLD AUTO: 1.9 K/UL (ref 1–4.8)
LYMPHOCYTES NFR BLD: 21.7 % (ref 18–48)
LYMPHOCYTES NFR BLD: 21.7 % (ref 18–48)
MCH RBC QN AUTO: 30.9 PG (ref 27–31)
MCH RBC QN AUTO: 30.9 PG (ref 27–31)
MCHC RBC AUTO-ENTMCNC: 32.9 G/DL (ref 32–36)
MCHC RBC AUTO-ENTMCNC: 32.9 G/DL (ref 32–36)
MCV RBC AUTO: 94 FL (ref 82–98)
MCV RBC AUTO: 94 FL (ref 82–98)
MONOCYTES # BLD AUTO: 1 K/UL (ref 0.3–1)
MONOCYTES # BLD AUTO: 1 K/UL (ref 0.3–1)
MONOCYTES NFR BLD: 11.7 % (ref 4–15)
MONOCYTES NFR BLD: 11.7 % (ref 4–15)
MRSA SCREEN BY PCR: NEGATIVE
NEUTROPHILS # BLD AUTO: 5.7 K/UL (ref 1.8–7.7)
NEUTROPHILS # BLD AUTO: 5.7 K/UL (ref 1.8–7.7)
NEUTROPHILS NFR BLD: 65.8 % (ref 38–73)
NEUTROPHILS NFR BLD: 65.8 % (ref 38–73)
NITRITE UR QL STRIP: NEGATIVE
NRBC BLD-RTO: 0 /100 WBC
NRBC BLD-RTO: 0 /100 WBC
PH UR STRIP: 6 [PH] (ref 5–8)
PLATELET # BLD AUTO: 239 K/UL (ref 150–450)
PLATELET # BLD AUTO: 239 K/UL (ref 150–450)
PMV BLD AUTO: 10.2 FL (ref 9.2–12.9)
PMV BLD AUTO: 10.2 FL (ref 9.2–12.9)
POTASSIUM SERPL-SCNC: 4.4 MMOL/L (ref 3.5–5.1)
POTASSIUM SERPL-SCNC: 4.4 MMOL/L (ref 3.5–5.1)
PROT SERPL-MCNC: 7.3 G/DL (ref 6–8.4)
PROT UR QL STRIP: NEGATIVE
PROTHROMBIN TIME: 10.7 SEC (ref 9–12.5)
RBC # BLD AUTO: 4.95 M/UL (ref 4.6–6.2)
RBC # BLD AUTO: 4.95 M/UL (ref 4.6–6.2)
SODIUM SERPL-SCNC: 138 MMOL/L (ref 136–145)
SODIUM SERPL-SCNC: 138 MMOL/L (ref 136–145)
SP GR UR STRIP: 1.02 (ref 1–1.03)
URN SPEC COLLECT METH UR: NORMAL
UROBILINOGEN UR STRIP-ACNC: NEGATIVE EU/DL
WBC # BLD AUTO: 8.6 K/UL (ref 3.9–12.7)
WBC # BLD AUTO: 8.6 K/UL (ref 3.9–12.7)

## 2023-08-01 PROCEDURE — 99152 MOD SED SAME PHYS/QHP 5/>YRS: CPT | Mod: ,,, | Performed by: GENERAL PRACTICE

## 2023-08-01 PROCEDURE — 63600175 PHARM REV CODE 636 W HCPCS

## 2023-08-01 PROCEDURE — C1785 PMKR, DUAL, RATE-RESP: HCPCS | Performed by: GENERAL PRACTICE

## 2023-08-01 PROCEDURE — 93010 ELECTROCARDIOGRAM REPORT: CPT | Mod: ,,, | Performed by: INTERNAL MEDICINE

## 2023-08-01 PROCEDURE — 25000003 PHARM REV CODE 250: Performed by: NURSE PRACTITIONER

## 2023-08-01 PROCEDURE — C1894 INTRO/SHEATH, NON-LASER: HCPCS | Performed by: GENERAL PRACTICE

## 2023-08-01 PROCEDURE — 93010 ELECTROCARDIOGRAM REPORT: CPT | Mod: 76,,, | Performed by: INTERNAL MEDICINE

## 2023-08-01 PROCEDURE — C1898 LEAD, PMKR, OTHER THAN TRANS: HCPCS | Performed by: GENERAL PRACTICE

## 2023-08-01 PROCEDURE — 80053 COMPREHEN METABOLIC PANEL: CPT | Performed by: NURSE PRACTITIONER

## 2023-08-01 PROCEDURE — 85025 COMPLETE CBC W/AUTO DIFF WBC: CPT | Performed by: NURSE PRACTITIONER

## 2023-08-01 PROCEDURE — 33208 INSRT HEART PM ATRIAL & VENT: CPT | Mod: KX,,, | Performed by: GENERAL PRACTICE

## 2023-08-01 PROCEDURE — 33208 INSRT HEART PM ATRIAL & VENT: CPT | Mod: KX | Performed by: GENERAL PRACTICE

## 2023-08-01 PROCEDURE — 27201423 OPTIME MED/SURG SUP & DEVICES STERILE SUPPLY: Performed by: GENERAL PRACTICE

## 2023-08-01 PROCEDURE — 94640 AIRWAY INHALATION TREATMENT: CPT

## 2023-08-01 PROCEDURE — 25000003 PHARM REV CODE 250: Performed by: GENERAL PRACTICE

## 2023-08-01 PROCEDURE — 25500020 PHARM REV CODE 255: Performed by: GENERAL PRACTICE

## 2023-08-01 PROCEDURE — 93005 ELECTROCARDIOGRAM TRACING: CPT | Mod: 59 | Performed by: INTERNAL MEDICINE

## 2023-08-01 PROCEDURE — 99152 PR MOD CONSCIOUS SEDATION, SAME PHYS, 5+ YRS, FIRST 15 MIN: ICD-10-PCS | Mod: ,,, | Performed by: GENERAL PRACTICE

## 2023-08-01 PROCEDURE — 94761 N-INVAS EAR/PLS OXIMETRY MLT: CPT | Mod: GZ,59

## 2023-08-01 PROCEDURE — 99153 MOD SED SAME PHYS/QHP EA: CPT | Performed by: GENERAL PRACTICE

## 2023-08-01 PROCEDURE — 85610 PROTHROMBIN TIME: CPT

## 2023-08-01 PROCEDURE — 93010 EKG 12-LEAD: ICD-10-PCS | Mod: ,,, | Performed by: INTERNAL MEDICINE

## 2023-08-01 PROCEDURE — 63600175 PHARM REV CODE 636 W HCPCS: Performed by: GENERAL PRACTICE

## 2023-08-01 PROCEDURE — 25000242 PHARM REV CODE 250 ALT 637 W/ HCPCS: Performed by: GENERAL PRACTICE

## 2023-08-01 PROCEDURE — 25000003 PHARM REV CODE 250

## 2023-08-01 PROCEDURE — 81003 URINALYSIS AUTO W/O SCOPE: CPT | Performed by: NURSE PRACTITIONER

## 2023-08-01 PROCEDURE — 87641 MR-STAPH DNA AMP PROBE: CPT | Performed by: GENERAL PRACTICE

## 2023-08-01 PROCEDURE — 99152 MOD SED SAME PHYS/QHP 5/>YRS: CPT | Performed by: GENERAL PRACTICE

## 2023-08-01 PROCEDURE — 33208 PR INSER HART PACER XVENOUS ATR/VENTR: ICD-10-PCS | Mod: KX,,, | Performed by: GENERAL PRACTICE

## 2023-08-01 DEVICE — IPG W1DR01 AZURE XT DR MRI USA
Type: IMPLANTABLE DEVICE | Site: CHEST  WALL | Status: FUNCTIONAL
Brand: AZURE™ XT DR MRI SURESCAN™

## 2023-08-01 DEVICE — LEAD 5076-58 MRI US RCMCRD
Type: IMPLANTABLE DEVICE | Site: CHEST  WALL | Status: FUNCTIONAL
Brand: CAPSUREFIX NOVUS MRI™ SURESCAN®

## 2023-08-01 RX ORDER — MUPIROCIN 20 MG/G
1 OINTMENT TOPICAL
Status: DISCONTINUED | OUTPATIENT
Start: 2023-08-01 | End: 2023-08-01 | Stop reason: HOSPADM

## 2023-08-01 RX ORDER — DOXAZOSIN 1 MG/1
2 TABLET ORAL DAILY
Status: DISCONTINUED | OUTPATIENT
Start: 2023-08-02 | End: 2023-08-02 | Stop reason: HOSPADM

## 2023-08-01 RX ORDER — SODIUM CHLORIDE 9 MG/ML
INJECTION, SOLUTION INTRAVENOUS CONTINUOUS
Status: DISCONTINUED | OUTPATIENT
Start: 2023-08-01 | End: 2023-08-01

## 2023-08-01 RX ORDER — IODIXANOL 320 MG/ML
INJECTION, SOLUTION INTRAVASCULAR
Status: DISCONTINUED | OUTPATIENT
Start: 2023-08-01 | End: 2023-08-01 | Stop reason: HOSPADM

## 2023-08-01 RX ORDER — VANCOMYCIN HCL IN 5 % DEXTROSE 1G/250ML
1000 PLASTIC BAG, INJECTION (ML) INTRAVENOUS
Status: DISCONTINUED | OUTPATIENT
Start: 2023-08-01 | End: 2023-08-01 | Stop reason: HOSPADM

## 2023-08-01 RX ORDER — FENTANYL CITRATE 50 UG/ML
INJECTION, SOLUTION INTRAMUSCULAR; INTRAVENOUS
Status: DISCONTINUED | OUTPATIENT
Start: 2023-08-01 | End: 2023-08-01 | Stop reason: HOSPADM

## 2023-08-01 RX ORDER — SPIRONOLACTONE 25 MG/1
25 TABLET ORAL DAILY
Status: DISCONTINUED | OUTPATIENT
Start: 2023-08-02 | End: 2023-08-02 | Stop reason: HOSPADM

## 2023-08-01 RX ORDER — BUDESONIDE 0.5 MG/2ML
0.5 INHALANT ORAL EVERY 12 HOURS
Status: DISCONTINUED | OUTPATIENT
Start: 2023-08-01 | End: 2023-08-02 | Stop reason: HOSPADM

## 2023-08-01 RX ORDER — LIDOCAINE HYDROCHLORIDE 10 MG/ML
INJECTION, SOLUTION EPIDURAL; INFILTRATION; INTRACAUDAL; PERINEURAL
Status: DISCONTINUED | OUTPATIENT
Start: 2023-08-01 | End: 2023-08-01 | Stop reason: HOSPADM

## 2023-08-01 RX ORDER — SODIUM CHLORIDE 9 MG/ML
INJECTION, SOLUTION INTRAVENOUS CONTINUOUS
Status: DISCONTINUED | OUTPATIENT
Start: 2023-08-01 | End: 2023-08-02 | Stop reason: HOSPADM

## 2023-08-01 RX ORDER — ARFORMOTEROL TARTRATE 15 UG/2ML
15 SOLUTION RESPIRATORY (INHALATION) 2 TIMES DAILY
Status: DISCONTINUED | OUTPATIENT
Start: 2023-08-01 | End: 2023-08-02 | Stop reason: HOSPADM

## 2023-08-01 RX ORDER — FLUTICASONE FUROATE AND VILANTEROL 100; 25 UG/1; UG/1
1 POWDER RESPIRATORY (INHALATION) DAILY
Status: DISCONTINUED | OUTPATIENT
Start: 2023-08-02 | End: 2023-08-01

## 2023-08-01 RX ORDER — ACETAMINOPHEN 325 MG/1
650 TABLET ORAL EVERY 4 HOURS PRN
Status: DISCONTINUED | OUTPATIENT
Start: 2023-08-01 | End: 2023-08-02 | Stop reason: HOSPADM

## 2023-08-01 RX ORDER — LEVOTHYROXINE SODIUM 25 UG/1
75 TABLET ORAL
Status: DISCONTINUED | OUTPATIENT
Start: 2023-08-02 | End: 2023-08-02 | Stop reason: HOSPADM

## 2023-08-01 RX ORDER — SODIUM CHLORIDE 9 MG/ML
INJECTION, SOLUTION INTRAVENOUS CONTINUOUS
Status: CANCELLED | OUTPATIENT
Start: 2023-08-01

## 2023-08-01 RX ORDER — VANCOMYCIN HCL IN 5 % DEXTROSE 1G/250ML
PLASTIC BAG, INJECTION (ML) INTRAVENOUS
Status: COMPLETED
Start: 2023-08-01 | End: 2023-08-01

## 2023-08-01 RX ORDER — MUPIROCIN 20 MG/G
1 OINTMENT TOPICAL
Status: CANCELLED | OUTPATIENT
Start: 2023-08-01

## 2023-08-01 RX ORDER — MUPIROCIN 20 MG/G
OINTMENT TOPICAL
Status: DISCONTINUED | OUTPATIENT
Start: 2023-08-01 | End: 2023-08-01 | Stop reason: HOSPADM

## 2023-08-01 RX ORDER — MIDAZOLAM HYDROCHLORIDE 1 MG/ML
INJECTION INTRAMUSCULAR; INTRAVENOUS
Status: DISCONTINUED | OUTPATIENT
Start: 2023-08-01 | End: 2023-08-01 | Stop reason: HOSPADM

## 2023-08-01 RX ADMIN — VANCOMYCIN HYDROCHLORIDE 1000 MG: 1 INJECTION, POWDER, LYOPHILIZED, FOR SOLUTION INTRAVENOUS at 01:08

## 2023-08-01 RX ADMIN — Medication 1000 MG: at 01:08

## 2023-08-01 RX ADMIN — BUDESONIDE INHALATION 0.5 MG: 0.5 SUSPENSION RESPIRATORY (INHALATION) at 08:08

## 2023-08-01 RX ADMIN — SODIUM CHLORIDE: 0.9 INJECTION, SOLUTION INTRAVENOUS at 01:08

## 2023-08-01 RX ADMIN — ARFORMOTEROL TARTRATE 15 MCG: 15 SOLUTION RESPIRATORY (INHALATION) at 08:08

## 2023-08-01 RX ADMIN — MUPIROCIN 1 G: 20 OINTMENT TOPICAL at 01:08

## 2023-08-01 NOTE — Clinical Note
The lead thresholds were tested, was connected to generator, was sutured in place and was secured in place.

## 2023-08-01 NOTE — NURSING TRANSFER
Nursing Transfer Note      8/1/2023   5:53 PM    Nurse giving handoff:Nieves Hurtado RN  Nurse receiving handoff:Kandis Ayala RN    Reason patient is being transferred: transferred to outpatient - extended observation room    Transfer To: Room# 3019    Transfer via stretcher    Transfer with cardiac monitoring    Transported by Nieves Hurtado RN    Transfer Vital Signs:  Blood Pressure:154/81 (112)  Heart Rate:66  O2:98%  Respirations:20    Telemetry: Rate 60  Order for Tele Monitor? Yes    Additional Lines: n/a    4eyes on Skin: yes    Medicines sent: 0.9% NaCl    Any special needs or follow-up needed: continuation of post procedure orders    Patient belongings transferred with patient: Yes    Chart send with patient: Yes    Notified: patient to notify family    Patient reassessed at: Nieves Hurtado RN and Kandis Ayala RN on 8/1/2023 at 1730 (date, time)    Upon arrival to floor: cardiac monitor applied, patient oriented to room, call bell in reach, and bed in lowest position

## 2023-08-01 NOTE — Clinical Note
The site was marked. The left chest was prepped. The site was prepped with ChloraPrep and Betadine. The site was clipped. The patient was draped.

## 2023-08-01 NOTE — NURSING
Upon post procedure assessment , area of ecchymosis noted lateral to aquacel dressing. Site soft, cool to touch, and free of pain. Dr. Garsia notified per Nicki Crisostomo RN. Per Dr. Garsia instructions, Mega Arce, cath lab RT applied pressure dressing to left chest wall. Will cont to monitor.

## 2023-08-01 NOTE — NURSING
Nurses Note -- 4 Eyes      8/1/2023   6:15 PM      Skin assessed during: Admit      [x] No Altered Skin Integrity Present    []Prevention Measures Documented      [] Yes- Altered Skin Integrity Present or Discovered   [] LDA Added if Not in Epic (Describe Wound)   [] New Altered Skin Integrity was Present on Admit and Documented in LDA   [] Wound Image Taken    Wound Care Consulted? No    Attending Nurse:  Kandis Mata RN/Staff Member:   Dorothy Burger RN

## 2023-08-01 NOTE — Clinical Note
A percutaneous stick to the left subclavian vein was performed. A percutaneous stick to the left anterior chest was performed.

## 2023-08-01 NOTE — Clinical Note
A generator pocket was made at the left subclavian with blunt dissection, electrocautery and sharp dissection.

## 2023-08-01 NOTE — PLAN OF CARE
Problem: Adult Inpatient Plan of Care  Goal: Plan of Care Review  8/1/2023 1817 by Kandis Ayala RN  Outcome: Ongoing, Progressing  8/1/2023 1816 by Kandis Ayala RN  Outcome: Ongoing, Progressing  Goal: Patient-Specific Goal (Individualized)  8/1/2023 1817 by Kandis Ayala RN  Outcome: Ongoing, Progressing  8/1/2023 1816 by Kandis Ayala RN  Outcome: Ongoing, Progressing  Goal: Absence of Hospital-Acquired Illness or Injury  8/1/2023 1817 by Kandis Ayala RN  Outcome: Ongoing, Progressing  8/1/2023 1816 by Kandis Ayala RN  Outcome: Ongoing, Progressing  Goal: Optimal Comfort and Wellbeing  8/1/2023 1817 by Kandis Ayala RN  Outcome: Ongoing, Progressing  8/1/2023 1816 by Kandis Ayala RN  Outcome: Ongoing, Progressing  Goal: Readiness for Transition of Care  8/1/2023 1817 by Kandis Ayala RN  Outcome: Ongoing, Progressing  8/1/2023 1816 by Kandis Ayala RN  Outcome: Ongoing, Progressing

## 2023-08-01 NOTE — DISCHARGE INSTRUCTIONS
Post Pacemaker Discharge Instructions:  DO NOT remove the Aquacel dressing (brown dressing over incision) that is covering the pacemaker incision until instructed by your physician in your follow up appointment (usually around 10 days).  Inspect the site daily for tenderness, discharge, or signs of infection.  Keep dressing dry and intact  Do not apply powders or lotions to incision site until healed  Avoid stress to the incision/suture site. Avoid any kind of trauma to the pacemaker site.  Check your pulse daily and notify your physician if the rate is less than the pacemaker set rate.   Always carry your pacemaker ID card with you. A permanent card will be mailed to you in 6-8 weeks.   Avoid areas of high voltages such as power plants, radio transmitters, or welding equipment. You may walk through anti-theft doorways, but do not stand near them for any length of time.   Inform all Physicians and Dentists that you have a pacemaker.   Your pacemaker will need to be checked for proper functioning at intervals determined by your physician. It is very important to keep these appointments or your pacemaker may not function properly.     Activity:  Limit your activity for the next 48 hours. Avoid excessive bending or squatting.   You may use your arms but avoid reaching overhead for 6 weeks with the arm on the same side as the pacemaker.  Do not lift anything greater than 5 pounds for one month with the arm on the same side as your pacemaker.  No driving until instructed by your physician at your follow up appointment.     CALL YOUR DOCTOR IMMEDIATELY IF YOU EXPERIENCE ANY OF THE FOLLOWING:  Chest pain, palpitations, shortness of breath, excessive dizziness, fainting, nausea or vomiting.   Signs of infection including: swelling, discharge, redness, warmth, pain, fever

## 2023-08-01 NOTE — NURSING
"Pressure dressing removed, no signs or symptoms of active bleeding. Dr. Garsia at bedside to view left chest wall prior to transferring patient to outpatient - extended observation room, no new orders at this time. Upon discussing discharge instructions with patient, patient asks when he should restart his Eliquis. Spoke with Dr. Garsia regarding patient restarting Eliquis, and he states, "I will discuss that with the patient upon discharge".   "

## 2023-08-01 NOTE — Clinical Note
The lead was sutured in place and was secured in place. The lead was inserted in the right ventricle.

## 2023-08-01 NOTE — Clinical Note
The lead was inserted under fluorscopic guidance and thresholds were tested. The lead was inserted in the high right atrium.

## 2023-08-02 VITALS
DIASTOLIC BLOOD PRESSURE: 97 MMHG | SYSTOLIC BLOOD PRESSURE: 169 MMHG | HEART RATE: 59 BPM | TEMPERATURE: 98 F | WEIGHT: 181 LBS | BODY MASS INDEX: 25.91 KG/M2 | OXYGEN SATURATION: 94 % | HEIGHT: 70 IN | RESPIRATION RATE: 18 BRPM

## 2023-08-02 PROCEDURE — 25000242 PHARM REV CODE 250 ALT 637 W/ HCPCS: Performed by: GENERAL PRACTICE

## 2023-08-02 PROCEDURE — 99900031 HC PATIENT EDUCATION (STAT)

## 2023-08-02 PROCEDURE — 99900035 HC TECH TIME PER 15 MIN (STAT)

## 2023-08-02 PROCEDURE — 94799 UNLISTED PULMONARY SVC/PX: CPT

## 2023-08-02 PROCEDURE — 25000003 PHARM REV CODE 250: Performed by: NURSE PRACTITIONER

## 2023-08-02 PROCEDURE — 94640 AIRWAY INHALATION TREATMENT: CPT

## 2023-08-02 RX ADMIN — ARFORMOTEROL TARTRATE 15 MCG: 15 SOLUTION RESPIRATORY (INHALATION) at 07:08

## 2023-08-02 RX ADMIN — LEVOTHYROXINE SODIUM 75 MCG: 0.03 TABLET ORAL at 05:08

## 2023-08-02 RX ADMIN — BUDESONIDE INHALATION 0.5 MG: 0.5 SUSPENSION RESPIRATORY (INHALATION) at 07:08

## 2023-08-02 NOTE — PLAN OF CARE
Problem: Adult Inpatient Plan of Care  Goal: Plan of Care Review  Outcome: Met  Goal: Patient-Specific Goal (Individualized)  Outcome: Met  Goal: Absence of Hospital-Acquired Illness or Injury  Outcome: Met  Goal: Optimal Comfort and Wellbeing  Outcome: Met  Goal: Readiness for Transition of Care  Outcome: Met     Problem: Skin Injury Risk Increased  Goal: Skin Health and Integrity  Outcome: Met     Problem: Chest Pain  Goal: Resolution of Chest Pain Symptoms  Outcome: Met     Problem: Fall Injury Risk  Goal: Absence of Fall and Fall-Related Injury  Outcome: Met

## 2023-08-02 NOTE — PLAN OF CARE
[facility]  Initial Discharge Assessment       Primary Care Provider: Umu Fox MD    Admission Diagnosis: Sinus pause [I45.5]    Admission Date: 8/1/2023  Expected Discharge Date:     Pt confirmed home address and denied HH and DME. Pt lives with Spouse Ann [phone]. Pt confirmed PCP, insurance and pharmacy as [facility]. Pts discharge plan is home and his friend will provide transport home. CM following for additional needs.     Transition of Care Barriers: None    Payor: [employer]ARE / Plan: Fosubo 65 / Product Type: Medicare Advantage /     Extended Emergency Contact Information  Primary Emergency Contact: Ann Hodge  Address: [address] [place] [address] [address] [place] [address] [facility]  Home Phone: [phone]  Relation: Spouse  Preferred language: English   needed? No    Discharge Plan A: Home with family  Discharge Plan B: Home      [id number] - [place], [place] - [address] [facility]  [address]  [facility] [address]  Phone: [phone] Fax: [phone]    [id number] - [place], [place] - [address] AT [facility] [address]  [address] [facility] [address]  Phone: [phone] Fax: [phone]    [facility]  [address] [facility] [address]  Phone: [phone] Fax: [phone]      Initial Assessment (most recent)       Adult Discharge Assessment - 08/02/23 1032          Discharge Assessment    Assessment Type Discharge Planning Assessment     Confirmed/corrected address, phone number and insurance Yes     Confirmed Demographics Correct on Facesheet     Source of Information patient     Communicated ROMAINE with patient/caregiver Yes     Reason For Admission Sinus Pause     People in Home spouse     Facility Arrived From: Home     Do you expect to return to your current living  situation? Yes     Do you have help at home or someone to help you manage your care at home? Yes     Who are your caregiver(s) and their phone number(s)? Spouse Ann [phone]     Prior to hospitilization cognitive status: Alert/Oriented     Current cognitive status: Alert/Oriented     Home Layout Able to live on 1st floor     Equipment Currently Used at Home none     Readmission within 30 days? No     Patient currently being followed by outpatient case management? No     Do you currently have service(s) that help you manage your care at home? No     Do you take prescription medications? Yes     Do you have prescription coverage? Yes     Do you have any problems affording any of your prescribed medications? No     Is the patient taking medications as prescribed? yes     Who is going to help you get home at discharge? Spouse Ann [phone]     How do you get to doctors appointments? family or friend will provide;car, drives self     Are you on dialysis? No     Do you take coumadin? No     Discharge Plan A Home with family     Discharge Plan B Home     DME Needed Upon Discharge  none     Discharge Plan discussed with: Patient     Transition of Care Barriers None        Physical Activity    On average, how many days per week do you engage in moderate to strenuous exercise (like a brisk walk)? Patient refused     On average, how many minutes do you engage in exercise at this level? Patient refused        Financial Resource Strain    How hard is it for you to pay for the very basics like food, housing, medical care, and heating? Patient refused        Housing Stability    In the last 12 months, was there a time when you were not able to pay the mortgage or rent on time? No     In the last 12 months, was there a time when you did not have a steady place to sleep or slept in a shelter (including now)? No        Transportation Needs    In the past 12 months, has lack of transportation kept you from medical  appointments or from getting medications? No     In the past 12 months, has lack of transportation kept you from meetings, work, or from getting things needed for daily living? No        Food Insecurity    Within the past 12 months, you worried that your food would run out before you got the money to buy more. Patient refused     Within the past 12 months, the food you bought just didn't last and you didn't have money to get more. Patient refused        Stress    Do you feel stress - tense, restless, nervous, or anxious, or unable to sleep at night because your mind is troubled all the time - these days? Patient refused        Social Connections    In a typical week, how many times do you talk on the phone with family, friends, or neighbors? Patient refused     How often do you get together with friends or relatives? Patient refused     How often do you attend meetings of the clubs or organizations you belong to? Patient refused     Are you , , , , never , or living with a partner?         Alcohol Use    Q1: How often do you have a drink containing alcohol? Patient refused     Q2: How many drinks containing alcohol do you have on a typical day when you are drinking? Patient refused     Q3: How often do you have six or more drinks on one occasion? Patient refused        OTHER    Name(s) of People in Home Spouse Ann [phone]

## 2023-08-02 NOTE — PLAN OF CARE
Tues 8/8/23 at 9:20 - PCP appt with Umu Fox added to pts avs.    08/02/23 1302   Discharge Assessment   Assessment Type Discharge Planning Reassessment

## 2023-08-02 NOTE — PLAN OF CARE
[facility]  Discharge Final Note    Primary Care Provider: Umu Fox MD    Expected Discharge Date: 8/2/2023    Pt is cleared for discharge home from case management and has follow up appts added to his AVS.     Final Discharge Note (most recent)       Final Note - 08/02/23 1303          Final Note    Assessment Type Final Discharge Note     Anticipated Discharge Disposition Home or Self Care     What phone number can be called within the next 1-3 days to see how you are doing after discharge? [phone]     Hospital Resources/Appts/Education Provided Appointments scheduled and added to AVS;Appointments scheduled by Navigator/Coordinator        Post-Acute Status    Discharge Delays None known at this time                     Important Message from Medicare             Contact Info       Chandler Becerril MD   Specialty: Interventional Cardiology, Cardiology    [address]  [place] [address]  CARDIOLOGY [facility] [address]   Phone: [phone]       Next Steps: Follow up in 1 week(s)    Instructions: For suture removal, For wound re-check    Umu Fox MD   Specialty: Family Medicine   Relationship: PCP - General    [address]  [facility] [address]   Phone: [phone]       Next Steps: Follow up on 8/8/2023    Instructions: 9:20 am Hospital follow up

## 2023-08-02 NOTE — PLAN OF CARE
Patient AAOx4. Shift assessment completed. He remains injury free. Ambulate with standby assist. Tolerating PO well. Left arm sling and Tele in place. Condition stable. Continue to monitor.

## 2023-08-02 NOTE — DISCHARGE SUMMARY
[facility]  Discharge Note  Short Stay    Procedure(s) (LRB):  INSERTION, CARDIAC PACEMAKER, DUAL CHAMBER (N/A)      OUTCOME: Patient tolerated treatment/procedure well without complication and is now ready for discharge.    DISPOSITION: Home or Self Care    FINAL DIAGNOSIS:  AV node dysfunction   S/P Dual chamber PPM Implantation    FOLLOWUP: In clinic on Thursday nest week at 1030 am for Nurse Visit.      Patient is doing well postoperatively.  Sitting on the side of the bed in no distress.  Left upper CW site clean and dry mild bruising noted        Jaimie Valdez Dr Mri Surescan - Implanted    Model/Cat number: W1DR01 Serial number: [id number]   Device identifier: [id number] Device identifier type: GS1   Date Implanted: 8/1/2023 Time Implanted:  2:57 PM   GUDID Information    Request status Successful     Brand name: Gail VALDEZ DR MRI SureScan Version/Model: W1DR01   Company name: [employer] MRI safety info as of 8/1/23: MR Conditional   Contains dry or latex rubber: No     GMDN P.T. name: Dual-chamber implantable pacemaker, rate-responsive       As of 8/1/2023    Status: Implanted        Lead   Lead Pacing Adult 6fr L58cm Ventricular Silicone Platinum In - [id number] - Implanted    Inventory item: Lead Pacing Adult 6fr L58cm Ventricular Silicone Platinum In Model/Cat number: 5076-58   Serial number: [id number] Device identifier: [id number]   Device identifier type: GS1 Date Implanted: 8/1/2023   GUDID Information    Request status Successful     Brand name: CapSureFix Novus MRI SureScan® Version/Model: 5076-58   Company name: [employer] MRI safety info as of 8/1/23: MR Conditional   Contains dry or latex rubber: No     GMDN P.T. name: Endocardial pacing lead       As of 8/1/2023    Status: Implanted        Lead Pacemaker Vent Bipolar 52cm - [id number] - Implanted    Inventory item: LEAD PACEMAKER VENT BIPOLAR 52CM Model/Cat number: 5076-52   Serial number: [id number]  Device identifier: [id number]   Device identifier type: GS1 Date Implanted: 8/1/2023   Time Implanted:  2:46 PM     GUDID Information    Request status Successful     Brand name: Campbell CROWE SureScan® Version/Model: 5076-52   Company name: [employer] MRI safety info as of 8/1/23: MR Conditional   Contains dry or latex rubber: No     GMDN P.T. name: Endocardial pacing lead       As of 8/1/2023    Status: Implanted Implant Site: Chest wall - subcutaneous   Maximum Capability: PM-DDD(R) Underlying Rhythms: Sinus rhythm   Current Programmed Mode: AAIR<=>DDDR Magnet Rate (bpm): 85   RA Lead Threshold (V): 0.8 RA Lead Pulse Duration (ms): 0.4   RV Lead Threshold (V): 0.5 RV Lead Pulse Duration (ms): 0.4   Sensing P Wave (mV): 2.8 Sensing R Wave (mV): 11.1   RA Impedance (ohms): 475 RV Impedance (ohms): 627   Basic Pacing Rate (bpm): 60 Av Delay (ms): 180   RA Output Voltage (V): 3.5 RA Pulse Duration (ms): 0.4   RV Output Voltage (V): 3.5 RV Pulse Duration (ms): 0.4   RA Sensing Amplitude (mV): 0.3 RV Sensing Amplitude (mV): 0.9        All Reviewers List       Medication List        CONTINUE taking these medications      albuterol 90 mcg/actuation inhaler  Commonly known as: PROVENTIL/VENTOLIN HFA  2 puffs every 4 hours as needed for cough, wheeze, or shortness of breath     ascorbic acid (vitamin C) 1000 MG tablet  Commonly known as: VITAMIN C     benralizumab 30 mg/mL Atin  Inject 1 pen into the skin as needed (every 56 days).     ELIQUIS 5 mg Tab  Generic drug: apixaban  TAKE 1 TABLET(5 MG) BY MOUTH TWICE DAILY     fluticasone-salmeterol 250-50 mcg/dose 250-50 mcg/dose diskus inhaler  Commonly known as: ADVAIR  Inhale 1 puff into the lungs 2 (two) times daily. Controller     GLUCOSAMINE COMPLEX ORAL     latanoprost 0.005 % ophthalmic solution     * levothyroxine 75 MCG tablet  Commonly known as: SYNTHROID  TAKE 1 TABLET BY MOUTH EVERY MORNING ON MONDAY, WEDNESDAY, FRIDAY, SATURDAY, AND SUNDAY     *  levothyroxine 50 MCG tablet  Commonly known as: SYNTHROID  TAKE 1 TABLET BY MOUTH EVERY MORNING ON TUES AND THURS     MAGNESIA ORAL     spironolactone 25 MG tablet  Commonly known as: ALDACTONE  TAKE 1 TABLET(25 MG) BY MOUTH EVERY DAY     terazosin 2 MG capsule  Commonly known as: HYTRIN  TAKE 1 CAPSULE(2 MG) BY MOUTH EVERY DAY           * This list has 2 medication(s) that are the same as other medications prescribed for you. Read the directions carefully, and ask your doctor or other care provider to review them with you.                 Narrative & Impression  Portable chest x-ray at 4:34 PM is compared to prior study dated 10/26/2021     Clinical history is pacemaker placement     There is a left subclavian vein pacemaker with lead tips overlying the right atrium and right ventricle. There is no pneumothorax. The cardiomediastinal silhouette is normal in size.  The lungs are clear.  There is a left humeral prosthesis.     IMPRESSION: No pneumothorax status post left subclavian vein pacemaker placement     No acute pulmonary process     Electronically signed by:  Elodia James MD  8/1/2023 4:43 PM CDT Workstation: [id number]              DISCHARGE INSTRUCTIONS: DC Pacemaker instructions printed out and given to patient by RN.    TIME SPENT ON DISCHARGE: 32 minutes

## 2023-08-02 NOTE — NURSING
Pt verbalized understanding of discharge instructions including post op discharge instructions for a pacemaker. IV x 2 and tele box removed, tele box returned to cardio B. Pt wheeled down (left arm in sling) with all personal belongings and [employer] pacemaker card and booklet to private vehicle.

## 2023-08-08 ENCOUNTER — OFFICE VISIT (OUTPATIENT)
Dept: FAMILY MEDICINE | Facility: CLINIC | Age: 83
End: 2023-08-08
Payer: MEDICARE

## 2023-08-08 VITALS
WEIGHT: 171.06 LBS | SYSTOLIC BLOOD PRESSURE: 120 MMHG | RESPIRATION RATE: 16 BRPM | HEIGHT: 70 IN | OXYGEN SATURATION: 95 % | HEART RATE: 72 BPM | BODY MASS INDEX: 24.49 KG/M2 | TEMPERATURE: 98 F | DIASTOLIC BLOOD PRESSURE: 70 MMHG

## 2023-08-08 DIAGNOSIS — I49.5 TACHY-BRADY SYNDROME: Primary | ICD-10-CM

## 2023-08-08 DIAGNOSIS — Z95.0 S/P PLACEMENT OF CARDIAC PACEMAKER: ICD-10-CM

## 2023-08-08 DIAGNOSIS — R53.83 FATIGUE, UNSPECIFIED TYPE: ICD-10-CM

## 2023-08-08 DIAGNOSIS — I10 ESSENTIAL HYPERTENSION: ICD-10-CM

## 2023-08-08 PROCEDURE — 1160F RVW MEDS BY RX/DR IN RCRD: CPT | Mod: CPTII,S$GLB,, | Performed by: FAMILY MEDICINE

## 2023-08-08 PROCEDURE — 3288F FALL RISK ASSESSMENT DOCD: CPT | Mod: CPTII,S$GLB,, | Performed by: FAMILY MEDICINE

## 2023-08-08 PROCEDURE — 99999 PR PBB SHADOW E&M-EST. PATIENT-LVL III: CPT | Mod: PBBFAC,,, | Performed by: FAMILY MEDICINE

## 2023-08-08 PROCEDURE — 1159F MED LIST DOCD IN RCRD: CPT | Mod: CPTII,S$GLB,, | Performed by: FAMILY MEDICINE

## 2023-08-08 PROCEDURE — 1159F PR MEDICATION LIST DOCUMENTED IN MEDICAL RECORD: ICD-10-PCS | Mod: CPTII,S$GLB,, | Performed by: FAMILY MEDICINE

## 2023-08-08 PROCEDURE — 3078F PR MOST RECENT DIASTOLIC BLOOD PRESSURE < 80 MM HG: ICD-10-PCS | Mod: CPTII,S$GLB,, | Performed by: FAMILY MEDICINE

## 2023-08-08 PROCEDURE — 1125F AMNT PAIN NOTED PAIN PRSNT: CPT | Mod: CPTII,S$GLB,, | Performed by: FAMILY MEDICINE

## 2023-08-08 PROCEDURE — 3078F DIAST BP <80 MM HG: CPT | Mod: CPTII,S$GLB,, | Performed by: FAMILY MEDICINE

## 2023-08-08 PROCEDURE — 3074F SYST BP LT 130 MM HG: CPT | Mod: CPTII,S$GLB,, | Performed by: FAMILY MEDICINE

## 2023-08-08 PROCEDURE — 1125F PR PAIN SEVERITY QUANTIFIED, PAIN PRESENT: ICD-10-PCS | Mod: CPTII,S$GLB,, | Performed by: FAMILY MEDICINE

## 2023-08-08 PROCEDURE — 1160F PR REVIEW ALL MEDS BY PRESCRIBER/CLIN PHARMACIST DOCUMENTED: ICD-10-PCS | Mod: CPTII,S$GLB,, | Performed by: FAMILY MEDICINE

## 2023-08-08 PROCEDURE — 3074F PR MOST RECENT SYSTOLIC BLOOD PRESSURE < 130 MM HG: ICD-10-PCS | Mod: CPTII,S$GLB,, | Performed by: FAMILY MEDICINE

## 2023-08-08 PROCEDURE — 99214 PR OFFICE/OUTPT VISIT, EST, LEVL IV, 30-39 MIN: ICD-10-PCS | Mod: S$GLB,,, | Performed by: FAMILY MEDICINE

## 2023-08-08 PROCEDURE — 3288F PR FALLS RISK ASSESSMENT DOCUMENTED: ICD-10-PCS | Mod: CPTII,S$GLB,, | Performed by: FAMILY MEDICINE

## 2023-08-08 PROCEDURE — 1101F PR PT FALLS ASSESS DOC 0-1 FALLS W/OUT INJ PAST YR: ICD-10-PCS | Mod: CPTII,S$GLB,, | Performed by: FAMILY MEDICINE

## 2023-08-08 PROCEDURE — 99999 PR PBB SHADOW E&M-EST. PATIENT-LVL III: ICD-10-PCS | Mod: PBBFAC,,, | Performed by: FAMILY MEDICINE

## 2023-08-08 PROCEDURE — 1101F PT FALLS ASSESS-DOCD LE1/YR: CPT | Mod: CPTII,S$GLB,, | Performed by: FAMILY MEDICINE

## 2023-08-08 PROCEDURE — 99214 OFFICE O/P EST MOD 30 MIN: CPT | Mod: S$GLB,,, | Performed by: FAMILY MEDICINE

## 2023-08-08 NOTE — PROGRESS NOTES
Subjective:       Patient ID: Maycol Hodge is a 83 y.o. male.    Chief Complaint: No chief complaint on file.    HPI  Review of Systems   Constitutional:  Positive for fatigue. Negative for unexpected weight change.   Respiratory:  Negative for chest tightness and shortness of breath.    Cardiovascular:  Negative for chest pain, palpitations and leg swelling.   Gastrointestinal:  Negative for abdominal pain.   Musculoskeletal:  Negative for arthralgias.   Neurological:  Negative for dizziness, syncope, light-headedness and headaches.       Patient Active Problem List   Diagnosis    Chronic allergic rhinitis    Essential hypertension    Severe persistent asthma without complication    BPH (benign prostatic hyperplasia)    History of TIA (transient ischemic attack)    Hyperlipidemia    Cataract    Urticaria    Hypothyroidism    Autoimmune urticaria    Anaphylactic syndrome    Hashimoto's thyroiditis    Chronic bronchiolitis    Change in bowel habits    Acute urticaria    Paroxysmal atrial fibrillation    Hx of colonic polyps    Chronic sinus complaints    Fatigue    Hypersomnolence disorder    Eosinophilic asthma    Calcification of aorta    Blood pressure instability    Nonthrombocytopenic purpura    Tachy-mile syndrome    S/P placement of cardiac pacemaker     Patient is here for a chronic conditions follow up.    Next appt with me 11/23     Admitted [facility] 8/2/23 for pacemaker placement due to tachy-mile syndrome . Here with wife     Reviewed labs 5/23 TFTs, lipids, cmp, cbc normal     C/o fatigue, low energy, worsening exercise tolerance and occ sx of orthostasis. Only change in meds is fasenra added for asthma 10/20. Denies CP. Has chronically slow HR. Taking atenolol 25mg 1/2 po qd. Card Dr. Vogel, now Baker- c/o tiredness, fatigue and slow pulse on atenolol. PAF on eliquis     GI dr. Mullen colonoscopy 5/20/23-no polyps     PSA 12.4 urology Dr. Truong-has had multiple biopsies-no prostate cancer. 12.4  is low for him     Hst- no sig james. No machine needed     Eye Dr. Hester-glaucoma        Allergy Dr. Unger- History: July 16th HA. 19th hives and whelps.  Allergy Dr. Unger who was consulted during hospital stay.  Admitted after syncope in triage at [facility]. Diagnosed with anaphylaxis and shock from unknown etiology. Denies throat or tongue swelling or SOB. No known food allergies.   Had slow pulse and low BP.  Given steroids. Released 22nd.  Had purpuric lesions concerning for possible vasculitis. Complement as normal.  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 Has had erratic and high BP.  C/o fatigue and sleepiness.  BX neg for vasculitis.  On bid antihistamine zyrtec and claritin.  Rash cleared immediately after admission. Has had no further hives x 2 months. On daily antihistamines     Endocrine Dr. Duncan -Has + TPO Ab 9/18,hypothyroid     Pulm Dr. Burch -asthma. Having moire mucous prod and occ wheezing on symbicort 160, singulair. Takes claritin prn and uses flonase daily.      Card Dr. Vogel h/o TIA . PAf on eliquis  Objective:      Physical Exam  Vitals and nursing note reviewed.   Constitutional:       Appearance: He is well-developed.   Cardiovascular:      Rate and Rhythm: Normal rate and regular rhythm.      Heart sounds: Normal heart sounds.   Pulmonary:      Effort: Pulmonary effort is normal.      Breath sounds: Normal breath sounds.   Skin:     General: Skin is warm and dry.   Neurological:      Mental Status: He is alert and oriented to person, place, and time.         Assessment:       1. Tachy-mile syndrome    2. S/P placement of cardiac pacemaker    3. Essential hypertension    4. Fatigue, unspecified type        Plan:         1. Tachy-mile syndrome  S/p pacemaker placement    2. S/P placement of cardiac pacemaker  Cont card post op care and f/u    3. Essential hypertension  Controlled on current medications.  Continue current medications.      4. Fatigue,  unspecified type  May improve with pacemaker. Will monitor      Time spent with patient: 20 minutes    Patient with be reevaluated in 3 months or sooner prn    Greater than 50% of this visit was spent counseling as described in above documentation:Yes

## 2023-08-14 ENCOUNTER — TELEPHONE (OUTPATIENT)
Dept: CARDIOLOGY | Facility: CLINIC | Age: 83
End: 2023-08-14
Payer: MEDICARE

## 2023-08-14 NOTE — TELEPHONE ENCOUNTER
08/15/2023    Received the remote transmission showing Fast A%V with rates of 333/162 as of 08/04/2023.  He will be following up with Rep check on 09/05/2023.  He continues present medications, but no Beta Blockers at the time.    08/15/2023    Spoke with Dr Garsia and he stated that he needs to see the patient, that  he would probably need a Beta Blocker for his increased rates and check with him for any symptoms that he might be having.  Tia placed him on the schedule for tomorrow at 11:30 AM per Dr Garsia and he would evaluate.    Left msg on patient's phone about the appt time.    08/16/2023    Called the patient again this am and left another msg for his appt time today at 11:30 AM to see Dr Garsia.

## 2023-08-16 ENCOUNTER — OFFICE VISIT (OUTPATIENT)
Dept: CARDIOLOGY | Facility: CLINIC | Age: 83
End: 2023-08-16
Payer: MEDICARE

## 2023-08-16 VITALS
SYSTOLIC BLOOD PRESSURE: 143 MMHG | BODY MASS INDEX: 24.67 KG/M2 | WEIGHT: 171.94 LBS | DIASTOLIC BLOOD PRESSURE: 78 MMHG | HEART RATE: 62 BPM | OXYGEN SATURATION: 98 %

## 2023-08-16 DIAGNOSIS — I10 ESSENTIAL HYPERTENSION: ICD-10-CM

## 2023-08-16 DIAGNOSIS — Z86.73 HISTORY OF TIA (TRANSIENT ISCHEMIC ATTACK): ICD-10-CM

## 2023-08-16 DIAGNOSIS — Z95.0 S/P PLACEMENT OF CARDIAC PACEMAKER: ICD-10-CM

## 2023-08-16 DIAGNOSIS — E78.2 MIXED HYPERLIPIDEMIA: ICD-10-CM

## 2023-08-16 DIAGNOSIS — I49.5 TACHY-BRADY SYNDROME: Primary | ICD-10-CM

## 2023-08-16 DIAGNOSIS — I48.91 ATRIAL FIBRILLATION, UNSPECIFIED TYPE: ICD-10-CM

## 2023-08-16 DIAGNOSIS — I49.5 SSS (SICK SINUS SYNDROME): ICD-10-CM

## 2023-08-16 PROCEDURE — 1160F PR REVIEW ALL MEDS BY PRESCRIBER/CLIN PHARMACIST DOCUMENTED: ICD-10-PCS | Mod: CPTII,S$GLB,, | Performed by: GENERAL PRACTICE

## 2023-08-16 PROCEDURE — 3078F PR MOST RECENT DIASTOLIC BLOOD PRESSURE < 80 MM HG: ICD-10-PCS | Mod: CPTII,S$GLB,, | Performed by: GENERAL PRACTICE

## 2023-08-16 PROCEDURE — 1101F PR PT FALLS ASSESS DOC 0-1 FALLS W/OUT INJ PAST YR: ICD-10-PCS | Mod: CPTII,S$GLB,, | Performed by: GENERAL PRACTICE

## 2023-08-16 PROCEDURE — 99999 PR PBB SHADOW E&M-EST. PATIENT-LVL III: ICD-10-PCS | Mod: PBBFAC,,, | Performed by: GENERAL PRACTICE

## 2023-08-16 PROCEDURE — 99024 PR POST-OP FOLLOW-UP VISIT: ICD-10-PCS | Mod: S$GLB,,, | Performed by: GENERAL PRACTICE

## 2023-08-16 PROCEDURE — 1159F MED LIST DOCD IN RCRD: CPT | Mod: CPTII,S$GLB,, | Performed by: GENERAL PRACTICE

## 2023-08-16 PROCEDURE — 99999 PR PBB SHADOW E&M-EST. PATIENT-LVL III: CPT | Mod: PBBFAC,,, | Performed by: GENERAL PRACTICE

## 2023-08-16 PROCEDURE — 1101F PT FALLS ASSESS-DOCD LE1/YR: CPT | Mod: CPTII,S$GLB,, | Performed by: GENERAL PRACTICE

## 2023-08-16 PROCEDURE — 3078F DIAST BP <80 MM HG: CPT | Mod: CPTII,S$GLB,, | Performed by: GENERAL PRACTICE

## 2023-08-16 PROCEDURE — 3077F SYST BP >= 140 MM HG: CPT | Mod: CPTII,S$GLB,, | Performed by: GENERAL PRACTICE

## 2023-08-16 PROCEDURE — 1160F RVW MEDS BY RX/DR IN RCRD: CPT | Mod: CPTII,S$GLB,, | Performed by: GENERAL PRACTICE

## 2023-08-16 PROCEDURE — 99024 POSTOP FOLLOW-UP VISIT: CPT | Mod: S$GLB,,, | Performed by: GENERAL PRACTICE

## 2023-08-16 PROCEDURE — 3077F PR MOST RECENT SYSTOLIC BLOOD PRESSURE >= 140 MM HG: ICD-10-PCS | Mod: CPTII,S$GLB,, | Performed by: GENERAL PRACTICE

## 2023-08-16 PROCEDURE — 3288F FALL RISK ASSESSMENT DOCD: CPT | Mod: CPTII,S$GLB,, | Performed by: GENERAL PRACTICE

## 2023-08-16 PROCEDURE — 3288F PR FALLS RISK ASSESSMENT DOCUMENTED: ICD-10-PCS | Mod: CPTII,S$GLB,, | Performed by: GENERAL PRACTICE

## 2023-08-16 PROCEDURE — 1159F PR MEDICATION LIST DOCUMENTED IN MEDICAL RECORD: ICD-10-PCS | Mod: CPTII,S$GLB,, | Performed by: GENERAL PRACTICE

## 2023-08-16 RX ORDER — METOPROLOL SUCCINATE 25 MG/1
25 TABLET, EXTENDED RELEASE ORAL DAILY
Qty: 30 TABLET | Refills: 11 | Status: SHIPPED | OUTPATIENT
Start: 2023-08-16 | End: 2023-09-26

## 2023-08-16 NOTE — PROGRESS NOTES
Subjective:    Patient ID:  Maycol Hodge is a 83 y.o. male who presents for follow-up of   Chief Complaint   Patient presents with    Pacemaker Check       HPI:    Here for followup the pacemaker 08/01/2023    Receives some monitor alerts of tachycardias 15% of the time.  His been generally 01/20/2028, 2113, on August 4th.  On August 3rd he had 40 minutes duration at 2316 of atrial fibrillation.  His heart rates have gone up to approximately 150 per min.  3.7 hours/ day.  BP RUNNING 140 SYSTOLIC.          Inventory item: LEAD PACEMAKER VENT BIPOLAR 52CM Model/Cat number: 5076-52   Serial number: [id number] Device identifier: [id number]   Device identifier type: GS1 Date Implanted: 8/1/2023   Time Implanted:  2:46 PM     GUDID Information    Request status Successful     Brand name: Mention Mobile MRI SureScan® Version/Model: 5076-52   Company name: [employer] MRI safety info as of 8/1/23: MR Conditional   Contains dry or latex rubber: No     GMDN P.T. name: Endocardial pacing lead       As of 8/1/2023    Status: Implanted Implant Site: Chest wall - subcutaneous   Maximum Capability: PM-DDD(R) Underlying Rhythms: Sinus rhythm   Current Programmed Mode: AAIR<=>DDDR Magnet Rate (bpm): 85   RA Lead Threshold (V): 0.8 RA Lead Pulse Duration (ms): 0.4   RV Lead Threshold (V): 0.5 RV Lead Pulse Duration (ms): 0.4   Sensing P Wave (mV): 2.8 Sensing R Wave (mV): 11.1   RA Impedance (ohms): 475 RV Impedance (ohms): 627   Basic Pacing Rate (bpm): 60 Av Delay (ms): 180   RA Output Voltage (V): 3.5 RA Pulse Duration (ms): 0.4   RV Output Voltage (V): 3.5 RV Pulse Duration (ms): 0.4   RA Sensing Amplitude (mV): 0.3 RV Sensing Amplitude (mV): 0.9       Review of patient's allergies indicates:   Allergen Reactions    Doxycycline Rash     Causes rash, hives and itching    Augmentin [amoxicillin-pot clavulanate] Nausea And Vomiting    Diltiazem      pruritis    Hydrochlorothiazide      Causes elevate uric acid,  gout      Levaquin [levofloxacin] Swelling    Norvasc [amlodipine] Swelling    Pravastatin Rash       Past Medical History:   Diagnosis Date    A-fib     Arthritis     Asthma     Cancer     Hematuria     Hypertension     Hypothyroidism, unspecified     Skin cancer, basal cell     Stroke     Syncope and collapse     Thyroid disease     TIA (transient ischemic attack)      Past Surgical History:   Procedure Laterality Date    A-V CARDIAC PACEMAKER INSERTION N/A 2023    Procedure: INSERTION, CARDIAC PACEMAKER, DUAL CHAMBER;  Surgeon: Amrit Garsia MD;  Location: [facility] CATH/EP LAB;  Service: Cardiology;  Laterality: N/A;    COLONOSCOPY      COLONOSCOPY N/A 2020    Procedure: COLONOSCOPY;  Surgeon: Willis Mullen MD;  Location: [facility] ENDO;  Service: Endoscopy;  Laterality: N/A;    COLONOSCOPY N/A 2023    Procedure: COLONOSCOPY;  Surgeon: Willis Mullen MD;  Location: [facility] ENDO;  Service: Endoscopy;  Laterality: N/A;    CYSTOSCOPY      negative    EYE SURGERY      bilateral cataracts    HERNIA REPAIR      righht inguinal    JOINT REPLACEMENT Left     shoulder    left cataract surgery      PROSTATE SURGERY      TURP     Social History     Tobacco Use    Smoking status: Former     Current packs/day: 0.00     Types: Cigarettes     Quit date:      Years since quittin.6    Smokeless tobacco: Never    Tobacco comments:     Quit around    Substance Use Topics    Alcohol use: Yes     Alcohol/week: 7.0 standard drinks of alcohol     Types: 7 Shots of liquor per week     Comment: daily    Drug use: No     Family History   Problem Relation Age of Onset    Multiple sclerosis Mother     Heart disease Father     Stroke Father     No Known Problems Daughter     Cancer Neg Hx     Diabetes Neg Hx     Hypertension Neg Hx         Review of Systems:   Constitution: Negative for diaphoresis and fever.   HEENT: Negative for nosebleeds.    Cardiovascular: Negative for chest pain       No dyspnea on exertion        No leg swelling        No palpitations  Respiratory: Negative for shortness of breath and wheezing.    Hematologic/Lymphatic: Negative for bleeding problem. Does not bruise/bleed easily.   Skin: Negative for color change and rash.   Musculoskeletal: Negative for falls and myalgias.   Gastrointestinal: Negative for hematemesis and hematochezia.   Genitourinary: Negative for hematuria.   Neurological: Negative for dizziness and light-headedness.   Psychiatric/Behavioral: Negative for altered mental status and memory loss.          Objective:        Vitals:    08/16/23 1143   BP: (!) 143/78   Pulse: 62       Lab Results   Component Value Date    WBC 8.60 08/01/2023    WBC 8.60 08/01/2023    HGB 15.3 08/01/2023    HGB 15.3 08/01/2023    HCT 46.5 08/01/2023    HCT 46.5 08/01/2023     08/01/2023     08/01/2023    CHOL 207 (H) 05/04/2023    TRIG 103 05/04/2023    HDL 64 05/04/2023    ALT 17 08/01/2023    AST 22 08/01/2023     08/01/2023     08/01/2023    K 4.4 08/01/2023    K 4.4 08/01/2023     08/01/2023     08/01/2023    CREATININE 1.1 08/01/2023    CREATININE 1.1 08/01/2023    BUN 18 08/01/2023    BUN 18 08/01/2023    CO2 22 (L) 08/01/2023    CO2 22 (L) 08/01/2023    TSH 2.666 05/04/2023    PSA 12.4 (H) 02/17/2021    INR 1.0 08/01/2023    HGBA1C 5.3 10/26/2021        ECHOCARDIOGRAM RESULTS  Results for orders placed during the hospital encounter of 08/03/22    Echo    Interpretation Summary  · The left ventricle is normal in size with moderate concentric hypertrophy and normal systolic function.  · Normal left ventricular diastolic function.  · The estimated PA systolic pressure is 21 mmHg.  · Normal right ventricular size with normal right ventricular systolic function.  · Normal central venous pressure (3 mmHg).  · The estimated ejection fraction is 70%.  · Plaque present.  · Mild pulmonic regurgitation.  · Moderate aortic regurgitation.  · Mild tricuspid regurgitation.  · Mild  right atrial enlargement.        CURRENT/PREVIOUS VISIT EKG  Results for orders placed or performed during the hospital encounter of 08/01/23   EKG 12-lead    Collection Time: 08/01/23  3:19 PM    Narrative    Test Reason : Z95.0,    Vent. Rate : 060 BPM     Atrial Rate : 060 BPM     P-R Int : 254 ms          QRS Dur : 104 ms      QT Int : 418 ms       P-R-T Axes : 082 -38 040 degrees     QTc Int : 418 ms    Atrial-paced rhythm with prolonged AV conduction  Left axis deviation  Incomplete right bundle branch block  Abnormal ECG  When compared with ECG of 01-AUG-2023 11:19,  Electronic atrial pacemaker has replaced Sinus rhythm  Confirmed by Chandler Becerril MD ([id number]) on 8/5/2023 4:50:09 PM    Referred By: THOMAS LANDON           Confirmed By:Chandler Becerril MD     No valid procedures specified.   Results for orders placed during the hospital encounter of 08/03/22    Nuclear Stress - Cardiology Interpreted    Interpretation Summary    Normal myocardial perfusion scan. There is no evidence of myocardial ischemia or infarction.    The gated perfusion images showed an ejection fraction of 75% post stress. Normal ejection fraction is greater than 47%.    There is normal wall motion at rest and post stress.    LV cavity size is normal at rest and normal at stress.    The EKG portion of this study is negative for ischemia.    The patient reported no chest pain during the stress test.    There were no arrhythmias during stress.      Physical Exam:  CONSTITUTIONAL: No fever, no chills  HEENT: Normocephalic, atraumatic,pupils reactive to light                 NECK:  No JVD no carotid bruit  CVS: S1S2+, RRR, no murmurs,   LUNGS: Clear    PACEMAKER SITE WELL HEALED STERI STRIPS IN PLACE    ABDOMEN: Soft, NT, BS+  EXTREMITIES: No cyanosis, edema  : No fletcher catheter  NEURO: AAO X 3  PSY: Normal affect      Medication List with Changes/Refills   Current Medications    ALBUTEROL (PROVENTIL/VENTOLIN HFA) 90 MCG/ACTUATION INHALER     2 puffs every 4 hours as needed for cough, wheeze, or shortness of breath    ASCORBIC ACID, VITAMIN C, (VITAMIN C) 1000 MG TABLET    Take 1,000 mg by mouth once daily.    BENRALIZUMAB 30 MG/ML ATIN    Inject 1 pen into the skin as needed (every 56 days).    ELIQUIS 5 MG TAB    TAKE 1 TABLET(5 MG) BY MOUTH TWICE DAILY    FLUTICASONE-SALMETEROL DISKUS INHALER 250-50 MCG    Inhale 1 puff into the lungs 2 (two) times daily. Controller    GLUC HCL/GLUC JAMESON/[id number]-[id number] (GLUCOSAMINE COMPLEX ORAL)    Take 1 capsule by mouth once daily.    LATANOPROST 0.005 % OPHTHALMIC SOLUTION    Place 1 drop into both eyes every evening.     LEVOTHYROXINE (SYNTHROID) 50 MCG TABLET    TAKE 1 TABLET BY MOUTH EVERY MORNING ON TUES AND THURS    LEVOTHYROXINE (SYNTHROID) 75 MCG TABLET    TAKE 1 TABLET BY MOUTH EVERY MORNING ON MONDAY, WEDNESDAY, FRIDAY, SATURDAY, AND SUNDAY    MAGNESIUM HYDROXIDE (MAGNESIA ORAL)    Take 400 mg by mouth.    SPIRONOLACTONE (ALDACTONE) 25 MG TABLET    TAKE 1 TABLET(25 MG) BY MOUTH EVERY DAY    TERAZOSIN (HYTRIN) 2 MG CAPSULE    TAKE 1 CAPSULE(2 MG) BY MOUTH EVERY DAY             Assessment:       1. Tachy-mile syndrome    2. SSS (sick sinus syndrome)    3. History of TIA (transient ischemic attack)    4. Essential hypertension    5. Mixed hyperlipidemia    6. Atrial fibrillation, unspecified type    7. S/P placement of cardiac pacemaker         Plan:     Problem List Items Addressed This Visit          Neuro    History of TIA (transient ischemic attack)       Cardiac/Vascular    Essential hypertension    Hyperlipidemia    Tachy-mile syndrome - Primary    S/P placement of cardiac pacemaker     Other Visit Diagnoses       SSS (sick sinus syndrome)        Atrial fibrillation, unspecified type                    No follow-ups on file.    The patients questions were answered, they verbalized understanding, and agreed with the treatment plan.     THOMAS LANDON MD  [facility]    H and p  unchanged  See for gi/cv

## 2023-09-01 ENCOUNTER — OFFICE VISIT (OUTPATIENT)
Dept: CARDIOLOGY | Facility: CLINIC | Age: 83
End: 2023-09-01
Payer: MEDICARE

## 2023-09-01 VITALS
WEIGHT: 170 LBS | BODY MASS INDEX: 24.34 KG/M2 | HEIGHT: 70 IN | SYSTOLIC BLOOD PRESSURE: 112 MMHG | DIASTOLIC BLOOD PRESSURE: 64 MMHG

## 2023-09-01 DIAGNOSIS — I48.0 PAROXYSMAL ATRIAL FIBRILLATION: Primary | ICD-10-CM

## 2023-09-01 DIAGNOSIS — I48.92 ATRIAL FLUTTER, UNSPECIFIED TYPE: ICD-10-CM

## 2023-09-01 DIAGNOSIS — Z95.0 S/P PLACEMENT OF CARDIAC PACEMAKER: ICD-10-CM

## 2023-09-01 DIAGNOSIS — I49.5 TACHY-BRADY SYNDROME: ICD-10-CM

## 2023-09-01 DIAGNOSIS — I10 ESSENTIAL HYPERTENSION: ICD-10-CM

## 2023-09-01 PROCEDURE — 99214 OFFICE O/P EST MOD 30 MIN: CPT | Mod: S$GLB,,, | Performed by: NURSE PRACTITIONER

## 2023-09-01 PROCEDURE — 99999 PR PBB SHADOW E&M-EST. PATIENT-LVL III: CPT | Mod: PBBFAC,,, | Performed by: NURSE PRACTITIONER

## 2023-09-01 PROCEDURE — 1126F AMNT PAIN NOTED NONE PRSNT: CPT | Mod: CPTII,S$GLB,, | Performed by: NURSE PRACTITIONER

## 2023-09-01 PROCEDURE — 3074F SYST BP LT 130 MM HG: CPT | Mod: CPTII,S$GLB,, | Performed by: NURSE PRACTITIONER

## 2023-09-01 PROCEDURE — 3288F PR FALLS RISK ASSESSMENT DOCUMENTED: ICD-10-PCS | Mod: CPTII,S$GLB,, | Performed by: NURSE PRACTITIONER

## 2023-09-01 PROCEDURE — 1160F RVW MEDS BY RX/DR IN RCRD: CPT | Mod: CPTII,S$GLB,, | Performed by: NURSE PRACTITIONER

## 2023-09-01 PROCEDURE — 1101F PT FALLS ASSESS-DOCD LE1/YR: CPT | Mod: CPTII,S$GLB,, | Performed by: NURSE PRACTITIONER

## 2023-09-01 PROCEDURE — 3288F FALL RISK ASSESSMENT DOCD: CPT | Mod: CPTII,S$GLB,, | Performed by: NURSE PRACTITIONER

## 2023-09-01 PROCEDURE — 1159F MED LIST DOCD IN RCRD: CPT | Mod: CPTII,S$GLB,, | Performed by: NURSE PRACTITIONER

## 2023-09-01 PROCEDURE — 1101F PR PT FALLS ASSESS DOC 0-1 FALLS W/OUT INJ PAST YR: ICD-10-PCS | Mod: CPTII,S$GLB,, | Performed by: NURSE PRACTITIONER

## 2023-09-01 PROCEDURE — 99214 PR OFFICE/OUTPT VISIT, EST, LEVL IV, 30-39 MIN: ICD-10-PCS | Mod: S$GLB,,, | Performed by: NURSE PRACTITIONER

## 2023-09-01 PROCEDURE — 1160F PR REVIEW ALL MEDS BY PRESCRIBER/CLIN PHARMACIST DOCUMENTED: ICD-10-PCS | Mod: CPTII,S$GLB,, | Performed by: NURSE PRACTITIONER

## 2023-09-01 PROCEDURE — 3078F PR MOST RECENT DIASTOLIC BLOOD PRESSURE < 80 MM HG: ICD-10-PCS | Mod: CPTII,S$GLB,, | Performed by: NURSE PRACTITIONER

## 2023-09-01 PROCEDURE — 99999 PR PBB SHADOW E&M-EST. PATIENT-LVL III: ICD-10-PCS | Mod: PBBFAC,,, | Performed by: NURSE PRACTITIONER

## 2023-09-01 PROCEDURE — 93000 EKG 12-LEAD: ICD-10-PCS | Mod: S$GLB,,, | Performed by: INTERNAL MEDICINE

## 2023-09-01 PROCEDURE — 1126F PR PAIN SEVERITY QUANTIFIED, NO PAIN PRESENT: ICD-10-PCS | Mod: CPTII,S$GLB,, | Performed by: NURSE PRACTITIONER

## 2023-09-01 PROCEDURE — 1159F PR MEDICATION LIST DOCUMENTED IN MEDICAL RECORD: ICD-10-PCS | Mod: CPTII,S$GLB,, | Performed by: NURSE PRACTITIONER

## 2023-09-01 PROCEDURE — 93000 ELECTROCARDIOGRAM COMPLETE: CPT | Mod: S$GLB,,, | Performed by: INTERNAL MEDICINE

## 2023-09-01 PROCEDURE — 3074F PR MOST RECENT SYSTOLIC BLOOD PRESSURE < 130 MM HG: ICD-10-PCS | Mod: CPTII,S$GLB,, | Performed by: NURSE PRACTITIONER

## 2023-09-01 PROCEDURE — 3078F DIAST BP <80 MM HG: CPT | Mod: CPTII,S$GLB,, | Performed by: NURSE PRACTITIONER

## 2023-09-01 RX ORDER — AMIODARONE HYDROCHLORIDE 200 MG/1
200 TABLET ORAL 2 TIMES DAILY
Qty: 60 TABLET | Refills: 11 | Status: SHIPPED | OUTPATIENT
Start: 2023-09-01 | End: 2023-12-13 | Stop reason: SDUPTHER

## 2023-09-01 NOTE — PROGRESS NOTES
Subjective:    Patient ID:  Maycol Hodge is a 83 y.o. male   Chief Complaint   Patient presents with    Follow-up       HPI:  Patient seen today for follow-up appointment.  He reports that he has been feeling well except today he is feeling very tired and fatigued.  He recently had pacemaker placed and states that he noted an improvement after the pacemaker was placed but today he is feeling bad again.    Review of patient's allergies indicates:   Allergen Reactions    Doxycycline Rash     Causes rash, hives and itching    Augmentin [amoxicillin-pot clavulanate] Nausea And Vomiting    Diltiazem      pruritis    Hydrochlorothiazide      Causes elevate uric acid, gout      Levaquin [levofloxacin] Swelling    Norvasc [amlodipine] Swelling    Pravastatin Rash       Past Medical History:   Diagnosis Date    A-fib     Arthritis     Asthma     Cancer     Hematuria     Hypertension     Hypothyroidism, unspecified     Skin cancer, basal cell     Stroke     Syncope and collapse     Thyroid disease     TIA (transient ischemic attack)      Past Surgical History:   Procedure Laterality Date    A-V CARDIAC PACEMAKER INSERTION N/A 8/1/2023    Procedure: INSERTION, CARDIAC PACEMAKER, DUAL CHAMBER;  Surgeon: Amrit Garsia MD;  Location: [facility] CATH/EP LAB;  Service: Cardiology;  Laterality: N/A;    COLONOSCOPY      COLONOSCOPY N/A 1/21/2020    Procedure: COLONOSCOPY;  Surgeon: Willis Mullen MD;  Location: [facility] ENDO;  Service: Endoscopy;  Laterality: N/A;    COLONOSCOPY N/A 5/30/2023    Procedure: COLONOSCOPY;  Surgeon: Willis Mullen MD;  Location: [facility] ENDO;  Service: Endoscopy;  Laterality: N/A;    CYSTOSCOPY      negative    EYE SURGERY      bilateral cataracts    HERNIA REPAIR      righht inguinal    JOINT REPLACEMENT Left     shoulder    left cataract surgery      PROSTATE SURGERY      TURP     Social History     Tobacco Use    Smoking status: Former     Current packs/day: 0.00     Types: Cigarettes     Quit date:  1955     Years since quittin.7    Smokeless tobacco: Never    Tobacco comments:     Quit around    Substance Use Topics    Alcohol use: Yes     Alcohol/week: 7.0 standard drinks of alcohol     Types: 7 Shots of liquor per week     Comment: daily    Drug use: No     Family History   Problem Relation Age of Onset    Multiple sclerosis Mother     Heart disease Father     Stroke Father     No Known Problems Daughter     Cancer Neg Hx     Diabetes Neg Hx     Hypertension Neg Hx         Review of Systems:   Per HPI         Objective:        Vitals:    23 1044   BP: 112/64       Lab Results   Component Value Date    WBC 8.60 2023    WBC 8.60 2023    HGB 15.3 2023    HGB 15.3 2023    HCT 46.5 2023    HCT 46.5 2023     2023     2023    CHOL 207 (H) 2023    TRIG 103 2023    HDL 64 2023    ALT 17 2023    AST 22 2023     2023     2023    K 4.4 2023    K 4.4 2023     2023     2023    CREATININE 1.1 2023    CREATININE 1.1 2023    BUN 18 2023    BUN 18 2023    CO2 22 (L) 2023    CO2 22 (L) 2023    TSH 2.666 2023    PSA 12.4 (H) 2021    INR 1.0 2023    HGBA1C 5.3 10/26/2021        ECHOCARDIOGRAM RESULTS  Results for orders placed during the hospital encounter of 22    Echo    Interpretation Summary  · The left ventricle is normal in size with moderate concentric hypertrophy and normal systolic function.  · Normal left ventricular diastolic function.  · The estimated PA systolic pressure is 21 mmHg.  · Normal right ventricular size with normal right ventricular systolic function.  · Normal central venous pressure (3 mmHg).  · The estimated ejection fraction is 70%.  · Plaque present.  · Mild pulmonic regurgitation.  · Moderate aortic regurgitation.  · Mild tricuspid regurgitation.  · Mild right atrial  enlargement.        CURRENT/PREVIOUS VISIT EKG  Results for orders placed or performed during the hospital encounter of 08/01/23   EKG 12-lead    Collection Time: 08/01/23  3:19 PM    Narrative    Test Reason : Z95.0,    Vent. Rate : 060 BPM     Atrial Rate : 060 BPM     P-R Int : 254 ms          QRS Dur : 104 ms      QT Int : 418 ms       P-R-T Axes : 082 -38 040 degrees     QTc Int : 418 ms    Atrial-paced rhythm with prolonged AV conduction  Left axis deviation  Incomplete right bundle branch block  Abnormal ECG  When compared with ECG of 01-AUG-2023 11:19,  Electronic atrial pacemaker has replaced Sinus rhythm  Confirmed by Chandler Becerril MD ([id number]) on 8/5/2023 4:50:09 PM    Referred By: THOMAS LANDON           Confirmed By:Chandler Becerril MD     No valid procedures specified.   Results for orders placed during the hospital encounter of 08/03/22    Nuclear Stress - Cardiology Interpreted    Interpretation Summary    Normal myocardial perfusion scan. There is no evidence of myocardial ischemia or infarction.    The gated perfusion images showed an ejection fraction of 75% post stress. Normal ejection fraction is greater than 47%.    There is normal wall motion at rest and post stress.    LV cavity size is normal at rest and normal at stress.    The EKG portion of this study is negative for ischemia.    The patient reported no chest pain during the stress test.    There were no arrhythmias during stress.      Physical Exam:  CONSTITUTIONAL: No fever, no chills  HEENT: Normocephalic, atraumatic,pupils reactive to light                 NECK:  No JVD no carotid bruit  CVS: S1S2+, iRRR  LUNGS: Clear  ABDOMEN: Soft, NT, BS+  EXTREMITIES: No cyanosis, edema  : No fletcher catheter  NEURO: AAO X 3  PSY: Normal affect      Medication List with Changes/Refills   New Medications    AMIODARONE (PACERONE) 200 MG TAB    1 tablet (200 mg total) by Per NG tube route 2 (two) times daily.   Current Medications    ALBUTEROL  (PROVENTIL/VENTOLIN HFA) 90 MCG/ACTUATION INHALER    2 puffs every 4 hours as needed for cough, wheeze, or shortness of breath    ASCORBIC ACID, VITAMIN C, (VITAMIN C) 1000 MG TABLET    Take 1,000 mg by mouth once daily.    BENRALIZUMAB 30 MG/ML ATIN    Inject 1 pen into the skin as needed (every 56 days).    ELIQUIS 5 MG TAB    TAKE 1 TABLET(5 MG) BY MOUTH TWICE DAILY    FLUTICASONE-SALMETEROL DISKUS INHALER 250-50 MCG    Inhale 1 puff into the lungs 2 (two) times daily. Controller    GLUC HCL/GLUC JAMESON/CZ-[id number]-[id number] (GLUCOSAMINE COMPLEX ORAL)    Take 1 capsule by mouth once daily.    LATANOPROST 0.005 % OPHTHALMIC SOLUTION    Place 1 drop into both eyes every evening.     LEVOTHYROXINE (SYNTHROID) 50 MCG TABLET    TAKE 1 TABLET BY MOUTH EVERY MORNING ON TUES AND THURS    LEVOTHYROXINE (SYNTHROID) 75 MCG TABLET    TAKE 1 TABLET BY MOUTH EVERY MORNING ON MONDAY, WEDNESDAY, FRIDAY, SATURDAY, AND SUNDAY    MAGNESIUM HYDROXIDE (MAGNESIA ORAL)    Take 400 mg by mouth.    METOPROLOL SUCCINATE (TOPROL-XL) 25 MG 24 HR TABLET    Take 1 tablet (25 mg total) by mouth once daily.    SPIRONOLACTONE (ALDACTONE) 25 MG TABLET    TAKE 1 TABLET(25 MG) BY MOUTH EVERY DAY    TERAZOSIN (HYTRIN) 2 MG CAPSULE    TAKE 1 CAPSULE(2 MG) BY MOUTH EVERY DAY             Assessment:       1. Paroxysmal atrial fibrillation    2. S/P placement of cardiac pacemaker    3. Essential hypertension    4. Tachy-mile syndrome    5. Atrial flutter, unspecified type         Plan:     Problem List Items Addressed This Visit          Unprioritized    Essential hypertension    Current Assessment & Plan     BP stable on current regimen. It has been running a bit higher at home in the 140s.          Paroxysmal atrial fibrillation - Primary    Relevant Orders    IN OFFICE EKG 12-LEAD (to [facility])    Case Request-Cath Lab: TRANSESOPHAGEAL ECHOCARDIOGRAM WITH POSSIBLE CARDIOVERSION (VINOD W/ POSS CARDIOVERSION) (Completed)    Tachy-mile syndrome    S/P placement of  cardiac pacemaker    Current Assessment & Plan     For pacer check next week.         Atrial flutter    Current Assessment & Plan     Patient reports that yesterday he was feeling relatively well but today he is noticed he feels very fatigued and tired.  He is noted to be in atrial flutter as his underlying rhythm on EKG with ventricular pacing.  He has noted EKGs from within the past 1 month that showed sinus rhythm.  He is on Eliquis due to history of paroxysmal atrial fibrillation and should continue with that.  Will start him on amiodarone 200 mg by mouth twice daily.  Discussed the option for VINOD cardioversion. Risks of VINOD were discussed with patient the risks include but not limited to oropharyngeal trauma, dental trauma, trauma to the esophagus, possible aspiration, and reaction to anesthesia.  Risks of cardioversion include but are not limited to arrhythmia and stroke.   He is agreeable.          Relevant Orders    Case Request-Cath Lab: TRANSESOPHAGEAL ECHOCARDIOGRAM WITH POSSIBLE CARDIOVERSION (VINOD W/ POSS CARDIOVERSION) (Completed)       Follow up in about 4 weeks (around 9/29/2023).

## 2023-09-01 NOTE — ASSESSMENT & PLAN NOTE
Patient reports that yesterday he was feeling relatively well but today he is noticed he feels very fatigued and tired.  He is noted to be in atrial flutter as his underlying rhythm on EKG with ventricular pacing.  He has noted EKGs from within the past 1 month that showed sinus rhythm.  He is on Eliquis due to history of paroxysmal atrial fibrillation and should continue with that.  Will start him on amiodarone 200 mg by mouth twice daily.  Discussed the option for VINOD cardioversion. Risks of VINOD were discussed with patient the risks include but not limited to oropharyngeal trauma, dental trauma, trauma to the esophagus, possible aspiration, and reaction to anesthesia.  Risks of cardioversion include but are not limited to arrhythmia and stroke.   He is agreeable.

## 2023-09-05 ENCOUNTER — TELEPHONE (OUTPATIENT)
Dept: CARDIOLOGY | Facility: CLINIC | Age: 83
End: 2023-09-05
Payer: MEDICARE

## 2023-09-05 DIAGNOSIS — Z86.73 HISTORY OF TIA (TRANSIENT ISCHEMIC ATTACK): Primary | ICD-10-CM

## 2023-09-05 NOTE — TELEPHONE ENCOUNTER
----- Message from Savanna Kimble MA sent at 9/5/2023 10:34 AM CDT -----    ----- Message -----  From: Guilherme Long  Sent: 9/5/2023   9:14 AM CDT  To: [facility] Staff    Type: Need Medical Advice   Who Called: Patient  Best callback number: [phone]  Additional Information: Patient called and asked if he should stop his elquis for his upcoming procedure, also since he started taking amiodarone (PACERONE) 200 MG Tab h has been seeing blood in his urine quit often   Please call to further assist, Thanks.

## 2023-09-11 ENCOUNTER — TELEPHONE (OUTPATIENT)
Dept: CARDIOLOGY | Facility: CLINIC | Age: 83
End: 2023-09-11
Payer: MEDICARE

## 2023-09-11 NOTE — TELEPHONE ENCOUNTER
----- Message from Ruthann Kurtz sent at 9/11/2023  1:53 PM CDT -----  Type:  Patient Returning Call    Who Called:  pt  Who Left Message for Patient:  Shyla  Does the patient know what this is regarding?:  yes  Best Call Back Number [phone]     Additional Information:  please call and advise--thank you

## 2023-09-12 ENCOUNTER — HOSPITAL ENCOUNTER (OUTPATIENT)
Facility: HOSPITAL | Age: 83
Discharge: HOME | End: 2023-09-12
Attending: GENERAL PRACTICE | Admitting: GENERAL PRACTICE
Payer: MEDICARE

## 2023-09-12 DIAGNOSIS — I48.0 PAROXYSMAL ATRIAL FIBRILLATION: ICD-10-CM

## 2023-09-12 DIAGNOSIS — I48.91 A-FIB: ICD-10-CM

## 2023-09-12 DIAGNOSIS — I48.92 ATRIAL FLUTTER, UNSPECIFIED TYPE: ICD-10-CM

## 2023-09-12 PROCEDURE — 93010 EKG 12-LEAD: ICD-10-PCS | Mod: ,,, | Performed by: SPECIALIST

## 2023-09-12 PROCEDURE — 93005 ELECTROCARDIOGRAM TRACING: CPT | Performed by: SPECIALIST

## 2023-09-12 PROCEDURE — 93010 ELECTROCARDIOGRAM REPORT: CPT | Mod: ,,, | Performed by: SPECIALIST

## 2023-09-12 NOTE — NURSING
Ekg shows SR atrial paced, Ekg sent to Dr. Garsia for review. Procedure canceled and pt sent home per MD instructions

## 2023-09-26 ENCOUNTER — OFFICE VISIT (OUTPATIENT)
Dept: CARDIOLOGY | Facility: CLINIC | Age: 83
End: 2023-09-26
Payer: MEDICARE

## 2023-09-26 VITALS
HEART RATE: 69 BPM | DIASTOLIC BLOOD PRESSURE: 80 MMHG | BODY MASS INDEX: 24.56 KG/M2 | OXYGEN SATURATION: 98 % | WEIGHT: 171.19 LBS | SYSTOLIC BLOOD PRESSURE: 157 MMHG

## 2023-09-26 DIAGNOSIS — R00.1 BRADYCARDIA: ICD-10-CM

## 2023-09-26 DIAGNOSIS — I49.5 TACHY-BRADY SYNDROME: ICD-10-CM

## 2023-09-26 DIAGNOSIS — I70.0 CALCIFICATION OF AORTA: ICD-10-CM

## 2023-09-26 DIAGNOSIS — I48.92 ATRIAL FLUTTER, UNSPECIFIED TYPE: ICD-10-CM

## 2023-09-26 DIAGNOSIS — R00.0 TACHYCARDIA: ICD-10-CM

## 2023-09-26 DIAGNOSIS — I10 ESSENTIAL HYPERTENSION: ICD-10-CM

## 2023-09-26 DIAGNOSIS — Z95.0 STATUS POST BIVENTRICULAR CARDIAC PACEMAKER INSERTION: ICD-10-CM

## 2023-09-26 DIAGNOSIS — E02 SUBCLINICAL IODINE-DEFICIENCY HYPOTHYROIDISM: ICD-10-CM

## 2023-09-26 DIAGNOSIS — Z86.73 HISTORY OF TIA (TRANSIENT ISCHEMIC ATTACK): Primary | ICD-10-CM

## 2023-09-26 DIAGNOSIS — I49.5 SSS (SICK SINUS SYNDROME): ICD-10-CM

## 2023-09-26 DIAGNOSIS — E78.2 MIXED HYPERLIPIDEMIA: ICD-10-CM

## 2023-09-26 DIAGNOSIS — R79.1 ABNORMAL COAGULATION PROFILE: ICD-10-CM

## 2023-09-26 DIAGNOSIS — I48.0 PAROXYSMAL ATRIAL FIBRILLATION: ICD-10-CM

## 2023-09-26 DIAGNOSIS — E03.9 HYPOTHYROIDISM, UNSPECIFIED TYPE: ICD-10-CM

## 2023-09-26 DIAGNOSIS — J82.83 EOSINOPHILIC ASTHMA: ICD-10-CM

## 2023-09-26 DIAGNOSIS — Z95.0 S/P PLACEMENT OF CARDIAC PACEMAKER: ICD-10-CM

## 2023-09-26 PROCEDURE — 99999 PR PBB SHADOW E&M-EST. PATIENT-LVL IV: ICD-10-PCS | Mod: PBBFAC,,, | Performed by: GENERAL PRACTICE

## 2023-09-26 PROCEDURE — 93000 ELECTROCARDIOGRAM COMPLETE: CPT | Mod: S$GLB,,, | Performed by: GENERAL PRACTICE

## 2023-09-26 PROCEDURE — 3077F PR MOST RECENT SYSTOLIC BLOOD PRESSURE >= 140 MM HG: ICD-10-PCS | Mod: CPTII,S$GLB,, | Performed by: GENERAL PRACTICE

## 2023-09-26 PROCEDURE — 1159F PR MEDICATION LIST DOCUMENTED IN MEDICAL RECORD: ICD-10-PCS | Mod: CPTII,S$GLB,, | Performed by: GENERAL PRACTICE

## 2023-09-26 PROCEDURE — 99024 POSTOP FOLLOW-UP VISIT: CPT | Mod: S$GLB,,, | Performed by: GENERAL PRACTICE

## 2023-09-26 PROCEDURE — 3079F PR MOST RECENT DIASTOLIC BLOOD PRESSURE 80-89 MM HG: ICD-10-PCS | Mod: CPTII,S$GLB,, | Performed by: GENERAL PRACTICE

## 2023-09-26 PROCEDURE — 3077F SYST BP >= 140 MM HG: CPT | Mod: CPTII,S$GLB,, | Performed by: GENERAL PRACTICE

## 2023-09-26 PROCEDURE — 3288F FALL RISK ASSESSMENT DOCD: CPT | Mod: CPTII,S$GLB,, | Performed by: GENERAL PRACTICE

## 2023-09-26 PROCEDURE — 99024 PR POST-OP FOLLOW-UP VISIT: ICD-10-PCS | Mod: S$GLB,,, | Performed by: GENERAL PRACTICE

## 2023-09-26 PROCEDURE — 1159F MED LIST DOCD IN RCRD: CPT | Mod: CPTII,S$GLB,, | Performed by: GENERAL PRACTICE

## 2023-09-26 PROCEDURE — 3288F PR FALLS RISK ASSESSMENT DOCUMENTED: ICD-10-PCS | Mod: CPTII,S$GLB,, | Performed by: GENERAL PRACTICE

## 2023-09-26 PROCEDURE — 1101F PT FALLS ASSESS-DOCD LE1/YR: CPT | Mod: CPTII,S$GLB,, | Performed by: GENERAL PRACTICE

## 2023-09-26 PROCEDURE — 1101F PR PT FALLS ASSESS DOC 0-1 FALLS W/OUT INJ PAST YR: ICD-10-PCS | Mod: CPTII,S$GLB,, | Performed by: GENERAL PRACTICE

## 2023-09-26 PROCEDURE — 3079F DIAST BP 80-89 MM HG: CPT | Mod: CPTII,S$GLB,, | Performed by: GENERAL PRACTICE

## 2023-09-26 PROCEDURE — 93000 EKG 12-LEAD: ICD-10-PCS | Mod: S$GLB,,, | Performed by: GENERAL PRACTICE

## 2023-09-26 PROCEDURE — 99999 PR PBB SHADOW E&M-EST. PATIENT-LVL IV: CPT | Mod: PBBFAC,,, | Performed by: GENERAL PRACTICE

## 2023-09-26 RX ORDER — VERAPAMIL HYDROCHLORIDE 180 MG/1
180 CAPSULE, EXTENDED RELEASE ORAL DAILY
Qty: 30 CAPSULE | Refills: 11 | Status: SHIPPED | OUTPATIENT
Start: 2023-09-26 | End: 2024-09-25

## 2023-09-26 RX ORDER — LEVOTHYROXINE SODIUM 50 UG/1
TABLET ORAL
Qty: 26 TABLET | Refills: 2 | Status: SHIPPED | OUTPATIENT
Start: 2023-09-26

## 2023-09-26 NOTE — TELEPHONE ENCOUNTER
No care due was identified.  [facility] Embedded Care Due Messages. Reference number: [id number].   9/26/2023 10:52:21 AM CDT

## 2023-09-26 NOTE — PROGRESS NOTES
Subjective:    Patient ID:  Maycol Hodge is a 83 y.o. male who presents for follow-up of No chief complaint on file.      HPI:      9/26/23      Pacemaker insert 8/1/23  FLUTTER  sept 5 2023  HE WAS PLACED ON AMIODARONE AND SCHEDULED FOR CARDIOVERSION BUT SPONTANEOUSLY CONVERTED.    I CAN NOT PULL UP HIS EKGS CURRENTLY BUT THE PACEMAKER TRACINGS LOOK LIKE ATRIAL FLUTTER  Gets KYLE when walk. Feels heat beat irregular at times when checking Bp.  IT IS LIGHTHEADED IF HE STANDS UP QUICKLY    PACEMAKER INTERROGATION 09/05/2023 REVEALED A 4 SVT LONGEST 43 SEC,PREVIOUSLY THE LONGEST 76 HOURS ON AUGUST 11TH             Tachy-mile syndrome    2. SSS (sick sinus syndrome)    3. History of TIA (transient ischemic attack)    4. Essential hypertension    5. Mixed hyperlipidemia    6. Atrial fibrillation, unspecified type    7. S/P placement of cardiac pacemaker             Plan:      Problem List Items Addressed This Visit                  Neuro     History of TIA (transient ischemic attack)          Cardiac/Vascular     Essential hypertension     Hyperlipidemia     Tachy-mile syndrome - Primary     S/P placement of cardiac pacemaker           Here for followup the pacemaker 08/01/2023     Receives some monitor alerts of tachycardias 15% of the time.  His been generally 01/20/2028, 2113, on August 4th.  On August 3rd he had 40 minutes duration at 2316 of atrial fibrillation.  His heart rates have gone up to approximately 150 per min.  3.7 hours/ day.  BP RUNNING 140 SYSTOLIC.      Review of patient's allergies indicates:   Allergen Reactions    Doxycycline Rash     Causes rash, hives and itching    Augmentin [amoxicillin-pot clavulanate] Nausea And Vomiting    Diltiazem      pruritis    Hydrochlorothiazide      Causes elevate uric acid, gout      Levaquin [levofloxacin] Swelling    Norvasc [amlodipine] Swelling    Pravastatin Rash       Past Medical History:   Diagnosis Date    A-fib     Arthritis     Asthma     Cancer      Hematuria     Hypertension     Hypothyroidism, unspecified     Skin cancer, basal cell     Stroke     Syncope and collapse     Thyroid disease     TIA (transient ischemic attack)      Past Surgical History:   Procedure Laterality Date    A-V CARDIAC PACEMAKER INSERTION N/A 2023    Procedure: INSERTION, CARDIAC PACEMAKER, DUAL CHAMBER;  Surgeon: Amrit Garsia MD;  Location: [facility] CATH/EP LAB;  Service: Cardiology;  Laterality: N/A;    COLONOSCOPY      COLONOSCOPY N/A 2020    Procedure: COLONOSCOPY;  Surgeon: Willis Mullen MD;  Location: [facility] ENDO;  Service: Endoscopy;  Laterality: N/A;    COLONOSCOPY N/A 2023    Procedure: COLONOSCOPY;  Surgeon: Willis Mullen MD;  Location: [facility] ENDO;  Service: Endoscopy;  Laterality: N/A;    CYSTOSCOPY      negative    EYE SURGERY      bilateral cataracts    HERNIA REPAIR      righht inguinal    JOINT REPLACEMENT Left     shoulder    left cataract surgery      PROSTATE SURGERY      TURP     Social History     Tobacco Use    Smoking status: Former     Current packs/day: 0.00     Types: Cigarettes     Quit date:      Years since quittin.7    Smokeless tobacco: Never    Tobacco comments:     Quit around    Substance Use Topics    Alcohol use: Yes     Alcohol/week: 7.0 standard drinks of alcohol     Types: 7 Shots of liquor per week     Comment: daily    Drug use: No     Family History   Problem Relation Age of Onset    Multiple sclerosis Mother     Heart disease Father     Stroke Father     No Known Problems Daughter     Cancer Neg Hx     Diabetes Neg Hx     Hypertension Neg Hx         Review of Systems:   Constitution: Negative for diaphoresis and fever.   HEENT: Negative for nosebleeds.    Cardiovascular: Negative for chest pain       No dyspnea on exertion       No leg swelling        No palpitations  Respiratory: Negative for shortness of breath and wheezing.    Hematologic/Lymphatic: Negative for bleeding problem. Does not bruise/bleed  easily.   Skin: Negative for color change and rash.   Musculoskeletal: Negative for falls and myalgias.   Gastrointestinal: Negative for hematemesis and hematochezia.   Genitourinary: Negative for hematuria.   Neurological: Negative for dizziness and light-headedness.   Psychiatric/Behavioral: Negative for altered mental status and memory loss.          Objective:        There were no vitals filed for this visit.    Lab Results   Component Value Date    WBC 8.60 08/01/2023    WBC 8.60 08/01/2023    HGB 15.3 08/01/2023    HGB 15.3 08/01/2023    HCT 46.5 08/01/2023    HCT 46.5 08/01/2023     08/01/2023     08/01/2023    CHOL 207 (H) 05/04/2023    TRIG 103 05/04/2023    HDL 64 05/04/2023    ALT 17 08/01/2023    AST 22 08/01/2023     08/01/2023     08/01/2023    K 4.4 08/01/2023    K 4.4 08/01/2023     08/01/2023     08/01/2023    CREATININE 1.1 08/01/2023    CREATININE 1.1 08/01/2023    BUN 18 08/01/2023    BUN 18 08/01/2023    CO2 22 (L) 08/01/2023    CO2 22 (L) 08/01/2023    TSH 2.666 05/04/2023    PSA 12.4 (H) 02/17/2021    INR 1.0 08/01/2023    HGBA1C 5.3 10/26/2021        ECHOCARDIOGRAM RESULTS  Results for orders placed during the hospital encounter of 08/03/22    Echo    Interpretation Summary  · The left ventricle is normal in size with moderate concentric hypertrophy and normal systolic function.  · Normal left ventricular diastolic function.  · The estimated PA systolic pressure is 21 mmHg.  · Normal right ventricular size with normal right ventricular systolic function.  · Normal central venous pressure (3 mmHg).  · The estimated ejection fraction is 70%.  · Plaque present.  · Mild pulmonic regurgitation.  · Moderate aortic regurgitation.  · Mild tricuspid regurgitation.  · Mild right atrial enlargement.        CURRENT/PREVIOUS VISIT EKG  Results for orders placed or performed during the hospital encounter of 09/12/23   EKG 12-lead    Collection Time: 09/12/23  6:16 AM     Narrative    Test Reason : I48.91,    Vent. Rate : 070 BPM     Atrial Rate : 070 BPM     P-R Int : 272 ms          QRS Dur : 096 ms      QT Int : 416 ms       P-R-T Axes : 074 -42 066 degrees     QTc Int : 449 ms    Atrial-paced rhythm with prolonged AV conduction with frequent   ventricular-paced complexes  Left axis deviation  Incomplete right bundle branch block  Abnormal ECG  When compared with ECG of 01-SEP-2023 10:43,  Premature ventricular complexes are no longer Present  Confirmed by Rd MG, Bipin TOBIAS ([id number]) on 9/17/2023 8:02:31 PM    Referred By: THOMAS LANDON           Confirmed By:Bipin Sultana MD     No valid procedures specified.   Results for orders placed during the hospital encounter of 08/03/22    Nuclear Stress - Cardiology Interpreted    Interpretation Summary    Normal myocardial perfusion scan. There is no evidence of myocardial ischemia or infarction.    The gated perfusion images showed an ejection fraction of 75% post stress. Normal ejection fraction is greater than 47%.    There is normal wall motion at rest and post stress.    LV cavity size is normal at rest and normal at stress.    The EKG portion of this study is negative for ischemia.    The patient reported no chest pain during the stress test.    There were no arrhythmias during stress.      Physical Exam:  CONSTITUTIONAL: No fever, no chills  HEENT: Normocephalic, atraumatic,pupils reactive to light                 NECK:  No JVD no carotid bruit  CVS: S1S2+, RRR, no murmurs,   LUNGS: Clear  ABDOMEN: Soft, NT, BS+  EXTREMITIES: No cyanosis, edema  : No fletcher catheter  NEURO: AAO X 3  PSY: Normal affect      Medication List with Changes/Refills   Current Medications    ALBUTEROL (PROVENTIL/VENTOLIN HFA) 90 MCG/ACTUATION INHALER    2 puffs every 4 hours as needed for cough, wheeze, or shortness of breath    AMIODARONE (PACERONE) 200 MG TAB    1 tablet (200 mg total) by Per NG tube route 2 (two) times daily.    ASCORBIC ACID,  VITAMIN C, (VITAMIN C) 1000 MG TABLET    Take 1,000 mg by mouth once daily.    BENRALIZUMAB 30 MG/ML ATIN    Inject 1 pen into the skin as needed (every 56 days).    ELIQUIS 5 MG TAB    TAKE 1 TABLET(5 MG) BY MOUTH TWICE DAILY    FLUTICASONE-SALMETEROL DISKUS INHALER 250-50 MCG    Inhale 1 puff into the lungs 2 (two) times daily. Controller    GLUC HCL/GLUC JAMESON/[id number]-[id number]-[id number] (GLUCOSAMINE COMPLEX ORAL)    Take 1 capsule by mouth once daily.    LATANOPROST 0.005 % OPHTHALMIC SOLUTION    Place 1 drop into both eyes every evening.     LEVOTHYROXINE (SYNTHROID) 50 MCG TABLET    TAKE 1 TABLET BY MOUTH EVERY MORNING ON TUES AND THURS    LEVOTHYROXINE (SYNTHROID) 75 MCG TABLET    TAKE 1 TABLET BY MOUTH EVERY MORNING ON MONDAY, WEDNESDAY, FRIDAY, SATURDAY AND SUNDAY    MAGNESIUM HYDROXIDE (MAGNESIA ORAL)    Take 400 mg by mouth.    METOPROLOL SUCCINATE (TOPROL-XL) 25 MG 24 HR TABLET    Take 1 tablet (25 mg total) by mouth once daily.    SPIRONOLACTONE (ALDACTONE) 25 MG TABLET    TAKE 1 TABLET(25 MG) BY MOUTH EVERY DAY    TERAZOSIN (HYTRIN) 2 MG CAPSULE    TAKE 1 CAPSULE(2 MG) BY MOUTH EVERY DAY             Assessment:       1. History of TIA (transient ischemic attack)    2. Bradycardia    3. Atrial flutter, unspecified type    4. S/P placement of cardiac pacemaker    5. Paroxysmal atrial fibrillation    6. SSS (sick sinus syndrome)    7. Tachy-mile syndrome    8. Essential hypertension    9. Mixed hyperlipidemia    10. Abnormal coagulation profile    11. Status post biventricular cardiac pacemaker insertion    12. 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 Tachycardia    15. Calcification of aorta         Plan:     Problem List Items Addressed This Visit          Neuro    History of TIA (transient ischemic attack) - Primary       Pulmonary    Eosinophilic asthma       Cardiac/Vascular    Essential hypertension    Hyperlipidemia    Paroxysmal atrial fibrillation    Calcification of aorta     Tachy-mile syndrome    S/P placement of cardiac pacemaker    Atrial flutter       Endocrine    Hypothyroidism     Other Visit Diagnoses       Bradycardia        SSS (sick sinus syndrome)        Abnormal coagulation profile        Status post biventricular cardiac pacemaker insertion        Tachycardia              EKG TODAY REVEALS ATRIAL PACED WITH PROLONGED AV CONDUCTION INCOMPLETE RIGHT BUNDLE BRANCH BLOCK LEFT AXIS DEVIATION.  IS CURRENTLY TAKING PACERONE 200 B.I.D. AND ELIQUIS.  HE MAY BE A CANDIDATE FOR ATRIAL FLUTTER ABLATION AND IS REFERRED TO.    NEED TO ADJUST THE MOM ON THE PACEMAKER AS HE GETS SOME CHEST EXERTION HE WAS HOPING TO HAVE MORE ENERGY AFTER THE PACEMAKER WAS PLACED BUT HAS NOT WORK SO FAR  No follow-ups on file.      HE FEELS MORE TIRED IN THE PAST SINCE HE HAS BEEN ON METOPROLOL.  HE GETS DIZZY WHEN HE GETS UP QUICK IT HE IS ON HYTRIN FOR BLOOD PRESSURE MEDICATIONS.  I AM GOING TO CHANGE HIS BLOOD PRESSURE MEDICATIONS FROM HYTRIN AND METOPROLOL TO VERY EARLY IN TO SEE OF HE FEELS BETTER.    The patients questions were answered, they verbalized understanding, and agreed with the treatment plan.     THOMAS LANDON MD  [facility]

## 2023-09-26 NOTE — TELEPHONE ENCOUNTER
Refill Decision Note   Maycol Hodge  is requesting a refill authorization.  Brief Assessment and Rationale for Refill:  Approve     Medication Therapy Plan:         Comments:     Note composed:11:16 AM 09/26/2023

## 2023-10-06 ENCOUNTER — TELEPHONE (OUTPATIENT)
Dept: FAMILY MEDICINE | Facility: CLINIC | Age: 83
End: 2023-10-06
Payer: MEDICARE

## 2023-10-06 ENCOUNTER — TELEPHONE (OUTPATIENT)
Dept: PULMONOLOGY | Facility: CLINIC | Age: 83
End: 2023-10-06
Payer: MEDICARE

## 2023-10-06 ENCOUNTER — OFFICE VISIT (OUTPATIENT)
Dept: FAMILY MEDICINE | Facility: CLINIC | Age: 83
End: 2023-10-06
Payer: MEDICARE

## 2023-10-06 VITALS
TEMPERATURE: 99 F | HEART RATE: 64 BPM | OXYGEN SATURATION: 97 % | SYSTOLIC BLOOD PRESSURE: 138 MMHG | BODY MASS INDEX: 24.37 KG/M2 | DIASTOLIC BLOOD PRESSURE: 86 MMHG | WEIGHT: 170.19 LBS | HEIGHT: 70 IN

## 2023-10-06 DIAGNOSIS — R05.1 ACUTE COUGH: Primary | ICD-10-CM

## 2023-10-06 PROCEDURE — 1159F PR MEDICATION LIST DOCUMENTED IN MEDICAL RECORD: ICD-10-PCS | Mod: CPTII,S$GLB,, | Performed by: NURSE PRACTITIONER

## 2023-10-06 PROCEDURE — 99999 PR PBB SHADOW E&M-EST. PATIENT-LVL III: ICD-10-PCS | Mod: PBBFAC,,, | Performed by: NURSE PRACTITIONER

## 2023-10-06 PROCEDURE — 99999 PR PBB SHADOW E&M-EST. PATIENT-LVL III: CPT | Mod: PBBFAC,,, | Performed by: NURSE PRACTITIONER

## 2023-10-06 PROCEDURE — 99213 PR OFFICE/OUTPT VISIT, EST, LEVL III, 20-29 MIN: ICD-10-PCS | Mod: S$GLB,,, | Performed by: NURSE PRACTITIONER

## 2023-10-06 PROCEDURE — 1101F PT FALLS ASSESS-DOCD LE1/YR: CPT | Mod: CPTII,S$GLB,, | Performed by: NURSE PRACTITIONER

## 2023-10-06 PROCEDURE — 99213 OFFICE O/P EST LOW 20 MIN: CPT | Mod: S$GLB,,, | Performed by: NURSE PRACTITIONER

## 2023-10-06 PROCEDURE — 3075F PR MOST RECENT SYSTOLIC BLOOD PRESS GE 130-139MM HG: ICD-10-PCS | Mod: CPTII,S$GLB,, | Performed by: NURSE PRACTITIONER

## 2023-10-06 PROCEDURE — 1126F PR PAIN SEVERITY QUANTIFIED, NO PAIN PRESENT: ICD-10-PCS | Mod: CPTII,S$GLB,, | Performed by: NURSE PRACTITIONER

## 2023-10-06 PROCEDURE — 3288F FALL RISK ASSESSMENT DOCD: CPT | Mod: CPTII,S$GLB,, | Performed by: NURSE PRACTITIONER

## 2023-10-06 PROCEDURE — 1126F AMNT PAIN NOTED NONE PRSNT: CPT | Mod: CPTII,S$GLB,, | Performed by: NURSE PRACTITIONER

## 2023-10-06 PROCEDURE — 1101F PR PT FALLS ASSESS DOC 0-1 FALLS W/OUT INJ PAST YR: ICD-10-PCS | Mod: CPTII,S$GLB,, | Performed by: NURSE PRACTITIONER

## 2023-10-06 PROCEDURE — 1159F MED LIST DOCD IN RCRD: CPT | Mod: CPTII,S$GLB,, | Performed by: NURSE PRACTITIONER

## 2023-10-06 PROCEDURE — 3075F SYST BP GE 130 - 139MM HG: CPT | Mod: CPTII,S$GLB,, | Performed by: NURSE PRACTITIONER

## 2023-10-06 PROCEDURE — 3288F PR FALLS RISK ASSESSMENT DOCUMENTED: ICD-10-PCS | Mod: CPTII,S$GLB,, | Performed by: NURSE PRACTITIONER

## 2023-10-06 PROCEDURE — 3079F PR MOST RECENT DIASTOLIC BLOOD PRESSURE 80-89 MM HG: ICD-10-PCS | Mod: CPTII,S$GLB,, | Performed by: NURSE PRACTITIONER

## 2023-10-06 PROCEDURE — 3079F DIAST BP 80-89 MM HG: CPT | Mod: CPTII,S$GLB,, | Performed by: NURSE PRACTITIONER

## 2023-10-06 RX ORDER — HYDROCODONE BITARTRATE AND HOMATROPINE METHYLBROMIDE ORAL SOLUTION 5; 1.5 MG/5ML; MG/5ML
5 LIQUID ORAL EVERY 4 HOURS PRN
Qty: 120 ML | Refills: 0 | Status: SHIPPED | OUTPATIENT
Start: 2023-10-06 | End: 2023-10-16

## 2023-10-06 RX ORDER — AZITHROMYCIN 250 MG/1
TABLET, FILM COATED ORAL
Qty: 6 TABLET | Refills: 0 | Status: SHIPPED | OUTPATIENT
Start: 2023-10-06 | End: 2023-10-11

## 2023-10-06 NOTE — TELEPHONE ENCOUNTER
----- Message from Martha Vargas, Patient Care Assistant sent at 10/6/2023 10:39 AM CDT -----  Regarding: advice  Contact: pt  Type: Needs Medical Advice    Who Called:  pt     Symptoms (please be specific):  ribs pain while coughing   How long has patient had these symptoms:  a week     Best Call Back Number: [phone]    Additional Information: please call pt to advise. Thanks!

## 2023-10-06 NOTE — TELEPHONE ENCOUNTER
----- Message from Marcelo Chung sent at 10/6/2023 11:12 AM CDT -----  Type: Needs Medical Advice  Who Called:  pt   Symptoms (please be specific):  cold concerns moving to chest   Pharmacy name and phone #:    [id number] - [place] - [address] [facility]  [address]  [place] [address]  Phone: [phone] Fax: [phone]      Best Call Back Number: [phone]    Additional Information: pt stated he would like to be advised in regards to getting codeine syrup rx sent for pt please call back to advise asap, pt does not want to nani appt

## 2023-10-06 NOTE — PROGRESS NOTES
This dictation has been generated using Modal Fluency Dictation some phonetic errors may occur. Please contact author for clarification if needed.     Problem List Items Addressed This Visit    None  Visit Diagnoses       Acute cough    -  Primary            Orders Placed This Encounter    hydrocodone-homatropine 5-1.5 mg/5 ml (HYCODAN) 5-1.5 mg/5 mL Syrp    azithromycin (Z-NAZARIO) 250 MG tablet     Cough.  No evidence of bacterial etiology however symptoms fail to improve, improve and then worsen, patient spikes a fever, mucous quantity colored changes he may start Z-Nazario.  Hycodan cough medicine as above discussed risk of somnolence and falls.  Recommended Zyrtec in the morning and Benadryl at night.  Discussed risk of somnolence.  Do not take medications and operate machinery or drive.  He is had increased risk of falls while taking medication. He understood the choice, were able to express a  choice, appreciated the risks associated with it, and they had logical reasoning for their choice and demonstrated capacity.     No follow-ups on file.    ________________________________________________________________  ________________________________________________________________      Chief Complaint   Patient presents with    Cough    Sinus Problem    Sore Throat     History of present illness  This 83 y.o. presents today for complaint of cough.  Patient notes coughing up clear sputum.  Cough has been present for about 8 days now.  Symptoms started about 10 days ago with a sore throat which resolved.  He had some runny nose symptoms.  He has been left with his cough.  Denies chest pain or shortness a breath.    No fever chills   No nausea vomiting diarrhea   No rash   Patient denies hemoptysis.        Past Medical History:   Diagnosis Date    A-fib     Arthritis     Asthma     Cancer     Hematuria     Hypertension     Hypothyroidism, unspecified     Skin cancer, basal cell     Stroke     Syncope and collapse     Thyroid  disease     TIA (transient ischemic attack)        Past Surgical History:   Procedure Laterality Date    A-V CARDIAC PACEMAKER INSERTION N/A 2023    Procedure: INSERTION, CARDIAC PACEMAKER, DUAL CHAMBER;  Surgeon: Amrit Garsia MD;  Location: [facility] CATH/EP LAB;  Service: Cardiology;  Laterality: N/A;    COLONOSCOPY      COLONOSCOPY N/A 2020    Procedure: COLONOSCOPY;  Surgeon: Willis Mullen MD;  Location: [facility] ENDO;  Service: Endoscopy;  Laterality: N/A;    COLONOSCOPY N/A 2023    Procedure: COLONOSCOPY;  Surgeon: Willis Mullen MD;  Location: [facility] ENDO;  Service: Endoscopy;  Laterality: N/A;    CYSTOSCOPY      negative    EYE SURGERY      bilateral cataracts    HERNIA REPAIR      righht inguinal    JOINT REPLACEMENT Left     shoulder    left cataract surgery      PROSTATE SURGERY      TURP       Family History   Problem Relation Age of Onset    Multiple sclerosis Mother     Heart disease Father     Stroke Father     No Known Problems Daughter     Cancer Neg Hx     Diabetes Neg Hx     Hypertension Neg Hx        Social History     Socioeconomic History    Marital status:    Tobacco Use    Smoking status: Former     Current packs/day: 0.00     Types: Cigarettes     Quit date:      Years since quittin.8    Smokeless tobacco: Never    Tobacco comments:     Quit around    Substance and Sexual Activity    Alcohol use: Yes     Alcohol/week: 7.0 standard drinks of alcohol     Types: 7 Shots of liquor per week     Comment: daily    Drug use: No    Sexual activity: Not Currently     Partners: Female     Social Determinants of Health     Financial Resource Strain: Unknown (2023)    Overall Financial Resource Strain (CARDIA)     Difficulty of Paying Living Expenses: Patient refused   Food Insecurity: Unknown (2023)    Hunger Vital Sign     Worried About Running Out of Food in the Last Year: Patient refused     Ran Out of Food in the Last Year: Patient refused   Transportation  Needs: No Transportation Needs (8/2/2023)    PRAPARE - Transportation     Lack of Transportation (Medical): No     Lack of Transportation (Non-Medical): No   Physical Activity: Unknown (8/2/2023)    Exercise Vital Sign     Days of Exercise per Week: Patient refused     Minutes of Exercise per Session: Patient refused   Stress: Unknown (8/2/2023)    [employer] [employer] - Occupational Stress Questionnaire     Feeling of Stress : Patient refused   Social Connections: Unknown (8/2/2023)    Social Connection and Isolation Panel [NHANES]     Frequency of Communication with Friends and Family: Patient refused     Frequency of Social Gatherings with Friends and Family: Patient refused     Attends Synagogue Services: More than 4 times per year     Active Member of Clubs or Organizations: No     Attends Club or Organization Meetings: Patient refused     Marital Status:    Housing Stability: Low Risk  (8/2/2023)    Housing Stability Vital Sign     Unable to Pay for Housing in the Last Year: No     Number of Places Lived in the Last Year: 1     Unstable Housing in the Last Year: No       Current Outpatient Medications   Medication Sig Dispense Refill    albuterol (PROVENTIL/VENTOLIN HFA) 90 mcg/actuation inhaler 2 puffs every 4 hours as needed for cough, wheeze, or shortness of breath 18 g 11    ascorbic acid, vitamin C, (VITAMIN C) 1000 MG tablet Take 1,000 mg by mouth once daily.      benralizumab 30 mg/mL AtIn Inject 1 pen into the skin as needed (every 56 days). 1 mL 11    ELIQUIS 5 mg Tab TAKE 1 TABLET(5 MG) BY MOUTH TWICE DAILY 180 tablet 3    GLUC HCL/GLUC JAMESON/HJ-UPZ-O-GLUC (GLUCOSAMINE COMPLEX ORAL) Take 1 capsule by mouth once daily.      latanoprost 0.005 % ophthalmic solution Place 1 drop into both eyes every evening.       levothyroxine (SYNTHROID) 50 MCG tablet TAKE 1 TABLET BY MOUTH EVERY MORNING ON TUESDAY AND THURSDAY 26 tablet 2    levothyroxine (SYNTHROID) 75 MCG tablet TAKE 1 TABLET  BY MOUTH EVERY MORNING ON MONDAY, WEDNESDAY, FRIDAY, SATURDAY AND SUNDAY 64 tablet 2    magnesium hydroxide (MAGNESIA ORAL) Take 400 mg by mouth.      spironolactone (ALDACTONE) 25 MG tablet TAKE 1 TABLET(25 MG) BY MOUTH EVERY DAY 30 tablet 11    amiodarone (PACERONE) 200 MG Tab 1 tablet (200 mg total) by Per NG tube route 2 (two) times daily. (Patient not taking: Reported on 10/6/2023) 60 tablet 11    azithromycin (Z-NAZARIO) 250 MG tablet Take 2 tablets by mouth on day 1; Take 1 tablet by mouth on days 2-5 6 tablet 0    fluticasone-salmeterol diskus inhaler 250-50 mcg Inhale 1 puff into the lungs 2 (two) times daily. Controller 60 each 11    hydrocodone-homatropine 5-1.5 mg/5 ml (HYCODAN) 5-1.5 mg/5 mL Syrp Take 5 mLs by mouth every 4 (four) hours as needed (cough). 120 mL 0    verapamiL (VERELAN) 180 MG C24P Take 1 capsule (180 mg total) by mouth once daily. 30 capsule 11     No current facility-administered medications for this visit.       Review of patient's allergies indicates:   Allergen Reactions    Doxycycline Rash     Causes rash, hives and itching    Augmentin [amoxicillin-pot clavulanate] Nausea And Vomiting    Diltiazem      pruritis    Hydrochlorothiazide      Causes elevate uric acid, gout      Levaquin [levofloxacin] Swelling    Norvasc [amlodipine] Swelling    Pravastatin Rash       Physical examination  Vitals Reviewed\  Vitals:    10/06/23 1323   BP: 138/86   Pulse: 64   Temp: 98.7 °F (37.1 °C)     Body mass index is 24.42 kg/m². .  Gen. Well-dressed well-nourished   Skin warm dry and intact.  No rashes noted.  HEENT.  TM intact bilateral with normal light reflex.  No mastoid tenderness during percussion.  Nares patent bilateral.  Pharynx is unremarkable except postnasal drip.  No maxillary or frontal sinus tenderness when percussed.    Neck is supple without adenopathy  Chest.  Respirations are even unlabored.  Lungs are clear to auscultation.  Cardiac regular rate and rhythm.  No chest wall  adenopathy noted.  Neuro. Awake alert oriented x4.  Normal judgment and cognition noted.  Extremities no clubbing cyanosis or edema noted.     Call or return to clinic prn if these symptoms worsen or fail to improve as anticipated.

## 2023-10-06 NOTE — TELEPHONE ENCOUNTER
Pt states that he started with a sore throat and now has a cough  explained that Dr Burch was not in the office and I can get him scheduled next week but he may wish to reach out to his PCP or go to Urgent care  pt has been scheduled and will contact PCP

## 2023-10-06 NOTE — TELEPHONE ENCOUNTER
Called patient in regards to cold concerns. States cold symptoms are getting worse and nothing otc is helping. States he is starting to have some chest pressure and and congestion. Scheduled office visit per patient request.

## 2023-10-11 ENCOUNTER — OFFICE VISIT (OUTPATIENT)
Dept: PULMONOLOGY | Facility: CLINIC | Age: 83
End: 2023-10-11
Payer: MEDICARE

## 2023-10-11 ENCOUNTER — HOSPITAL ENCOUNTER (OUTPATIENT)
Dept: RADIOLOGY | Facility: HOSPITAL | Age: 83
Discharge: HOME | End: 2023-10-11
Attending: INTERNAL MEDICINE
Payer: MEDICARE

## 2023-10-11 VITALS
HEIGHT: 70 IN | HEART RATE: 65 BPM | WEIGHT: 169 LBS | DIASTOLIC BLOOD PRESSURE: 86 MMHG | OXYGEN SATURATION: 96 % | BODY MASS INDEX: 24.2 KG/M2 | SYSTOLIC BLOOD PRESSURE: 147 MMHG

## 2023-10-11 DIAGNOSIS — Z79.52 SEVERE PERSISTENT ASTHMA DEPENDENT ON SYSTEMIC STEROIDS WITH ACUTE EXACERBATION: Primary | ICD-10-CM

## 2023-10-11 DIAGNOSIS — J45.51 SEVERE PERSISTENT ASTHMA DEPENDENT ON SYSTEMIC STEROIDS WITH ACUTE EXACERBATION: Primary | ICD-10-CM

## 2023-10-11 DIAGNOSIS — J82.83 EOSINOPHILIC ASTHMA: ICD-10-CM

## 2023-10-11 DIAGNOSIS — J45.901 EXACERBATION OF ASTHMA, UNSPECIFIED ASTHMA SEVERITY, UNSPECIFIED WHETHER PERSISTENT: ICD-10-CM

## 2023-10-11 DIAGNOSIS — R05.9 COUGH: ICD-10-CM

## 2023-10-11 DIAGNOSIS — R53.83 FATIGUE, UNSPECIFIED TYPE: ICD-10-CM

## 2023-10-11 PROCEDURE — 71046 XR CHEST PA AND LATERAL: ICD-10-PCS | Mod: 26,,, | Performed by: RADIOLOGY

## 2023-10-11 PROCEDURE — 1101F PT FALLS ASSESS-DOCD LE1/YR: CPT | Mod: CPTII,S$GLB,, | Performed by: INTERNAL MEDICINE

## 2023-10-11 PROCEDURE — 99214 PR OFFICE/OUTPT VISIT, EST, LEVL IV, 30-39 MIN: ICD-10-PCS | Mod: S$GLB,,, | Performed by: INTERNAL MEDICINE

## 2023-10-11 PROCEDURE — 99214 OFFICE O/P EST MOD 30 MIN: CPT | Mod: S$GLB,,, | Performed by: INTERNAL MEDICINE

## 2023-10-11 PROCEDURE — 99999 PR PBB SHADOW E&M-EST. PATIENT-LVL IV: CPT | Mod: PBBFAC,,, | Performed by: INTERNAL MEDICINE

## 2023-10-11 PROCEDURE — 1159F MED LIST DOCD IN RCRD: CPT | Mod: CPTII,S$GLB,, | Performed by: INTERNAL MEDICINE

## 2023-10-11 PROCEDURE — 3079F PR MOST RECENT DIASTOLIC BLOOD PRESSURE 80-89 MM HG: ICD-10-PCS | Mod: CPTII,S$GLB,, | Performed by: INTERNAL MEDICINE

## 2023-10-11 PROCEDURE — 1101F PR PT FALLS ASSESS DOC 0-1 FALLS W/OUT INJ PAST YR: ICD-10-PCS | Mod: CPTII,S$GLB,, | Performed by: INTERNAL MEDICINE

## 2023-10-11 PROCEDURE — 71046 X-RAY EXAM CHEST 2 VIEWS: CPT | Mod: TC

## 2023-10-11 PROCEDURE — 3077F SYST BP >= 140 MM HG: CPT | Mod: CPTII,S$GLB,, | Performed by: INTERNAL MEDICINE

## 2023-10-11 PROCEDURE — 99999 PR PBB SHADOW E&M-EST. PATIENT-LVL IV: ICD-10-PCS | Mod: PBBFAC,,, | Performed by: INTERNAL MEDICINE

## 2023-10-11 PROCEDURE — 1159F PR MEDICATION LIST DOCUMENTED IN MEDICAL RECORD: ICD-10-PCS | Mod: CPTII,S$GLB,, | Performed by: INTERNAL MEDICINE

## 2023-10-11 PROCEDURE — 71046 X-RAY EXAM CHEST 2 VIEWS: CPT | Mod: 26,,, | Performed by: RADIOLOGY

## 2023-10-11 PROCEDURE — 3079F DIAST BP 80-89 MM HG: CPT | Mod: CPTII,S$GLB,, | Performed by: INTERNAL MEDICINE

## 2023-10-11 PROCEDURE — 3288F FALL RISK ASSESSMENT DOCD: CPT | Mod: CPTII,S$GLB,, | Performed by: INTERNAL MEDICINE

## 2023-10-11 PROCEDURE — 3288F PR FALLS RISK ASSESSMENT DOCUMENTED: ICD-10-PCS | Mod: CPTII,S$GLB,, | Performed by: INTERNAL MEDICINE

## 2023-10-11 PROCEDURE — 3077F PR MOST RECENT SYSTOLIC BLOOD PRESSURE >= 140 MM HG: ICD-10-PCS | Mod: CPTII,S$GLB,, | Performed by: INTERNAL MEDICINE

## 2023-10-11 RX ORDER — CODEINE PHOSPHATE AND GUAIFENESIN 10; 100 MG/5ML; MG/5ML
10 SOLUTION ORAL EVERY 6 HOURS PRN
Qty: 237 ML | Refills: 0 | Status: SHIPPED | OUTPATIENT
Start: 2023-10-11

## 2023-10-11 RX ORDER — PREDNISONE 10 MG/1
TABLET ORAL
Qty: 30 TABLET | Refills: 1 | Status: SHIPPED | OUTPATIENT
Start: 2023-10-11

## 2023-10-11 RX ORDER — ALBUTEROL SULFATE 90 UG/1
AEROSOL, METERED RESPIRATORY (INHALATION)
Qty: 18 G | Refills: 11 | Status: SHIPPED | OUTPATIENT
Start: 2023-10-11

## 2023-10-11 RX ORDER — FLUTICASONE FUROATE, UMECLIDINIUM BROMIDE AND VILANTEROL TRIFENATATE 200; 62.5; 25 UG/1; UG/1; UG/1
1 POWDER RESPIRATORY (INHALATION) DAILY
Qty: 60 EACH | Refills: 11 | Status: SHIPPED | OUTPATIENT
Start: 2023-10-11

## 2023-10-11 NOTE — PATIENT INSTRUCTIONS
Lungs do sound good today    Chest xray 8/1/2023 viewed with new pacer-- lungs small post op.  Need follow up.    Amiodarone may adversely affect lung-- chest xray may not be as good a screen as ct chest.  Would do chest xray 1st...    Lungs sound clear    Use codeine to suppress cough.    Would stay on controllers-- use trelegy for now.      Mucous was sl yellow today- do culture today - -may give antibiotic if needed (intolerances and amiodarone limit antibiotics)    Would do ct if not thriving     Oxygen sat walking varied 93-98...    Would  continue fasenra.    Need follow up in 2- 3 weeks if not cleared -- may need ct chest if not clearing.

## 2023-10-11 NOTE — PROGRESS NOTES
10/11/2023    Macyol Hodge  Office Note    Chief Complaint   Patient presents with    Cough       HPI:    10/11/2023 pt doing well on fasenra, but developed sore throat followed by 3 days with rhinitis, then cough with violent cough and clear mucous.  Appetite low and having fatigue.  Pt took prednisone  6 days -- illness was similar to prior asthma..  pt had some wheezes and worse laying down-- very fatigued.      Pt started amiodarone 8/1/2023 out pt Dr Garsia-- had pacer     12/6/2022 goes to gym daily, doing better.  Had pft better now than last yr.    Patient Instructions   Would recommend continue Fasenra as no asthma issues since starting over 2-3 yrs ago.    Breathing test today is better yet.      6/22/2022 used provigil with good results but waned.  Took prednisone with some benefit.    No wheezes and occ sob/albuterol use  Patient Instructions   Provigil will increase alertness --- not an medication for energy. There is no energy medication-- medications are for illness.      Asthma seems to be doing well  -- you have benefited from fasenra every 2 months, you have been on fasenra 2 yrs in September.      Would check lung capacity at follow up in 6 months.    3/7/2022 did not use provigil as felt did not have narcolepsy.  No prednisone.  bp 171/99-- pcp told to repeat  And still bp up.  Energy varies -- trys to get to gym an hour with variable results.  Asthma controlled.  Weaned off symbicort with breo use-- very stable and wishes to wean off.    No hives.  Patient Instructions   Would try provigil to improve alertness and vigilance.  May need follow up for refills as needed.  Would discuss bp with Dr Fox June 2 follow  up  Would be reasonable to use breo every other day -- if stable breo qod for a month - could skip if having no asthma symptoms or albuterol use.     You have had dramatic benefit from Fasenra-- need to continue.      12/62021 -- had fasenra 4 wks ago.  Had been able use no  prednisone nor rescue nor controller til 2 wks ago when had sore throat followed by nasal congestion then cough/wheezes-- pt cleared with prednisone/azithromycin and prn albuterol.  Patient Instructions   You started Fasenra last September with extreme dramatic benefit-- your asthma was under excellent control and ---your controller and rescue and action plans were able to be stopped.  You had mild exacerbation due to uri last 3 wks.      Would recommend continuing Fasenra as you were on prednisone every 2 - 3 months prior to Fasenra.     If your insurance mandates you be on controller -- would recommend using singulair and symbicort-- symbicort was started 3 wks.              Would recommend trial provigil for excess sleepiness-- your recent sleep study did not show significant sleep apnea with AHI 2.1.  Will try to get approval through speciality pharmacy.   Start provigil one daily to improve alertness and focus.         Addendum -sleep study 8/20/2020 had ahi 2.1.   Could repeat in house sleep study.  Suspect would do well with provigil -- would recommend trial 100 mg daily to treat hypersomnolence.      10/25/2021 having fatigue, sometimes wake up fatigued and sometimes develops during day.  Denies nerve/anxiety issues.  Appetite ok.  No cough/wheezes/sob.  No spasm in over a yr or more.  No inhaler use.  Fatigue occurs 1/3 days.  Voids adequately.  Will sit about throughout day once fatigued.  Fatigue occurring last 3 months or so, had colonoscopy last yr, has chr elevation psa which is monitor q yr from q 6 months in past. Saw cardiology.     will nap twice daily -- has excess sleepiness.  Patient Instructions   Would use prednisone once daily for 3 days if any fatigue-- may do  Once or twice.  Breathing test was better at 56% than 42% last yr-- but you may still have some airway problems..  Would try inhalers if fatigued-- try nebulizer.  Also try prednisone----- if prednisone works-- use inhalers more.    Would screen for inflammation, testosterone (may not replace as psa up???), blood counts/chemistries, thyroid  (continue medications), diabetes screen, and chest xray.  Should f/u Dr Rojo soon if fatigue not clearing?????  psa not oredered as not able.   Addendum -sleep study 8/20/2020 had ahi 2.1.   Could repeat in house sleep study.  Suspect would do well with provigil -- would recommend trial 100 mg daily to treat hypersomnolence.      4/28/2021- fasenra going well, no steroids, 4-5 times daily has transparent mucous produced. No sleep apnea but some excessive sleepiness appreciated. Sleep 10, arouses 3 to void (had turp/meds in past)with difficulty going back. Off symbicort. Uses albuterol maybe once a wk or so.   Patient Instructions   Continue Gym.    Lungs should be good.  Need to continue fasenra.    Thyroid could be rechecked.     Home study-- no sleep apnea, could have restless legs?  10/28/2020 no prednisone since last visit, fasenra helped dramatically.  No prednisoen and breathing much better.  - pt got shot here 10/8/2020  Patient Instructions   Need to continue fasenra as has had dramatic response.     9/28/2020 took another course prednisone, has been on prednisone  Monthly since march.  Finished last 2 wks ago, feels will need [place] course soon. Uses symbicort regular.  Had sleep study few wks ago.fev 42 from 57% on 2017,      Sleep study 8/20/2020 with ahi 2/hr- within normal range.   Patient Instructions   Asthma very unstable..  Will place order for fasenra- sample shot would help.  Shot would be monthly x 3 then every 8 wks.    Use valtrex if shingles - call for any question.      Sound like you need treatment again soon- start prednisone soon or Fasenra shots.     will give paper script for advair - may replace symboicort if cheaper?     No sleep apnea of significance.      Fasenra is an interleukin 5 inhibitor.  8/11/2020 had to use prednisone since March, having severe fatigue -  reports sleeps well, sleep non restorative, snores with no sleep study in past.  Romo to mail box - wife sleeps separate room. No snore arousal.    Uses symbicort regular.  Uses albuterol rescue minimally less than once a wk.  Pt starts prednisone when cough becomes excessive.  Eats well, no wt loss. Sleeps 7 hrs/d.  Goes to [employer] for hr 3/wk.  Pt having vivid dreams.   Patient Instructions   Check lung capacity   Check sleep study - best prior to prednisone  May use prednisone - start 20/d dropping to 10 mg daily once better.  If you are found to have sleep apnea- less than 5 episodes an hour would be normal- would try cpap therapy.  2020 51 yo son  suddenly  in [place]- son avid - death.  Toxicology pending.  Had 2 spells ppt prednisone use since last visit with good results.  Took erythro also.  Having some grief- eats and sleeps ok . Wife in good shape. Uses symbicort regular.  Patient Instructions   Asthma not too bad but you needed prednisone twice since March.  Special shots may help-  Could ask to get fasenra covered.  May consider starting if pattern worsens.    symbicort needed regular especially in winter/spring.+      3/2/2020- having exhaustion after 10-15 min doing yd wk.  Saw cardiology- had a fib at colonoscopy in dec but sinus mile since. - eliquis started.  Pt had syncope 10 days - saw Dr Vogel in f/u.  Last wk had some noct wheezes.   Uses symbicort 2 bid and singulair.  No no prednisone use last yr.  Sleeps ok, nerves ok - denies depression.  Had hives last July.  Stress echo in 2019 was good save high bp?    Patient Instructions   Fatigue and wheezes are noted.  Breathing not typical cause for loss of consciousness.  Heart rate is low - may contribute to fatigue but not that bad?    Chronic sinuses seem ok.  Would recommend prednisone 20 mg daily for 3 days now,  May repeat but would like to see better function and activity.    Call if not perking up.   oxygen  level walking in office went from 97 to 93 % - not bad.  bp standing didn't fall today.    Blood wk last month all looked good.    Feb 1, 2019- Onset one week, sore throat, congestion, cough (fits, severe, productive light yellow in color), took azithromycin with minimal benefit. Current Prednisone use.  January 24, 2019- Feeling well, no exacerbations requiring antibiotic and steroid therapy. No cough, no nocturnal arousals, no SOB.   Sept 27, 2018- had exacerbations in July and August needing azithro and prednisone- had cough clear mucous with sl yellow, no breathing issues.  Unusual to have exacerbation summer.  Jan 5, 2018-- took azithro and pred x 3 - good results.  Doing well  Jan 19,2017 hpi, had acute onset 5 days ago with sinus runny, cough, wheezes, sob, no fever, no n/v, pos headache.  Muscle aches and joint aches.  Took prednisone, took inhaler, neb rx was old. Run down, poor appetite,   Took prednisone and azithro x3, still cough but better,  Down but better.  Mucous clear.   Dec 16, 2016HPI:pt with asthma with no usual noct arousals or rescue use.  Exacerbates 3x yr in spring mainly. Progresses for a week on rescue and then uses pred and doxy (recent doxy reaction) to clear.  I cared for years ago.  No recent pft.  Pt feels breathing better than  Usual yrs past.      The chief compliant  problem varies with instablilty at time    PFSH:  Past Medical History:   Diagnosis Date    A-fib     Arthritis     Asthma     Cancer     Hematuria     Hypertension     Hypothyroidism, unspecified     Skin cancer, basal cell     Stroke     Syncope and collapse     Thyroid disease     TIA (transient ischemic attack)          Past Surgical History:   Procedure Laterality Date    A-V CARDIAC PACEMAKER INSERTION N/A 8/1/2023    Procedure: INSERTION, CARDIAC PACEMAKER, DUAL CHAMBER;  Surgeon: Amrit Garsia MD;  Location: [facility] CATH[facility]EP LAB;  Service: Cardiology;  Laterality: N/A;    COLONOSCOPY      COLONOSCOPY N/A  2020    Procedure: COLONOSCOPY;  Surgeon: Willis Mullen MD;  Location: [facility] ENDO;  Service: Endoscopy;  Laterality: N/A;    COLONOSCOPY N/A 2023    Procedure: COLONOSCOPY;  Surgeon: Willis Mullen MD;  Location: [facility] ENDO;  Service: Endoscopy;  Laterality: N/A;    CYSTOSCOPY      negative    EYE SURGERY      bilateral cataracts    HERNIA REPAIR      righht inguinal    JOINT REPLACEMENT Left     shoulder    left cataract surgery      PROSTATE SURGERY      TURP     Social History     Tobacco Use    Smoking status: Former     Current packs/day: 0.00     Types: Cigarettes     Quit date:      Years since quittin.8    Smokeless tobacco: Never    Tobacco comments:     Quit around    Substance Use Topics    Alcohol use: Yes     Alcohol/week: 7.0 standard drinks of alcohol     Types: 7 Shots of liquor per week     Comment: daily    Drug use: No     Family History   Problem Relation Age of Onset    Multiple sclerosis Mother     Heart disease Father     Stroke Father     No Known Problems Daughter     Cancer Neg Hx     Diabetes Neg Hx     Hypertension Neg Hx      Review of patient's allergies indicates:   Allergen Reactions    Doxycycline Rash     Causes rash, hives and itching    Diltiazem      pruritis    Hydrochlorothiazide      Causes elevate uric acid, gout      Levaquin [levofloxacin] Swelling    Norvasc [amlodipine] Swelling       Performance Status:The patient's activity level is functions out of house.      Review of Systems:    Constitutional: Negative for activity change, appetite change, chills, diaphoresis, fatigue, fever and unexpected weight change.   HENT: Negative for dental problem, postnasal drip, trouble swallowing and voice change. Positive for  rhinorrhea, sinus pressure, sinus pain, sneezing, sore throat,   Respiratory: Negative for apnea,  shortness of breath, wheezing and stridor.  Positive for cough, chest tightness,  Cardiovascular: Negative for chest pain, palpitations  "and leg swelling.   Gastrointestinal: Negative for abdominal distention, abdominal pain, constipation and nausea.   Musculoskeletal: Negative for gait problem, myalgias and neck pain.   Skin: Negative for color change and pallor.   Allergic/Immunologic: Negative for environmental allergies and food allergies.   Neurological: Negative for dizziness, speech difficulty, weakness, light-headedness, numbness and headaches.   Hematological: Negative for adenopathy. Does not bruise/bleed easily.   Psychiatric/Behavioral: Negative for dysphoric mood and sleep disturbance. The patient is not nervous/anxious.     Exam:Comprehensive exam done.   BP (!) 147/86 (BP Location: Right arm, Patient Position: Sitting, BP Method: Small (Automatic))   Pulse 65   Ht 5' 10" (1.778 m)   Wt 76.6 kg (168 lb 15.7 oz)   SpO2 96% Comment: on room air at rest  BMI 24.25 kg/m²   Exam included Vitals as listed, and patient's appearance and affect and alertness and mood, oral exam for yeast and hygiene and pharynx lesions and Mallapatti (M) score, neck with inspection for jvd and masses and thyroid abnormalities and lymph nodes (supraclavicular and infraclavicular nodes and axillary also examined and noted if abn), chest exam included symmetry and effort and fremitus and percussion and auscultation, cardiac exam included rhythm and gallops and murmur and rubs and jvd and edema, abdominal exam for mass and hepatosplenomegaly and tenderness and hernias and bowel sounds, Musculoskeletal exam with muscle tone and posture and mobility/gait and  strength, and skin for rashes and cyanosis and pallor and turgor, extremity for clubbing.  Findings were normal except for pertinent findings listed below:  M3,  chest is symmetric, no distress, normal percussion, normal fremitus, and no murmur, no edema, lung sounds good.    Rest good.      Radiographs (ct chest and cxr) reviewed from 10/16/2017 normal  cxr 5/20/2020 nad    Labs Reviewed  Lab Results "   Component Value Date    WBC 8.60 08/01/2023    WBC 8.60 08/01/2023    HGB 15.3 08/01/2023    HGB 15.3 08/01/2023    HCT 46.5 08/01/2023    HCT 46.5 08/01/2023    MCV 94 08/01/2023    MCV 94 08/01/2023     08/01/2023     08/01/2023   holter 1/27/2020- The diary was properly completed. The tape was adequate (0 days , 48 hours, 0 minutes).  Predominant Rhythm Sinus rhythm with heart rates varying between 43 and 113 bpm with an average of 56 bpm.  Ventricular Arrhythmias There were very rare PVCs totalling 58 and averaging 1.21 per hour.  Supraventricular Arrhythmias There were occasional PACs totalling 638 and averaging 13.29 per hour.  Occassional atrial pair and short run of atrial tachycardia,the longest 7 beats with no associated symptoms..  There was an episode of SOB reported. The corresponding rhythm strips revealed the following: During the event (At the [employer]), the rhythm was sinus tachycardia at 113 bpm with without ectopy.     Results for ARNULFO SALEH (MRN [id number]) as of 9/21/2018 11:38   Ref. Range 9/21/2018 08:59   Eosinophil% Latest Ref Range: 0.0 - 8.0 % 9.2 (H)   Eos # Latest Ref Range: 0.0 - 0.5 K/uL 0.6 (H)     PFT  March 31, 2017 fev 57%,     11/22/19-Conclusion     Concentric left ventricular remodeling.  Normal left ventricular systolic function. The estimated ejection fraction is 60%  Normal LV diastolic function.  No wall motion abnormalities.  Normal right ventricular systolic function.  The stress echo portion of this study is negative for myocardial ischemia.  The patient's exercise capacity was above average. functional capacity of 10 METS  The patient reached the end of the protocol.  There were no arrhythmias during stress.  The EKG portion of this study is negative for myocardial ischemia.  Exercise portion of stress test stopped due to an increase in systolic and diastolic blood pressure above protocol.  Hypertensive response changed from stress echo in 3/2018.      Spirometry with bronchodilator, lung volume by gas dilution, diffusion capacity were accomplished October 11, 2021. The FEV1 to FVC ratio was 67% indicating airflow obstruction. The FEV1 was 56% of predicted at 1.6 L-this makes airflow obstruction   moderately severe. Bronchodilator response was not statistically significant at 9%. Total lung capacity on lung volume by gas dilution was 60% of predicted and low. Diffusion was 62% of predicted and low.   Â    There is moderately severe airflow obstruction. There is no significant bronchodilator response. Total lung capacity is reduced to 60% predicted and diffusion is reduced to 62% predicted. Clinical correlation recommended.       Spirometry with bronchodilator, lung volume by gas dilution, diffusion capacity measured August 17, 2020.  The FEV1 to FVC ratio was 65% indicating airflow obstruction.  The FEV1 measured 42% predicted at 1.2 L making airflow obstruction severe.  There    was a 13% improvement in the FEV1 following bronchodilator, this is a significant bronchodilator response.  Total lung capacity was reduced to 57% predicted suggesting restriction.  Residual volume was a little bit elevated at 77% of predicted.     Diffusion was reduced to 61% of predicted, uncorrected for anemia present.       Plan:  Clinical impression is apparently straight forward and impression with management as below.    Maycol was seen today for cough.    Diagnoses and all orders for this visit:    Exacerbation of asthma, unspecified asthma severity, unspecified whether persistent  -     guaiFENesin-codeine 100-10 mg/5 ml (TUSSI-ORGANIDIN NR)  mg/5 mL syrup; Take 10 mLs by mouth every 6 (six) hours as needed for Cough.  -     predniSONE (DELTASONE) 10 MG tablet; Take 2 daily for 5 days and repeat for breathing problems.  -     fluticasone-umeclidin-vilanter (TRELEGY ELLIPTA) 200-62.5-25 mcg inhaler; Inhale 1 puff into the lungs once daily.  -     albuterol  (PROVENTIL/VENTOLIN HFA) 90 mcg/actuation inhaler; 2 puffs every 4 hours as needed for cough, wheeze, or shortness of breath  -     X-Ray Chest PA And Lateral; Future    Cough  -     guaiFENesin-codeine 100-10 mg/5 ml (TUSSI-ORGANIDIN NR)  mg/5 mL syrup; Take 10 mLs by mouth every 6 (six) hours as needed for Cough.  -     Culture, Respiratory with Gram Stain; Future  -     X-Ray Chest PA And Lateral; Future  -     C-REACTIVE PROTEIN; Future    Eosinophilic asthma  -     albuterol (PROVENTIL/VENTOLIN HFA) 90 mcg/actuation inhaler; 2 puffs every 4 hours as needed for cough, wheeze, or shortness of breath    Severe persistent asthma dependent on systemic steroids with acute exacerbation  -     albuterol (PROVENTIL/VENTOLIN HFA) 90 mcg/actuation inhaler; 2 puffs every 4 hours as needed for cough, wheeze, or shortness of breath    Fatigue, unspecified type  -     C-REACTIVE PROTEIN; Future              Maycol was seen today for cough.    Diagnoses and all orders for this visit:    Exacerbation of asthma, unspecified asthma severity, unspecified whether persistent  -     guaiFENesin-codeine 100-10 mg/5 ml (TUSSI-ORGANIDIN NR)  mg/5 mL syrup; Take 10 mLs by mouth every 6 (six) hours as needed for Cough.  -     predniSONE (DELTASONE) 10 MG tablet; Take 2 daily for 5 days and repeat for breathing problems.  -     fluticasone-umeclidin-vilanter (TRELEGY ELLIPTA) 200-62.5-25 mcg inhaler; Inhale 1 puff into the lungs once daily.  -     albuterol (PROVENTIL/VENTOLIN HFA) 90 mcg/actuation inhaler; 2 puffs every 4 hours as needed for cough, wheeze, or shortness of breath  -     X-Ray Chest PA And Lateral; Future    Cough  -     guaiFENesin-codeine 100-10 mg/5 ml (TUSSI-ORGANIDIN NR)  mg/5 mL syrup; Take 10 mLs by mouth every 6 (six) hours as needed for Cough.  -     Culture, Respiratory with Gram Stain; Future  -     X-Ray Chest PA And Lateral; Future  -     C-REACTIVE PROTEIN; Future    Eosinophilic asthma  -      albuterol (PROVENTIL/VENTOLIN HFA) 90 mcg/actuation inhaler; 2 puffs every 4 hours as needed for cough, wheeze, or shortness of breath    Severe persistent asthma dependent on systemic steroids with acute exacerbation  -     albuterol (PROVENTIL/VENTOLIN HFA) 90 mcg/actuation inhaler; 2 puffs every 4 hours as needed for cough, wheeze, or shortness of breath    Fatigue, unspecified type  -     C-REACTIVE PROTEIN; Future            Follow up in about 2 weeks (around 10/25/2023).    Discussed with patient above for education the following:      Patient Instructions   Lungs do sound good today    Chest xray 8/1/2023 viewed with new pacer-- lungs small post op.  Need follow up.    Amiodarone may adversely affect lung-- chest xray may not be as good a screen as ct chest.  Would do chest xray 1st...    Lungs sound clear    Use codeine to suppress cough.    Would stay on controllers-- use trelegy for now.      Mucous was sl yellow today- do culture today - -may give antibiotic if needed (intolerances and amiodarone limit antibiotics)    Would do ct if not thriving     Oxygen sat walking varied 93-98...    Would  continue fasenra.    Need follow up in 2- 3 weeks if not cleared -- may need ct chest if not clearing.             Evaluation took 32 minutes

## 2023-10-12 ENCOUNTER — TELEPHONE (OUTPATIENT)
Dept: PULMONOLOGY | Facility: CLINIC | Age: 83
End: 2023-10-12
Payer: MEDICARE

## 2023-10-12 NOTE — TELEPHONE ENCOUNTER
----- Message from Osvaldo Burch MD sent at 10/11/2023  6:57 PM CDT -----  Notify patient's chest x-ray looks very good, no change in plan, no inflammation suggested on blood test

## 2023-10-25 ENCOUNTER — OFFICE VISIT (OUTPATIENT)
Dept: PULMONOLOGY | Facility: CLINIC | Age: 83
End: 2023-10-25
Payer: MEDICARE

## 2023-10-25 VITALS
DIASTOLIC BLOOD PRESSURE: 74 MMHG | SYSTOLIC BLOOD PRESSURE: 132 MMHG | BODY MASS INDEX: 24.3 KG/M2 | WEIGHT: 169.75 LBS | OXYGEN SATURATION: 98 % | HEART RATE: 63 BPM | HEIGHT: 70 IN

## 2023-10-25 DIAGNOSIS — J45.50 SEVERE PERSISTENT ASTHMA WITHOUT COMPLICATION: Primary | ICD-10-CM

## 2023-10-25 PROCEDURE — 3288F PR FALLS RISK ASSESSMENT DOCUMENTED: ICD-10-PCS | Mod: CPTII,S$GLB,, | Performed by: INTERNAL MEDICINE

## 2023-10-25 PROCEDURE — 3075F SYST BP GE 130 - 139MM HG: CPT | Mod: CPTII,S$GLB,, | Performed by: INTERNAL MEDICINE

## 2023-10-25 PROCEDURE — 99213 OFFICE O/P EST LOW 20 MIN: CPT | Mod: S$GLB,,, | Performed by: INTERNAL MEDICINE

## 2023-10-25 PROCEDURE — 1159F MED LIST DOCD IN RCRD: CPT | Mod: CPTII,S$GLB,, | Performed by: INTERNAL MEDICINE

## 2023-10-25 PROCEDURE — 3075F PR MOST RECENT SYSTOLIC BLOOD PRESS GE 130-139MM HG: ICD-10-PCS | Mod: CPTII,S$GLB,, | Performed by: INTERNAL MEDICINE

## 2023-10-25 PROCEDURE — 1159F PR MEDICATION LIST DOCUMENTED IN MEDICAL RECORD: ICD-10-PCS | Mod: CPTII,S$GLB,, | Performed by: INTERNAL MEDICINE

## 2023-10-25 PROCEDURE — 3078F PR MOST RECENT DIASTOLIC BLOOD PRESSURE < 80 MM HG: ICD-10-PCS | Mod: CPTII,S$GLB,, | Performed by: INTERNAL MEDICINE

## 2023-10-25 PROCEDURE — 3078F DIAST BP <80 MM HG: CPT | Mod: CPTII,S$GLB,, | Performed by: INTERNAL MEDICINE

## 2023-10-25 PROCEDURE — 1101F PT FALLS ASSESS-DOCD LE1/YR: CPT | Mod: CPTII,S$GLB,, | Performed by: INTERNAL MEDICINE

## 2023-10-25 PROCEDURE — 99213 PR OFFICE/OUTPT VISIT, EST, LEVL III, 20-29 MIN: ICD-10-PCS | Mod: S$GLB,,, | Performed by: INTERNAL MEDICINE

## 2023-10-25 PROCEDURE — 1101F PR PT FALLS ASSESS DOC 0-1 FALLS W/OUT INJ PAST YR: ICD-10-PCS | Mod: CPTII,S$GLB,, | Performed by: INTERNAL MEDICINE

## 2023-10-25 PROCEDURE — 99999 PR PBB SHADOW E&M-EST. PATIENT-LVL IV: CPT | Mod: PBBFAC,,, | Performed by: INTERNAL MEDICINE

## 2023-10-25 PROCEDURE — 99999 PR PBB SHADOW E&M-EST. PATIENT-LVL IV: ICD-10-PCS | Mod: PBBFAC,,, | Performed by: INTERNAL MEDICINE

## 2023-10-25 PROCEDURE — 3288F FALL RISK ASSESSMENT DOCD: CPT | Mod: CPTII,S$GLB,, | Performed by: INTERNAL MEDICINE

## 2023-10-25 NOTE — PATIENT INSTRUCTIONS
Blood in urine may be from kidney and lung disease but not likely.      Urine test reasonable and urine culture  also.    If not clearly from urology problem-- may recheck kidney function?        Lungs are doing well...    Would still be concerned about amiodarone.    Cxr viewed and good.

## 2023-10-25 NOTE — PROGRESS NOTES
10/25/2023    Maycol Hodge  Office Note    Chief Complaint   Patient presents with    Follow-up       HPI:    10/25/2023 pt relates had had blood in urine, pt on fasenra 3 yrs.  Cxr clear from  last eval with neg sputum culture-- cleareed    No urine pain. To see urology in [place] in am ....    10/11/2023 pt doing well on fasenra, but developed sore throat followed by 3 days with rhinitis, then cough with violent cough and clear mucous.  Appetite low and having fatigue.  Pt took prednisone  6 days -- illness was similar to prior asthma..  pt had some wheezes and worse laying down-- very fatigued.      Pt started amiodarone 8/1/2023 out pt Dr Garsia-- had pacer   Patient Instructions   Lungs do sound good today    Chest xray 8/1/2023 viewed with new pacer-- lungs small post op.  Need follow up.    Amiodarone may adversely affect lung-- chest xray may not be as good a screen as ct chest.  Would do chest xray 1st...    Lungs sound clear    Use codeine to suppress cough.    Would stay on controllers-- use trelegy for now.      Mucous was sl yellow today- do culture today - -may give antibiotic if needed (intolerances and amiodarone limit antibiotics)    Would do ct if not thriving     Oxygen sat walking varied 93-98...    Would  continue fasenra.    Need follow up in 2- 3 weeks if not cleared -- may need ct chest if not clearing.    12/6/2022 goes to gym daily, doing better.  Had pft better now than last yr.    Patient Instructions   Would recommend continue Fasenra as no asthma issues since starting over 2-3 yrs ago.    Breathing test today is better yet.      6/22/2022 used provigil with good results but waned.  Took prednisone with some benefit.    No wheezes and occ sob/albuterol use  Patient Instructions   Provigil will increase alertness --- not an medication for energy. There is no energy medication-- medications are for illness.      Asthma seems to be doing well  -- you have benefited from fasenra every  2 months, you have been on fasenra 2 yrs in September.      Would check lung capacity at follow up in 6 months.    3/7/2022 did not use provigil as felt did not have narcolepsy.  No prednisone.  bp 171/99-- pcp told to repeat  And still bp up.  Energy varies -- trys to get to gym an hour with variable results.  Asthma controlled.  Weaned off symbicort with breo use-- very stable and wishes to wean off.    No hives.  Patient Instructions   Would try provigil to improve alertness and vigilance.  May need follow up for refills as needed.  Would discuss bp with Dr Fox June 2 follow  up  Would be reasonable to use breo every other day -- if stable breo qod for a month - could skip if having no asthma symptoms or albuterol use.     You have had dramatic benefit from Fasenra-- need to continue.      12/62021 -- had fasenra 4 wks ago.  Had been able use no prednisone nor rescue nor controller til 2 wks ago when had sore throat followed by nasal congestion then cough/wheezes-- pt cleared with prednisone/azithromycin and prn albuterol.  Patient Instructions   You started Fasenra last September with extreme dramatic benefit-- your asthma was under excellent control and ---your controller and rescue and action plans were able to be stopped.  You had mild exacerbation due to uri last 3 wks.      Would recommend continuing Fasenra as you were on prednisone every 2 - 3 months prior to Fasenra.     If your insurance mandates you be on controller -- would recommend using singulair and symbicort-- symbicort was started 3 wks.              Would recommend trial provigil for excess sleepiness-- your recent sleep study did not show significant sleep apnea with AHI 2.1.  Will try to get approval through speciality pharmacy.   Start provigil one daily to improve alertness and focus.         Addendum -sleep study 8/20/2020 had ahi 2.1.   Could repeat in house sleep study.  Suspect would do well with provigil -- would recommend trial  100 mg daily to treat hypersomnolence.      10/25/2021 having fatigue, sometimes wake up fatigued and sometimes develops during day.  Denies nerve/anxiety issues.  Appetite ok.  No cough/wheezes/sob.  No spasm in over a yr or more.  No inhaler use.  Fatigue occurs 1/3 days.  Voids adequately.  Will sit about throughout day once fatigued.  Fatigue occurring last 3 months or so, had colonoscopy last yr, has chr elevation psa which is monitor q yr from q 6 months in past. Saw cardiology.     will nap twice daily -- has excess sleepiness.  Patient Instructions   Would use prednisone once daily for 3 days if any fatigue-- may do  Once or twice.  Breathing test was better at 56% than 42% last yr-- but you may still have some airway problems..  Would try inhalers if fatigued-- try nebulizer.  Also try prednisone----- if prednisone works-- use inhalers more.   Would screen for inflammation, testosterone (may not replace as psa up???), blood counts/chemistries, thyroid  (continue medications), diabetes screen, and chest xray.  Should f/u Dr Rojo soon if fatigue not clearing?????  psa not oredered as not able.   Addendum -sleep study 8/20/2020 had ahi 2.1.   Could repeat in house sleep study.  Suspect would do well with provigil -- would recommend trial 100 mg daily to treat hypersomnolence.      4/28/2021- fasenra going well, no steroids, 4-5 times daily has transparent mucous produced. No sleep apnea but some excessive sleepiness appreciated. Sleep 10, arouses 3 to void (had turp/meds in past)with difficulty going back. Off symbicort. Uses albuterol maybe once a wk or so.   Patient Instructions   Continue Gym.    Lungs should be good.  Need to continue fasenra.    Thyroid could be rechecked.     Home study-- no sleep apnea, could have restless legs?  10/28/2020 no prednisone since last visit, fasenra helped dramatically.  No prednisoen and breathing much better.  - pt got shot here 10/8/2020  Patient Instructions   Need  to continue fasenra as has had dramatic response.     2020 took another course prednisone, has been on prednisone  Monthly since march.  Finished last 2 wks ago, feels will need [place] course soon. Uses symbicort regular.  Had sleep study few wks ago.fev 42 from 57% on 2017,      Sleep study 2020 with ahi 2/hr- within normal range.   Patient Instructions   Asthma very unstable..  Will place order for fasenra- sample shot would help.  Shot would be monthly x 3 then every 8 wks.    Use valtrex if shingles - call for any question.      Sound like you need treatment again soon- start prednisone soon or Fasenra shots.     will give paper script for advair - may replace symboicort if cheaper?     No sleep apnea of significance.      Fasenra is an interleukin 5 inhibitor.  2020 had to use prednisone since March, having severe fatigue - reports sleeps well, sleep non restorative, snores with no sleep study in past.  Romo to mail box - wife sleeps separate room. No snore arousal.    Uses symbicort regular.  Uses albuterol rescue minimally less than once a wk.  Pt starts prednisone when cough becomes excessive.  Eats well, no wt loss. Sleeps 7 hrs/d.  Goes to [employer] for hr 3/wk.  Pt having vivid dreams.   Patient Instructions   Check lung capacity   Check sleep study - best prior to prednisone  May use prednisone - start 20/d dropping to 10 mg daily once better.  If you are found to have sleep apnea- less than 5 episodes an hour would be normal- would try cpap therapy.  2020 51 yo son  suddenly  in [place]- son avid - death.  Toxicology pending.  Had 2 spells ppt prednisone use since last visit with good results.  Took erythro also.  Having some grief- eats and sleeps ok . Wife in good shape. Uses symbicort regular.  Patient Instructions   Asthma not too bad but you needed prednisone twice since March.  Special shots may help-  Could ask to get fasenra covered.  May consider starting if  pattern worsens.    symbicort needed regular especially in winter/spring.+      3/2/2020- having exhaustion after 10-15 min doing yd wk.  Saw cardiology- had a fib at colonoscopy in dec but sinus mile since. - eliquis started.  Pt had syncope 10 days - saw Dr Vogel in f/u.  Last wk had some noct wheezes.   Uses symbicort 2 bid and singulair.  No no prednisone use last yr.  Sleeps ok, nerves ok - denies depression.  Had hives last July.  Stress echo in 11/22/2019 was good save high bp?    Patient Instructions   Fatigue and wheezes are noted.  Breathing not typical cause for loss of consciousness.  Heart rate is low - may contribute to fatigue but not that bad?    Chronic sinuses seem ok.  Would recommend prednisone 20 mg daily for 3 days now,  May repeat but would like to see better function and activity.    Call if not perking up.   oxygen level walking in office went from 97 to 93 % - not bad.  bp standing didn't fall today.    Blood wk last month all looked good.    Feb 1, 2019- Onset one week, sore throat, congestion, cough (fits, severe, productive light yellow in color), took azithromycin with minimal benefit. Current Prednisone use.  January 24, 2019- Feeling well, no exacerbations requiring antibiotic and steroid therapy. No cough, no nocturnal arousals, no SOB.   Sept 27, 2018- had exacerbations in July and August needing azithro and prednisone- had cough clear mucous with sl yellow, no breathing issues.  Unusual to have exacerbation summer.  Jan 5, 2018-- took azithro and pred x 3 - good results.  Doing well  Jan 19,2017 hpi, had acute onset 5 days ago with sinus runny, cough, wheezes, sob, no fever, no n/v, pos headache.  Muscle aches and joint aches.  Took prednisone, took inhaler, neb rx was old. Run down, poor appetite,   Took prednisone and azithro x3, still cough but better,  Down but better.  Mucous clear.   Dec 16, 2016HPI:pt with asthma with no usual noct arousals or rescue use.  Exacerbates 3x  yr in spring mainly. Progresses for a week on rescue and then uses pred and doxy (recent doxy reaction) to clear.  I cared for years ago.  No recent pft.  Pt feels breathing better than  Usual yrs past.      The chief compliant  problem varies with instablilty at time    PFSH:  Past Medical History:   Diagnosis Date    A-fib     Arthritis     Asthma     Cancer     Hematuria     Hypertension     Hypothyroidism, unspecified     Skin cancer, basal cell     Stroke     Syncope and collapse     Thyroid disease     TIA (transient ischemic attack)          Past Surgical History:   Procedure Laterality Date    A-V CARDIAC PACEMAKER INSERTION N/A 2023    Procedure: INSERTION, CARDIAC PACEMAKER, DUAL CHAMBER;  Surgeon: Amrit Garsia MD;  Location: [facility] CATH/EP LAB;  Service: Cardiology;  Laterality: N/A;    COLONOSCOPY      COLONOSCOPY N/A 2020    Procedure: COLONOSCOPY;  Surgeon: Willis Mullen MD;  Location: [facility] ENDO;  Service: Endoscopy;  Laterality: N/A;    COLONOSCOPY N/A 2023    Procedure: COLONOSCOPY;  Surgeon: Willis Mullen MD;  Location: [facility] ENDO;  Service: Endoscopy;  Laterality: N/A;    CYSTOSCOPY      negative    EYE SURGERY      bilateral cataracts    HERNIA REPAIR      righht inguinal    JOINT REPLACEMENT Left     shoulder    left cataract surgery      PROSTATE SURGERY      TURP     Social History     Tobacco Use    Smoking status: Former     Current packs/day: 0.00     Types: Cigarettes     Quit date:      Years since quittin.8    Smokeless tobacco: Never    Tobacco comments:     Quit around    Substance Use Topics    Alcohol use: Yes     Alcohol/week: 7.0 standard drinks of alcohol     Types: 7 Shots of liquor per week     Comment: daily    Drug use: No     Family History   Problem Relation Age of Onset    Multiple sclerosis Mother     Heart disease Father     Stroke Father     No Known Problems Daughter     Cancer Neg Hx     Diabetes Neg Hx     Hypertension Neg Hx   "    Review of patient's allergies indicates:   Allergen Reactions    Doxycycline Rash     Causes rash, hives and itching    Diltiazem      pruritis    Hydrochlorothiazide      Causes elevate uric acid, gout      Levaquin [levofloxacin] Swelling    Norvasc [amlodipine] Swelling       Performance Status:The patient's activity level is functions out of house.      Review of Systems:    Constitutional: Negative for activity change, appetite change, chills, diaphoresis, fatigue, fever and unexpected weight change.   HENT: Negative for dental problem, postnasal drip, trouble swallowing and voice change. Positive for  rhinorrhea, sinus pressure, sinus pain, sneezing, sore throat,   Respiratory: Negative for apnea,  shortness of breath, wheezing and stridor.  Positive for cough, chest tightness,  Cardiovascular: Negative for chest pain, palpitations and leg swelling.   Gastrointestinal: Negative for abdominal distention, abdominal pain, constipation and nausea.   Musculoskeletal: Negative for gait problem, myalgias and neck pain.   Skin: Negative for color change and pallor.   Allergic/Immunologic: Negative for environmental allergies and food allergies.   Neurological: Negative for dizziness, speech difficulty, weakness, light-headedness, numbness and headaches.   Hematological: Negative for adenopathy. Does not bruise/bleed easily.   Psychiatric/Behavioral: Negative for dysphoric mood and sleep disturbance. The patient is not nervous/anxious.     Exam:Comprehensive exam done.   /74 (BP Location: Right arm, Patient Position: Sitting, BP Method: Small (Automatic))   Pulse 63   Ht 5' 10" (1.778 m)   Wt 77 kg (169 lb 12.1 oz)   SpO2 98% Comment: on room air at rest  BMI 24.36 kg/m²   Exam included Vitals as listed, and patient's appearance and affect and alertness and mood, oral exam for yeast and hygiene and pharynx lesions and Mallapatti (M) score, neck with inspection for jvd and masses and thyroid abnormalities " and lymph nodes (supraclavicular and infraclavicular nodes and axillary also examined and noted if abn), chest exam included symmetry and effort and fremitus and percussion and auscultation, cardiac exam included rhythm and gallops and murmur and rubs and jvd and edema, abdominal exam for mass and hepatosplenomegaly and tenderness and hernias and bowel sounds, Musculoskeletal exam with muscle tone and posture and mobility/gait and  strength, and skin for rashes and cyanosis and pallor and turgor, extremity for clubbing.  Findings were normal except for pertinent findings listed below:  M3,  chest is symmetric, no distress, normal percussion, normal fremitus, and no murmur, no edema, lung sounds good.    Rest good.      Radiographs (ct chest and cxr) reviewed from 10/16/2017 normal  cxr 5/20/2020 nad    Labs Reviewed  Lab Results   Component Value Date    WBC 8.60 08/01/2023    WBC 8.60 08/01/2023    HGB 15.3 08/01/2023    HGB 15.3 08/01/2023    HCT 46.5 08/01/2023    HCT 46.5 08/01/2023    MCV 94 08/01/2023    MCV 94 08/01/2023     08/01/2023     08/01/2023   holter 1/27/2020- The diary was properly completed. The tape was adequate (0 days , 48 hours, 0 minutes).  Predominant Rhythm Sinus rhythm with heart rates varying between 43 and 113 bpm with an average of 56 bpm.  Ventricular Arrhythmias There were very rare PVCs totalling 58 and averaging 1.21 per hour.  Supraventricular Arrhythmias There were occasional PACs totalling 638 and averaging 13.29 per hour.  Occassional atrial pair and short run of atrial tachycardia,the longest 7 beats with no associated symptoms..  There was an episode of SOB reported. The corresponding rhythm strips revealed the following: During the event (At the Gym), the rhythm was sinus tachycardia at 113 bpm with without ectopy.     Results for ARNULFO SALEH (MRN [id number]) as of 9/21/2018 11:38   Ref. Range 9/21/2018 08:59   Eosinophil% Latest Ref Range: 0.0 - 8.0 % 9.2  (H)   Eos # Latest Ref Range: 0.0 - 0.5 K/uL 0.6 (H)     PFT  March 31, 2017 fev 57%,     11/22/19-Conclusion     Concentric left ventricular remodeling.  Normal left ventricular systolic function. The estimated ejection fraction is 60%  Normal LV diastolic function.  No wall motion abnormalities.  Normal right ventricular systolic function.  The stress echo portion of this study is negative for myocardial ischemia.  The patient's exercise capacity was above average. functional capacity of 10 METS  The patient reached the end of the protocol.  There were no arrhythmias during stress.  The EKG portion of this study is negative for myocardial ischemia.  Exercise portion of stress test stopped due to an increase in systolic and diastolic blood pressure above protocol.  Hypertensive response changed from stress echo in 3/2018.     Spirometry with bronchodilator, lung volume by gas dilution, diffusion capacity were accomplished October 11, 2021. The FEV1 to FVC ratio was 67% indicating airflow obstruction. The FEV1 was 56% of predicted at 1.6 L-this makes airflow obstruction   moderately severe. Bronchodilator response was not statistically significant at 9%. Total lung capacity on lung volume by gas dilution was 60% of predicted and low. Diffusion was 62% of predicted and low.   Â    There is moderately severe airflow obstruction. There is no significant bronchodilator response. Total lung capacity is reduced to 60% predicted and diffusion is reduced to 62% predicted. Clinical correlation recommended.       Spirometry with bronchodilator, lung volume by gas dilution, diffusion capacity measured August 17, 2020.  The FEV1 to FVC ratio was 65% indicating airflow obstruction.  The FEV1 measured 42% predicted at 1.2 L making airflow obstruction severe.  There    was a 13% improvement in the FEV1 following bronchodilator, this is a significant bronchodilator response.  Total lung capacity was reduced to 57% predicted  suggesting restriction.  Residual volume was a little bit elevated at 77% of predicted.     Diffusion was reduced to 61% of predicted, uncorrected for anemia present.       Plan:  Clinical impression is apparently straight forward and impression with management as below.    Maycol was seen today for follow-up.    Diagnoses and all orders for this visit:    Severe persistent asthma without complication                Maycol was seen today for follow-up.    Diagnoses and all orders for this visit:    Severe persistent asthma without complication              Follow up in about 9 months (around 7/25/2024), or if symptoms worsen or fail to improve.    Discussed with patient above for education the following:      Patient Instructions   Blood in urine may be from kidney and lung disease but not likely.      Urine test reasonable and urine culture  also.    If not clearly from urology problem-- may recheck kidney function?        Lungs are doing well...    Would still be concerned about amiodarone.    Cxr viewed and good.

## 2023-11-07 ENCOUNTER — LAB VISIT (OUTPATIENT)
Dept: LAB | Facility: HOSPITAL | Age: 83
End: 2023-11-07
Attending: FAMILY MEDICINE
Payer: MEDICARE

## 2023-11-07 DIAGNOSIS — E78.2 MIXED HYPERLIPIDEMIA: ICD-10-CM

## 2023-11-07 DIAGNOSIS — E03.9 HYPOTHYROIDISM, UNSPECIFIED TYPE: ICD-10-CM

## 2023-11-07 LAB
ALBUMIN SERPL BCP-MCNC: 3.4 G/DL (ref 3.5–5.2)
ALP SERPL-CCNC: 60 U/L (ref 55–135)
ALT SERPL W/O P-5'-P-CCNC: 15 U/L (ref 10–44)
ANION GAP SERPL CALC-SCNC: 9 MMOL/L (ref 8–16)
AST SERPL-CCNC: 21 U/L (ref 10–40)
BASOPHILS # BLD AUTO: 0.04 K/UL (ref 0–0.2)
BASOPHILS NFR BLD: 0.5 % (ref 0–1.9)
BILIRUB SERPL-MCNC: 1 MG/DL (ref 0.1–1)
BUN SERPL-MCNC: 19 MG/DL (ref 8–23)
CALCIUM SERPL-MCNC: 9.3 MG/DL (ref 8.7–10.5)
CHLORIDE SERPL-SCNC: 103 MMOL/L (ref 95–110)
CHOLEST SERPL-MCNC: 210 MG/DL (ref 120–199)
CHOLEST/HDLC SERPL: 3.2 {RATIO} (ref 2–5)
CO2 SERPL-SCNC: 27 MMOL/L (ref 23–29)
CREAT SERPL-MCNC: 1.3 MG/DL (ref 0.5–1.4)
DIFFERENTIAL METHOD: ABNORMAL
EOSINOPHIL # BLD AUTO: 0 K/UL (ref 0–0.5)
EOSINOPHIL NFR BLD: 0 % (ref 0–8)
ERYTHROCYTE [DISTWIDTH] IN BLOOD BY AUTOMATED COUNT: 16.4 % (ref 11.5–14.5)
EST. GFR  (NO RACE VARIABLE): 53.8 ML/MIN/1.73 M^2
GLUCOSE SERPL-MCNC: 84 MG/DL (ref 70–110)
HCT VFR BLD AUTO: 46 % (ref 40–54)
HDLC SERPL-MCNC: 66 MG/DL (ref 40–75)
HDLC SERPL: 31.4 % (ref 20–50)
HGB BLD-MCNC: 14.9 G/DL (ref 14–18)
IMM GRANULOCYTES # BLD AUTO: 0.03 K/UL (ref 0–0.04)
IMM GRANULOCYTES NFR BLD AUTO: 0.4 % (ref 0–0.5)
LDLC SERPL CALC-MCNC: 128.2 MG/DL (ref 63–159)
LYMPHOCYTES # BLD AUTO: 1.8 K/UL (ref 1–4.8)
LYMPHOCYTES NFR BLD: 21.8 % (ref 18–48)
MCH RBC QN AUTO: 31 PG (ref 27–31)
MCHC RBC AUTO-ENTMCNC: 32.4 G/DL (ref 32–36)
MCV RBC AUTO: 96 FL (ref 82–98)
MONOCYTES # BLD AUTO: 0.9 K/UL (ref 0.3–1)
MONOCYTES NFR BLD: 11.5 % (ref 4–15)
NEUTROPHILS # BLD AUTO: 5.3 K/UL (ref 1.8–7.7)
NEUTROPHILS NFR BLD: 65.8 % (ref 38–73)
NONHDLC SERPL-MCNC: 144 MG/DL
NRBC BLD-RTO: 0 /100 WBC
PLATELET # BLD AUTO: 276 K/UL (ref 150–450)
PMV BLD AUTO: 10 FL (ref 9.2–12.9)
POTASSIUM SERPL-SCNC: 4.6 MMOL/L (ref 3.5–5.1)
PROT SERPL-MCNC: 6.8 G/DL (ref 6–8.4)
RBC # BLD AUTO: 4.8 M/UL (ref 4.6–6.2)
SODIUM SERPL-SCNC: 139 MMOL/L (ref 136–145)
T4 FREE SERPL-MCNC: 1.14 NG/DL (ref 0.71–1.51)
TRIGL SERPL-MCNC: 79 MG/DL (ref 30–150)
TSH SERPL DL<=0.005 MIU/L-ACNC: 1.87 UIU/ML (ref 0.4–4)
WBC # BLD AUTO: 8.09 K/UL (ref 3.9–12.7)

## 2023-11-07 PROCEDURE — 80053 COMPREHEN METABOLIC PANEL: CPT | Performed by: FAMILY MEDICINE

## 2023-11-07 PROCEDURE — 84443 ASSAY THYROID STIM HORMONE: CPT | Performed by: FAMILY MEDICINE

## 2023-11-07 PROCEDURE — 84439 ASSAY OF FREE THYROXINE: CPT | Performed by: FAMILY MEDICINE

## 2023-11-07 PROCEDURE — 85025 COMPLETE CBC W/AUTO DIFF WBC: CPT | Performed by: FAMILY MEDICINE

## 2023-11-07 PROCEDURE — 36415 COLL VENOUS BLD VENIPUNCTURE: CPT | Mod: PO | Performed by: FAMILY MEDICINE

## 2023-11-07 PROCEDURE — 80061 LIPID PANEL: CPT | Performed by: FAMILY MEDICINE

## 2023-11-14 ENCOUNTER — OFFICE VISIT (OUTPATIENT)
Dept: FAMILY MEDICINE | Facility: CLINIC | Age: 83
End: 2023-11-14
Payer: MEDICARE

## 2023-11-14 VITALS
BODY MASS INDEX: 24.49 KG/M2 | HEIGHT: 70 IN | HEART RATE: 84 BPM | OXYGEN SATURATION: 95 % | RESPIRATION RATE: 18 BRPM | TEMPERATURE: 98 F | DIASTOLIC BLOOD PRESSURE: 80 MMHG | SYSTOLIC BLOOD PRESSURE: 137 MMHG | WEIGHT: 171.06 LBS

## 2023-11-14 DIAGNOSIS — F10.20 ALCOHOL DEPENDENCE, UNCOMPLICATED: ICD-10-CM

## 2023-11-14 DIAGNOSIS — I10 ESSENTIAL HYPERTENSION: Primary | ICD-10-CM

## 2023-11-14 DIAGNOSIS — Z23 FLU VACCINE NEED: ICD-10-CM

## 2023-11-14 DIAGNOSIS — I48.0 PAROXYSMAL ATRIAL FIBRILLATION: ICD-10-CM

## 2023-11-14 DIAGNOSIS — N40.0 BENIGN PROSTATIC HYPERPLASIA WITHOUT LOWER URINARY TRACT SYMPTOMS: ICD-10-CM

## 2023-11-14 DIAGNOSIS — J45.50 SEVERE PERSISTENT ASTHMA WITHOUT COMPLICATION: ICD-10-CM

## 2023-11-14 DIAGNOSIS — E02 SUBCLINICAL IODINE-DEFICIENCY HYPOTHYROIDISM: ICD-10-CM

## 2023-11-14 DIAGNOSIS — E78.2 MIXED HYPERLIPIDEMIA: ICD-10-CM

## 2023-11-14 DIAGNOSIS — N63.41 SUBAREOLAR MASS OF RIGHT BREAST: ICD-10-CM

## 2023-11-14 PROCEDURE — 3079F PR MOST RECENT DIASTOLIC BLOOD PRESSURE 80-89 MM HG: ICD-10-PCS | Mod: CPTII,S$GLB,, | Performed by: FAMILY MEDICINE

## 2023-11-14 PROCEDURE — 1126F AMNT PAIN NOTED NONE PRSNT: CPT | Mod: CPTII,S$GLB,, | Performed by: FAMILY MEDICINE

## 2023-11-14 PROCEDURE — 90694 VACC AIIV4 NO PRSRV 0.5ML IM: CPT | Mod: S$GLB,,, | Performed by: FAMILY MEDICINE

## 2023-11-14 PROCEDURE — 90694 FLU VACCINE - QUADRIVALENT - ADJUVANTED: ICD-10-PCS | Mod: S$GLB,,, | Performed by: FAMILY MEDICINE

## 2023-11-14 PROCEDURE — 3288F PR FALLS RISK ASSESSMENT DOCUMENTED: ICD-10-PCS | Mod: CPTII,S$GLB,, | Performed by: FAMILY MEDICINE

## 2023-11-14 PROCEDURE — G0008 ADMIN INFLUENZA VIRUS VAC: HCPCS | Mod: S$GLB,,, | Performed by: FAMILY MEDICINE

## 2023-11-14 PROCEDURE — 1160F PR REVIEW ALL MEDS BY PRESCRIBER/CLIN PHARMACIST DOCUMENTED: ICD-10-PCS | Mod: CPTII,S$GLB,, | Performed by: FAMILY MEDICINE

## 2023-11-14 PROCEDURE — 1101F PT FALLS ASSESS-DOCD LE1/YR: CPT | Mod: CPTII,S$GLB,, | Performed by: FAMILY MEDICINE

## 2023-11-14 PROCEDURE — 1160F RVW MEDS BY RX/DR IN RCRD: CPT | Mod: CPTII,S$GLB,, | Performed by: FAMILY MEDICINE

## 2023-11-14 PROCEDURE — 99214 PR OFFICE/OUTPT VISIT, EST, LEVL IV, 30-39 MIN: ICD-10-PCS | Mod: S$GLB,,, | Performed by: FAMILY MEDICINE

## 2023-11-14 PROCEDURE — 1101F PR PT FALLS ASSESS DOC 0-1 FALLS W/OUT INJ PAST YR: ICD-10-PCS | Mod: CPTII,S$GLB,, | Performed by: FAMILY MEDICINE

## 2023-11-14 PROCEDURE — 3075F SYST BP GE 130 - 139MM HG: CPT | Mod: CPTII,S$GLB,, | Performed by: FAMILY MEDICINE

## 2023-11-14 PROCEDURE — 99999 PR PBB SHADOW E&M-EST. PATIENT-LVL IV: CPT | Mod: PBBFAC,,, | Performed by: FAMILY MEDICINE

## 2023-11-14 PROCEDURE — G0008 FLU VACCINE - QUADRIVALENT - ADJUVANTED: ICD-10-PCS | Mod: S$GLB,,, | Performed by: FAMILY MEDICINE

## 2023-11-14 PROCEDURE — 3079F DIAST BP 80-89 MM HG: CPT | Mod: CPTII,S$GLB,, | Performed by: FAMILY MEDICINE

## 2023-11-14 PROCEDURE — 99999 PR PBB SHADOW E&M-EST. PATIENT-LVL IV: ICD-10-PCS | Mod: PBBFAC,,, | Performed by: FAMILY MEDICINE

## 2023-11-14 PROCEDURE — 1126F PR PAIN SEVERITY QUANTIFIED, NO PAIN PRESENT: ICD-10-PCS | Mod: CPTII,S$GLB,, | Performed by: FAMILY MEDICINE

## 2023-11-14 PROCEDURE — 1159F PR MEDICATION LIST DOCUMENTED IN MEDICAL RECORD: ICD-10-PCS | Mod: CPTII,S$GLB,, | Performed by: FAMILY MEDICINE

## 2023-11-14 PROCEDURE — 1159F MED LIST DOCD IN RCRD: CPT | Mod: CPTII,S$GLB,, | Performed by: FAMILY MEDICINE

## 2023-11-14 PROCEDURE — 99214 OFFICE O/P EST MOD 30 MIN: CPT | Mod: S$GLB,,, | Performed by: FAMILY MEDICINE

## 2023-11-14 PROCEDURE — 3288F FALL RISK ASSESSMENT DOCD: CPT | Mod: CPTII,S$GLB,, | Performed by: FAMILY MEDICINE

## 2023-11-14 PROCEDURE — 3075F PR MOST RECENT SYSTOLIC BLOOD PRESS GE 130-139MM HG: ICD-10-PCS | Mod: CPTII,S$GLB,, | Performed by: FAMILY MEDICINE

## 2023-11-14 NOTE — PROGRESS NOTES
Subjective:       Patient ID: Maycol Hodge is a 83 y.o. male.    Chief Complaint: Follow-up (6mth f/u)    HPI  Review of Systems   Constitutional:  Negative for fatigue and unexpected weight change.   Respiratory:  Negative for chest tightness and shortness of breath.    Cardiovascular:  Negative for chest pain, palpitations and leg swelling.        C/o mass right nipple area   Gastrointestinal:  Negative for abdominal pain.   Musculoskeletal:  Negative for arthralgias.   Neurological:  Negative for dizziness, syncope, light-headedness and headaches.       Patient Active Problem List   Diagnosis    Chronic allergic rhinitis    Essential hypertension    Severe persistent asthma without complication    BPH (benign prostatic hyperplasia)    History of TIA (transient ischemic attack)    Hyperlipidemia    Cataract    Urticaria    Hypothyroidism    Autoimmune urticaria    Anaphylactic syndrome    Hashimoto's thyroiditis    Chronic bronchiolitis    Change in bowel habits    Acute urticaria    Paroxysmal atrial fibrillation    Hx of colonic polyps    Chronic sinus complaints    Fatigue    Hypersomnolence disorder    Eosinophilic asthma    Calcification of aorta    Blood pressure instability    Nonthrombocytopenic purpura    Tachy-mile syndrome    S/P placement of cardiac pacemaker    Atrial flutter    Alcohol dependence, uncomplicated     Patient is here for a chronic conditions follow up.    Reviewed labs  11/23   Card Dr. Vogel h/o TIA . PAf on eliquis. Planning eval for atrial flutter ablation.  Has occ gross hematuria     Has asthma exacerbation and bronchitis 9/23     Admitted [facility] 8/2/23 for pacemaker placement due to tachy-mile syndrome      C/o fatigue, low energy, worsening exercise tolerance and occ sx of orthostasis. Only change in meds is fasenra added for asthma 10/20. Denies CP. Has chronically slow HR. Taking atenolol 25mg 1/2 po qd. Card Dr. Vogel, now Baker- c/o tiredness, fatigue and slow pulse  on atenolol. PAF on eliquis     GI dr. Mullen colonoscopy 5/20/23-no polyps     PSA 12.4 urology Dr. Truong-has had multiple biopsies-no prostate cancer. 12.4 is low for him. Has gross hematuria during resp illness 9/23- had cystoscopy      Hst- no sig james. No machine needed     Eye Dr. Hester-glaucoma        Allergy Dr. Unger- History: July 16th HA. 19th hives and whelps.  Allergy Dr. Unger who was consulted during hospital stay.  Admitted after syncope in triage at [facility]. Diagnosed with anaphylaxis and shock from unknown etiology. Denies throat or tongue swelling or SOB. No known food allergies.   Had slow pulse and low BP.  Given steroids. Released 22nd.  Had purpuric lesions concerning for possible vasculitis. Complement as normal.  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 Has had erratic and high BP.  C/o fatigue and sleepiness.  BX neg for vasculitis.  On bid antihistamine zyrtec and claritin.  Rash cleared immediately after admission. Has had no further hives x 2 months. On daily antihistamines     Endocrine Dr. Duncan -Has + TPO Ab 9/18,hypothyroid     Pulm Dr. Burch -asthma. Having moire mucous prod and occ wheezing on symbicort 160, singulair. Takes claritin prn and uses flonase daily.        Objective:      Physical Exam  Vitals and nursing note reviewed.   Constitutional:       Appearance: He is well-developed.   Cardiovascular:      Rate and Rhythm: Normal rate and regular rhythm.      Heart sounds: Normal heart sounds.   Pulmonary:      Effort: Pulmonary effort is normal.      Breath sounds: Normal breath sounds.   Chest:   Breasts:     Right: Mass present. No swelling, nipple discharge or tenderness.       Skin:     General: Skin is warm and dry.   Neurological:      Mental Status: He is alert and oriented to person, place, and time.         Assessment:       1. Essential hypertension    2. Mixed hyperlipidemia    3. Paroxysmal atrial fibrillation    4. Severe persistent  asthma without complication    5. Benign prostatic hyperplasia without lower urinary tract symptoms    6. Subclinical iodine-deficiency hypothyroidism    7. Alcohol dependence, uncomplicated    8. Flu vaccine need    9. Subareolar mass of right breast        Plan:         1. Essential hypertension  Controlled on current medications.  Continue current medications.      2. Mixed hyperlipidemia  I recommend a heart healthy diet rich in fiber, fresh vegetables and fruit and low in saturated fats (fried foods, red meat, etc.).  I also recommend regular exercise including a minimum of 150 minutes of cardio exercise per week and 2-30 minute workouts of strength training like light weights, yoga, pilates, etc.  You should work toward a body mass index of < 25.      - Comprehensive Metabolic Panel; Future  - Lipid Panel; Future    3. Paroxysmal atrial fibrillation  Cont current mgmt and f/u card    4. Severe persistent asthma without complication  Cont current mgmt    5. Benign prostatic hyperplasia without lower urinary tract symptoms  Cont current mgmt    6. Subclinical iodine-deficiency hypothyroidism  Stable condition.  Continue current medications.  Will adjust based on lab findings or if condition changes.    - CBC Auto Differential; Future  - TSH; Future  - T4, Free; Future    7. Alcohol dependence, uncomplicated  Recommend moderation or avoidance    8. Flu vaccine need  Immunize today.  Counseled patient on risks, benefits and side effects.  Patient elected to proceed with vaccination.    - Influenza (FLUAD) - Quadrivalent (Adjuvanted) *Preferred* (65+) (PF)    9. Subareolar mass of right breast  Screen and treat as indicated:    - US Breast Left Complete; Future  - Mammo Digital Diagnostic Right; Future      Time spent with patient: 20 minutes    Patient with be reevaluated in 6 months or sooner prn    Greater than 50% of this visit was spent counseling as described in above documentation:Yes

## 2023-11-22 DIAGNOSIS — J45.50 SEVERE PERSISTENT ASTHMA WITHOUT COMPLICATION: Primary | ICD-10-CM

## 2023-11-22 DIAGNOSIS — I48.91 ATRIAL FIBRILLATION, UNSPECIFIED TYPE: ICD-10-CM

## 2023-11-27 DIAGNOSIS — J45.50 SEVERE PERSISTENT ASTHMA WITHOUT COMPLICATION: ICD-10-CM

## 2023-12-04 RX ORDER — APIXABAN 5 MG/1
TABLET, FILM COATED ORAL
Qty: 180 TABLET | Refills: 3 | Status: SHIPPED | OUTPATIENT
Start: 2023-12-04

## 2023-12-07 ENCOUNTER — HOSPITAL ENCOUNTER (OUTPATIENT)
Dept: RADIOLOGY | Facility: HOSPITAL | Age: 83
Discharge: HOME | End: 2023-12-07
Attending: FAMILY MEDICINE
Payer: MEDICARE

## 2023-12-07 DIAGNOSIS — N63.41 SUBAREOLAR MASS OF RIGHT BREAST: ICD-10-CM

## 2023-12-07 DIAGNOSIS — Z95.0 S/P PLACEMENT OF CARDIAC PACEMAKER: Primary | ICD-10-CM

## 2023-12-07 PROCEDURE — 77066 DX MAMMO INCL CAD BI: CPT | Mod: 26,,, | Performed by: RADIOLOGY

## 2023-12-07 PROCEDURE — 77062 MAMMO DIGITAL DIAGNOSTIC BILAT WITH TOMO: ICD-10-PCS | Mod: 26,,, | Performed by: RADIOLOGY

## 2023-12-07 PROCEDURE — 77062 BREAST TOMOSYNTHESIS BI: CPT | Mod: 26,,, | Performed by: RADIOLOGY

## 2023-12-07 PROCEDURE — 77066 DX MAMMO INCL CAD BI: CPT | Mod: TC

## 2023-12-07 PROCEDURE — 77066 MAMMO DIGITAL DIAGNOSTIC BILAT WITH TOMO: ICD-10-PCS | Mod: 26,,, | Performed by: RADIOLOGY

## 2023-12-07 PROCEDURE — 76642 ULTRASOUND BREAST LIMITED: CPT | Mod: 26,RT,, | Performed by: RADIOLOGY

## 2023-12-07 PROCEDURE — 76642 ULTRASOUND BREAST LIMITED: CPT | Mod: TC,RT

## 2023-12-07 PROCEDURE — 76642 US BREAST RIGHT LIMITED: ICD-10-PCS | Mod: 26,RT,, | Performed by: RADIOLOGY

## 2023-12-11 NOTE — PROGRESS NOTES
Subjective:     HPI    I had the pleasure of seeing Maycol Hodge in consultation at your request for the evaluation of atrial arrhythmias. He is an 83M with HTN, HLD, hx TIA (7 yrs ago, manifesting as unilateral weakness), and [employer] dual chamber pacemaker implantation for SSS in 8/2023.(Dr. Garsia) His AF history dates back to 8/2021 based on ECGs in the EMR. He is on eliquis 5 mg bid for stroke prevention. At follow-up for his pacemaker (9/2023) he was found to be in AF at 70 bpm with frequent RV pacing. Started on amiodarone 200 mg bid, and within 2 weeks was back in sinus rhythm. He presents to discuss options.    Echo in 8/2023 showed EF 70%, moderate AI.Regadenoson SPECT in 8/2023 negative for ischemia.    I reviewed today's device interrogation which shows stable device/lead function, AF burden 0.2% since 10/2023, RV pacing now 0.3%, battery 13.9 yrs.     Pt with minimal to no symptoms in AF.    Review of Systems   Constitutional: Positive for malaise/fatigue. Negative for decreased appetite, weight gain and weight loss.   HENT:  Negative for sore throat.    Eyes:  Negative for blurred vision.   Cardiovascular:  Negative for chest pain, dyspnea on exertion, irregular heartbeat, leg swelling, near-syncope, orthopnea, palpitations, paroxysmal nocturnal dyspnea and syncope.   Respiratory:  Negative for shortness of breath.    Skin:  Negative for rash.   Musculoskeletal:  Negative for arthritis.   Gastrointestinal:  Negative for abdominal pain.   Neurological:  Negative for focal weakness.   Psychiatric/Behavioral:  Negative for altered mental status.        Objective:   Physical Exam  Vitals and nursing note reviewed.   Constitutional:       General: He is not in acute distress.     Appearance: He is well-developed.   HENT:      Head: Normocephalic and atraumatic.   Eyes:      General: No scleral icterus.     Pupils: Pupils are equal, round, and reactive to light.   Neck:      Thyroid: No thyromegaly.    Cardiovascular:      Rate and Rhythm: Regular rhythm.      Pulses: Normal pulses.      Heart sounds: Normal heart sounds. No murmur heard.     No friction rub. No gallop.   Pulmonary:      Effort: Pulmonary effort is normal.      Breath sounds: Normal breath sounds.   Abdominal:      General: Bowel sounds are normal. There is no distension.      Palpations: Abdomen is soft.      Tenderness: There is no abdominal tenderness.   Musculoskeletal:      Cervical back: Neck supple.   Skin:     General: Skin is warm and dry.      Findings: No rash.   Neurological:      Mental Status: He is alert and oriented to person, place, and time.   Psychiatric:         Behavior: Behavior normal.       Assessment:      1. S/P placement of cardiac pacemaker    2. Essential hypertension    3. Paroxysmal atrial fibrillation    4. Mixed hyperlipidemia    5. Tachy-mile syndrome    6. History of TIA (transient ischemic attack)        Plan:     In summary, Maycol Hodge is an 83M with a history of symptomatic PAF. His [id number] Score is 5 (age, HTN, hx TIA) so he should continue eliquis.    Pt maintaining sinus rhythm on amiodarone 200 mg daily. Plan to reduce amiodarone to 200 mg daily, follow-up PRN.    Pt is not sure he is taking amiodarone. He is going to check when he gets home and call me if he is not. If he is not we will hold off on restarting and have him follow-up in 12 months.    Thank you for allowing me to participate in the care of this patient. Please do not hesitate to call me with any questions or concerns.

## 2023-12-13 ENCOUNTER — OFFICE VISIT (OUTPATIENT)
Dept: CARDIOLOGY | Facility: CLINIC | Age: 83
End: 2023-12-13
Payer: MEDICARE

## 2023-12-13 VITALS
RESPIRATION RATE: 16 BRPM | WEIGHT: 168 LBS | HEART RATE: 83 BPM | BODY MASS INDEX: 24.05 KG/M2 | SYSTOLIC BLOOD PRESSURE: 122 MMHG | HEIGHT: 70 IN | DIASTOLIC BLOOD PRESSURE: 68 MMHG | OXYGEN SATURATION: 94 %

## 2023-12-13 DIAGNOSIS — Z95.0 S/P PLACEMENT OF CARDIAC PACEMAKER: Primary | ICD-10-CM

## 2023-12-13 DIAGNOSIS — I10 ESSENTIAL HYPERTENSION: ICD-10-CM

## 2023-12-13 DIAGNOSIS — Z86.73 HISTORY OF TIA (TRANSIENT ISCHEMIC ATTACK): ICD-10-CM

## 2023-12-13 DIAGNOSIS — I48.0 PAROXYSMAL ATRIAL FIBRILLATION: ICD-10-CM

## 2023-12-13 DIAGNOSIS — I49.5 TACHY-BRADY SYNDROME: ICD-10-CM

## 2023-12-13 DIAGNOSIS — E78.2 MIXED HYPERLIPIDEMIA: ICD-10-CM

## 2023-12-13 DIAGNOSIS — I48.92 ATRIAL FLUTTER, UNSPECIFIED TYPE: ICD-10-CM

## 2023-12-13 PROCEDURE — 1159F MED LIST DOCD IN RCRD: CPT | Mod: CPTII,S$GLB,, | Performed by: INTERNAL MEDICINE

## 2023-12-13 PROCEDURE — 3074F SYST BP LT 130 MM HG: CPT | Mod: CPTII,S$GLB,, | Performed by: INTERNAL MEDICINE

## 2023-12-13 PROCEDURE — 99214 PR OFFICE/OUTPT VISIT, EST, LEVL IV, 30-39 MIN: ICD-10-PCS | Mod: S$GLB,,, | Performed by: INTERNAL MEDICINE

## 2023-12-13 PROCEDURE — 1160F PR REVIEW ALL MEDS BY PRESCRIBER/CLIN PHARMACIST DOCUMENTED: ICD-10-PCS | Mod: CPTII,S$GLB,, | Performed by: INTERNAL MEDICINE

## 2023-12-13 PROCEDURE — 1159F PR MEDICATION LIST DOCUMENTED IN MEDICAL RECORD: ICD-10-PCS | Mod: CPTII,S$GLB,, | Performed by: INTERNAL MEDICINE

## 2023-12-13 PROCEDURE — 3074F PR MOST RECENT SYSTOLIC BLOOD PRESSURE < 130 MM HG: ICD-10-PCS | Mod: CPTII,S$GLB,, | Performed by: INTERNAL MEDICINE

## 2023-12-13 PROCEDURE — 1160F RVW MEDS BY RX/DR IN RCRD: CPT | Mod: CPTII,S$GLB,, | Performed by: INTERNAL MEDICINE

## 2023-12-13 PROCEDURE — 99214 OFFICE O/P EST MOD 30 MIN: CPT | Mod: S$GLB,,, | Performed by: INTERNAL MEDICINE

## 2023-12-13 PROCEDURE — 3288F FALL RISK ASSESSMENT DOCD: CPT | Mod: CPTII,S$GLB,, | Performed by: INTERNAL MEDICINE

## 2023-12-13 PROCEDURE — 3078F DIAST BP <80 MM HG: CPT | Mod: CPTII,S$GLB,, | Performed by: INTERNAL MEDICINE

## 2023-12-13 PROCEDURE — 3078F PR MOST RECENT DIASTOLIC BLOOD PRESSURE < 80 MM HG: ICD-10-PCS | Mod: CPTII,S$GLB,, | Performed by: INTERNAL MEDICINE

## 2023-12-13 PROCEDURE — 1101F PR PT FALLS ASSESS DOC 0-1 FALLS W/OUT INJ PAST YR: ICD-10-PCS | Mod: CPTII,S$GLB,, | Performed by: INTERNAL MEDICINE

## 2023-12-13 PROCEDURE — 3288F PR FALLS RISK ASSESSMENT DOCUMENTED: ICD-10-PCS | Mod: CPTII,S$GLB,, | Performed by: INTERNAL MEDICINE

## 2023-12-13 PROCEDURE — 1101F PT FALLS ASSESS-DOCD LE1/YR: CPT | Mod: CPTII,S$GLB,, | Performed by: INTERNAL MEDICINE

## 2023-12-13 RX ORDER — AMIODARONE HYDROCHLORIDE 200 MG/1
200 TABLET ORAL DAILY
Qty: 90 TABLET | Refills: 3 | Status: SHIPPED | OUTPATIENT
Start: 2023-12-13 | End: 2024-12-12

## 2023-12-14 ENCOUNTER — TELEPHONE (OUTPATIENT)
Dept: ELECTROPHYSIOLOGY | Facility: CLINIC | Age: 83
End: 2023-12-14
Payer: MEDICARE

## 2023-12-14 NOTE — TELEPHONE ENCOUNTER
----- Message from Osmar Medina MA sent at 12/13/2023 11:36 AM CST -----    ----- Message -----  From: Georgiana Ruiz  Sent: 12/13/2023  11:30 AM CST  To: Gladys Leggett Staff    Patient have not taking amiodarone (PACERONE) 200 MG Tab. Please call back @ [phone]. Thank you Georgiana

## 2023-12-15 ENCOUNTER — TELEPHONE (OUTPATIENT)
Dept: ELECTROPHYSIOLOGY | Facility: CLINIC | Age: 83
End: 2023-12-15
Payer: MEDICARE

## 2023-12-15 NOTE — TELEPHONE ENCOUNTER
----- Message from Amina Melo sent at 12/15/2023 10:58 AM CST -----  Regarding: return call  Pt is returning your call and can be reached at [phone].    Thank you

## 2023-12-18 ENCOUNTER — OFFICE VISIT (OUTPATIENT)
Dept: CARDIOLOGY | Facility: CLINIC | Age: 83
End: 2023-12-18
Payer: MEDICARE

## 2023-12-18 VITALS
DIASTOLIC BLOOD PRESSURE: 89 MMHG | HEART RATE: 84 BPM | BODY MASS INDEX: 24.59 KG/M2 | WEIGHT: 171.75 LBS | HEIGHT: 70 IN | SYSTOLIC BLOOD PRESSURE: 150 MMHG

## 2023-12-18 DIAGNOSIS — R79.1 ABNORMAL COAGULATION PROFILE: ICD-10-CM

## 2023-12-18 DIAGNOSIS — I70.0 CALCIFICATION OF AORTA: ICD-10-CM

## 2023-12-18 DIAGNOSIS — Z86.73 HISTORY OF TIA (TRANSIENT ISCHEMIC ATTACK): ICD-10-CM

## 2023-12-18 DIAGNOSIS — I49.5 TACHY-BRADY SYNDROME: ICD-10-CM

## 2023-12-18 DIAGNOSIS — Z95.0 S/P PLACEMENT OF CARDIAC PACEMAKER: Primary | ICD-10-CM

## 2023-12-18 DIAGNOSIS — E78.2 MIXED HYPERLIPIDEMIA: ICD-10-CM

## 2023-12-18 DIAGNOSIS — I48.0 PAROXYSMAL ATRIAL FIBRILLATION: ICD-10-CM

## 2023-12-18 DIAGNOSIS — I10 PRIMARY HYPERTENSION: ICD-10-CM

## 2023-12-18 DIAGNOSIS — E02 SUBCLINICAL IODINE-DEFICIENCY HYPOTHYROIDISM: ICD-10-CM

## 2023-12-18 PROCEDURE — 1159F PR MEDICATION LIST DOCUMENTED IN MEDICAL RECORD: ICD-10-PCS | Mod: CPTII,S$GLB,, | Performed by: GENERAL PRACTICE

## 2023-12-18 PROCEDURE — 1101F PT FALLS ASSESS-DOCD LE1/YR: CPT | Mod: CPTII,S$GLB,, | Performed by: GENERAL PRACTICE

## 2023-12-18 PROCEDURE — 3079F DIAST BP 80-89 MM HG: CPT | Mod: CPTII,S$GLB,, | Performed by: GENERAL PRACTICE

## 2023-12-18 PROCEDURE — 1160F RVW MEDS BY RX/DR IN RCRD: CPT | Mod: CPTII,S$GLB,, | Performed by: GENERAL PRACTICE

## 2023-12-18 PROCEDURE — 93000 ELECTROCARDIOGRAM COMPLETE: CPT | Mod: S$GLB,,, | Performed by: GENERAL PRACTICE

## 2023-12-18 PROCEDURE — 1160F PR REVIEW ALL MEDS BY PRESCRIBER/CLIN PHARMACIST DOCUMENTED: ICD-10-PCS | Mod: CPTII,S$GLB,, | Performed by: GENERAL PRACTICE

## 2023-12-18 PROCEDURE — 1101F PR PT FALLS ASSESS DOC 0-1 FALLS W/OUT INJ PAST YR: ICD-10-PCS | Mod: CPTII,S$GLB,, | Performed by: GENERAL PRACTICE

## 2023-12-18 PROCEDURE — 3077F PR MOST RECENT SYSTOLIC BLOOD PRESSURE >= 140 MM HG: ICD-10-PCS | Mod: CPTII,S$GLB,, | Performed by: GENERAL PRACTICE

## 2023-12-18 PROCEDURE — 1126F AMNT PAIN NOTED NONE PRSNT: CPT | Mod: CPTII,S$GLB,, | Performed by: GENERAL PRACTICE

## 2023-12-18 PROCEDURE — 99999 PR PBB SHADOW E&M-EST. PATIENT-LVL IV: ICD-10-PCS | Mod: PBBFAC,,, | Performed by: GENERAL PRACTICE

## 2023-12-18 PROCEDURE — 3079F PR MOST RECENT DIASTOLIC BLOOD PRESSURE 80-89 MM HG: ICD-10-PCS | Mod: CPTII,S$GLB,, | Performed by: GENERAL PRACTICE

## 2023-12-18 PROCEDURE — 99999 PR PBB SHADOW E&M-EST. PATIENT-LVL IV: CPT | Mod: PBBFAC,,, | Performed by: GENERAL PRACTICE

## 2023-12-18 PROCEDURE — 3288F FALL RISK ASSESSMENT DOCD: CPT | Mod: CPTII,S$GLB,, | Performed by: GENERAL PRACTICE

## 2023-12-18 PROCEDURE — 1126F PR PAIN SEVERITY QUANTIFIED, NO PAIN PRESENT: ICD-10-PCS | Mod: CPTII,S$GLB,, | Performed by: GENERAL PRACTICE

## 2023-12-18 PROCEDURE — 3288F PR FALLS RISK ASSESSMENT DOCUMENTED: ICD-10-PCS | Mod: CPTII,S$GLB,, | Performed by: GENERAL PRACTICE

## 2023-12-18 PROCEDURE — 1159F MED LIST DOCD IN RCRD: CPT | Mod: CPTII,S$GLB,, | Performed by: GENERAL PRACTICE

## 2023-12-18 PROCEDURE — 99214 PR OFFICE/OUTPT VISIT, EST, LEVL IV, 30-39 MIN: ICD-10-PCS | Mod: S$GLB,,, | Performed by: GENERAL PRACTICE

## 2023-12-18 PROCEDURE — 99214 OFFICE O/P EST MOD 30 MIN: CPT | Mod: S$GLB,,, | Performed by: GENERAL PRACTICE

## 2023-12-18 PROCEDURE — 93000 EKG 12-LEAD: ICD-10-PCS | Mod: S$GLB,,, | Performed by: GENERAL PRACTICE

## 2023-12-18 PROCEDURE — 3077F SYST BP >= 140 MM HG: CPT | Mod: CPTII,S$GLB,, | Performed by: GENERAL PRACTICE

## 2023-12-18 NOTE — PROGRESS NOTES
Subjective:    Patient ID:  Maycol Hodge is a 83 y.o. male who presents for follow-up of   Chief Complaint   Patient presents with    Follow-up       HPI:       He is here today for follow-up of his atrial fib flutter, pacemaker my history of hypertension TIA chads Vasc score 5.  He has had no complaints and no episodes of atrial flutter that he is aware of..  Is taking the Pacerone 200 mg daily and the Eliquis 5 mg b.i.d..  Is no falling or bruising.        Dr Graham 12/13/23.    leoncio Hodge is an 83M with a history of symptomatic PAF. His [id number] Score is 5 (age, HTN, hx TIA) so he should continue eliquis.     Pt maintaining sinus rhythm on amiodarone 200 mg daily. Plan to reduce amiodarone to 200 mg daily, follow-up PRN.     Pt is not sure he is taking amiodarone. He is going to check when he gets home and call me if he is not. If he is not we will hold off on restarting and have him follow-up in 12 months.     Thank you for allowing me to participate in the care of this patient. Please do not hesitate to call me with any questions or concerns.                     9/26/23  Pacemaker insert 8/1/23  FLUTTER  sept 5 2023  HE WAS PLACED ON AMIODARONE AND SCHEDULED FOR CARDIOVERSION BUT SPONTANEOUSLY CONVERTED.     I CAN NOT PULL UP HIS EKGS CURRENTLY BUT THE PACEMAKER TRACINGS LOOK LIKE ATRIAL FLUTTER  Gets KYLE when walk. Feels heat beat irregular at times when checking Bp.  IT IS LIGHTHEADED IF HE STANDS UP QUICKLY     PACEMAKER INTERROGATION 09/05/2023 REVEALED A 4 SVT LONGEST 43 SEC,PREVIOUSLY THE LONGEST 76 HOURS ON AUGUST 11TH          EKG TODAY REVEALS ATRIAL PACED WITH PROLONGED AV CONDUCTION INCOMPLETE RIGHT BUNDLE BRANCH BLOCK LEFT AXIS DEVIATION.  IS CURRENTLY TAKING PACERONE 200 B.I.D. AND ELIQUIS.  HE MAY BE A CANDIDATE FOR ATRIAL FLUTTER ABLATION AND IS REFERRED TO.     NEED TO ADJUST THE MOM ON THE PACEMAKER AS HE GETS SOME CHEST EXERTION HE WAS HOPING TO HAVE MORE ENERGY AFTER THE  PACEMAKER WAS PLACED BUT HAS NOT WORK SO FAR  No follow-ups on file.        HE FEELS MORE TIRED IN THE PAST SINCE HE HAS BEEN ON METOPROLOL.  HE GETS DIZZY WHEN HE GETS UP QUICK IT HE IS ON HYTRIN FOR BLOOD PRESSURE MEDICATIONS.  I AM GOING TO CHANGE HIS BLOOD PRESSURE MEDICATIONS FROM HYTRIN AND METOPROLOL TO VERY EARLY IN TO SEE OF HE FEELS BETTER.            Here for followup the pacemaker 08/01/2023     Receives some monitor alerts of tachycardias 15% of the time.  His been generally 01/20/2028, 2113, on August 4th.  On August 3rd he had 40 minutes duration at 2316 of atrial fibrillation.  His heart rates have gone up to approximately 150 per min.  3.7 hours/ day.  BP RUNNING 140 SYSTOLIC.    Review of patient's allergies indicates:   Allergen Reactions    Doxycycline Rash     Causes rash, hives and itching    Augmentin [amoxicillin-pot clavulanate] Nausea And Vomiting    Diltiazem      pruritis    Hydrochlorothiazide      Causes elevate uric acid, gout      Levaquin [levofloxacin] Swelling    Norvasc [amlodipine] Swelling    Pravastatin Rash       Past Medical History:   Diagnosis Date    A-fib     Arthritis     Asthma     Cancer     Hematuria     Hypertension     Hypothyroidism, unspecified     Skin cancer, basal cell     Stroke     Syncope and collapse     Thyroid disease     TIA (transient ischemic attack)      Past Surgical History:   Procedure Laterality Date    A-V CARDIAC PACEMAKER INSERTION N/A 8/1/2023    Procedure: INSERTION, CARDIAC PACEMAKER, DUAL CHAMBER;  Surgeon: Amrit Garsia MD;  Location: [facility] CATH/EP LAB;  Service: Cardiology;  Laterality: N/A;    COLONOSCOPY      COLONOSCOPY N/A 1/21/2020    Procedure: COLONOSCOPY;  Surgeon: Willis Mullen MD;  Location: [facility];  Service: Endoscopy;  Laterality: N/A;    COLONOSCOPY N/A 5/30/2023    Procedure: COLONOSCOPY;  Surgeon: Willis Mullen MD;  Location: [facility];  Service: Endoscopy;  Laterality: N/A;    CYSTOSCOPY      negative    EYE  SURGERY      bilateral cataracts    HERNIA REPAIR      righht inguinal    JOINT REPLACEMENT Left     shoulder    left cataract surgery      PROSTATE SURGERY      TURP     Social History     Tobacco Use    Smoking status: Former     Current packs/day: 0.00     Types: Cigarettes     Quit date:      Years since quittin.0    Smokeless tobacco: Never    Tobacco comments:     Quit around    Substance Use Topics    Alcohol use: Yes     Alcohol/week: 7.0 standard drinks of alcohol     Types: 7 Shots of liquor per week     Comment: daily    Drug use: No     Family History   Problem Relation Age of Onset    Multiple sclerosis Mother     Heart disease Father     Stroke Father     No Known Problems Daughter     Cancer Neg Hx     Diabetes Neg Hx     Hypertension Neg Hx         Review of Systems:   Constitution: Negative for diaphoresis and fever.   HEENT: Negative for nosebleeds.    Cardiovascular: Negative for chest pain       No dyspnea on exertion       No leg swelling        No palpitations  Respiratory: Negative for shortness of breath and wheezing.    Hematologic/Lymphatic: Negative for bleeding problem. Does not bruise/bleed easily.   Skin: Negative for color change and rash.   Musculoskeletal: Negative for falls and myalgias.   Gastrointestinal: Negative for hematemesis and hematochezia.   Genitourinary: Negative for hematuria.   Neurological: Negative for dizziness and light-headedness.   Psychiatric/Behavioral: Negative for altered mental status and memory loss.          Objective:        Vitals:    23 1038   BP: (!) 150/89   Pulse: 84       Lab Results   Component Value Date    WBC 8.09 2023    HGB 14.9 2023    HCT 46.0 2023     2023    CHOL 210 (H) 2023    TRIG 79 2023    HDL 66 2023    ALT 15 2023    AST 21 2023     2023    K 4.6 2023     2023    CREATININE 1.3 2023    BUN 19 2023    CO2 27  11/07/2023    TSH 1.867 11/07/2023    PSA 12.4 (H) 02/17/2021    INR 1.0 08/01/2023    HGBA1C 5.3 10/26/2021        ECHOCARDIOGRAM RESULTS  Results for orders placed during the hospital encounter of 08/03/22    Echo    Interpretation Summary  · The left ventricle is normal in size with moderate concentric hypertrophy and normal systolic function.  · Normal left ventricular diastolic function.  · The estimated PA systolic pressure is 21 mmHg.  · Normal right ventricular size with normal right ventricular systolic function.  · Normal central venous pressure (3 mmHg).  · The estimated ejection fraction is 70%.  · Plaque present.  · Mild pulmonic regurgitation.  · Moderate aortic regurgitation.  · Mild tricuspid regurgitation.  · Mild right atrial enlargement.        CURRENT/PREVIOUS VISIT EKG  Results for orders placed or performed in visit on 09/26/23   IN OFFICE EKG 12-LEAD (to [place])    Collection Time: 09/26/23 12:23 PM    Narrative    Test Reason : Z86.73,R00.1,I48.92,    Vent. Rate : 061 BPM     Atrial Rate : 061 BPM     P-R Int : 254 ms          QRS Dur : 098 ms      QT Int : 422 ms       P-R-T Axes : 000 -34 053 degrees     QTc Int : 424 ms    Atrial-paced rhythm with prolonged AV conduction  Left axis deviation  Incomplete right bundle branch block  Abnormal ECG  When compared with ECG of 12-SEP-2023 06:16,  Electronic atrial pacemaker has replaced Electronic ventricular pacemaker  Confirmed by Sun MG, Amrit BIGGS ([id number]) on 10/3/2023 8:53:12 PM    Referred By:             Confirmed By:Amrit Garsia MD     No valid procedures specified.   Results for orders placed during the hospital encounter of 08/03/22    Nuclear Stress - Cardiology Interpreted    Interpretation Summary    Normal myocardial perfusion scan. There is no evidence of myocardial ischemia or infarction.    The gated perfusion images showed an ejection fraction of 75% post stress. Normal ejection fraction is greater than 47%.    There is normal  wall motion at rest and post stress.    LV cavity size is normal at rest and normal at stress.    The EKG portion of this study is negative for ischemia.    The patient reported no chest pain during the stress test.    There were no arrhythmias during stress.      Physical Exam:  CONSTITUTIONAL: No fever, no chills  HEENT: Normocephalic, atraumatic,pupils reactive to light                 NECK:  No JVD no carotid bruit  CVS: S1S2+, RRR, no murmurs,   LUNGS: Clear  ABDOMEN: Soft, NT, BS+  EXTREMITIES: No cyanosis, edema  : No fletcher catheter  NEURO: AAO X 3  PSY: Normal affect      Medication List with Changes/Refills   Current Medications    ALBUTEROL (PROVENTIL/VENTOLIN HFA) 90 MCG/ACTUATION INHALER    2 puffs every 4 hours as needed for cough, wheeze, or shortness of breath    AMIODARONE (PACERONE) 200 MG TAB    1 tablet (200 mg total) by Per NG tube route once daily.    ASCORBIC ACID, VITAMIN C, (VITAMIN C) 1000 MG TABLET    Take 1,000 mg by mouth once daily.    BENRALIZUMAB 30 MG/ML ATIN    Inject 1 Dose into the skin every 8 weeks.    ELIQUIS 5 MG TAB    TAKE 1 TABLET(5 MG) BY MOUTH TWICE DAILY    FLUTICASONE-SALMETEROL DISKUS INHALER 250-50 MCG    Inhale 1 puff into the lungs 2 (two) times daily. Controller    FLUTICASONE-UMECLIDIN-VILANTER (TRELEGY ELLIPTA) 200-62.5-25 MCG INHALER    Inhale 1 puff into the lungs once daily.    GLUC HCL/GLUC JAMESON/[id number]-[id number]-GLUC (GLUCOSAMINE COMPLEX ORAL)    Take 1 capsule by mouth once daily.    GUAIFENESIN-CODEINE 100-10 MG/5 ML (TUSSI-ORGANIDIN NR)  MG/5 ML SYRUP    Take 10 mLs by mouth every 6 (six) hours as needed for Cough.    LATANOPROST 0.005 % OPHTHALMIC SOLUTION    Place 1 drop into both eyes every evening.     LEVOTHYROXINE (SYNTHROID) 50 MCG TABLET    TAKE 1 TABLET BY MOUTH EVERY MORNING ON TUESDAY AND THURSDAY    LEVOTHYROXINE (SYNTHROID) 75 MCG TABLET    TAKE 1 TABLET BY MOUTH EVERY MORNING ON MONDAY, WEDNESDAY, FRIDAY, SATURDAY AND SUNDAY    MAGNESIUM  HYDROXIDE (MAGNESIA ORAL)    Take 400 mg by mouth.    PREDNISONE (DELTASONE) 10 MG TABLET    Take 2 daily for 5 days and repeat for breathing problems.    SPIRONOLACTONE (ALDACTONE) 25 MG TABLET    TAKE 1 TABLET(25 MG) BY MOUTH EVERY DAY    VERAPAMIL (VERELAN) 180 MG C24P    Take 1 capsule (180 mg total) by mouth once daily.             Assessment:       1. S/P placement of cardiac pacemaker    2. History of TIA (transient ischemic attack)    3. Tachy-mile syndrome    4. Calcification of aorta    5. Subclinical iodine-deficiency hypothyroidism    6. Mixed hyperlipidemia    7. Primary hypertension    8. Abnormal coagulation profile    9. Paroxysmal atrial fibrillation         Plan:     Problem List Items Addressed This Visit          Neuro    History of TIA (transient ischemic attack)       Cardiac/Vascular    Hyperlipidemia    Paroxysmal atrial fibrillation    Relevant Orders    EKG 12-lead    Calcification of aorta    Tachy-mile syndrome    S/P placement of cardiac pacemaker - Primary    Relevant Orders    EKG 12-lead       Endocrine    Hypothyroidism     Other Visit Diagnoses       Primary hypertension        Abnormal coagulation profile              Will obtain EKG today to assure maintenance of sinus rhythm.  He is asymptomatic will continue the current medication atrial fibrillation with tachy-mile syndrome  Continue routine pacemaker check.    Recheck thyroid profile because of high-risk medications    Follow up in about 6 months (around 6/18/2024).    The patients questions were answered, they verbalized understanding, and agreed with the treatment plan.     THOMAS LANDON MD  [facility]

## 2024-03-26 ENCOUNTER — PATIENT MESSAGE (OUTPATIENT)
Dept: ADMINISTRATIVE | Facility: HOSPITAL | Age: 84
End: 2024-03-26
Payer: MEDICARE

## 2024-05-08 ENCOUNTER — LAB VISIT (OUTPATIENT)
Dept: LAB | Facility: HOSPITAL | Age: 84
End: 2024-05-08
Attending: FAMILY MEDICINE
Payer: MEDICARE

## 2024-05-08 DIAGNOSIS — E78.2 MIXED HYPERLIPIDEMIA: ICD-10-CM

## 2024-05-08 DIAGNOSIS — E02 SUBCLINICAL IODINE-DEFICIENCY HYPOTHYROIDISM: ICD-10-CM

## 2024-05-08 LAB
ALBUMIN SERPL BCP-MCNC: 3.6 G/DL (ref 3.5–5.2)
ALP SERPL-CCNC: 66 U/L (ref 55–135)
ALT SERPL W/O P-5'-P-CCNC: 16 U/L (ref 10–44)
ANION GAP SERPL CALC-SCNC: 8 MMOL/L (ref 8–16)
AST SERPL-CCNC: 19 U/L (ref 10–40)
BASOPHILS # BLD AUTO: 0.05 K/UL (ref 0–0.2)
BASOPHILS NFR BLD: 0.7 % (ref 0–1.9)
BILIRUB SERPL-MCNC: 1.1 MG/DL (ref 0.1–1)
BUN SERPL-MCNC: 17 MG/DL (ref 8–23)
CALCIUM SERPL-MCNC: 9.5 MG/DL (ref 8.7–10.5)
CHLORIDE SERPL-SCNC: 104 MMOL/L (ref 95–110)
CHOLEST SERPL-MCNC: 201 MG/DL (ref 120–199)
CHOLEST/HDLC SERPL: 3.5 {RATIO} (ref 2–5)
CO2 SERPL-SCNC: 26 MMOL/L (ref 23–29)
CREAT SERPL-MCNC: 1.2 MG/DL (ref 0.5–1.4)
DIFFERENTIAL METHOD BLD: ABNORMAL
EOSINOPHIL # BLD AUTO: 0.2 K/UL (ref 0–0.5)
EOSINOPHIL NFR BLD: 2.4 % (ref 0–8)
ERYTHROCYTE [DISTWIDTH] IN BLOOD BY AUTOMATED COUNT: 15.9 % (ref 11.5–14.5)
EST. GFR  (NO RACE VARIABLE): 58.9 ML/MIN/1.73 M^2
GLUCOSE SERPL-MCNC: 90 MG/DL (ref 70–110)
HCT VFR BLD AUTO: 45 % (ref 40–54)
HDLC SERPL-MCNC: 57 MG/DL (ref 40–75)
HDLC SERPL: 28.4 % (ref 20–50)
HGB BLD-MCNC: 14.6 G/DL (ref 14–18)
IMM GRANULOCYTES # BLD AUTO: 0.04 K/UL (ref 0–0.04)
IMM GRANULOCYTES NFR BLD AUTO: 0.5 % (ref 0–0.5)
LDLC SERPL CALC-MCNC: 125.2 MG/DL (ref 63–159)
LYMPHOCYTES # BLD AUTO: 2.1 K/UL (ref 1–4.8)
LYMPHOCYTES NFR BLD: 27.7 % (ref 18–48)
MCH RBC QN AUTO: 29 PG (ref 27–31)
MCHC RBC AUTO-ENTMCNC: 32.4 G/DL (ref 32–36)
MCV RBC AUTO: 90 FL (ref 82–98)
MONOCYTES # BLD AUTO: 0.8 K/UL (ref 0.3–1)
MONOCYTES NFR BLD: 11 % (ref 4–15)
NEUTROPHILS # BLD AUTO: 4.3 K/UL (ref 1.8–7.7)
NEUTROPHILS NFR BLD: 57.7 % (ref 38–73)
NONHDLC SERPL-MCNC: 144 MG/DL
NRBC BLD-RTO: 0 /100 WBC
PLATELET # BLD AUTO: 300 K/UL (ref 150–450)
PMV BLD AUTO: 10.2 FL (ref 9.2–12.9)
POTASSIUM SERPL-SCNC: 4.6 MMOL/L (ref 3.5–5.1)
PROT SERPL-MCNC: 7 G/DL (ref 6–8.4)
RBC # BLD AUTO: 5.03 M/UL (ref 4.6–6.2)
SODIUM SERPL-SCNC: 138 MMOL/L (ref 136–145)
T4 FREE SERPL-MCNC: 1.07 NG/DL (ref 0.71–1.51)
TRIGL SERPL-MCNC: 94 MG/DL (ref 30–150)
TSH SERPL DL<=0.005 MIU/L-ACNC: 0.86 UIU/ML (ref 0.4–4)
WBC # BLD AUTO: 7.45 K/UL (ref 3.9–12.7)

## 2024-05-08 PROCEDURE — 36415 COLL VENOUS BLD VENIPUNCTURE: CPT | Mod: PO | Performed by: FAMILY MEDICINE

## 2024-05-08 PROCEDURE — 85025 COMPLETE CBC W/AUTO DIFF WBC: CPT | Performed by: FAMILY MEDICINE

## 2024-05-08 PROCEDURE — 84443 ASSAY THYROID STIM HORMONE: CPT | Performed by: FAMILY MEDICINE

## 2024-05-08 PROCEDURE — 80061 LIPID PANEL: CPT | Performed by: FAMILY MEDICINE

## 2024-05-08 PROCEDURE — 80053 COMPREHEN METABOLIC PANEL: CPT | Performed by: FAMILY MEDICINE

## 2024-05-08 PROCEDURE — 84439 ASSAY OF FREE THYROXINE: CPT | Performed by: FAMILY MEDICINE

## 2024-05-14 ENCOUNTER — OFFICE VISIT (OUTPATIENT)
Dept: FAMILY MEDICINE | Facility: CLINIC | Age: 84
End: 2024-05-14
Payer: MEDICARE

## 2024-05-14 VITALS
WEIGHT: 171.31 LBS | OXYGEN SATURATION: 95 % | HEART RATE: 80 BPM | DIASTOLIC BLOOD PRESSURE: 70 MMHG | TEMPERATURE: 98 F | SYSTOLIC BLOOD PRESSURE: 122 MMHG | BODY MASS INDEX: 23.98 KG/M2 | RESPIRATION RATE: 17 BRPM | HEIGHT: 71 IN

## 2024-05-14 DIAGNOSIS — N18.31 CHRONIC KIDNEY DISEASE, STAGE 3A: ICD-10-CM

## 2024-05-14 DIAGNOSIS — D69.2 NONTHROMBOCYTOPENIC PURPURA: ICD-10-CM

## 2024-05-14 DIAGNOSIS — Z95.0 S/P PLACEMENT OF CARDIAC PACEMAKER: ICD-10-CM

## 2024-05-14 DIAGNOSIS — J45.50 SEVERE PERSISTENT ASTHMA WITHOUT COMPLICATION: ICD-10-CM

## 2024-05-14 DIAGNOSIS — E02 SUBCLINICAL IODINE-DEFICIENCY HYPOTHYROIDISM: ICD-10-CM

## 2024-05-14 DIAGNOSIS — T50.905A DRUG-INDUCED GYNECOMASTIA: ICD-10-CM

## 2024-05-14 DIAGNOSIS — N62 DRUG-INDUCED GYNECOMASTIA: ICD-10-CM

## 2024-05-14 DIAGNOSIS — I10 ESSENTIAL HYPERTENSION: Primary | ICD-10-CM

## 2024-05-14 DIAGNOSIS — I70.0 CALCIFICATION OF AORTA: ICD-10-CM

## 2024-05-14 DIAGNOSIS — Z86.73 HISTORY OF TIA (TRANSIENT ISCHEMIC ATTACK): ICD-10-CM

## 2024-05-14 DIAGNOSIS — E78.2 MIXED HYPERLIPIDEMIA: ICD-10-CM

## 2024-05-14 DIAGNOSIS — I48.0 PAROXYSMAL ATRIAL FIBRILLATION: ICD-10-CM

## 2024-05-14 DIAGNOSIS — R53.82 CHRONIC FATIGUE: ICD-10-CM

## 2024-05-14 DIAGNOSIS — N40.0 BENIGN PROSTATIC HYPERPLASIA WITHOUT LOWER URINARY TRACT SYMPTOMS: ICD-10-CM

## 2024-05-14 DIAGNOSIS — F10.20 ALCOHOL DEPENDENCE, UNCOMPLICATED: ICD-10-CM

## 2024-05-14 PROBLEM — J44.89 CHRONIC BRONCHIOLITIS: Status: RESOLVED | Noted: 2019-12-05 | Resolved: 2024-05-14

## 2024-05-14 PROCEDURE — 3288F FALL RISK ASSESSMENT DOCD: CPT | Mod: CPTII,S$GLB,, | Performed by: FAMILY MEDICINE

## 2024-05-14 PROCEDURE — 99999 PR PBB SHADOW E&M-EST. PATIENT-LVL III: CPT | Mod: PBBFAC,,, | Performed by: FAMILY MEDICINE

## 2024-05-14 PROCEDURE — 99214 OFFICE O/P EST MOD 30 MIN: CPT | Mod: S$GLB,,, | Performed by: FAMILY MEDICINE

## 2024-05-14 PROCEDURE — 1160F RVW MEDS BY RX/DR IN RCRD: CPT | Mod: CPTII,S$GLB,, | Performed by: FAMILY MEDICINE

## 2024-05-14 PROCEDURE — 1101F PT FALLS ASSESS-DOCD LE1/YR: CPT | Mod: CPTII,S$GLB,, | Performed by: FAMILY MEDICINE

## 2024-05-14 PROCEDURE — 1159F MED LIST DOCD IN RCRD: CPT | Mod: CPTII,S$GLB,, | Performed by: FAMILY MEDICINE

## 2024-05-14 PROCEDURE — 1126F AMNT PAIN NOTED NONE PRSNT: CPT | Mod: CPTII,S$GLB,, | Performed by: FAMILY MEDICINE

## 2024-05-14 NOTE — PROGRESS NOTES
Subjective:       Patient ID: Maycol Hodge is a 84 y.o. male.    Chief Complaint: Follow-up (6mth f/uj)    HPI  Review of Systems   Constitutional:  Positive for fatigue. Negative for unexpected weight change.   Respiratory:  Negative for chest tightness and shortness of breath.    Cardiovascular:  Negative for chest pain, palpitations and leg swelling.   Gastrointestinal:  Negative for abdominal pain.   Musculoskeletal:  Negative for arthralgias.   Neurological:  Negative for dizziness, syncope, light-headedness and headaches.       Patient Active Problem List   Diagnosis    Chronic allergic rhinitis    Essential hypertension    Severe persistent asthma without complication    BPH (benign prostatic hyperplasia)    History of TIA (transient ischemic attack)    Hyperlipidemia    Cataract    Urticaria    Hypothyroidism    Autoimmune urticaria    Anaphylactic syndrome    Hashimoto's thyroiditis    Change in bowel habits    Acute urticaria    Paroxysmal atrial fibrillation    Hx of colonic polyps    Chronic sinus complaints    Fatigue    Hypersomnolence disorder    Eosinophilic asthma    Calcification of aorta    Blood pressure instability    Nonthrombocytopenic purpura    Tachy-mile syndrome    S/P placement of cardiac pacemaker    Atrial flutter    Alcohol dependence, uncomplicated    Chronic kidney disease, stage 3a    Drug-induced gynecomastia     Patient is here for a chronic conditions follow up.    Reviewed labs  5/24  CKd stage 3     C/o breast lump with tenderness 11/23 . Mammo kerrie and right breast us 12/23 showed mild benign gynecomastia likely due to spironolactone which he is on since 7/22 by gini Garsia for bp instability.     Card Dr. Vogel/Sun h/o TIA . PAf on eliquis. Had conversion to sinus rhythm after amiodarone added 9/23 and has remained so.  Has occ gross hematuria related to prostate disease. CHADS score is 5 so eliquis continuation is recommended  Arrhythmia Dr. Graham s/p cardiac  pacemaker for SSS placed 8/23             GI dr. Mullen colonoscopy 5/20/23-no polyps     urology Dr. Truong-elevated PSA has had multiple biopsies-no prostate cancer.      Hst- no sig james. No machine needed. C/o chronic fatigue for years, low energy, frequent naps at least 2 a day and never feeling rested. Has spurts of energy but usually get exhausted doing very little. Goes to gym 2-3 times per week.  Nuc stress test neg for ischemia and echo showed nl EF 2022. No wheezes or sob.       Eye Dr. Hester-glaucoma        Allergy Dr. Unger- History: July 16th HA. 19th hives and whelps.  Allergy Dr. Unger who was consulted during hospital stay.  Admitted after syncope in triage at [facility]. Diagnosed with anaphylaxis and shock from unknown etiology. Denies throat or tongue swelling or SOB. No known food allergies.   Had slow pulse and low BP.  Given steroids. Released 22nd.  Had purpuric lesions concerning for possible vasculitis. Complement as normal.  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 Has had erratic and high BP.  C/o fatigue and sleepiness.  BX neg for vasculitis.  On bid antihistamine zyrtec and claritin.  Rash cleared immediately after admission. Has had no further hives x 2 months. On daily antihistamines     Endocrine Dr. Duncan -Has + TPO Ab 9/18 c/w hashimotos ,hypothyroid     Pulm Dr. Burch -asthma. Having moire mucous prod and occ wheezing on trelegy, singulair. Takes claritin prn and uses flonase daily.      Objective:      Physical Exam  Vitals and nursing note reviewed.   Constitutional:       Appearance: He is well-developed.   Cardiovascular:      Rate and Rhythm: Normal rate and regular rhythm.      Heart sounds: Normal heart sounds.   Pulmonary:      Effort: Pulmonary effort is normal.      Breath sounds: Normal breath sounds.   Skin:     General: Skin is warm and dry.   Neurological:      Mental Status: He is alert and oriented to person, place, and time.          Assessment:       1. Essential hypertension    2. Mixed hyperlipidemia    3. Paroxysmal atrial fibrillation    4. Subclinical iodine-deficiency hypothyroidism    5. Severe persistent asthma without complication    6. Benign prostatic hyperplasia without lower urinary tract symptoms    7. History of TIA (transient ischemic attack)    8. Chronic kidney disease, stage 3a    9. Alcohol dependence, uncomplicated    10. Nonthrombocytopenic purpura    11. Calcification of aorta    12. S/P placement of cardiac pacemaker    13. Drug-induced gynecomastia    14. Chronic fatigue        Plan:         1. Essential hypertension  Controlled on current medications.  Continue current medications.      2. Mixed hyperlipidemia  Controlled. Not on statin  - Comprehensive Metabolic Panel; Future  - Lipid Panel; Future    3. Paroxysmal atrial fibrillation  Sinus rhythm. Rate controlled . On amiodarone and eliquis. Cont card care    4. Subclinical iodine-deficiency hypothyroidism  Controlled on current medications.  Continue current medications.    - CBC Auto Differential; Future  - TSH; Future  - T4, Free; Future    5. Severe persistent asthma without complication  Controlled on current medications.  Continue current medications.      6. Benign prostatic hyperplasia without lower urinary tract symptoms  Cont current mgmt and urology consult    7. History of TIA (transient ischemic attack)  Cont eliquis    8. Chronic kidney disease, stage 3a  Stable and chronic.  Will continue to monitor q3-6 months and control chronic conditions as optimally as possible to preserve function.      9. Alcohol dependence, uncomplicated  Recommend abstinence or moderation     10. Nonthrombocytopenic purpura  Recommend good skin care    11. Calcification of aorta  Cont to lower risk factors    12. S/P placement of cardiac pacemaker  Cont card monitoring    13. Drug-induced gynecomastia  Mild , due to spironolactone which he is on since 7/22 by card Dr. Garsia  for bp instability.     14. Chronic fatigue  Chronic and stable. Encouraged regular exercise and sleep, healthy diet.  Declined referral to PT/CP rehab        Time spent with patient: 20 minutes    Patient with be reevaluated in 4 months or sooner prn    Greater than 50% of this visit was spent counseling as described in above documentation:Yes

## 2024-05-28 DIAGNOSIS — Z00.00 ENCOUNTER FOR MEDICARE ANNUAL WELLNESS EXAM: ICD-10-CM

## 2024-05-29 DIAGNOSIS — E03.9 HYPOTHYROIDISM, UNSPECIFIED TYPE: ICD-10-CM

## 2024-05-29 NOTE — TELEPHONE ENCOUNTER
No care due was identified.  [facility] [place] Embedded Care Due Messages. Reference number: [id number].   5/29/2024 4:53:48 PM CDT

## 2024-05-30 RX ORDER — LEVOTHYROXINE SODIUM 75 UG/1
TABLET ORAL
Qty: 64 TABLET | Refills: 3 | Status: SHIPPED | OUTPATIENT
Start: 2024-05-30

## 2024-05-30 RX ORDER — LEVOTHYROXINE SODIUM 50 UG/1
TABLET ORAL
Qty: 26 TABLET | Refills: 3 | Status: SHIPPED | OUTPATIENT
Start: 2024-05-30

## 2024-05-30 NOTE — TELEPHONE ENCOUNTER
Refill Decision Note   Maycol Hodge  is requesting a refill authorization.  Brief Assessment and Rationale for Refill:  Approve     Medication Therapy Plan:         Alert overridden per protocol: Yes   Comments:     Note composed:10:30 AM 05/30/2024

## 2024-07-15 ENCOUNTER — OFFICE VISIT (OUTPATIENT)
Dept: FAMILY MEDICINE | Facility: CLINIC | Age: 84
End: 2024-07-15
Payer: MEDICARE

## 2024-07-15 VITALS
DIASTOLIC BLOOD PRESSURE: 70 MMHG | SYSTOLIC BLOOD PRESSURE: 118 MMHG | OXYGEN SATURATION: 97 % | BODY MASS INDEX: 24.1 KG/M2 | HEART RATE: 78 BPM | TEMPERATURE: 98 F | WEIGHT: 172.19 LBS | HEIGHT: 71 IN

## 2024-07-15 DIAGNOSIS — I70.0 CALCIFICATION OF AORTA: ICD-10-CM

## 2024-07-15 DIAGNOSIS — R26.9 ABNORMALITY OF GAIT AND MOBILITY: ICD-10-CM

## 2024-07-15 DIAGNOSIS — I10 ESSENTIAL HYPERTENSION: ICD-10-CM

## 2024-07-15 DIAGNOSIS — I48.0 PAROXYSMAL ATRIAL FIBRILLATION: ICD-10-CM

## 2024-07-15 DIAGNOSIS — Z00.00 ENCOUNTER FOR PREVENTIVE HEALTH EXAMINATION: Primary | ICD-10-CM

## 2024-07-15 PROCEDURE — 3288F FALL RISK ASSESSMENT DOCD: CPT | Mod: CPTII,S$GLB,, | Performed by: NURSE PRACTITIONER

## 2024-07-15 PROCEDURE — G0439 PPPS, SUBSEQ VISIT: HCPCS | Mod: S$GLB,,, | Performed by: NURSE PRACTITIONER

## 2024-07-15 PROCEDURE — 1101F PT FALLS ASSESS-DOCD LE1/YR: CPT | Mod: CPTII,S$GLB,, | Performed by: NURSE PRACTITIONER

## 2024-07-15 PROCEDURE — 99999 PR PBB SHADOW E&M-EST. PATIENT-LVL IV: CPT | Mod: PBBFAC,,, | Performed by: NURSE PRACTITIONER

## 2024-07-15 PROCEDURE — 1158F ADVNC CARE PLAN TLK DOCD: CPT | Mod: CPTII,S$GLB,, | Performed by: NURSE PRACTITIONER

## 2024-07-15 PROCEDURE — 1159F MED LIST DOCD IN RCRD: CPT | Mod: CPTII,S$GLB,, | Performed by: NURSE PRACTITIONER

## 2024-07-15 PROCEDURE — 1170F FXNL STATUS ASSESSED: CPT | Mod: CPTII,S$GLB,, | Performed by: NURSE PRACTITIONER

## 2024-07-15 NOTE — PROGRESS NOTES
"  Maycol Hodge presented for a  Medicare AWV and comprehensive Health Risk Assessment today. The following components were reviewed and updated:    Medical history  Family History  Social history  Allergies and Current Medications  Health Risk Assessment  Health Maintenance  Care Team         ** See Completed Assessments for Annual Wellness Visit within the encounter summary.**         The following assessments were completed:  Living Situation  CAGE  Depression Screening  Timed Get Up and Go  Whisper Test  Cognitive Function Screening  Nutrition Screening  ADL Screening  PAQ Screening    Clock in media   Opioid documentation:      Patient does not have a current opioid prescription.        Vitals:    07/15/24 1410   BP: 118/70   Pulse: 78   Temp: 98.2 °F (36.8 °C)   TempSrc: Oral   SpO2: 97%   Weight: 78.1 kg (172 lb 2.9 oz)   Height: 5' 11" (1.803 m)     Body mass index is 24.01 kg/m².  Physical Exam  Constitutional:       Appearance: He is well-developed.   HENT:      Head: Normocephalic and atraumatic.      Right Ear: Hearing normal.      Left Ear: Hearing normal.      Nose: Nose normal.   Eyes:      General: Lids are normal.      Conjunctiva/sclera: Conjunctivae normal.      Pupils: Pupils are equal, round, and reactive to light.   Cardiovascular:      Rate and Rhythm: Normal rate.   Pulmonary:      Effort: Pulmonary effort is normal.   Abdominal:      Palpations: Abdomen is soft.   Musculoskeletal:         General: Normal range of motion.      Cervical back: Normal range of motion and neck supple.   Skin:     General: Skin is warm and dry.   Neurological:      Mental Status: He is alert and oriented to person, place, and time.               Diagnoses and health risks identified today and associated recommendations/orders:    1. Encounter for preventive health examination  Discussed health maintenance guidelines appropriate for age.        2. Essential hypertension  Controlled, continue current medication " regimen  Low salt diet  Increase physical activity  Followed by pcp      3. Calcification of aorta  Stable, continue to monitor  Followed by pcp     4. Paroxysmal atrial fibrillation  Stable, continue current medication  Followed by cardiology     5. Abnormality of gait and mobility  Stable, continue to monitor       Provided Maycol with a 5-10 year written screening schedule and personal prevention plan. Recommendations were developed using the USPSTF age appropriate recommendations. Education, counseling, and referrals were provided as needed. After Visit Summary printed and given to patient which includes a list of additional screenings\tests needed.    Follow up for One year for Annual Wellness Visit.    Galina Arguello NP    I offered to discuss advanced care planning, including how to pick a person who would make decisions for you if you were unable to make them for yourself, called a health care power of , and what kind of decisions you might make such as use of life sustaining treatments such as ventilators and tube feeding when faced with a life limiting illness recorded on a living will that they will need to know. (How you want to be cared for as you near the end of your natural life)     X  Patient has advanced directive written but has opted not to place them on file with the institution.

## 2024-07-15 NOTE — PATIENT INSTRUCTIONS
Counseling and Referral of Other Preventative  (Italic type indicates deductible and co-insurance are waived)    Patient Name: Maycol Hodge  Today's Date: 7/15/2024    Health Maintenance       Date Due Completion Date    Shingles Vaccine (1 of 2) Never done ---    RSV Vaccine (Age 60+ and Pregnant patients) (1 - 1-dose 60+ series) Never done ---    COVID-19 Vaccine (7 - 2023-24 season) 09/01/2023 11/8/2021    Influenza Vaccine (1) 09/01/2024 11/14/2023    TETANUS VACCINE 08/18/2025 8/18/2015    Lipid Panel 05/08/2029 5/8/2024        No orders of the defined types were placed in this encounter.      The following information is provided to all patients.  This information is to help you find resources for any of the problems found today that may be affecting your health:                  Living healthy guide: [email or web address].[facility]      Understanding Diabetes: www.diabetes.org      Eating healthy: www.cdc.gov/healthyweight      [facility] home safety checklist: www.cdc.gov/steadi/patient.html      Agency on Aging: www.goea.[place].[facility]      Alcoholics anonymous (AA): www.aa.org      Physical Activity: www.su.nih.gov/ej6ebev      Tobacco use: www.quitwithusla.org

## 2024-07-16 RX ORDER — SPIRONOLACTONE 25 MG/1
TABLET ORAL
Qty: 30 TABLET | Refills: 11 | Status: SHIPPED | OUTPATIENT
Start: 2024-07-16

## 2024-07-25 ENCOUNTER — OFFICE VISIT (OUTPATIENT)
Dept: PULMONOLOGY | Facility: CLINIC | Age: 84
End: 2024-07-25
Payer: MEDICARE

## 2024-07-25 VITALS
OXYGEN SATURATION: 97 % | DIASTOLIC BLOOD PRESSURE: 85 MMHG | WEIGHT: 172.19 LBS | HEART RATE: 69 BPM | HEIGHT: 71 IN | BODY MASS INDEX: 24.1 KG/M2 | SYSTOLIC BLOOD PRESSURE: 136 MMHG

## 2024-07-25 DIAGNOSIS — J45.50 SEVERE PERSISTENT ASTHMA WITHOUT COMPLICATION: Primary | ICD-10-CM

## 2024-07-25 DIAGNOSIS — R53.83 FATIGUE DUE TO DEPRESSION: ICD-10-CM

## 2024-07-25 DIAGNOSIS — J45.901 EXACERBATION OF ASTHMA, UNSPECIFIED ASTHMA SEVERITY, UNSPECIFIED WHETHER PERSISTENT: ICD-10-CM

## 2024-07-25 DIAGNOSIS — F32.A FATIGUE DUE TO DEPRESSION: ICD-10-CM

## 2024-07-25 PROCEDURE — 3288F FALL RISK ASSESSMENT DOCD: CPT | Mod: CPTII,S$GLB,, | Performed by: INTERNAL MEDICINE

## 2024-07-25 PROCEDURE — 1101F PT FALLS ASSESS-DOCD LE1/YR: CPT | Mod: CPTII,S$GLB,, | Performed by: INTERNAL MEDICINE

## 2024-07-25 PROCEDURE — 99213 OFFICE O/P EST LOW 20 MIN: CPT | Mod: S$GLB,,, | Performed by: INTERNAL MEDICINE

## 2024-07-25 PROCEDURE — 99999 PR PBB SHADOW E&M-EST. PATIENT-LVL III: CPT | Mod: PBBFAC,,, | Performed by: INTERNAL MEDICINE

## 2024-07-25 RX ORDER — BUPROPION HYDROCHLORIDE 150 MG/1
300 TABLET ORAL DAILY
Qty: 60 TABLET | Refills: 3 | Status: SHIPPED | OUTPATIENT
Start: 2024-07-25 | End: 2025-07-25

## 2024-07-25 RX ORDER — FLUTICASONE FUROATE, UMECLIDINIUM BROMIDE AND VILANTEROL TRIFENATATE 200; 62.5; 25 UG/1; UG/1; UG/1
1 POWDER RESPIRATORY (INHALATION) DAILY
Qty: 60 EACH | Refills: 11 | Status: SHIPPED | OUTPATIENT
Start: 2024-07-25

## 2024-07-25 NOTE — PATIENT INSTRUCTIONS
Would do trial prednisone 20 mg daily for a wk, use trelegy daily -- as trial..   could do follow up breathing test.    After above -- would try Wellbutrin -- start one daily for a week -- if not effective go to one twice daily for a month(or so) stop if not effective      Continue fasenra as lungs have been very stable.  Would do above as stamina not good......

## 2024-07-25 NOTE — PROGRESS NOTES
7/25/2024    aMycol Hodge  Office Note    Chief Complaint   Patient presents with    Follow-up    Asthma       HPI:  7/25/2024 pt having good and bad days, will have islas climbing stairs. Fatigues and naps easy-- sleeps ok, had sleep study past and did well.  Pt was able to yd wk 2yrs ago.    Still on fasenra and not on controller  Still on amiodarone and takes thyroid rx with  good tsh 5/2024...  Pt appreciates slowing down   No wt loss.  May have some down mood..  Pleasure reading, concentrates ok.    Wife ok---       10/25/2023 pt relates had had blood in urine, pt on fasenra 3 yrs.  Cxr clear from  last eval with neg sputum culture-- cleareed    No urine pain. To see urology in [place] in am ....  Patient Instructions   Blood in urine may be from kidney and lung disease but not likely.      Urine test reasonable and urine culture  also.    If not clearly from urology problem-- may recheck kidney function?        Lungs are doing well...    Would still be concerned about amiodarone.    Cxr viewed and good.      10/11/2023 pt doing well on fasenra, but developed sore throat followed by 3 days with rhinitis, then cough with violent cough and clear mucous.  Appetite low and having fatigue.  Pt took prednisone  6 days -- illness was similar to prior asthma..  pt had some wheezes and worse laying down-- very fatigued.      Pt started amiodarone 8/1/2023 out pt Dr Garsia-- had pacer   Patient Instructions   Lungs do sound good today    Chest xray 8/1/2023 viewed with new pacer-- lungs small post op.  Need follow up.    Amiodarone may adversely affect lung-- chest xray may not be as good a screen as ct chest.  Would do chest xray 1st...    Lungs sound clear    Use codeine to suppress cough.    Would stay on controllers-- use trelegy for now.      Mucous was sl yellow today- do culture today - -may give antibiotic if needed (intolerances and amiodarone limit antibiotics)    Would do ct if not thriving     Oxygen sat  walking varied 93-98...    Would  continue fasenra.    Need follow up in 2- 3 weeks if not cleared -- may need ct chest if not clearing.    12/6/2022 goes to gym daily, doing better.  Had pft better now than last yr.    Patient Instructions   Would recommend continue Fasenra as no asthma issues since starting over 2-3 yrs ago.    Breathing test today is better yet.      6/22/2022 used provigil with good results but waned.  Took prednisone with some benefit.    No wheezes and occ sob/albuterol use  Patient Instructions   Provigil will increase alertness --- not an medication for energy. There is no energy medication-- medications are for illness.      Asthma seems to be doing well  -- you have benefited from fasenra every 2 months, you have been on fasenra 2 yrs in September.      Would check lung capacity at follow up in 6 months.    3/7/2022 did not use provigil as felt did not have narcolepsy.  No prednisone.  bp 171/99-- pcp told to repeat  And still bp up.  Energy varies -- trys to get to gym an hour with variable results.  Asthma controlled.  Weaned off symbicort with breo use-- very stable and wishes to wean off.    No hives.  Patient Instructions   Would try provigil to improve alertness and vigilance.  May need follow up for refills as needed.  Would discuss bp with Dr Fox June 2 follow  up  Would be reasonable to use breo every other day -- if stable breo qod for a month - could skip if having no asthma symptoms or albuterol use.     You have had dramatic benefit from Fasenra-- need to continue.      12/62021 -- had fasenra 4 wks ago.  Had been able use no prednisone nor rescue nor controller til 2 wks ago when had sore throat followed by nasal congestion then cough/wheezes-- pt cleared with prednisone/azithromycin and prn albuterol.  Patient Instructions   You started Fasenra last September with extreme dramatic benefit-- your asthma was under excellent control and ---your controller and rescue and  action plans were able to be stopped.  You had mild exacerbation due to uri last 3 wks.      Would recommend continuing Fasenra as you were on prednisone every 2 - 3 months prior to Fasenra.     If your insurance mandates you be on controller -- would recommend using singulair and symbicort-- symbicort was started 3 wks.              Would recommend trial provigil for excess sleepiness-- your recent sleep study did not show significant sleep apnea with AHI 2.1.  Will try to get approval through speciality pharmacy.   Start provigil one daily to improve alertness and focus.         Addendum -sleep study 8/20/2020 had ahi 2.1.   Could repeat in house sleep study.  Suspect would do well with provigil -- would recommend trial 100 mg daily to treat hypersomnolence.      10/25/2021 having fatigue, sometimes wake up fatigued and sometimes develops during day.  Denies nerve/anxiety issues.  Appetite ok.  No cough/wheezes/sob.  No spasm in over a yr or more.  No inhaler use.  Fatigue occurs 1/3 days.  Voids adequately.  Will sit about throughout day once fatigued.  Fatigue occurring last 3 months or so, had colonoscopy last yr, has chr elevation psa which is monitor q yr from q 6 months in past. Saw cardiology.     will nap twice daily -- has excess sleepiness.  Patient Instructions   Would use prednisone once daily for 3 days if any fatigue-- may do  Once or twice.  Breathing test was better at 56% than 42% last yr-- but you may still have some airway problems..  Would try inhalers if fatigued-- try nebulizer.  Also try prednisone----- if prednisone works-- use inhalers more.   Would screen for inflammation, testosterone (may not replace as psa up???), blood counts/chemistries, thyroid  (continue medications), diabetes screen, and chest xray.  Should f/u Dr Rojo soon if fatigue not clearing?????  psa not oredered as not able.   Addendum -sleep study 8/20/2020 had ahi 2.1.   Could repeat in house sleep study.  Suspect  would do well with provigil -- would recommend trial 100 mg daily to treat hypersomnolence.      4/28/2021- fasenra going well, no steroids, 4-5 times daily has transparent mucous produced. No sleep apnea but some excessive sleepiness appreciated. Sleep 10, arouses 3 to void (had turp/meds in past)with difficulty going back. Off symbicort. Uses albuterol maybe once a wk or so.   Patient Instructions   Continue Gym.    Lungs should be good.  Need to continue fasenra.    Thyroid could be rechecked.     Home study-- no sleep apnea, could have restless legs?  10/28/2020 no prednisone since last visit, fasenra helped dramatically.  No prednisoen and breathing much better.  - pt got shot here 10/8/2020  Patient Instructions   Need to continue fasenra as has had dramatic response.     9/28/2020 took another course prednisone, has been on prednisone  Monthly since march.  Finished last 2 wks ago, feels will need [place] course soon. Uses symbicort regular.  Had sleep study few wks ago.fev 42 from 57% on 2017,      Sleep study 8/20/2020 with ahi 2/hr- within normal range.   Patient Instructions   Asthma very unstable..  Will place order for fasenra- sample shot would help.  Shot would be monthly x 3 then every 8 wks.    Use valtrex if shingles - call for any question.      Sound like you need treatment again soon- start prednisone soon or Fasenra shots.     will give paper script for advair - may replace symboicort if cheaper?     No sleep apnea of significance.      Fasenra is an interleukin 5 inhibitor.  8/11/2020 had to use prednisone since March, having severe fatigue - reports sleeps well, sleep non restorative, snores with no sleep study in past.  Romo to mail box - wife sleeps separate room. No snore arousal.    Uses symbicort regular.  Uses albuterol rescue minimally less than once a wk.  Pt starts prednisone when cough becomes excessive.  Eats well, no wt loss. Sleeps 7 hrs/d.  Goes to [employer] for hr 3/wk.  Pt  having vivid dreams.   Patient Instructions   Check lung capacity   Check sleep study - best prior to prednisone  May use prednisone - start 20/d dropping to 10 mg daily once better.  If you are found to have sleep apnea- less than 5 episodes an hour would be normal- would try cpap therapy.  2020 51 yo son  suddenly  in [place]- son avid - death.  Toxicology pending.  Had 2 spells ppt prednisone use since last visit with good results.  Took erythro also.  Having some grief- eats and sleeps ok . Wife in good shape. Uses symbicort regular.  Patient Instructions   Asthma not too bad but you needed prednisone twice since March.  Special shots may help-  Could ask to get fasenra covered.  May consider starting if pattern worsens.    symbicort needed regular especially in winter/spring.+      3/2/2020- having exhaustion after 10-15 min doing yd wk.  Saw cardiology- had a fib at colonoscopy in dec but sinus mile since. - eliquis started.  Pt had syncope 10 days - saw Dr Vogel in f/u.  Last wk had some noct wheezes.   Uses symbicort 2 bid and singulair.  No no prednisone use last yr.  Sleeps ok, nerves ok - denies depression.  Had hives last July.  Stress echo in 2019 was good save high bp?    Patient Instructions   Fatigue and wheezes are noted.  Breathing not typical cause for loss of consciousness.  Heart rate is low - may contribute to fatigue but not that bad?    Chronic sinuses seem ok.  Would recommend prednisone 20 mg daily for 3 days now,  May repeat but would like to see better function and activity.    Call if not perking up.   oxygen level walking in office went from 97 to 93 % - not bad.  bp standing didn't fall today.    Blood wk last month all looked good.    2019- Onset one week, sore throat, congestion, cough (fits, severe, productive light yellow in color), took azithromycin with minimal benefit. Current Prednisone use.  2019- Feeling well, no exacerbations  requiring antibiotic and steroid therapy. No cough, no nocturnal arousals, no SOB.   Sept 27, 2018- had exacerbations in July and August needing azithro and prednisone- had cough clear mucous with sl yellow, no breathing issues.  Unusual to have exacerbation summer.  Jan 5, 2018-- took azithro and pred x 3 - good results.  Doing well  Jan 19,2017 hpi, had acute onset 5 days ago with sinus runny, cough, wheezes, sob, no fever, no n/v, pos headache.  Muscle aches and joint aches.  Took prednisone, took inhaler, neb rx was old. Run down, poor appetite,   Took prednisone and azithro x3, still cough but better,  Down but better.  Mucous clear.   Dec 16, 2016HPI:pt with asthma with no usual noct arousals or rescue use.  Exacerbates 3x yr in spring mainly. Progresses for a week on rescue and then uses pred and doxy (recent doxy reaction) to clear.  I cared for years ago.  No recent pft.  Pt feels breathing better than  Usual yrs past.      The chief compliant  problem varies with instablilty at time    PFSH:  Past Medical History:   Diagnosis Date    A-fib     Arthritis     Asthma     Cancer     Hematuria     Hypertension     Hypothyroidism, unspecified     Skin cancer, basal cell     Stroke     Syncope and collapse     Thyroid disease     TIA (transient ischemic attack)          Past Surgical History:   Procedure Laterality Date    A-V CARDIAC PACEMAKER INSERTION N/A 8/1/2023    Procedure: INSERTION, CARDIAC PACEMAKER, DUAL CHAMBER;  Surgeon: Amrit Garsia MD;  Location: [facility] CATH/EP LAB;  Service: Cardiology;  Laterality: N/A;    COLONOSCOPY      COLONOSCOPY N/A 1/21/2020    Procedure: COLONOSCOPY;  Surgeon: Willis Mullen MD;  Location: [facility];  Service: Endoscopy;  Laterality: N/A;    COLONOSCOPY N/A 5/30/2023    Procedure: COLONOSCOPY;  Surgeon: Willis Mullen MD;  Location: [facility];  Service: Endoscopy;  Laterality: N/A;    CYSTOSCOPY      negative    EYE SURGERY      bilateral cataracts    HERNIA  REPAIR      righht inguinal    JOINT REPLACEMENT Left     shoulder    left cataract surgery      PROSTATE SURGERY      TURP     Social History     Tobacco Use    Smoking status: Former     Current packs/day: 0.00     Types: Cigarettes     Quit date:      Years since quittin.6    Smokeless tobacco: Never    Tobacco comments:     Quit around    Substance Use Topics    Alcohol use: Yes     Alcohol/week: 7.0 standard drinks of alcohol     Types: 7 Shots of liquor per week     Comment: daily    Drug use: No     Family History   Problem Relation Name Age of Onset    Multiple sclerosis Mother      Heart disease Father      Stroke Father      No Known Problems Daughter      Cancer Neg Hx      Diabetes Neg Hx      Hypertension Neg Hx       Review of patient's allergies indicates:   Allergen Reactions    Doxycycline Rash     Causes rash, hives and itching    Diltiazem      pruritis    Hydrochlorothiazide      Causes elevate uric acid, gout      Levaquin [levofloxacin] Swelling    Norvasc [amlodipine] Swelling       Performance Status:The patient's activity level is functions out of house.      Review of Systems:    Constitutional: Negative for activity change, appetite change, chills, diaphoresis, fatigue, fever and unexpected weight change.   HENT: Negative for dental problem, postnasal drip, trouble swallowing and voice change. Positive for  rhinorrhea, sinus pressure, sinus pain, sneezing, sore throat,   Respiratory: Negative for apnea,  shortness of breath, wheezing and stridor.  Positive for cough, chest tightness,  Cardiovascular: Negative for chest pain, palpitations and leg swelling.   Gastrointestinal: Negative for abdominal distention, abdominal pain, constipation and nausea.   Musculoskeletal: Negative for gait problem, myalgias and neck pain.   Skin: Negative for color change and pallor.   Allergic/Immunologic: Negative for environmental allergies and food allergies.   Neurological: Negative for  "dizziness, speech difficulty, weakness, light-headedness, numbness and headaches.   Hematological: Negative for adenopathy. Does not bruise/bleed easily.   Psychiatric/Behavioral: Negative for dysphoric mood and sleep disturbance. The patient is not nervous/anxious.     Exam:Comprehensive exam done.   /85 (BP Location: Left arm, Patient Position: Sitting, BP Method: Small (Automatic))   Pulse 69   Ht 5' 11" (1.803 m)   Wt 78.1 kg (172 lb 2.9 oz)   SpO2 97% Comment: on room air at rest  BMI 24.01 kg/m²   Exam included Vitals as listed, and patient's appearance and affect and alertness and mood, oral exam for yeast and hygiene and pharynx lesions and Mallapatti (M) score, neck with inspection for jvd and masses and thyroid abnormalities and lymph nodes (supraclavicular and infraclavicular nodes and axillary also examined and noted if abn), chest exam included symmetry and effort and fremitus and percussion and auscultation, cardiac exam included rhythm and gallops and murmur and rubs and jvd and edema, abdominal exam for mass and hepatosplenomegaly and tenderness and hernias and bowel sounds, Musculoskeletal exam with muscle tone and posture and mobility/gait and  strength, and skin for rashes and cyanosis and pallor and turgor, extremity for clubbing.  Findings were normal except for pertinent findings listed below:  M3,  chest is symmetric, no distress, normal percussion, normal fremitus, and no murmur, no edema, lung sounds good.    Rest good.      Radiographs (ct chest and cxr) reviewed from 10/16/2017 normal  cxr 5/20/2020 nad    Labs Reviewed  Lab Results   Component Value Date    WBC 7.45 05/08/2024    HGB 14.6 05/08/2024    HCT 45.0 05/08/2024    MCV 90 05/08/2024     05/08/2024   holter 1/27/2020- The diary was properly completed. The tape was adequate (0 days , 48 hours, 0 minutes).  Predominant Rhythm Sinus rhythm with heart rates varying between 43 and 113 bpm with an average of 56 " bpm.  Ventricular Arrhythmias There were very rare PVCs totalling 58 and averaging 1.21 per hour.  Supraventricular Arrhythmias There were occasional PACs totalling 638 and averaging 13.29 per hour.  Occassional atrial pair and short run of atrial tachycardia,the longest 7 beats with no associated symptoms..  There was an episode of SOB reported. The corresponding rhythm strips revealed the following: During the event (At the Gym), the rhythm was sinus tachycardia at 113 bpm with without ectopy.     Results for ARNULFO SALEH (MRN [id number]) as of 9/21/2018 11:38   Ref. Range 9/21/2018 08:59   Eosinophil% Latest Ref Range: 0.0 - 8.0 % 9.2 (H)   Eos # Latest Ref Range: 0.0 - 0.5 K/uL 0.6 (H)     PFT  March 31, 2017 fev 57%,     11/22/19-Conclusion     Concentric left ventricular remodeling.  Normal left ventricular systolic function. The estimated ejection fraction is 60%  Normal LV diastolic function.  No wall motion abnormalities.  Normal right ventricular systolic function.  The stress echo portion of this study is negative for myocardial ischemia.  The patient's exercise capacity was above average. functional capacity of 10 METS  The patient reached the end of the protocol.  There were no arrhythmias during stress.  The EKG portion of this study is negative for myocardial ischemia.  Exercise portion of stress test stopped due to an increase in systolic and diastolic blood pressure above protocol.  Hypertensive response changed from stress echo in 3/2018.     Spirometry with bronchodilator, lung volume by gas dilution, diffusion capacity were accomplished October 11, 2021. The FEV1 to FVC ratio was 67% indicating airflow obstruction. The FEV1 was 56% of predicted at 1.6 L-this makes airflow obstruction   moderately severe. Bronchodilator response was not statistically significant at 9%. Total lung capacity on lung volume by gas dilution was 60% of predicted and low. Diffusion was 62% of predicted and low.   Â   "  There is moderately severe airflow obstruction. There is no significant bronchodilator response. Total lung capacity is reduced to 60% predicted and diffusion is reduced to 62% predicted. Clinical correlation recommended.       Spirometry with bronchodilator, lung volume by gas dilution, diffusion capacity measured August 17, 2020.  The FEV1 to FVC ratio was 65% indicating airflow obstruction.  The FEV1 measured 42% predicted at 1.2 L making airflow obstruction severe.  There    was a 13% improvement in the FEV1 following bronchodilator, this is a significant bronchodilator response.  Total lung capacity was reduced to 57% predicted suggesting restriction.  Residual volume was a little bit elevated at 77% of predicted.     Diffusion was reduced to 61% of predicted, uncorrected for anemia present.       Plan:  Clinical impression is apparently straight forward and impression with management as below.    Maycol Barney" was seen today for follow-up and asthma.    Diagnoses and all orders for this visit:    Severe persistent asthma without complication  -     fluticasone-umeclidin-vilanter (TRELEGY ELLIPTA) 200-62.5-25 mcg inhaler; Inhale 1 puff into the lungs once daily.    Exacerbation of asthma, unspecified asthma severity, unspecified whether persistent    Fatigue due to depression  -     buPROPion (WELLBUTRIN XL) 150 MG TB24 tablet; Take 2 tablets (300 mg total) by mouth once daily.                  Maycol Barney" was seen today for follow-up and asthma.    Diagnoses and all orders for this visit:    Severe persistent asthma without complication  -     fluticasone-umeclidin-vilanter (TRELEGY ELLIPTA) 200-62.5-25 mcg inhaler; Inhale 1 puff into the lungs once daily.    Exacerbation of asthma, unspecified asthma severity, unspecified whether persistent    Fatigue due to depression  -     buPROPion (WELLBUTRIN XL) 150 MG TB24 tablet; Take 2 tablets (300 mg total) by mouth once daily.                Follow up in about 1 " year (around 7/25/2025).    Discussed with patient above for education the following:      Patient Instructions   Would do trial prednisone 20 mg daily for a wk, use trelegy daily -- as trial..   could do follow up breathing test.    After above -- would try Wellbutrin -- start one daily for a week -- if not effective go to one twice daily for a month(or so) stop if not effective      Continue fasenra as lungs have been very stable.  Would do above as stamina not good......

## 2024-07-26 ENCOUNTER — TELEPHONE (OUTPATIENT)
Dept: PULMONOLOGY | Facility: CLINIC | Age: 84
End: 2024-07-26
Payer: MEDICARE

## 2024-07-26 NOTE — TELEPHONE ENCOUNTER
----- Message from Melba Harrington sent at 7/26/2024 11:12 AM CDT -----  Regarding: rx concerns  Name of who is calling:   Maycol      What is the request in detail: Pt is requesting a call back in ref to medication dosage clarification      Can the clinic reply by [facility]:no      What number to call back if not [facility]: [phone]

## 2024-07-26 NOTE — TELEPHONE ENCOUNTER
Patient called to see about what does of Prednisone you wanted him to take.  You told him 10 mg and the paper stated 20 mg.  He stated he'd take 10 mg for now until you advise.

## 2024-08-06 ENCOUNTER — TELEPHONE (OUTPATIENT)
Dept: PULMONOLOGY | Facility: CLINIC | Age: 84
End: 2024-08-06

## 2024-08-06 ENCOUNTER — OFFICE VISIT (OUTPATIENT)
Dept: PULMONOLOGY | Facility: CLINIC | Age: 84
End: 2024-08-06
Payer: MEDICARE

## 2024-08-06 VITALS
HEIGHT: 71 IN | WEIGHT: 174.94 LBS | BODY MASS INDEX: 24.49 KG/M2 | SYSTOLIC BLOOD PRESSURE: 155 MMHG | OXYGEN SATURATION: 95 % | DIASTOLIC BLOOD PRESSURE: 80 MMHG | HEART RATE: 82 BPM

## 2024-08-06 DIAGNOSIS — R40.0 SOMNOLENCE: ICD-10-CM

## 2024-08-06 DIAGNOSIS — R53.82 CHRONIC FATIGUE: ICD-10-CM

## 2024-08-06 DIAGNOSIS — J44.89 ASTHMA-COPD OVERLAP SYNDROME: ICD-10-CM

## 2024-08-06 DIAGNOSIS — J45.50 SEVERE PERSISTENT ASTHMA WITHOUT COMPLICATION: Primary | ICD-10-CM

## 2024-08-06 PROCEDURE — 99213 OFFICE O/P EST LOW 20 MIN: CPT | Mod: S$GLB,,, | Performed by: INTERNAL MEDICINE

## 2024-08-06 PROCEDURE — 3288F FALL RISK ASSESSMENT DOCD: CPT | Mod: CPTII,S$GLB,, | Performed by: INTERNAL MEDICINE

## 2024-08-06 PROCEDURE — 99999 PR PBB SHADOW E&M-EST. PATIENT-LVL III: CPT | Mod: PBBFAC,,, | Performed by: INTERNAL MEDICINE

## 2024-08-06 PROCEDURE — 1101F PT FALLS ASSESS-DOCD LE1/YR: CPT | Mod: CPTII,S$GLB,, | Performed by: INTERNAL MEDICINE

## 2024-08-06 PROCEDURE — 1159F MED LIST DOCD IN RCRD: CPT | Mod: CPTII,S$GLB,, | Performed by: INTERNAL MEDICINE

## 2024-09-03 ENCOUNTER — HOSPITAL ENCOUNTER (OUTPATIENT)
Dept: CARDIOLOGY | Facility: CLINIC | Age: 84
Discharge: HOME | End: 2024-09-03
Attending: GENERAL PRACTICE
Payer: MEDICARE

## 2024-09-03 DIAGNOSIS — Z86.73 HISTORY OF TIA (TRANSIENT ISCHEMIC ATTACK): ICD-10-CM

## 2024-09-03 DIAGNOSIS — I48.0 PAROXYSMAL ATRIAL FIBRILLATION: Primary | ICD-10-CM

## 2024-09-03 DIAGNOSIS — Z95.0 S/P PLACEMENT OF CARDIAC PACEMAKER: ICD-10-CM

## 2024-09-03 LAB
BATTERY VOLTAGE (V): 3.08 V
IMPEDANCE RA LEAD (NATIVE): 380 OHMS
IMPEDANCE RA LEAD: 589 OHMS
P/R-WAVE RA LEAD (NATIVE): 9.4 MV
P/R-WAVE RA LEAD: 3.3 MV
THRESHOLD MS RA LEAD (NATIVE): 0.4 MS
THRESHOLD MS RA LEAD: 0.4 MS
THRESHOLD V RA LEAD (NATIVE): 0.8 V
THRESHOLD V RA LEAD: 0.5 V

## 2024-09-16 ENCOUNTER — TELEPHONE (OUTPATIENT)
Dept: FAMILY MEDICINE | Facility: CLINIC | Age: 84
End: 2024-09-16
Payer: MEDICARE

## 2024-09-16 NOTE — TELEPHONE ENCOUNTER
Returned call and spoke to patient regarding appointment. Appointment rescheduled for 10/3/24 with Dr. Fox

## 2024-09-16 NOTE — TELEPHONE ENCOUNTER
----- Message from Chanel Unger sent at 9/16/2024  1:30 PM CDT -----  Contact: Self  Type: Needs Medical Advice    Who Called:  Patient  What is this regarding?:  He is needing to reschedule his apt from 9/24/24.  Best Call Back Number:  [phone]  Additional Information:  Please call the patient back at the phone number listed above to advise. Thank you!

## 2024-09-17 ENCOUNTER — LAB VISIT (OUTPATIENT)
Dept: LAB | Facility: HOSPITAL | Age: 84
End: 2024-09-17
Attending: FAMILY MEDICINE
Payer: MEDICARE

## 2024-09-17 DIAGNOSIS — E78.2 MIXED HYPERLIPIDEMIA: ICD-10-CM

## 2024-09-17 DIAGNOSIS — E02 SUBCLINICAL IODINE-DEFICIENCY HYPOTHYROIDISM: ICD-10-CM

## 2024-09-17 LAB
ALBUMIN SERPL BCP-MCNC: 3.5 G/DL (ref 3.5–5.2)
ALP SERPL-CCNC: 67 U/L (ref 55–135)
ALT SERPL W/O P-5'-P-CCNC: 14 U/L (ref 10–44)
ANION GAP SERPL CALC-SCNC: 11 MMOL/L (ref 8–16)
AST SERPL-CCNC: 18 U/L (ref 10–40)
BASOPHILS # BLD AUTO: 0.06 K/UL (ref 0–0.2)
BASOPHILS NFR BLD: 0.8 % (ref 0–1.9)
BILIRUB SERPL-MCNC: 1.4 MG/DL (ref 0.1–1)
BUN SERPL-MCNC: 17 MG/DL (ref 8–23)
CALCIUM SERPL-MCNC: 9.2 MG/DL (ref 8.7–10.5)
CHLORIDE SERPL-SCNC: 106 MMOL/L (ref 95–110)
CHOLEST SERPL-MCNC: 193 MG/DL (ref 120–199)
CHOLEST/HDLC SERPL: 3.4 {RATIO} (ref 2–5)
CO2 SERPL-SCNC: 22 MMOL/L (ref 23–29)
CREAT SERPL-MCNC: 1.2 MG/DL (ref 0.5–1.4)
DIFFERENTIAL METHOD BLD: ABNORMAL
EOSINOPHIL # BLD AUTO: 0.2 K/UL (ref 0–0.5)
EOSINOPHIL NFR BLD: 2 % (ref 0–8)
ERYTHROCYTE [DISTWIDTH] IN BLOOD BY AUTOMATED COUNT: 15.9 % (ref 11.5–14.5)
EST. GFR  (NO RACE VARIABLE): 58.9 ML/MIN/1.73 M^2
GLUCOSE SERPL-MCNC: 94 MG/DL (ref 70–110)
HCT VFR BLD AUTO: 44.1 % (ref 40–54)
HDLC SERPL-MCNC: 56 MG/DL (ref 40–75)
HDLC SERPL: 29 % (ref 20–50)
HGB BLD-MCNC: 14.6 G/DL (ref 14–18)
IMM GRANULOCYTES # BLD AUTO: 0.03 K/UL (ref 0–0.04)
IMM GRANULOCYTES NFR BLD AUTO: 0.4 % (ref 0–0.5)
LDLC SERPL CALC-MCNC: 115 MG/DL (ref 63–159)
LYMPHOCYTES # BLD AUTO: 2 K/UL (ref 1–4.8)
LYMPHOCYTES NFR BLD: 27.8 % (ref 18–48)
MCH RBC QN AUTO: 30 PG (ref 27–31)
MCHC RBC AUTO-ENTMCNC: 33.1 G/DL (ref 32–36)
MCV RBC AUTO: 91 FL (ref 82–98)
MONOCYTES # BLD AUTO: 0.9 K/UL (ref 0.3–1)
MONOCYTES NFR BLD: 11.7 % (ref 4–15)
NEUTROPHILS # BLD AUTO: 4.2 K/UL (ref 1.8–7.7)
NEUTROPHILS NFR BLD: 57.3 % (ref 38–73)
NONHDLC SERPL-MCNC: 137 MG/DL
NRBC BLD-RTO: 0 /100 WBC
PLATELET # BLD AUTO: 277 K/UL (ref 150–450)
PMV BLD AUTO: 10.5 FL (ref 9.2–12.9)
POTASSIUM SERPL-SCNC: 4.3 MMOL/L (ref 3.5–5.1)
PROT SERPL-MCNC: 6.7 G/DL (ref 6–8.4)
RBC # BLD AUTO: 4.86 M/UL (ref 4.6–6.2)
SODIUM SERPL-SCNC: 139 MMOL/L (ref 136–145)
T4 FREE SERPL-MCNC: 0.93 NG/DL (ref 0.71–1.51)
TRIGL SERPL-MCNC: 110 MG/DL (ref 30–150)
TSH SERPL DL<=0.005 MIU/L-ACNC: 1.8 UIU/ML (ref 0.4–4)
WBC # BLD AUTO: 7.34 K/UL (ref 3.9–12.7)

## 2024-09-17 PROCEDURE — 84443 ASSAY THYROID STIM HORMONE: CPT | Performed by: FAMILY MEDICINE

## 2024-09-17 PROCEDURE — 84439 ASSAY OF FREE THYROXINE: CPT | Performed by: FAMILY MEDICINE

## 2024-09-17 PROCEDURE — 36415 COLL VENOUS BLD VENIPUNCTURE: CPT | Mod: PO | Performed by: FAMILY MEDICINE

## 2024-09-17 PROCEDURE — 85025 COMPLETE CBC W/AUTO DIFF WBC: CPT | Performed by: FAMILY MEDICINE

## 2024-09-17 PROCEDURE — 80061 LIPID PANEL: CPT | Performed by: FAMILY MEDICINE

## 2024-09-17 PROCEDURE — 80053 COMPREHEN METABOLIC PANEL: CPT | Performed by: FAMILY MEDICINE

## 2024-09-26 DIAGNOSIS — I10 ESSENTIAL HYPERTENSION: Primary | ICD-10-CM

## 2024-09-26 RX ORDER — VERAPAMIL HYDROCHLORIDE 180 MG/1
180 CAPSULE, EXTENDED RELEASE ORAL DAILY
Qty: 90 CAPSULE | Refills: 3 | Status: SHIPPED | OUTPATIENT
Start: 2024-09-26 | End: 2025-09-26

## 2024-09-26 NOTE — TELEPHONE ENCOUNTER
----- Message from Nadine Sandoval sent at 9/26/2024 10:33 AM CDT -----  Regarding: Needs return call  Type:  RX Refill Request    Who Called:  Pt  Refill or New Rx:  refill  RX Name and Strength:   Disp Refills Start End   verapamiL (VERELAN) 180 MG C24P           How is the patient currently taking it? (ex. 1XDay):  see chart  Is this a 30 day or 90 day RX:  see cart  Preferred Pharmacy with phone number:    [id number] - [place] - [address] [facility]  [address]  [place] [address]  Phone: [phone] Fax: [phone]          Local or Mail Order:  local  Ordering Provider:  margaret Welch Call Back Number:  [phone]    Additional Information:  Please call patient to tell him if he needs to take the medication or not, if he does he needs reffills called in

## 2024-10-03 ENCOUNTER — OFFICE VISIT (OUTPATIENT)
Dept: FAMILY MEDICINE | Facility: CLINIC | Age: 84
End: 2024-10-03
Payer: MEDICARE

## 2024-10-03 VITALS
HEART RATE: 80 BPM | SYSTOLIC BLOOD PRESSURE: 130 MMHG | DIASTOLIC BLOOD PRESSURE: 80 MMHG | WEIGHT: 168 LBS | HEIGHT: 71 IN | TEMPERATURE: 98 F | BODY MASS INDEX: 23.52 KG/M2 | OXYGEN SATURATION: 98 % | RESPIRATION RATE: 17 BRPM

## 2024-10-03 DIAGNOSIS — Z23 FLU VACCINE NEED: ICD-10-CM

## 2024-10-03 DIAGNOSIS — I10 ESSENTIAL HYPERTENSION: Primary | ICD-10-CM

## 2024-10-03 DIAGNOSIS — E78.2 MIXED HYPERLIPIDEMIA: ICD-10-CM

## 2024-10-03 DIAGNOSIS — J45.50 SEVERE PERSISTENT ASTHMA WITHOUT COMPLICATION: ICD-10-CM

## 2024-10-03 DIAGNOSIS — E03.9 HYPOTHYROIDISM, UNSPECIFIED TYPE: ICD-10-CM

## 2024-10-03 PROCEDURE — 99999 PR PBB SHADOW E&M-EST. PATIENT-LVL III: CPT | Mod: PBBFAC,,, | Performed by: FAMILY MEDICINE

## 2024-10-03 NOTE — PROGRESS NOTES
Subjective:       Patient ID: Maycol Hodge is a 84 y.o. male.    Chief Complaint: Follow-up (4mth f/u)    HPI  Review of Systems   Constitutional:  Negative for fatigue and unexpected weight change.   Respiratory:  Negative for chest tightness and shortness of breath.    Cardiovascular:  Negative for chest pain, palpitations and leg swelling.   Gastrointestinal:  Negative for abdominal pain.   Musculoskeletal:  Negative for arthralgias.   Neurological:  Negative for dizziness, syncope, light-headedness and headaches.       Patient Active Problem List   Diagnosis    Chronic allergic rhinitis    Essential hypertension    Severe persistent asthma without complication    BPH (benign prostatic hyperplasia)    History of TIA (transient ischemic attack)    Hyperlipidemia    Cataract    Urticaria    Hypothyroidism    Autoimmune urticaria    Anaphylactic syndrome    Hashimoto's thyroiditis    Change in bowel habits    Acute urticaria    Paroxysmal atrial fibrillation    Hx of colonic polyps    Chronic sinus complaints    Fatigue    Hypersomnolence disorder    Eosinophilic asthma    Calcification of aorta    Blood pressure instability    Nonthrombocytopenic purpura    Tachy-mile syndrome    S/P placement of cardiac pacemaker    Atrial flutter    Alcohol dependence, uncomplicated    Chronic kidney disease, stage 3a    Drug-induced gynecomastia     Patient is here for a chronic conditions follow up.    Reviewed labs  9/24  Trying to improve his exercise tolerance gradually. Now up to 4200 steps /day weekly average    Nephro CKd stage 3      Breast C/o breast lump with tenderness 11/23 . Mammo kerrie and right breast us 12/23 showed mild benign gynecomastia likely due to spironolactone which he is on since 7/22 by gini Garsia for bp instability. Wants to do trial off     Card Dr. Vogel/Sun h/o TIA . PAf on eliquis. Had conversion to sinus rhythm after amiodarone added 9/23 and has remained so.  Has occ gross hematuria  related to prostate disease. CHADS score is 5 so eliquis continuation is recommended  Arrhythmia Dr. Graham s/p cardiac pacemaker for SSS placed 8/23  HPL- rash on pravastatin      GI dr. Mullen colonoscopy 5/20/23-no polyps     urology Dr. Truong-elevated PSA has had multiple biopsies-no prostate cancer.      Eye Dr. Hester-glaucoma     Allergy Dr. Unger- History: July 16th HA. 19th hives and whelps.  Allergy Dr. Unger who was consulted during hospital stay.  Admitted after syncope in triage at [facility]. Diagnosed with anaphylaxis and shock from unknown etiology. Denies throat or tongue swelling or SOB. No known food allergies.   Had slow pulse and low BP.  Given steroids. Released 22nd.  Had purpuric lesions concerning for possible vasculitis. Complement as normal.  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 Has had erratic and high BP.  C/o fatigue and sleepiness.  BX neg for vasculitis.  On bid antihistamine zyrtec and claritin.  Rash cleared immediately after admission. Has had no further hives x 2 months. On daily antihistamines     Endocrine Dr. Duncan -Has + TPO Ab 9/18 c/w hashimotos ,hypothyroid  TFTs nl on alternating synthroid 50 and 75 mcg     Pulm Dr. Burch -asthma. Having moire mucous prod and occ wheezing on trelegy, singulair. Takes claritin prn and uses flonase daily.    Hst- no sig james. No machine needed. C/o chronic fatigue for years, low energy, frequent naps at least 2 a day and never feeling rested. Has spurts of energy but usually get exhausted doing very little. Goes to gym 2-3 times per week.  Nuc stress test neg for ischemia and echo showed nl EF 2022. No wheezes or sob  Objective:      Physical Exam  Vitals and nursing note reviewed.   Constitutional:       Appearance: He is well-developed.   Cardiovascular:      Rate and Rhythm: Normal rate and regular rhythm.      Heart sounds: Normal heart sounds.   Pulmonary:      Effort: Pulmonary effort is normal.       Breath sounds: Normal breath sounds.   Skin:     General: Skin is warm and dry.   Neurological:      Mental Status: He is alert and oriented to person, place, and time.         Assessment:       1. Essential hypertension    2. Hypothyroidism, unspecified type    3. Mixed hyperlipidemia    4. Severe persistent asthma without complication    5. Flu vaccine need        Plan:       1. Essential hypertension (Primary)  Controlled on current medications.  Continue current medications.      2. Hypothyroidism, unspecified type  Controlled on current medications.  Continue current medications.    - CBC Auto Differential; Future  - TSH; Future  - T4, Free; Future    3. Mixed hyperlipidemia  Consider nexlizet-will investigate  - Comprehensive Metabolic Panel; Future  - Lipid Panel; Future    4. Severe persistent asthma without complication  Controlled on current medications.  Continue current medications.      5. Flu vaccine need  Immunize today.  Counseled patient on risks, benefits and side effects.  Patient elected to proceed with vaccination.    - influenza (adjuvanted) (Fluad) 45 mcg/0.5 mL IM vaccine (> or = 66 yo) 0.5 mL          Time spent with patient: 20 minutes    Patient with be reevaluated in 6 months or sooner prn    Greater than 50% of this visit was spent counseling as described in above documentation:Yes

## 2024-10-15 ENCOUNTER — HOSPITAL ENCOUNTER (OUTPATIENT)
Dept: CARDIOLOGY | Facility: CLINIC | Age: 84
Discharge: HOME | End: 2024-10-15
Attending: GENERAL PRACTICE
Payer: MEDICARE

## 2024-10-15 DIAGNOSIS — Z95.0 S/P PLACEMENT OF CARDIAC PACEMAKER: ICD-10-CM

## 2024-10-18 ENCOUNTER — CLINICAL SUPPORT (OUTPATIENT)
Dept: CARDIOLOGY | Facility: CLINIC | Age: 84
End: 2024-10-18
Payer: MEDICARE

## 2024-10-18 ENCOUNTER — HOSPITAL ENCOUNTER (OUTPATIENT)
Dept: CARDIOLOGY | Facility: CLINIC | Age: 84
Discharge: HOME | End: 2024-10-18
Attending: GENERAL PRACTICE
Payer: MEDICARE

## 2024-10-18 DIAGNOSIS — Z95.0 PRESENCE OF CARDIAC PACEMAKER: ICD-10-CM

## 2024-10-18 DIAGNOSIS — R00.1 BRADYCARDIA, UNSPECIFIED: ICD-10-CM

## 2024-10-18 PROCEDURE — 93296 REM INTERROG EVL PM/IDS: CPT | Mod: PN | Performed by: GENERAL PRACTICE

## 2024-10-18 PROCEDURE — 93294 REM INTERROG EVL PM/LDLS PM: CPT | Mod: S$GLB,,, | Performed by: GENERAL PRACTICE

## 2024-10-29 ENCOUNTER — TELEPHONE (OUTPATIENT)
Dept: PULMONOLOGY | Facility: CLINIC | Age: 84
End: 2024-10-29
Payer: MEDICARE

## 2024-11-05 LAB
OHS CV AF BURDEN PERCENT: 9.9
OHS CV DC REMOTE DEVICE TYPE: NORMAL
OHS CV ICD SHOCK: NO
OHS CV RV PACING PERCENT: 5.64 %

## 2024-11-19 ENCOUNTER — OFFICE VISIT (OUTPATIENT)
Dept: PULMONOLOGY | Facility: CLINIC | Age: 84
End: 2024-11-19
Payer: MEDICARE

## 2024-11-19 VITALS
BODY MASS INDEX: 24.52 KG/M2 | DIASTOLIC BLOOD PRESSURE: 88 MMHG | OXYGEN SATURATION: 96 % | HEART RATE: 81 BPM | SYSTOLIC BLOOD PRESSURE: 149 MMHG | WEIGHT: 175.13 LBS | HEIGHT: 71 IN

## 2024-11-19 DIAGNOSIS — J82.83 EOSINOPHILIC ASTHMA: ICD-10-CM

## 2024-11-19 DIAGNOSIS — J45.50 SEVERE PERSISTENT ASTHMA WITHOUT COMPLICATION: ICD-10-CM

## 2024-11-19 DIAGNOSIS — Z79.52 SEVERE PERSISTENT ASTHMA DEPENDENT ON SYSTEMIC STEROIDS WITH ACUTE EXACERBATION: ICD-10-CM

## 2024-11-19 DIAGNOSIS — J45.51 SEVERE PERSISTENT ASTHMA DEPENDENT ON SYSTEMIC STEROIDS WITH ACUTE EXACERBATION: ICD-10-CM

## 2024-11-19 DIAGNOSIS — J45.901 EXACERBATION OF ASTHMA, UNSPECIFIED ASTHMA SEVERITY, UNSPECIFIED WHETHER PERSISTENT: ICD-10-CM

## 2024-11-19 PROCEDURE — 99212 OFFICE O/P EST SF 10 MIN: CPT | Mod: S$GLB,,, | Performed by: INTERNAL MEDICINE

## 2024-11-19 PROCEDURE — 3288F FALL RISK ASSESSMENT DOCD: CPT | Mod: CPTII,S$GLB,, | Performed by: INTERNAL MEDICINE

## 2024-11-19 PROCEDURE — 1101F PT FALLS ASSESS-DOCD LE1/YR: CPT | Mod: CPTII,S$GLB,, | Performed by: INTERNAL MEDICINE

## 2024-11-19 PROCEDURE — 99999 PR PBB SHADOW E&M-EST. PATIENT-LVL III: CPT | Mod: PBBFAC,,, | Performed by: INTERNAL MEDICINE

## 2024-11-19 RX ORDER — PREDNISONE 10 MG/1
TABLET ORAL
Qty: 30 TABLET | Refills: 1 | Status: SHIPPED | OUTPATIENT
Start: 2024-11-19

## 2024-11-19 RX ORDER — ALBUTEROL SULFATE 90 UG/1
INHALANT RESPIRATORY (INHALATION)
Qty: 18 G | Refills: 11 | Status: SHIPPED | OUTPATIENT
Start: 2024-11-19

## 2024-11-19 NOTE — PATIENT INSTRUCTIONS
Would consider trial off fasenra.    Use prednisone as needed    Use albuterol as needed     Should stay on controller.... trelegy

## 2024-11-19 NOTE — PROGRESS NOTES
11/19/2024    Maycol Hodge  Office Note    Chief Complaint   Patient presents with    Follow-up    Asthma       HPI:    11/19/2024 pt having intermittent fatigue.  Some days very severe fatigue.        8/6/2024 pt not better and has no stamina.  Occasionally not too bad but has low stamina and huffing and puffing sob......         Pt viewed side effects of buproprium -- declined to use    Took prednisone with no help.        Patient Instructions   No pulmonary issue found to cause fatigue.    Check oxygen sleeping  -- you had fair numbers with home sleep study in 2020... you have somnolence    Fasenra has been very effective to stop bronchospasm.           7/25/2024 pt having good and bad days, will have islas climbing stairs. Fatigues and naps easy-- sleeps ok, had sleep study past and did well.  Pt was able to yd wk 2yrs ago.    Still on fasenra and not on controller  Still on amiodarone and takes thyroid rx with  good tsh 5/2024...  Pt appreciates slowing down   No wt loss.  May have some down mood..  Pleasure reading, concentrates ok.    Wife ok---     Patient Instructions   Would do trial prednisone 20 mg daily for a wk, use trelegy daily -- as trial..   could do follow up breathing test.    After above -- would try Wellbutrin -- start one daily for a week -- if not effective go to one twice daily for a month(or so) stop if not effective      Continue fasenra as lungs have been very stable.  Would do above as stamina not good......    10/25/2023 pt relates had had blood in urine, pt on fasenra 3 yrs.  Cxr clear from  last eval with neg sputum culture-- cleareed    No urine pain. To see urology in [place] in am ....  Patient Instructions   Blood in urine may be from kidney and lung disease but not likely.      Urine test reasonable and urine culture  also.    If not clearly from urology problem-- may recheck kidney function?        Lungs are doing well...    Would still be concerned about amiodarone.    Cxr  viewed and good.      10/11/2023 pt doing well on fasenra, but developed sore throat followed by 3 days with rhinitis, then cough with violent cough and clear mucous.  Appetite low and having fatigue.  Pt took prednisone  6 days -- illness was similar to prior asthma..  pt had some wheezes and worse laying down-- very fatigued.      Pt started amiodarone 8/1/2023 out pt Dr Garsia-- had pacer   Patient Instructions   Lungs do sound good today    Chest xray 8/1/2023 viewed with new pacer-- lungs small post op.  Need follow up.    Amiodarone may adversely affect lung-- chest xray may not be as good a screen as ct chest.  Would do chest xray 1st...    Lungs sound clear    Use codeine to suppress cough.    Would stay on controllers-- use trelegy for now.      Mucous was sl yellow today- do culture today - -may give antibiotic if needed (intolerances and amiodarone limit antibiotics)    Would do ct if not thriving     Oxygen sat walking varied 93-98...    Would  continue fasenra.    Need follow up in 2- 3 weeks if not cleared -- may need ct chest if not clearing.    12/6/2022 goes to gym daily, doing better.  Had pft better now than last yr.    Patient Instructions   Would recommend continue Fasenra as no asthma issues since starting over 2-3 yrs ago.    Breathing test today is better yet.      6/22/2022 used provigil with good results but waned.  Took prednisone with some benefit.    No wheezes and occ sob/albuterol use  Patient Instructions   Provigil will increase alertness --- not an medication for energy. There is no energy medication-- medications are for illness.      Asthma seems to be doing well  -- you have benefited from fasenra every 2 months, you have been on fasenra 2 yrs in September.      Would check lung capacity at follow up in 6 months.    3/7/2022 did not use provigil as felt did not have narcolepsy.  No prednisone.  bp 171/99-- pcp told to repeat  And still bp up.  Energy varies -- trys to get to gym  an hour with variable results.  Asthma controlled.  Weaned off symbicort with breo use-- very stable and wishes to wean off.    No hives.  Patient Instructions   Would try provigil to improve alertness and vigilance.  May need follow up for refills as needed.  Would discuss bp with Dr Fox June 2 follow  up  Would be reasonable to use breo every other day -- if stable breo qod for a month - could skip if having no asthma symptoms or albuterol use.     You have had dramatic benefit from Fasenra-- need to continue.      12/62021 -- had fasenra 4 wks ago.  Had been able use no prednisone nor rescue nor controller til 2 wks ago when had sore throat followed by nasal congestion then cough/wheezes-- pt cleared with prednisone/azithromycin and prn albuterol.  Patient Instructions   You started Fasenra last September with extreme dramatic benefit-- your asthma was under excellent control and ---your controller and rescue and action plans were able to be stopped.  You had mild exacerbation due to uri last 3 wks.      Would recommend continuing Fasenra as you were on prednisone every 2 - 3 months prior to Fasenra.     If your insurance mandates you be on controller -- would recommend using singulair and symbicort-- symbicort was started 3 wks.              Would recommend trial provigil for excess sleepiness-- your recent sleep study did not show significant sleep apnea with AHI 2.1.  Will try to get approval through speciality pharmacy.   Start provigil one daily to improve alertness and focus.         Addendum -sleep study 8/20/2020 had ahi 2.1.   Could repeat in house sleep study.  Suspect would do well with provigil -- would recommend trial 100 mg daily to treat hypersomnolence.      10/25/2021 having fatigue, sometimes wake up fatigued and sometimes develops during day.  Denies nerve/anxiety issues.  Appetite ok.  No cough/wheezes/sob.  No spasm in over a yr or more.  No inhaler use.  Fatigue occurs 1/3 days.  Voids  adequately.  Will sit about throughout day once fatigued.  Fatigue occurring last 3 months or so, had colonoscopy last yr, has chr elevation psa which is monitor q yr from q 6 months in past. Saw cardiology.     will nap twice daily -- has excess sleepiness.  Patient Instructions   Would use prednisone once daily for 3 days if any fatigue-- may do  Once or twice.  Breathing test was better at 56% than 42% last yr-- but you may still have some airway problems..  Would try inhalers if fatigued-- try nebulizer.  Also try prednisone----- if prednisone works-- use inhalers more.   Would screen for inflammation, testosterone (may not replace as psa up???), blood counts/chemistries, thyroid  (continue medications), diabetes screen, and chest xray.  Should f/u Dr Rojo soon if fatigue not clearing?????  psa not oredered as not able.   Addendum -sleep study 8/20/2020 had ahi 2.1.   Could repeat in house sleep study.  Suspect would do well with provigil -- would recommend trial 100 mg daily to treat hypersomnolence.      4/28/2021- fasenra going well, no steroids, 4-5 times daily has transparent mucous produced. No sleep apnea but some excessive sleepiness appreciated. Sleep 10, arouses 3 to void (had turp/meds in past)with difficulty going back. Off symbicort. Uses albuterol maybe once a wk or so.   Patient Instructions   Continue Gym.    Lungs should be good.  Need to continue fasenra.    Thyroid could be rechecked.     Home study-- no sleep apnea, could have restless legs?  10/28/2020 no prednisone since last visit, fasenra helped dramatically.  No prednisoen and breathing much better.  - pt got shot here 10/8/2020  Patient Instructions   Need to continue fasenra as has had dramatic response.     9/28/2020 took another course prednisone, has been on prednisone  Monthly since march.  Finished last 2 wks ago, feels will need [place] course soon. Uses symbicort regular.  Had sleep study few wks ago.fev 42 from 57% on 2017,       Sleep study 2020 with ahi 2/hr- within normal range.   Patient Instructions   Asthma very unstable..  Will place order for fasenra- sample shot would help.  Shot would be monthly x 3 then every 8 wks.    Use valtrex if shingles - call for any question.      Sound like you need treatment again soon- start prednisone soon or Fasenra shots.     will give paper script for advair - may replace symboicort if cheaper?     No sleep apnea of significance.      Fasenra is an interleukin 5 inhibitor.  2020 had to use prednisone since March, having severe fatigue - reports sleeps well, sleep non restorative, snores with no sleep study in past.  Romo to mail box - wife sleeps separate room. No snore arousal.    Uses symbicort regular.  Uses albuterol rescue minimally less than once a wk.  Pt starts prednisone when cough becomes excessive.  Eats well, no wt loss. Sleeps 7 hrs/d.  Goes to [employer] for hr 3/wk.  Pt having vivid dreams.   Patient Instructions   Check lung capacity   Check sleep study - best prior to prednisone  May use prednisone - start 20/d dropping to 10 mg daily once better.  If you are found to have sleep apnea- less than 5 episodes an hour would be normal- would try cpap therapy.  2020 51 yo son  suddenly  in [place]- son avid - death.  Toxicology pending.  Had 2 spells ppt prednisone use since last visit with good results.  Took erythro also.  Having some grief- eats and sleeps ok . Wife in good shape. Uses symbicort regular.  Patient Instructions   Asthma not too bad but you needed prednisone twice since March.  Special shots may help-  Could ask to get fasenra covered.  May consider starting if pattern worsens.    symbicort needed regular especially in winter/spring.+      3/2/2020- having exhaustion after 10-15 min doing yd wk.  Saw cardiology- had a fib at colonoscopy in dec but sinus mile since. - eliquis started.  Pt had syncope 10 days - saw Dr Vogel in f/u.  Last  wk had some noct wheezes.   Uses symbicort 2 bid and singulair.  No no prednisone use last yr.  Sleeps ok, nerves ok - denies depression.  Had hives last July.  Stress echo in 11/22/2019 was good save high bp?    Patient Instructions   Fatigue and wheezes are noted.  Breathing not typical cause for loss of consciousness.  Heart rate is low - may contribute to fatigue but not that bad?    Chronic sinuses seem ok.  Would recommend prednisone 20 mg daily for 3 days now,  May repeat but would like to see better function and activity.    Call if not perking up.   oxygen level walking in office went from 97 to 93 % - not bad.  bp standing didn't fall today.    Blood wk last month all looked good.    Feb 1, 2019- Onset one week, sore throat, congestion, cough (fits, severe, productive light yellow in color), took azithromycin with minimal benefit. Current Prednisone use.  January 24, 2019- Feeling well, no exacerbations requiring antibiotic and steroid therapy. No cough, no nocturnal arousals, no SOB.   Sept 27, 2018- had exacerbations in July and August needing azithro and prednisone- had cough clear mucous with sl yellow, no breathing issues.  Unusual to have exacerbation summer.  Jan 5, 2018-- took azithro and pred x 3 - good results.  Doing well  Jan 19,2017 hpi, had acute onset 5 days ago with sinus runny, cough, wheezes, sob, no fever, no n/v, pos headache.  Muscle aches and joint aches.  Took prednisone, took inhaler, neb rx was old. Run down, poor appetite,   Took prednisone and azithro x3, still cough but better,  Down but better.  Mucous clear.   Dec 16, 2016HPI:pt with asthma with no usual noct arousals or rescue use.  Exacerbates 3x yr in spring mainly. Progresses for a week on rescue and then uses pred and doxy (recent doxy reaction) to clear.  I cared for years ago.  No recent pft.  Pt feels breathing better than  Usual yrs past.      The chief compliant  problem varies with instablilty at time    PFSH:  Past  Medical History:   Diagnosis Date    A-fib     Arthritis     Asthma     Cancer     Hematuria     Hypertension     Hypothyroidism, unspecified     Skin cancer, basal cell     Stroke     Syncope and collapse     Thyroid disease     TIA (transient ischemic attack)          Past Surgical History:   Procedure Laterality Date    A-V CARDIAC PACEMAKER INSERTION N/A 2023    Procedure: INSERTION, CARDIAC PACEMAKER, DUAL CHAMBER;  Surgeon: Amrit Garsia MD;  Location: [facility] CATH/EP LAB;  Service: Cardiology;  Laterality: N/A;    COLONOSCOPY      COLONOSCOPY N/A 2020    Procedure: COLONOSCOPY;  Surgeon: Willis Mullen MD;  Location: [facility] ENDO;  Service: Endoscopy;  Laterality: N/A;    COLONOSCOPY N/A 2023    Procedure: COLONOSCOPY;  Surgeon: Willis Mullen MD;  Location: [facility] ENDO;  Service: Endoscopy;  Laterality: N/A;    CYSTOSCOPY      negative    EYE SURGERY      bilateral cataracts    HERNIA REPAIR      righht inguinal    JOINT REPLACEMENT Left     shoulder    left cataract surgery      PROSTATE SURGERY      TURP     Social History     Tobacco Use    Smoking status: Former     Current packs/day: 0.00     Types: Cigarettes     Quit date:      Years since quittin.9    Smokeless tobacco: Never    Tobacco comments:     Quit around    Substance Use Topics    Alcohol use: Yes     Alcohol/week: 7.0 standard drinks of alcohol     Types: 7 Shots of liquor per week     Comment: daily    Drug use: No     Family History   Problem Relation Name Age of Onset    Multiple sclerosis Mother      Heart disease Father      Stroke Father      No Known Problems Daughter      Cancer Neg Hx      Diabetes Neg Hx      Hypertension Neg Hx       Review of patient's allergies indicates:   Allergen Reactions    Doxycycline Rash     Causes rash, hives and itching    Diltiazem      pruritis    Hydrochlorothiazide      Causes elevate uric acid, gout      Levaquin [levofloxacin] Swelling    Norvasc [amlodipine] Swelling  "      Performance Status:The patient's activity level is functions out of house.      Review of Systems:    Constitutional: Negative for activity change, appetite change, chills, diaphoresis, fatigue, fever and unexpected weight change.   HENT: Negative for dental problem, postnasal drip, trouble swallowing and voice change. Positive for  rhinorrhea, sinus pressure, sinus pain, sneezing, sore throat,   Respiratory: Negative for apnea,  shortness of breath, wheezing and stridor.  Positive for cough, chest tightness,  Cardiovascular: Negative for chest pain, palpitations and leg swelling.   Gastrointestinal: Negative for abdominal distention, abdominal pain, constipation and nausea.   Musculoskeletal: Negative for gait problem, myalgias and neck pain.   Skin: Negative for color change and pallor.   Allergic/Immunologic: Negative for environmental allergies and food allergies.   Neurological: Negative for dizziness, speech difficulty, weakness, light-headedness, numbness and headaches.   Hematological: Negative for adenopathy. Does not bruise/bleed easily.   Psychiatric/Behavioral: Negative for dysphoric mood and sleep disturbance. The patient is not nervous/anxious.     Exam:Comprehensive exam done.   BP (!) 149/88 (BP Location: Left arm, Patient Position: Sitting)   Pulse 81   Ht 5' 11" (1.803 m)   Wt 79.5 kg (175 lb 2.5 oz)   SpO2 96% Comment: on room air at rest  BMI 24.43 kg/m²   Exam included Vitals as listed, and patient's appearance and affect and alertness and mood, oral exam for yeast and hygiene and pharynx lesions and Mallapatti (M) score, neck with inspection for jvd and masses and thyroid abnormalities and lymph nodes (supraclavicular and infraclavicular nodes and axillary also examined and noted if abn), chest exam included symmetry and effort and fremitus and percussion and auscultation, cardiac exam included rhythm and gallops and murmur and rubs and jvd and edema, abdominal exam for mass and " hepatosplenomegaly and tenderness and hernias and bowel sounds, Musculoskeletal exam with muscle tone and posture and mobility/gait and  strength, and skin for rashes and cyanosis and pallor and turgor, extremity for clubbing.  Findings were normal except for pertinent findings listed below:  M3,  chest is symmetric, no distress, normal percussion, normal fremitus, and no murmur, no edema, lung sounds good.    Rest good.      Radiographs (ct chest and cxr) reviewed from 10/16/2017 normal  cxr 5/20/2020 nad    Labs Reviewed  Lab Results   Component Value Date    WBC 7.34 09/17/2024    HGB 14.6 09/17/2024    HCT 44.1 09/17/2024    MCV 91 09/17/2024     09/17/2024   holter 1/27/2020- The diary was properly completed. The tape was adequate (0 days , 48 hours, 0 minutes).  Predominant Rhythm Sinus rhythm with heart rates varying between 43 and 113 bpm with an average of 56 bpm.  Ventricular Arrhythmias There were very rare PVCs totalling 58 and averaging 1.21 per hour.  Supraventricular Arrhythmias There were occasional PACs totalling 638 and averaging 13.29 per hour.  Occassional atrial pair and short run of atrial tachycardia,the longest 7 beats with no associated symptoms..  There was an episode of SOB reported. The corresponding rhythm strips revealed the following: During the event (At the [employer]), the rhythm was sinus tachycardia at 113 bpm with without ectopy.     Results for ARNULFO SALEH (MRN [id number]) as of 9/21/2018 11:38   Ref. Range 9/21/2018 08:59   Eosinophil% Latest Ref Range: 0.0 - 8.0 % 9.2 (H)   Eos # Latest Ref Range: 0.0 - 0.5 K/uL 0.6 (H)     PFT  March 31, 2017 fev 57%,     11/22/19-Conclusion     Concentric left ventricular remodeling.  Normal left ventricular systolic function. The estimated ejection fraction is 60%  Normal LV diastolic function.  No wall motion abnormalities.  Normal right ventricular systolic function.  The stress echo portion of this study is negative for myocardial  "ischemia.  The patient's exercise capacity was above average. functional capacity of 10 METS  The patient reached the end of the protocol.  There were no arrhythmias during stress.  The EKG portion of this study is negative for myocardial ischemia.  Exercise portion of stress test stopped due to an increase in systolic and diastolic blood pressure above protocol.  Hypertensive response changed from stress echo in 3/2018.     Spirometry with bronchodilator, lung volume by gas dilution, diffusion capacity were accomplished October 11, 2021. The FEV1 to FVC ratio was 67% indicating airflow obstruction. The FEV1 was 56% of predicted at 1.6 L-this makes airflow obstruction   moderately severe. Bronchodilator response was not statistically significant at 9%. Total lung capacity on lung volume by gas dilution was 60% of predicted and low. Diffusion was 62% of predicted and low.   Â    There is moderately severe airflow obstruction. There is no significant bronchodilator response. Total lung capacity is reduced to 60% predicted and diffusion is reduced to 62% predicted. Clinical correlation recommended.       Spirometry with bronchodilator, lung volume by gas dilution, diffusion capacity measured August 17, 2020.  The FEV1 to FVC ratio was 65% indicating airflow obstruction.  The FEV1 measured 42% predicted at 1.2 L making airflow obstruction severe.  There    was a 13% improvement in the FEV1 following bronchodilator, this is a significant bronchodilator response.  Total lung capacity was reduced to 57% predicted suggesting restriction.  Residual volume was a little bit elevated at 77% of predicted.     Diffusion was reduced to 61% of predicted, uncorrected for anemia present.       Plan:  Clinical impression is apparently straight forward and impression with management as below.    Maycol Barney" was seen today for follow-up and asthma.    Diagnoses and all orders for this visit:    Exacerbation of asthma, unspecified asthma " "severity, unspecified whether persistent  -     predniSONE (DELTASONE) 10 MG tablet; Take 2 daily for 5 days and repeat for breathing problems.  -     albuterol (PROVENTIL/VENTOLIN HFA) 90 mcg/actuation inhaler; 2 puffs every 4 hours as needed for cough, wheeze, or shortness of breath    Eosinophilic asthma  -     albuterol (PROVENTIL/VENTOLIN HFA) 90 mcg/actuation inhaler; 2 puffs every 4 hours as needed for cough, wheeze, or shortness of breath    Severe persistent asthma dependent on systemic steroids with acute exacerbation  -     albuterol (PROVENTIL/VENTOLIN HFA) 90 mcg/actuation inhaler; 2 puffs every 4 hours as needed for cough, wheeze, or shortness of breath    Severe persistent asthma without complication  -     Discontinue: benralizumab 30 mg/mL AtIn; Inject 1 Dose into the skin every 8 weeks.  -     benralizumab 30 mg/mL AtIn; Inject 1 Dose into the skin every 8 weeks.                      Maycol Barney" was seen today for follow-up and asthma.    Diagnoses and all orders for this visit:    Exacerbation of asthma, unspecified asthma severity, unspecified whether persistent  -     predniSONE (DELTASONE) 10 MG tablet; Take 2 daily for 5 days and repeat for breathing problems.  -     albuterol (PROVENTIL/VENTOLIN HFA) 90 mcg/actuation inhaler; 2 puffs every 4 hours as needed for cough, wheeze, or shortness of breath    Eosinophilic asthma  -     albuterol (PROVENTIL/VENTOLIN HFA) 90 mcg/actuation inhaler; 2 puffs every 4 hours as needed for cough, wheeze, or shortness of breath    Severe persistent asthma dependent on systemic steroids with acute exacerbation  -     albuterol (PROVENTIL/VENTOLIN HFA) 90 mcg/actuation inhaler; 2 puffs every 4 hours as needed for cough, wheeze, or shortness of breath    Severe persistent asthma without complication  -     Discontinue: benralizumab 30 mg/mL AtIn; Inject 1 Dose into the skin every 8 weeks.  -     benralizumab 30 mg/mL AtIn; Inject 1 Dose into the skin every 8 " weeks.                    Follow up in about 6 months (around 5/19/2025), or if symptoms worsen or fail to improve.    Discussed with patient above for education the following:      Patient Instructions   Would consider trial off fasenra.    Use prednisone as needed    Use albuterol as needed     Should stay on controller.... trelegy

## 2024-11-28 DIAGNOSIS — E03.9 HYPOTHYROIDISM, UNSPECIFIED TYPE: ICD-10-CM

## 2024-11-28 NOTE — TELEPHONE ENCOUNTER
No care due was identified.  [facility] Embedded Care Due Messages. Reference number: [id number].   11/28/2024 2:25:42 PM CST

## 2024-11-29 RX ORDER — LEVOTHYROXINE SODIUM 50 UG/1
TABLET ORAL
Qty: 26 TABLET | Refills: 3 | Status: SHIPPED | OUTPATIENT
Start: 2024-11-29

## 2024-11-29 RX ORDER — LEVOTHYROXINE SODIUM 75 UG/1
TABLET ORAL
Qty: 64 TABLET | Refills: 3 | Status: SHIPPED | OUTPATIENT
Start: 2024-11-29

## 2024-11-29 NOTE — TELEPHONE ENCOUNTER
Refill Decision Note   Maycol Hodge  is requesting a refill authorization.  Brief Assessment and Rationale for Refill:  Approve     Medication Therapy Plan:  Pt alternates between both strengths      Alert overridden per protocol: Yes   Comments:     Note composed:9:35 AM 11/29/2024             Appointments     Last Visit   10/3/2024 Umu Fox MD   Next Visit   4/3/2025 Umu Fox MD

## 2025-01-06 ENCOUNTER — TELEPHONE (OUTPATIENT)
Dept: PULMONOLOGY | Facility: CLINIC | Age: 85
End: 2025-01-06
Payer: MEDICARE

## 2025-01-06 ENCOUNTER — OFFICE VISIT (OUTPATIENT)
Dept: PULMONOLOGY | Facility: CLINIC | Age: 85
End: 2025-01-06
Payer: MEDICARE

## 2025-01-06 VITALS
SYSTOLIC BLOOD PRESSURE: 169 MMHG | HEART RATE: 85 BPM | BODY MASS INDEX: 24.35 KG/M2 | DIASTOLIC BLOOD PRESSURE: 96 MMHG | WEIGHT: 174.63 LBS

## 2025-01-06 DIAGNOSIS — J44.89 ASTHMA-COPD OVERLAP SYNDROME: Primary | ICD-10-CM

## 2025-01-06 DIAGNOSIS — Z79.52 SEVERE PERSISTENT ASTHMA DEPENDENT ON SYSTEMIC STEROIDS WITH ACUTE EXACERBATION: ICD-10-CM

## 2025-01-06 DIAGNOSIS — J45.51 SEVERE PERSISTENT ASTHMA DEPENDENT ON SYSTEMIC STEROIDS WITH ACUTE EXACERBATION: ICD-10-CM

## 2025-01-06 PROCEDURE — 99999 PR PBB SHADOW E&M-EST. PATIENT-LVL III: CPT | Mod: PBBFAC,,, | Performed by: INTERNAL MEDICINE

## 2025-01-06 PROCEDURE — 99213 OFFICE O/P EST LOW 20 MIN: CPT | Mod: S$GLB,,, | Performed by: INTERNAL MEDICINE

## 2025-01-06 PROCEDURE — 1101F PT FALLS ASSESS-DOCD LE1/YR: CPT | Mod: CPTII,S$GLB,, | Performed by: INTERNAL MEDICINE

## 2025-01-06 PROCEDURE — 3288F FALL RISK ASSESSMENT DOCD: CPT | Mod: CPTII,S$GLB,, | Performed by: INTERNAL MEDICINE

## 2025-01-06 RX ORDER — AZITHROMYCIN 500 MG/1
TABLET, FILM COATED ORAL
Qty: 3 TABLET | Refills: 3 | Status: SHIPPED | OUTPATIENT
Start: 2025-01-06

## 2025-01-06 RX ORDER — OSELTAMIVIR PHOSPHATE 75 MG/1
75 CAPSULE ORAL 2 TIMES DAILY
Qty: 10 CAPSULE | Refills: 0 | Status: SHIPPED | OUTPATIENT
Start: 2025-01-06 | End: 2025-01-11

## 2025-01-06 RX ORDER — CODEINE PHOSPHATE AND GUAIFENESIN 10; 100 MG/5ML; MG/5ML
10 SOLUTION ORAL EVERY 6 HOURS PRN
Qty: 237 ML | Refills: 0 | Status: SHIPPED | OUTPATIENT
Start: 2025-01-06 | End: 2025-01-16

## 2025-01-06 NOTE — TELEPHONE ENCOUNTER
Spoke to patient.  He stated that he isn't getting better but he feels he is.  He states the coughing is bothering him and making his ribs hurt.  Denies fever.  He states he is still coughing up sputum sometimes it is clear other times it is clear.  He finished ABX.  F/U appt made for today to be re-evaluated.  Pt verbalized understanding.

## 2025-01-06 NOTE — PATIENT INSTRUCTIONS
Illness seems to be resolving.    Illness didn't feel like usual asthma but occurred prior to when fasenra was due (fasenra stopped)    Use codeine and repeat prednisone if worsening or not clearing cough.    Do chest xray 1-2 wks if not clearing    You had flu vaccine -- use tamiflu if needed      Re check in 6 wks or 3 months.

## 2025-01-06 NOTE — TELEPHONE ENCOUNTER
----- Message from Aung sent at 1/6/2025 10:38 AM CST -----  Regarding: advice  Contact: DILSHAD SALEH [[id number]]  Type: Needs Medical Advice  Who Called:  Dilshad    Symptoms (please be specific):  Cough    How long has patient had these symptoms:  8 days    Pharmacy name and phone #:    [id number] - [place], [place] - [address] & [facility]  [address]  [place] [address]  Phone: [phone] Fax: [phone]    Best Call Back Number: [phone] (home)     Additional Information: Taken all ABX. Still don't feel better. Please call to advise.

## 2025-01-06 NOTE — PROGRESS NOTES
1/6/2025    Maycol Hodge  Office Note    Chief Complaint   Patient presents with    Follow-up    Asthma       HPI:    1/6/2025 pt was in [place] and developed cough after jeni.  Took azithromcin and prednisone when returned prior to new yr.  Mucous was green yellow-- cleared color with azithro.      No nocturnal worsening .   Fatigue very bad- slept 18 hrs last wk.   Usually sleeps 8 hrs nightly.    No fever and appetite ok -- chr slender..    Off abx and prednisone 3 days and  worsened cough-- was very severe.      11/19/2024 pt having intermittent fatigue.  Some days very severe fatigue.      Patient Instructions   Would consider trial off fasenra.    Use prednisone as needed    Use albuterol as needed     Should stay on controller.... trelegy    Addendum -- we discuss and you direct to stop fasenra    8/6/2024 pt not better and has no stamina.  Occasionally not too bad but has low stamina and huffing and puffing sob......         Pt viewed side effects of buproprium -- declined to use    Took prednisone with no help.        Patient Instructions   No pulmonary issue found to cause fatigue.    Check oxygen sleeping  -- you had fair numbers with home sleep study in 2020... you have somnolence    Fasenra has been very effective to stop bronchospasm.           7/25/2024 pt having good and bad days, will have islas climbing stairs. Fatigues and naps easy-- sleeps ok, had sleep study past and did well.  Pt was able to yd wk 2yrs ago.    Still on fasenra and not on controller  Still on amiodarone and takes thyroid rx with  good tsh 5/2024...  Pt appreciates slowing down   No wt loss.  May have some down mood..  Pleasure reading, concentrates ok.    Wife ok---     Patient Instructions   Would do trial prednisone 20 mg daily for a wk, use trelegy daily -- as trial..   could do follow up breathing test.    After above -- would try Wellbutrin -- start one daily for a week -- if not effective go to one twice daily for  a month(or so) stop if not effective      Continue fasenra as lungs have been very stable.  Would do above as stamina not good......    10/25/2023 pt relates had had blood in urine, pt on fasenra 3 yrs.  Cxr clear from  last eval with neg sputum culture-- cleareed    No urine pain. To see urology in [place] in am ....  Patient Instructions   Blood in urine may be from kidney and lung disease but not likely.      Urine test reasonable and urine culture  also.    If not clearly from urology problem-- may recheck kidney function?        Lungs are doing well...    Would still be concerned about amiodarone.    Cxr viewed and good.      10/11/2023 pt doing well on fasenra, but developed sore throat followed by 3 days with rhinitis, then cough with violent cough and clear mucous.  Appetite low and having fatigue.  Pt took prednisone  6 days -- illness was similar to prior asthma..  pt had some wheezes and worse laying down-- very fatigued.      Pt started amiodarone 8/1/2023 out pt Dr Garsia-- had pacer   Patient Instructions   Lungs do sound good today    Chest xray 8/1/2023 viewed with new pacer-- lungs small post op.  Need follow up.    Amiodarone may adversely affect lung-- chest xray may not be as good a screen as ct chest.  Would do chest xray 1st...    Lungs sound clear    Use codeine to suppress cough.    Would stay on controllers-- use trelegy for now.      Mucous was sl yellow today- do culture today - -may give antibiotic if needed (intolerances and amiodarone limit antibiotics)    Would do ct if not thriving     Oxygen sat walking varied 93-98...    Would  continue fasenra.    Need follow up in 2- 3 weeks if not cleared -- may need ct chest if not clearing.    12/6/2022 goes to gym daily, doing better.  Had pft better now than last yr.    Patient Instructions   Would recommend continue Fasenra as no asthma issues since starting over 2-3 yrs ago.    Breathing test today is better yet.      6/22/2022 used  provigil with good results but waned.  Took prednisone with some benefit.    No wheezes and occ sob/albuterol use  Patient Instructions   Provigil will increase alertness --- not an medication for energy. There is no energy medication-- medications are for illness.      Asthma seems to be doing well  -- you have benefited from fasenra every 2 months, you have been on fasenra 2 yrs in September.      Would check lung capacity at follow up in 6 months.    3/7/2022 did not use provigil as felt did not have narcolepsy.  No prednisone.  bp 171/99-- pcp told to repeat  And still bp up.  Energy varies -- trys to get to gym an hour with variable results.  Asthma controlled.  Weaned off symbicort with breo use-- very stable and wishes to wean off.    No hives.  Patient Instructions   Would try provigil to improve alertness and vigilance.  May need follow up for refills as needed.  Would discuss bp with Dr Fox June 2 follow  up  Would be reasonable to use breo every other day -- if stable breo qod for a month - could skip if having no asthma symptoms or albuterol use.     You have had dramatic benefit from Fasenra-- need to continue.      12/62021 -- had fasenra 4 wks ago.  Had been able use no prednisone nor rescue nor controller til 2 wks ago when had sore throat followed by nasal congestion then cough/wheezes-- pt cleared with prednisone/azithromycin and prn albuterol.  Patient Instructions   You started Fasenra last September with extreme dramatic benefit-- your asthma was under excellent control and ---your controller and rescue and action plans were able to be stopped.  You had mild exacerbation due to uri last 3 wks.      Would recommend continuing Fasenra as you were on prednisone every 2 - 3 months prior to Fasenra.     If your insurance mandates you be on controller -- would recommend using singulair and symbicort-- symbicort was started 3 wks.              Would recommend trial provigil for excess sleepiness--  your recent sleep study did not show significant sleep apnea with AHI 2.1.  Will try to get approval through speciality pharmacy.   Start provigil one daily to improve alertness and focus.         Addendum -sleep study 8/20/2020 had ahi 2.1.   Could repeat in house sleep study.  Suspect would do well with provigil -- would recommend trial 100 mg daily to treat hypersomnolence.      10/25/2021 having fatigue, sometimes wake up fatigued and sometimes develops during day.  Denies nerve/anxiety issues.  Appetite ok.  No cough/wheezes/sob.  No spasm in over a yr or more.  No inhaler use.  Fatigue occurs 1/3 days.  Voids adequately.  Will sit about throughout day once fatigued.  Fatigue occurring last 3 months or so, had colonoscopy last yr, has chr elevation psa which is monitor q yr from q 6 months in past. Saw cardiology.     will nap twice daily -- has excess sleepiness.  Patient Instructions   Would use prednisone once daily for 3 days if any fatigue-- may do  Once or twice.  Breathing test was better at 56% than 42% last yr-- but you may still have some airway problems..  Would try inhalers if fatigued-- try nebulizer.  Also try prednisone----- if prednisone works-- use inhalers more.   Would screen for inflammation, testosterone (may not replace as psa up???), blood counts/chemistries, thyroid  (continue medications), diabetes screen, and chest xray.  Should f/u Dr Rojo soon if fatigue not clearing?????  psa not oredered as not able.   Addendum -sleep study 8/20/2020 had ahi 2.1.   Could repeat in house sleep study.  Suspect would do well with provigil -- would recommend trial 100 mg daily to treat hypersomnolence.      4/28/2021- fasenra going well, no steroids, 4-5 times daily has transparent mucous produced. No sleep apnea but some excessive sleepiness appreciated. Sleep 10, arouses 3 to void (had turp/meds in past)with difficulty going back. Off symbicort. Uses albuterol maybe once a wk or so.   Patient  Instructions   Continue Gym.    Lungs should be good.  Need to continue fasenra.    Thyroid could be rechecked.     Home study-- no sleep apnea, could have restless legs?  10/28/2020 no prednisone since last visit, fasenra helped dramatically.  No prednisoen and breathing much better.  - pt got shot here 10/8/2020  Patient Instructions   Need to continue fasenra as has had dramatic response.     2020 took another course prednisone, has been on prednisone  Monthly since march.  Finished last 2 wks ago, feels will need [place] course soon. Uses symbicort regular.  Had sleep study few wks ago.fev 42 from 57% on 2017,      Sleep study 2020 with ahi 2/hr- within normal range.   Patient Instructions   Asthma very unstable..  Will place order for fasenra- sample shot would help.  Shot would be monthly x 3 then every 8 wks.    Use valtrex if shingles - call for any question.      Sound like you need treatment again soon- start prednisone soon or Fasenra shots.     will give paper script for advair - may replace symboicort if cheaper?     No sleep apnea of significance.      Fasenra is an interleukin 5 inhibitor.  2020 had to use prednisone since March, having severe fatigue - reports sleeps well, sleep non restorative, snores with no sleep study in past.  Romo to mail box - wife sleeps separate room. No snore arousal.    Uses symbicort regular.  Uses albuterol rescue minimally less than once a wk.  Pt starts prednisone when cough becomes excessive.  Eats well, no wt loss. Sleeps 7 hrs/d.  Goes to [employer] for hr 3/wk.  Pt having vivid dreams.   Patient Instructions   Check lung capacity   Check sleep study - best prior to prednisone  May use prednisone - start 20/d dropping to 10 mg daily once better.  If you are found to have sleep apnea- less than 5 episodes an hour would be normal- would try cpap therapy.  2020 51 yo son  suddenly  in [place]- son avid - death.  Toxicology pending.  Had  2 spells ppt prednisone use since last visit with good results.  Took erythro also.  Having some grief- eats and sleeps ok . Wife in good shape. Uses symbicort regular.  Patient Instructions   Asthma not too bad but you needed prednisone twice since March.  Special shots may help-  Could ask to get fasenra covered.  May consider starting if pattern worsens.    symbicort needed regular especially in winter/spring.+      3/2/2020- having exhaustion after 10-15 min doing yd wk.  Saw cardiology- had a fib at colonoscopy in dec but sinus mile since. - eliquis started.  Pt had syncope 10 days - saw Dr Vogel in f/u.  Last wk had some noct wheezes.   Uses symbicort 2 bid and singulair.  No no prednisone use last yr.  Sleeps ok, nerves ok - denies depression.  Had hives last July.  Stress echo in 11/22/2019 was good save high bp?    Patient Instructions   Fatigue and wheezes are noted.  Breathing not typical cause for loss of consciousness.  Heart rate is low - may contribute to fatigue but not that bad?    Chronic sinuses seem ok.  Would recommend prednisone 20 mg daily for 3 days now,  May repeat but would like to see better function and activity.    Call if not perking up.   oxygen level walking in office went from 97 to 93 % - not bad.  bp standing didn't fall today.    Blood wk last month all looked good.    Feb 1, 2019- Onset one week, sore throat, congestion, cough (fits, severe, productive light yellow in color), took azithromycin with minimal benefit. Current Prednisone use.  January 24, 2019- Feeling well, no exacerbations requiring antibiotic and steroid therapy. No cough, no nocturnal arousals, no SOB.   Sept 27, 2018- had exacerbations in July and August needing azithro and prednisone- had cough clear mucous with sl yellow, no breathing issues.  Unusual to have exacerbation summer.  Jan 5, 2018-- took azithro and pred x 3 - good results.  Doing well  Jan 19,2017 hpi, had acute onset 5 days ago with sinus  runny, cough, wheezes, sob, no fever, no n/v, pos headache.  Muscle aches and joint aches.  Took prednisone, took inhaler, neb rx was old. Run down, poor appetite,   Took prednisone and azithro x3, still cough but better,  Down but better.  Mucous clear.   Dec 16, 2016HPI:pt with asthma with no usual noct arousals or rescue use.  Exacerbates 3x yr in spring mainly. Progresses for a week on rescue and then uses pred and doxy (recent doxy reaction) to clear.  I cared for years ago.  No recent pft.  Pt feels breathing better than  Usual yrs past.      The chief compliant  problem varies with instablilty at time    PFSH:  Past Medical History:   Diagnosis Date    A-fib     Arthritis     Asthma     Cancer     Hematuria     Hypertension     Hypothyroidism, unspecified     Skin cancer, basal cell     Stroke     Syncope and collapse     Thyroid disease     TIA (transient ischemic attack)          Past Surgical History:   Procedure Laterality Date    A-V CARDIAC PACEMAKER INSERTION N/A 2023    Procedure: INSERTION, CARDIAC PACEMAKER, DUAL CHAMBER;  Surgeon: Amrit Garsia MD;  [facility] CATH[facility]EP LAB;  Service: Cardiology;  Laterality: N/A;    COLONOSCOPY      COLONOSCOPY N/A 2020    Procedure: COLONOSCOPY;  Surgeon: Willis Mullen MD;  Location: [facility] ENDO;  Service: Endoscopy;  Laterality: N/A;    COLONOSCOPY N/A 2023    Procedure: COLONOSCOPY;  Surgeon: Willis Mullen MD;  Location: [facility] ENDO;  Service: Endoscopy;  Laterality: N/A;    CYSTOSCOPY      negative    EYE SURGERY      bilateral cataracts    HERNIA REPAIR      righht inguinal    JOINT REPLACEMENT Left     shoulder    left cataract surgery      PROSTATE SURGERY      TURP     Social History     Tobacco Use    Smoking status: Former     Current packs/day: 0.00     Types: Cigarettes     Quit date:      Years since quittin.0    Smokeless tobacco: Never    Tobacco comments:     Quit around    Substance Use Topics    Alcohol use:  Yes     Alcohol/week: 7.0 standard drinks of alcohol     Types: 7 Shots of liquor per week     Comment: daily    Drug use: No     Family History   Problem Relation Name Age of Onset    Multiple sclerosis Mother      Heart disease Father      Stroke Father      No Known Problems Daughter      Cancer Neg Hx      Diabetes Neg Hx      Hypertension Neg Hx       Review of patient's allergies indicates:   Allergen Reactions    Doxycycline Rash     Causes rash, hives and itching    Diltiazem      pruritis    Hydrochlorothiazide      Causes elevate uric acid, gout      Levaquin [levofloxacin] Swelling    Norvasc [amlodipine] Swelling       Performance Status:The patient's activity level is functions out of house.      Review of Systems:    Constitutional: Negative for activity change, appetite change, chills, diaphoresis, fatigue, fever and unexpected weight change.   HENT: Negative for dental problem, postnasal drip, trouble swallowing and voice change. Positive for  rhinorrhea, sinus pressure, sinus pain, sneezing, sore throat,   Respiratory: Negative for apnea,  shortness of breath, wheezing and stridor.  Positive for cough, chest tightness,  Cardiovascular: Negative for chest pain, palpitations and leg swelling.   Gastrointestinal: Negative for abdominal distention, abdominal pain, constipation and nausea.   Musculoskeletal: Negative for gait problem, myalgias and neck pain.   Skin: Negative for color change and pallor.   Allergic/Immunologic: Negative for environmental allergies and food allergies.   Neurological: Negative for dizziness, speech difficulty, weakness, light-headedness, numbness and headaches.   Hematological: Negative for adenopathy. Does not bruise/bleed easily.   Psychiatric/Behavioral: Negative for dysphoric mood and sleep disturbance. The patient is not nervous/anxious.     Exam:Comprehensive exam done.   BP (!) 169/96 (BP Location: Right arm, Patient Position: Sitting)   Pulse 85   Wt 79.2 kg (174  lb 9.7 oz)   BMI 24.35 kg/m²   Exam included Vitals as listed, and patient's appearance and affect and alertness and mood, oral exam for yeast and hygiene and pharynx lesions and Mallapatti (M) score, neck with inspection for jvd and masses and thyroid abnormalities and lymph nodes (supraclavicular and infraclavicular nodes and axillary also examined and noted if abn), chest exam included symmetry and effort and fremitus and percussion and auscultation, cardiac exam included rhythm and gallops and murmur and rubs and jvd and edema, abdominal exam for mass and hepatosplenomegaly and tenderness and hernias and bowel sounds, Musculoskeletal exam with muscle tone and posture and mobility/gait and  strength, and skin for rashes and cyanosis and pallor and turgor, extremity for clubbing.  Findings were normal except for pertinent findings listed below:  M3,  chest is symmetric, no distress, normal percussion, normal fremitus, and no murmur, no edema, lung sounds good.    Rest good.      Radiographs (ct chest and cxr) reviewed from 10/16/2017 normal  cxr 5/20/2020 nad    Labs Reviewed  Lab Results   Component Value Date    WBC 7.34 09/17/2024    HGB 14.6 09/17/2024    HCT 44.1 09/17/2024    MCV 91 09/17/2024     09/17/2024   holter 1/27/2020- The diary was properly completed. The tape was adequate (0 days , 48 hours, 0 minutes).  Predominant Rhythm Sinus rhythm with heart rates varying between 43 and 113 bpm with an average of 56 bpm.  Ventricular Arrhythmias There were very rare PVCs totalling 58 and averaging 1.21 per hour.  Supraventricular Arrhythmias There were occasional PACs totalling 638 and averaging 13.29 per hour.  Occassional atrial pair and short run of atrial tachycardia,the longest 7 beats with no associated symptoms..  There was an episode of SOB reported. The corresponding rhythm strips revealed the following: During the event (At the [employer]), the rhythm was sinus tachycardia at 113 bpm with  without ectopy.     Results for ARNULFO SALEH (MRN [id number]) as of 9/21/2018 11:38   Ref. Range 9/21/2018 08:59   Eosinophil% Latest Ref Range: 0.0 - 8.0 % 9.2 (H)   Eos # Latest Ref Range: 0.0 - 0.5 K/uL 0.6 (H)     PFT  March 31, 2017 fev 57%,     11/22/19-Conclusion     Concentric left ventricular remodeling.  Normal left ventricular systolic function. The estimated ejection fraction is 60%  Normal LV diastolic function.  No wall motion abnormalities.  Normal right ventricular systolic function.  The stress echo portion of this study is negative for myocardial ischemia.  The patient's exercise capacity was above average. functional capacity of 10 METS  The patient reached the end of the protocol.  There were no arrhythmias during stress.  The EKG portion of this study is negative for myocardial ischemia.  Exercise portion of stress test stopped due to an increase in systolic and diastolic blood pressure above protocol.  Hypertensive response changed from stress echo in 3/2018.     Spirometry with bronchodilator, lung volume by gas dilution, diffusion capacity were accomplished October 11, 2021. The FEV1 to FVC ratio was 67% indicating airflow obstruction. The FEV1 was 56% of predicted at 1.6 L-this makes airflow obstruction   moderately severe. Bronchodilator response was not statistically significant at 9%. Total lung capacity on lung volume by gas dilution was 60% of predicted and low. Diffusion was 62% of predicted and low.   Â    There is moderately severe airflow obstruction. There is no significant bronchodilator response. Total lung capacity is reduced to 60% predicted and diffusion is reduced to 62% predicted. Clinical correlation recommended.       Spirometry with bronchodilator, lung volume by gas dilution, diffusion capacity measured August 17, 2020.  The FEV1 to FVC ratio was 65% indicating airflow obstruction.  The FEV1 measured 42% predicted at 1.2 L making airflow obstruction severe.  There     "was a 13% improvement in the FEV1 following bronchodilator, this is a significant bronchodilator response.  Total lung capacity was reduced to 57% predicted suggesting restriction.  Residual volume was a little bit elevated at 77% of predicted.     Diffusion was reduced to 61% of predicted, uncorrected for anemia present.       Plan:  Clinical impression is apparently straight forward and impression with management as below.    Maycol Barney" was seen today for follow-up and asthma.    Diagnoses and all orders for this visit:    Asthma-COPD overlap syndrome  -     oseltamivir (TAMIFLU) 75 MG capsule; Take 1 capsule (75 mg total) by mouth 2 (two) times daily. for 5 days    Severe persistent asthma dependent on systemic steroids with acute exacerbation  -     guaiFENesin-codeine 100-10 mg/5 ml (TUSSI-ORGANIDIN NR)  mg/5 mL syrup; Take 10 mLs by mouth every 6 (six) hours as needed for Cough.  -     oseltamivir (TAMIFLU) 75 MG capsule; Take 1 capsule (75 mg total) by mouth 2 (two) times daily. for 5 days  -     azithromycin (ZITHROMAX) 500 MG tablet; One daily for yellow mucous, repeat if needed  -     X-Ray Chest PA And Lateral; Future                        Maycol Barney" was seen today for follow-up and asthma.    Diagnoses and all orders for this visit:    Asthma-COPD overlap syndrome  -     oseltamivir (TAMIFLU) 75 MG capsule; Take 1 capsule (75 mg total) by mouth 2 (two) times daily. for 5 days    Severe persistent asthma dependent on systemic steroids with acute exacerbation  -     guaiFENesin-codeine 100-10 mg/5 ml (TUSSI-ORGANIDIN NR)  mg/5 mL syrup; Take 10 mLs by mouth every 6 (six) hours as needed for Cough.  -     oseltamivir (TAMIFLU) 75 MG capsule; Take 1 capsule (75 mg total) by mouth 2 (two) times daily. for 5 days  -     azithromycin (ZITHROMAX) 500 MG tablet; One daily for yellow mucous, repeat if needed  -     X-Ray Chest PA And Lateral; Future                      Follow up in about 6 weeks " (around 2/17/2025), or if symptoms worsen or fail to improve.    Discussed with patient above for education the following:      Patient Instructions   Illness seems to be resolving.    Illness didn't feel like usual asthma but occurred prior to when fasenra was due (fasenra stopped)    Use codeine and repeat prednisone if worsening or not clearing cough.    Do chest xray 1-2 wks if not clearing    You had flu vaccine -- use tamiflu if needed      Re check in 6 wks or 3 months.

## 2025-01-08 ENCOUNTER — TELEPHONE (OUTPATIENT)
Dept: PULMONOLOGY | Facility: CLINIC | Age: 85
End: 2025-01-08
Payer: MEDICARE

## 2025-01-08 DIAGNOSIS — Z79.52 SEVERE PERSISTENT ASTHMA DEPENDENT ON SYSTEMIC STEROIDS WITH ACUTE EXACERBATION: Primary | ICD-10-CM

## 2025-01-08 DIAGNOSIS — J82.83 EOSINOPHILIC ASTHMA: ICD-10-CM

## 2025-01-08 DIAGNOSIS — J45.51 SEVERE PERSISTENT ASTHMA DEPENDENT ON SYSTEMIC STEROIDS WITH ACUTE EXACERBATION: Primary | ICD-10-CM

## 2025-01-08 RX ORDER — BENRALIZUMAB 30 MG/ML
1 INJECTION, SOLUTION SUBCUTANEOUS
Qty: 1 EACH | Refills: 6 | Status: ACTIVE | OUTPATIENT
Start: 2025-02-03

## 2025-01-08 NOTE — TELEPHONE ENCOUNTER
----- Message from Pharmacist Asia sent at 1/8/2025  2:51 PM CST -----  Regarding: RE: Amalia Saez afternoon Dr. Burch and Staff,    The patient would like to hold Fasenra for another month. He would like a prescription for Fasenra to be sent to the P[facility] in case he decides to resume. Can you please send a prescription to [facility] for the Fasenra and I will route to the program?    Asia Welch, PharmCONRAD  Clinical Pharmacist  [facility]  (P): [phone]  (F): [phone]  ----- Message -----  From: Osvaldo Burch MD  Sent: 1/7/2025   5:33 PM CST  To: Shelly Luong; Asia Alonso PharmD  Subject: RE: Fasenra                                      Will ask staff to ask pt-- he seemed to exacerbate when he missed dose last month-- we discussed yesterday.  I would encourage him ot resume fasenra.  Will have staff ask him if he will resume?  ----- Message -----  From: sAia Alonso PharmD  Sent: 1/7/2025   2:52 PM CST  To: Osvaldo Burch MD; Marcin SAMUEL Staff  Subject: Fasenra                                          Good afternoon Dr. Burch and Staff,    The patient has been approved for the Fasenra patient assistance program to receive free drug from the  until 12/31/2025. I see the patient had a recent office visit with you and the plan is to consider a trial off Fasenra. Should he not proceed with Fasenra?    Asia Welch, PharmD  Clinical Pharmacist  [facility]  (P): [phone]  (F): [phone]

## 2025-01-09 NOTE — TELEPHONE ENCOUNTER
----- Message from Osvaldo Burch MD sent at 1/7/2025  5:31 PM CST -----  Regarding: RE: Fasenra  Will ask staff to ask pt-- he seemed to exacerbate when he missed dose last month-- we discussed yesterday.  I would encourage him ot resume fasenra.  Will have staff ask him if he will resume?  ----- Message -----  From: Asia Alonso PharmD  Sent: 1/7/2025   2:52 PM CST  To: Osvaldo Burch MD; Marcin SAMUEL Staff  Subject: Fasenra                                          Good afternoon Dr. Burch and Staff,    The patient has been approved for the Fasenra patient assistance program to receive free drug from the  until 12/31/2025. I see the patient had a recent office visit with you and the plan is to consider a trial off Fasenra. Should he not proceed with Fasenra?    Asia Welch, PharmD  Clinical Pharmacist  [facility]  (P): [phone]  (F): [phone]   MEDICARE WELLNESS VISIT NOTE      HISTORY OF PRESENT ILLNESS:   Vikash Holley presents for his Subsequent Annual Medicare Wellness Visit.   He has complaints or concerns which include follow up on several chronic problems.  See separate Evaluation and Management note. .      Patient Care Team:  Ken Medrano MD as PCP - General (Family Practice)  Titi AGRAWAL MD as Gastroenterologist (Gastroenterology)        Patient Active Problem List    Diagnosis Date Noted   • Essential hypertension, benign 02/22/2013     Priority: High     On lisinopril  Last DASH review: 1/6/2017             Last overview discussion: 1/6/2017       • Hyperlipidemia 02/22/2013     Priority: High     On atorvastatin 5mg daily  Modified American Heart Association guidelines reviewed on elevated cholesterol management on 3/18/2014      • Encounter for long-term (current) use of medications 07/07/2017     Priority: Low   • Subcutaneous mass of left cheek 07/07/2016     Priority: Low     Noted ~late May 2016, nontender, question of sebaceous cyst but some tethering of overlying skin.     • Obesity, unspecified 02/24/2013     Priority: Low   • Tubular adenoma of colon      Priority: Low     Last colonoscopy with polypectomy 10/2013.  Due 2018.     • Type 2 diabetes mellitus with neurologic complication (CMS/HCC) 02/22/2013     Priority: Low     On maximum metformin, glipizide and added Lantus.           Past Medical History:   Diagnosis Date   • HTN (hypertension)    • Hyperlipidemia    • Tubular adenoma of colon    • Type 2 diabetes mellitus (CMS/HCC)          Past Surgical History:   Procedure Laterality Date   • Colonoscopy w/ polypectomy  10/11/2010, 10/28/2013         Social History     Tobacco Use   • Smoking status: Former Smoker   • Smokeless tobacco: Never Used   Substance Use Topics   • Alcohol use: Yes     Alcohol/week: 0.0 oz     Comment: 1-2   • Drug use: No     Drug use:    Drug Use:    No              Family History -  Reviewed    Medication Sig   • JANUVIA 50 MG tablet TAKE 1 TABLET DAILY   • hydrochlorothiazide (HYDRODIURIL) 12.5 MG tablet TAKE 1 TABLET DAILY   • lisinopril (PRINIVIL,ZESTRIL) 40 MG tablet TAKE 1 TABLET DAILY   • metformin (GLUCOPHAGE-XR) 500 MG 24 hr tablet TAKE 4 TABLETS DAILY   • glipiZIDE (GLUCOTROL ER) 10 MG 24 hr tablet TAKE 2 TABLETS DAILY   • atorvastatin (LIPITOR) 10 MG tablet TAKE ONE-HALF (1/2) TABLET DAILY   • FREESTYLE LITE test strip TEST THREE TIMES A DAY (INSULIN DEPENDENT DIABETES)   • LANTUS SOLOSTAR 100 UNIT/ML pen-injector INJECT 20 UNITS UNDER THE SKIN NIGHTLY   • B-D U/F PEN NEEDLE 31G X 5 MM Misc USE WITH LANTUS SOLOSTAR   • colchicine (COLCRYS) 0.6 MG tablet Take 1 tablet by mouth 2 times daily.   • DISPENSE CM CORE 2 CAPS PO DAILY   • Lipoic Acid 100 MG CAPS Take  by mouth.   • Omega 3 1000 MG capsule Take 1,000 mg by mouth daily.   • Multiple Vitamins-Minerals (DAILY HEART HEALTH SUPPORT) MISC Take  by mouth.   • vitamin E (NATURAL VITAMIN E) 100 UNITS capsule Take 1 capsule by mouth daily.   • Cholecalciferol (VITAMIN D) 2000 UNITS CAPS Take  by mouth.   • DISPENSE Concentrated fruits and veges   • Misc Natural Products (SAW PALMETTO PLUS) CAPS Take  by mouth.   • Misc Natural Products (LUTEIN VISION BLEND) CAPS Take  by mouth.   • DISPENSE Carb blocker   • Coenzyme Q10 (CO Q 10) 10 MG CAPS Take  by mouth.   • Garlic 100 MG TABS Take  by mouth.   • Linoleic Acid Conjugated (CLA) 500 MG CAPS Take  by mouth.   • Bioflavonoid Products (VITAMIN C PLUS) 500 MG TABS Take  by mouth.   • Cinnamon 500 MG CAPS Take  by mouth.   • Glucosamine 750 MG TABS Take  by mouth.     No current facility-administered medications for this visit.         The following items on the Medicare Health Risk Assessment were found to be positive  1.) Do you have an Advance directive, living will, or power of  for health care document that contains your wishes for end of life care?:  No     2.) Would you like  additional information on advance directives?:  Yes     6 a.) How many servings of Fruits and Vegetables do you have each day ( 1 serving = 1 piece of fruit, 1/2 cup fruits or vegetables):  1 per day     6 b.) How many servings of High Fiber / Whole Grain Foods to you have each day ( 1 serving = 1 cup cold cereal, 1/2 cup cooked cereal, 1 slice bread):  1 per day         Vision and hearing screens: not applicable     Advance Directive document: No  The patient has the following Advance Directive documents: Declines additional information     Cognitive Assessment: no evidence of cognitive dysfunction by direct observation    Recent PHQ 2/9 Score    PHQ 2:  Date PHQ 2 Score   1/17/2019 0       PHQ 9:       DEPRESSION ASSESSMENT/PLAN:  Depression screening is negative no further plan needed.     Body mass index is 30.71 kg/m².    BMI ASSESSMENT/PLAN:  We discussed increased activity and decreased portion size as long-term life style modification.     Needed Screening/Treatment:   Colorectal cancer screening  and Immunizations reviewed and patient needs: Influenza, Pneumococcal 23 and Prevnar 13, Tdap, Shingles - Discussed/declined   Needed follow up:  None    See orders.   See Patient Instructions section.   Return in about 3 months (around 4/17/2019) for for med follow up; 1yr for Medicare Wellness.     Health Maintenance Due   Topic Date Due   • Abdominal Aortic Aneurysm (AAA) Screening  09/17/2010   • Medicare Wellness 65+  01/08/2019   • Depression Screening  01/08/2019       Patient is due for topics listed above, he wishes to proceed with Colorectal Cancer Screening: Colonoscopy and will be schedule thru [facility], but is not proceeding with Immunization(s) Dtap/Tdap/Td, Influenza, Pneumococcal and Shingles at this time.     Unaddressed Risk Adjusted HCC Categories and Diagnoses  HCC 18 - Diabetes with Chronic Complications   Unaddressed Dx:Type 2 Diabetes Mellitus With Diabetic Polyneuropathy, With Long-Term  Current Use Of Insulin (Cms/Hcc)

## 2025-01-09 NOTE — TELEPHONE ENCOUNTER
Spoke to patient.  His symptoms are much improved.  I let him know Dr. Burch is encouraging him to continue the Fasenra.  He verbalized understanding.  He spoke to [employer] yesterday regarding approval for the PAP. He asked it to be put on hold.  After our discussion regarding continuation, he verbalized he will contact [employer] today to reinstate.

## 2025-01-16 ENCOUNTER — CLINICAL SUPPORT (OUTPATIENT)
Dept: CARDIOLOGY | Facility: CLINIC | Age: 85
End: 2025-01-16
Payer: MEDICARE

## 2025-01-16 DIAGNOSIS — R00.1 BRADYCARDIA, UNSPECIFIED: ICD-10-CM

## 2025-01-16 DIAGNOSIS — Z95.0 PRESENCE OF CARDIAC PACEMAKER: ICD-10-CM

## 2025-01-17 ENCOUNTER — HOSPITAL ENCOUNTER (OUTPATIENT)
Dept: CARDIOLOGY | Facility: CLINIC | Age: 85
Discharge: HOME | End: 2025-01-17
Attending: GENERAL PRACTICE
Payer: MEDICARE

## 2025-01-17 DIAGNOSIS — Z95.0 PRESENCE OF CARDIAC PACEMAKER: ICD-10-CM

## 2025-01-17 DIAGNOSIS — R00.1 BRADYCARDIA, UNSPECIFIED: ICD-10-CM

## 2025-01-25 NOTE — PROGRESS NOTES
Subjective:    Patient ID:  Maycol Hodge is a 84 y.o. male who presents for follow-up of   Chief Complaint   Patient presents with    Atrial Fibrillation     FOLLOW UP       HPI:        He is here for followup pacemaker. Watches BP AT HOME runs over 140s. Walks frequently outside a mile several times a week. Apaced 44%. Vpaced 3%  4.6% AFIB. No dizziness.      EKG ATRIAL PACED LAD RBBB  12/18/23     He is here today for follow-up of his atrial fib flutter, pacemaker my history of hypertension TIA chads Vasc score 5.  He has had no complaints and no episodes of atrial flutter that he is aware of..  Is taking the Pacerone 200 mg daily and the Eliquis 5 mg b.i.d..  Is no falling or bruising.           Dr Graham 12/13/23.     leoncio Hodge is an 83M with a history of symptomatic PAF. His CVT1YM2-TYXt Score is 5 (age, HTN, hx TIA) so he should continue eliquis.     Pt maintaining sinus rhythm on amiodarone 200 mg daily. Plan to reduce amiodarone to 200 mg daily, follow-up PRN.     Pt is not sure he is taking amiodarone. He is going to check when he gets home and call me if he is not. If he is not we will hold off on restarting and have him follow-up in 12 months.     Thank you for allowing me to participate in the care of this patient. Please do not hesitate to call me with any questions or concerns.                        9/26/23  Pacemaker insert 8/1/23  FLUTTER  sept 5 2023  HE WAS PLACED ON AMIODARONE AND SCHEDULED FOR CARDIOVERSION BUT SPONTANEOUSLY CONVERTED.     I CAN NOT PULL UP HIS EKGS CURRENTLY BUT THE PACEMAKER TRACINGS LOOK LIKE ATRIAL FLUTTER  Gets KYLE when walk. Feels heat beat irregular at times when checking Bp.  IT IS LIGHTHEADED IF HE STANDS UP QUICKLY     PACEMAKER INTERROGATION 09/05/2023 REVEALED A 4 SVT LONGEST 43 SEC,PREVIOUSLY THE LONGEST 76 HOURS ON AUGUST 11TH           EKG TODAY REVEALS ATRIAL PACED WITH PROLONGED AV CONDUCTION INCOMPLETE RIGHT BUNDLE BRANCH BLOCK LEFT AXIS  DEVIATION.  IS CURRENTLY TAKING PACERONE 200 B.I.D. AND ELIQUIS.  HE MAY BE A CANDIDATE FOR ATRIAL FLUTTER ABLATION AND IS REFERRED TO.     NEED TO ADJUST THE MOM ON THE PACEMAKER AS HE GETS SOME CHEST EXERTION HE WAS HOPING TO HAVE MORE ENERGY AFTER THE PACEMAKER WAS PLACED BUT HAS NOT WORK SO FAR  No follow-ups on file.        HE FEELS MORE TIRED IN THE PAST SINCE HE HAS BEEN ON METOPROLOL.  HE GETS DIZZY WHEN HE GETS UP QUICK IT HE IS ON HYTRIN FOR BLOOD PRESSURE MEDICATIONS.  I AM GOING TO CHANGE HIS BLOOD PRESSURE MEDICATIONS FROM HYTRIN AND METOPROLOL TO VERY EARLY IN TO SEE OF HE FEELS BETTER.              Here for followup the pacemaker 08/01/2023     Receives some monitor alerts of tachycardias 15% of the time.  His been generally 01/20/2028, 2113, on August 4th.  On August 3rd he had 40 minutes duration at 2316 of atrial fibrillation.  His heart rates have gone up to approximately 150 per min.  3.7 hours/ day.  BP RUNNING 140 SYSTOLIC.      Review of patient's allergies indicates:   Allergen Reactions    Doxycycline Rash     Causes rash, hives and itching    Augmentin [amoxicillin-pot clavulanate] Nausea And Vomiting    Diltiazem      pruritis    Hydrochlorothiazide      Causes elevate uric acid, gout      Levaquin [levofloxacin] Swelling    Norvasc [amlodipine] Swelling    Pravastatin Rash       Past Medical History:   Diagnosis Date    A-fib     Arthritis     Asthma     Cancer     Hematuria     Hypertension     Hypothyroidism, unspecified     Skin cancer, basal cell     Stroke     Syncope and collapse     Thyroid disease     TIA (transient ischemic attack)      Past Surgical History:   Procedure Laterality Date    A-V CARDIAC PACEMAKER INSERTION N/A 8/1/2023    Procedure: INSERTION, CARDIAC PACEMAKER, DUAL CHAMBER;  Surgeon: Amrit Garsia MD;  Location: [facility] CATH/EP LAB;  Service: Cardiology;  Laterality: N/A;    COLONOSCOPY      COLONOSCOPY N/A 1/21/2020    Procedure: COLONOSCOPY;  Surgeon: Willis GONZALEZ  MD Sebas;  Location: [facility];  Service: Endoscopy;  Laterality: N/A;    COLONOSCOPY N/A 2023    Procedure: COLONOSCOPY;  Surgeon: Willis Mullen MD;  Location: [facility];  Service: Endoscopy;  Laterality: N/A;    CYSTOSCOPY      negative    EYE SURGERY      bilateral cataracts    HERNIA REPAIR      righht inguinal    JOINT REPLACEMENT Left     shoulder    left cataract surgery      PROSTATE SURGERY      TURP     Social History     Tobacco Use    Smoking status: Former     Current packs/day: 0.00     Types: Cigarettes     Quit date:      Years since quittin.1    Smokeless tobacco: Never    Tobacco comments:     Quit around    Substance Use Topics    Alcohol use: Yes     Alcohol/week: 7.0 standard drinks of alcohol     Types: 7 Shots of liquor per week     Comment: daily    Drug use: No     Family History   Problem Relation Name Age of Onset    Multiple sclerosis Mother      Heart disease Father      Stroke Father      No Known Problems Daughter      Cancer Neg Hx      Diabetes Neg Hx      Hypertension Neg Hx          Review of Systems:   Constitution: Negative for diaphoresis and fever.   HEENT: Negative for nosebleeds.    Cardiovascular: Negative for chest pain       No dyspnea on exertion       No leg swelling        No palpitations  Respiratory: Negative for shortness of breath and wheezing.    Hematologic/Lymphatic: Negative for bleeding problem. Does not bruise/bleed easily.   Skin: Negative for color change and rash.   Musculoskeletal: Negative for falls and myalgias.   Gastrointestinal: Negative for hematemesis and hematochezia.   Genitourinary: Negative for hematuria.   Neurological: Negative for dizziness and light-headedness.   Psychiatric/Behavioral: Negative for altered mental status and memory loss.          Objective:        Vitals:    25 1315   BP: (!) 171/86   Pulse:        Lab Results   Component Value Date    WBC 7.34 2024    HGB 14.6 2024    HCT 44.1  09/17/2024     09/17/2024    CHOL 193 09/17/2024    TRIG 110 09/17/2024    HDL 56 09/17/2024    ALT 14 09/17/2024    AST 18 09/17/2024     09/17/2024    K 4.3 09/17/2024     09/17/2024    CREATININE 1.2 09/17/2024    BUN 17 09/17/2024    CO2 22 (L) 09/17/2024    TSH 1.802 09/17/2024    PSA 12.4 (H) 02/17/2021    INR 1.0 08/01/2023    HGBA1C 5.3 10/26/2021        ECHOCARDIOGRAM RESULTS  Results for orders placed during the hospital encounter of 08/03/22    Echo    Interpretation Summary  · The left ventricle is normal in size with moderate concentric hypertrophy and normal systolic function.  · Normal left ventricular diastolic function.  · The estimated PA systolic pressure is 21 mmHg.  · Normal right ventricular size with normal right ventricular systolic function.  · Normal central venous pressure (3 mmHg).  · The estimated ejection fraction is 70%.  · Plaque present.  · Mild pulmonic regurgitation.  · Moderate aortic regurgitation.  · Mild tricuspid regurgitation.  · Mild right atrial enlargement.        CURRENT/PREVIOUS VISIT EKG  Results for orders placed or performed in visit on 12/18/23   EKG 12-lead    Collection Time: 12/18/23 11:16 AM    Narrative    Test Reason : Z95.0,I48.0,    Vent. Rate : 068 BPM     Atrial Rate : 068 BPM     P-R Int : 262 ms          QRS Dur : 132 ms      QT Int : 410 ms       P-R-T Axes : 068 -56 023 degrees     QTc Int : 435 ms    Atrial-paced rhythm with prolonged AV conduction  Right bundle branch block  Left anterior fascicular block   Bifascicular block   Abnormal ECG  When compared with ECG of 26-SEP-2023 12:23,  (RBBB and left anterior fascicular block) has replaced Incomplete right  bundle branch block  Confirmed by Sun MG, Amrit BIGGS ([id number]) on 12/20/2023 8:44:18 PM    Referred By:  SB           Confirmed By:Amrit Garsia MD     No valid procedures specified.   Results for orders placed during the hospital encounter of 08/03/22    Nuclear Stress -  Cardiology Interpreted    Interpretation Summary    Normal myocardial perfusion scan. There is no evidence of myocardial ischemia or infarction.    The gated perfusion images showed an ejection fraction of 75% post stress. Normal ejection fraction is greater than 47%.    There is normal wall motion at rest and post stress.    LV cavity size is normal at rest and normal at stress.    The EKG portion of this study is negative for ischemia.    The patient reported no chest pain during the stress test.    There were no arrhythmias during stress.      Physical Exam:  CONSTITUTIONAL: No fever, no chills  HEENT: Normocephalic, atraumatic,pupils reactive to light                 NECK:  No JVD no carotid bruit  CVS: S1S2+, RRR, no murmurs,   LUNGS: Clear  ABDOMEN: Soft, NT, BS+  EXTREMITIES: No cyanosis, edema  : No fletcher catheter  NEURO: AAO X 3  PSY: Normal affect      Medication List with Changes/Refills   New Medications    LOSARTAN (COZAAR) 25 MG TABLET    Take 1 tablet (25 mg total) by mouth once daily.   Current Medications    ALBUTEROL (PROVENTIL/VENTOLIN HFA) 90 MCG/ACTUATION INHALER    2 puffs every 4 hours as needed for cough, wheeze, or shortness of breath    AMIODARONE (PACERONE) 200 MG TAB    1 tablet (200 mg total) by Per NG tube route once daily.    ASCORBIC ACID, VITAMIN C, (VITAMIN C) 1000 MG TABLET    Take 1,000 mg by mouth once daily.    BENRALIZUMAB (FASENRA PEN) 30 MG/ML ATIN    Inject 1 Dose into the skin every 8 weeks.    ELIQUIS 5 MG TAB    TAKE 1 TABLET(5 MG) BY MOUTH TWICE DAILY    FLUTICASONE-UMECLIDIN-VILANTER (TRELEGY ELLIPTA) 200-62.5-25 MCG INHALER    Inhale 1 puff into the lungs once daily.    GLUC HCL/GLUC JAMESON/CA-FFK-A-GLUC (GLUCOSAMINE COMPLEX ORAL)    Take 1 capsule by mouth once daily.    LATANOPROST 0.005 % OPHTHALMIC SOLUTION    Place 1 drop into both eyes every evening.     LEVOTHYROXINE (SYNTHROID) 50 MCG TABLET    TAKE 1 TABLET BY MOUTH EVERY MORNING ON TUESDAY AND THURSDAY     LEVOTHYROXINE (SYNTHROID) 75 MCG TABLET    TAKE 1 TABLET BY MOUTH EVERY MORNING ON MONDAY, WEDNESDAY, FRIDAY, SATURDAY AND SUNDAY    MAGNESIUM HYDROXIDE (MAGNESIA ORAL)    Take 400 mg by mouth.    VERAPAMIL (VERELAN) 180 MG C24P    Take 1 capsule (180 mg total) by mouth once daily.   Discontinued Medications    AZITHROMYCIN (ZITHROMAX) 500 MG TABLET    One daily for yellow mucous, repeat if needed    PREDNISONE (DELTASONE) 10 MG TABLET    Take 2 daily for 5 days and repeat for breathing problems.             Assessment:       1. History of TIA (transient ischemic attack)    2. Paroxysmal atrial fibrillation    3. S/P placement of cardiac pacemaker    4. Tachy-mile syndrome    5. Calcification of aorta    6. Mixed hyperlipidemia    7. Subclinical iodine-deficiency hypothyroidism    8. Bradycardia, unspecified    9. Primary hypertension         Plan:     Problem List Items Addressed This Visit          Neuro    History of TIA (transient ischemic attack) - Primary       Cardiac/Vascular    Hyperlipidemia    Paroxysmal atrial fibrillation    Relevant Orders    IN OFFICE EKG 12-LEAD (to Muse)    Calcification of aorta    Tachy-mile syndrome    S/P placement of cardiac pacemaker       Endocrine    Hypothyroidism     Other Visit Diagnoses       Bradycardia, unspecified        Relevant Orders    IN OFFICE EKG 12-LEAD (to Muse)    Primary hypertension              PARXSYMAL CONTINUE SAME MEDS    HTN REC COZAAR 25MG CONT VERLAN 120    PACEMAKER APPROPRIATE FUNCTION    MEDS REVIEWED    HLD  DIET EXERCISE    Follow up in about 6 months (around 7/27/2025).    The patients questions were answered, they verbalized understanding, and agreed with the treatment plan.     THOMAS LANDON MD  [facility]

## 2025-01-27 ENCOUNTER — OFFICE VISIT (OUTPATIENT)
Dept: CARDIOLOGY | Facility: CLINIC | Age: 85
End: 2025-01-27
Payer: MEDICARE

## 2025-01-27 VITALS
BODY MASS INDEX: 23.71 KG/M2 | DIASTOLIC BLOOD PRESSURE: 86 MMHG | SYSTOLIC BLOOD PRESSURE: 171 MMHG | WEIGHT: 170 LBS | HEART RATE: 71 BPM | OXYGEN SATURATION: 97 %

## 2025-01-27 DIAGNOSIS — I70.0 CALCIFICATION OF AORTA: ICD-10-CM

## 2025-01-27 DIAGNOSIS — R00.1 BRADYCARDIA, UNSPECIFIED: ICD-10-CM

## 2025-01-27 DIAGNOSIS — Z95.0 S/P PLACEMENT OF CARDIAC PACEMAKER: ICD-10-CM

## 2025-01-27 DIAGNOSIS — Z86.73 HISTORY OF TIA (TRANSIENT ISCHEMIC ATTACK): Primary | ICD-10-CM

## 2025-01-27 DIAGNOSIS — I48.0 PAROXYSMAL ATRIAL FIBRILLATION: ICD-10-CM

## 2025-01-27 DIAGNOSIS — I49.5 TACHY-BRADY SYNDROME: ICD-10-CM

## 2025-01-27 DIAGNOSIS — E02 SUBCLINICAL IODINE-DEFICIENCY HYPOTHYROIDISM: ICD-10-CM

## 2025-01-27 DIAGNOSIS — E78.2 MIXED HYPERLIPIDEMIA: ICD-10-CM

## 2025-01-27 DIAGNOSIS — I10 PRIMARY HYPERTENSION: ICD-10-CM

## 2025-01-27 PROCEDURE — 99214 OFFICE O/P EST MOD 30 MIN: CPT | Mod: S$GLB,,, | Performed by: GENERAL PRACTICE

## 2025-01-27 PROCEDURE — 1159F MED LIST DOCD IN RCRD: CPT | Mod: CPTII,S$GLB,, | Performed by: GENERAL PRACTICE

## 2025-01-27 PROCEDURE — 1160F RVW MEDS BY RX/DR IN RCRD: CPT | Mod: CPTII,S$GLB,, | Performed by: GENERAL PRACTICE

## 2025-01-27 PROCEDURE — 3288F FALL RISK ASSESSMENT DOCD: CPT | Mod: CPTII,S$GLB,, | Performed by: GENERAL PRACTICE

## 2025-01-27 PROCEDURE — 1101F PT FALLS ASSESS-DOCD LE1/YR: CPT | Mod: CPTII,S$GLB,, | Performed by: GENERAL PRACTICE

## 2025-01-27 PROCEDURE — 99999 PR PBB SHADOW E&M-EST. PATIENT-LVL III: CPT | Mod: PBBFAC,,, | Performed by: GENERAL PRACTICE

## 2025-01-27 RX ORDER — LOSARTAN POTASSIUM 25 MG/1
25 TABLET ORAL DAILY
Qty: 90 TABLET | Refills: 3 | Status: SHIPPED | OUTPATIENT
Start: 2025-01-27 | End: 2026-01-27

## 2025-02-01 LAB
OHS QRS DURATION: 124 MS
OHS QTC CALCULATION: 428 MS

## 2025-02-11 LAB
OHS CV AF BURDEN PERCENT: 4.6
OHS CV DC REMOTE DEVICE TYPE: NORMAL
OHS CV ICD SHOCK: NO
OHS CV RV PACING PERCENT: 2.25 %

## 2025-02-24 ENCOUNTER — TELEPHONE (OUTPATIENT)
Dept: CARDIOLOGY | Facility: CLINIC | Age: 85
End: 2025-02-24
Payer: MEDICARE

## 2025-02-24 DIAGNOSIS — I48.91 ATRIAL FIBRILLATION, UNSPECIFIED TYPE: ICD-10-CM

## 2025-02-24 NOTE — TELEPHONE ENCOUNTER
----- Message from Geoff sent at 2/24/2025  9:01 AM CST -----  Type:  RX Refill RequestWho Called: PTRefill or New Rx: refillRX Name and Strength:ELIQUIS 5 mg TabHow is the patient currently taking it? (ex. 1XDay):as directedIs this a 30 day or 90 day RX: 90Preferred Pharmacy with phone number:[id number] - [place], [place] - [address] [facility] [facility] [place] [address]Oebpb: [phone] Fax: [phone] or Mail Order:local Ordering Provider:Dr MAGUI GarsiaWould the patient rather a call back or a response via [facility]? Call [facility] Call Back Number: [phone] Information: Pt called last Monday and on  Fen 3, 2025 for his medication, he has been taking half pills and is now completely out of medication, needs this filled today please.   Please call back to advise.  If Dr Garsia is not available pls have another physician call this in today pls Thanks!

## 2025-02-24 NOTE — TELEPHONE ENCOUNTER
----- Message from Tiana sent at 2/22/2025 11:15 AM CST -----  Type:  RX Refill RequestWho Called:  ptRefill or New Rx:  refillRX Name and Strength:  ELIQUIS 5 mg TabHow is the patient currently taking it? (ex. 1XDay):  as directedIs this a 30 day or 90 day RX:  N/APreferred Pharmacy with phone number:  [id number] - [place], [place] - [address] AT [facility] [facility] [place] [address]Tzngg: [phone] Fax: [phone] Call Back Number:  [phone] Information:  pt is calling in regards to needing his refill ASAP. Pt states he only has 2 pills left and will be out. Please call back and advise. Thanks!

## 2025-02-24 NOTE — TELEPHONE ENCOUNTER
----- Message from Geoff sent at 2/24/2025  9:01 AM CST -----  Type:  RX Refill RequestWho Called: PTRefill or New Rx: refillRX Name and Strength:ELIQUIS 5 mg TabHow is the patient currently taking it? (ex. 1XDay):as directedIs this a 30 day or 90 day RX: 90Preferred Pharmacy with phone number:[id number] - [place], [place] - [address] [facility] [facility] [place] [address]Frksu: [phone] Fax: [phone] or Mail Order:local Ordering Provider:Dr MAGUI GarsiaWould the patient rather a call back or a response via [facility]? Call [facility] Call Back Number: [phone] Information: Pt called last Monday and on  Fen 3, 2025 for his medication, he has been taking half pills and is now completely out of medication, needs this filled today please.   Please call back to advise.  If Dr Garsia is not available pls have another physician call this in today pls Thanks!

## 2025-03-31 ENCOUNTER — LAB VISIT (OUTPATIENT)
Dept: LAB | Facility: HOSPITAL | Age: 85
End: 2025-03-31
Attending: FAMILY MEDICINE
Payer: MEDICARE

## 2025-03-31 DIAGNOSIS — E78.2 MIXED HYPERLIPIDEMIA: ICD-10-CM

## 2025-03-31 DIAGNOSIS — E03.9 HYPOTHYROIDISM, UNSPECIFIED TYPE: ICD-10-CM

## 2025-03-31 LAB
ABSOLUTE EOSINOPHIL (OHS): 0 K/UL
ABSOLUTE MONOCYTE (OHS): 0.93 K/UL (ref 0.3–1)
ABSOLUTE NEUTROPHIL COUNT (OHS): 5.45 K/UL (ref 1.8–7.7)
ALBUMIN SERPL BCP-MCNC: 3.4 G/DL (ref 3.5–5.2)
ALP SERPL-CCNC: 72 UNIT/L (ref 40–150)
ALT SERPL W/O P-5'-P-CCNC: 13 UNIT/L (ref 10–44)
ANION GAP (OHS): 7 MMOL/L (ref 8–16)
AST SERPL-CCNC: 15 UNIT/L (ref 11–45)
BASOPHILS # BLD AUTO: 0.1 K/UL
BASOPHILS NFR BLD AUTO: 1 %
BILIRUB SERPL-MCNC: 1 MG/DL (ref 0.1–1)
BUN SERPL-MCNC: 20 MG/DL (ref 8–23)
CALCIUM SERPL-MCNC: 8.8 MG/DL (ref 8.7–10.5)
CHLORIDE SERPL-SCNC: 105 MMOL/L (ref 95–110)
CHOLEST SERPL-MCNC: 183 MG/DL (ref 120–199)
CHOLEST/HDLC SERPL: 3.1 {RATIO} (ref 2–5)
CO2 SERPL-SCNC: 29 MMOL/L (ref 23–29)
CREAT SERPL-MCNC: 1 MG/DL (ref 0.5–1.4)
ERYTHROCYTE [DISTWIDTH] IN BLOOD BY AUTOMATED COUNT: 17.6 % (ref 11.5–14.5)
GFR SERPLBLD CREATININE-BSD FMLA CKD-EPI: >60 ML/MIN/1.73/M2
GLUCOSE SERPL-MCNC: 81 MG/DL (ref 70–110)
HCT VFR BLD AUTO: 42.4 % (ref 40–54)
HDLC SERPL-MCNC: 59 MG/DL (ref 40–75)
HDLC SERPL: 32.2 % (ref 20–50)
HGB BLD-MCNC: 13 GM/DL (ref 14–18)
IMM GRANULOCYTES # BLD AUTO: 0.03 K/UL (ref 0–0.04)
IMM GRANULOCYTES NFR BLD AUTO: 0.3 % (ref 0–0.5)
LDLC SERPL CALC-MCNC: 104.8 MG/DL (ref 63–159)
LYMPHOCYTES # BLD AUTO: 3.48 K/UL (ref 1–4.8)
MCH RBC QN AUTO: 26.1 PG (ref 27–31)
MCHC RBC AUTO-ENTMCNC: 30.7 G/DL (ref 32–36)
MCV RBC AUTO: 85 FL (ref 82–98)
NONHDLC SERPL-MCNC: 124 MG/DL
NUCLEATED RBC (/100WBC) (OHS): 0 /100 WBC
PLATELET # BLD AUTO: 320 K/UL (ref 150–450)
PMV BLD AUTO: 10.3 FL (ref 9.2–12.9)
POTASSIUM SERPL-SCNC: 3.8 MMOL/L (ref 3.5–5.1)
PROT SERPL-MCNC: 6.7 GM/DL (ref 6–8.4)
RBC # BLD AUTO: 4.99 M/UL (ref 4.6–6.2)
RELATIVE EOSINOPHIL (OHS): 0 %
RELATIVE LYMPHOCYTE (OHS): 34.8 % (ref 18–48)
RELATIVE MONOCYTE (OHS): 9.3 % (ref 4–15)
RELATIVE NEUTROPHIL (OHS): 54.6 % (ref 38–73)
SODIUM SERPL-SCNC: 141 MMOL/L (ref 136–145)
T4 FREE SERPL-MCNC: 1.16 NG/DL (ref 0.71–1.51)
TRIGL SERPL-MCNC: 96 MG/DL (ref 30–150)
TSH SERPL-ACNC: 1.77 UIU/ML (ref 0.4–4)
WBC # BLD AUTO: 9.99 K/UL (ref 3.9–12.7)

## 2025-03-31 PROCEDURE — 84443 ASSAY THYROID STIM HORMONE: CPT

## 2025-03-31 PROCEDURE — 80061 LIPID PANEL: CPT

## 2025-03-31 PROCEDURE — 84439 ASSAY OF FREE THYROXINE: CPT

## 2025-03-31 PROCEDURE — 85025 COMPLETE CBC W/AUTO DIFF WBC: CPT

## 2025-03-31 PROCEDURE — 80053 COMPREHEN METABOLIC PANEL: CPT

## 2025-03-31 PROCEDURE — 36415 COLL VENOUS BLD VENIPUNCTURE: CPT | Mod: PO

## 2025-04-03 ENCOUNTER — OFFICE VISIT (OUTPATIENT)
Dept: FAMILY MEDICINE | Facility: CLINIC | Age: 85
End: 2025-04-03
Payer: MEDICARE

## 2025-04-03 VITALS
BODY MASS INDEX: 24.04 KG/M2 | HEART RATE: 75 BPM | DIASTOLIC BLOOD PRESSURE: 60 MMHG | OXYGEN SATURATION: 97 % | RESPIRATION RATE: 20 BRPM | SYSTOLIC BLOOD PRESSURE: 110 MMHG | TEMPERATURE: 98 F | WEIGHT: 171.75 LBS | HEIGHT: 71 IN

## 2025-04-03 DIAGNOSIS — I10 ESSENTIAL HYPERTENSION: ICD-10-CM

## 2025-04-03 DIAGNOSIS — M79.604 LOW BACK PAIN RADIATING TO RIGHT LEG: ICD-10-CM

## 2025-04-03 DIAGNOSIS — J45.50 SEVERE PERSISTENT ASTHMA WITHOUT COMPLICATION: ICD-10-CM

## 2025-04-03 DIAGNOSIS — M54.50 LOW BACK PAIN RADIATING TO RIGHT LEG: ICD-10-CM

## 2025-04-03 DIAGNOSIS — Z95.0 S/P PLACEMENT OF CARDIAC PACEMAKER: ICD-10-CM

## 2025-04-03 DIAGNOSIS — E78.2 MIXED HYPERLIPIDEMIA: ICD-10-CM

## 2025-04-03 DIAGNOSIS — I48.0 PAROXYSMAL ATRIAL FIBRILLATION: ICD-10-CM

## 2025-04-03 DIAGNOSIS — E03.9 HYPOTHYROIDISM, UNSPECIFIED TYPE: Primary | ICD-10-CM

## 2025-04-03 DIAGNOSIS — E06.3 HASHIMOTO'S THYROIDITIS: ICD-10-CM

## 2025-04-03 DIAGNOSIS — N40.0 BENIGN PROSTATIC HYPERPLASIA WITHOUT LOWER URINARY TRACT SYMPTOMS: ICD-10-CM

## 2025-04-03 PROCEDURE — 1159F MED LIST DOCD IN RCRD: CPT | Mod: CPTII,S$GLB,, | Performed by: FAMILY MEDICINE

## 2025-04-03 PROCEDURE — 1101F PT FALLS ASSESS-DOCD LE1/YR: CPT | Mod: CPTII,S$GLB,, | Performed by: FAMILY MEDICINE

## 2025-04-03 PROCEDURE — 99999 PR PBB SHADOW E&M-EST. PATIENT-LVL III: CPT | Mod: PBBFAC,,, | Performed by: FAMILY MEDICINE

## 2025-04-03 PROCEDURE — G2211 COMPLEX E/M VISIT ADD ON: HCPCS | Mod: S$GLB,,, | Performed by: FAMILY MEDICINE

## 2025-04-03 PROCEDURE — 99214 OFFICE O/P EST MOD 30 MIN: CPT | Mod: S$GLB,,, | Performed by: FAMILY MEDICINE

## 2025-04-03 PROCEDURE — 3288F FALL RISK ASSESSMENT DOCD: CPT | Mod: CPTII,S$GLB,, | Performed by: FAMILY MEDICINE

## 2025-04-03 PROCEDURE — 1160F RVW MEDS BY RX/DR IN RCRD: CPT | Mod: CPTII,S$GLB,, | Performed by: FAMILY MEDICINE

## 2025-04-03 PROCEDURE — 1125F AMNT PAIN NOTED PAIN PRSNT: CPT | Mod: CPTII,S$GLB,, | Performed by: FAMILY MEDICINE

## 2025-04-03 RX ORDER — TIZANIDINE 4 MG/1
4 TABLET ORAL EVERY 6 HOURS PRN
Qty: 30 TABLET | Status: SHIPPED | OUTPATIENT
Start: 2025-04-03 | End: 2025-04-13

## 2025-04-03 NOTE — PROGRESS NOTES
"Subjective:       Patient ID: Maycol Hodge is a 84 y.o. male.    Chief Complaint: Follow-up (6mth f/u)    Problem List[1]  Patient is here for a chronic conditions follow up.    Reviewed labs 3/2025  History of Present Illness    CHIEF COMPLAINT:  Mr. Hodge presents today for follow up of hip pain and morning stiffness.    MUSCULOSKELETAL:  He experiences hip and sciatic pain with initial steps in the morning, radiating down to mid-thigh. Pain improves with movement and stretching. He performs stretching exercises and rope pull as part of exercise routine. He uses treadmill and stationary bicycle for 10-15 minutes each, and engages in sit-ups and light weightlifting exercises.    FATIGUE:  He reports fluctuating energy levels, getting tired easily. He felt good Monday and Tuesday when he exercised at the gym, but experienced a significant decrease in energy on Wednesday, describing it as "the bottom fell out." No specific cause for these energy fluctuations has been identified.    RECENT UPPER RESPIRATORY INFECTION:  He developed a sore throat and severe, persistent cough three weeks ago. Initially used codeine with minimal relief, then switched to Robitussin which was more effective in loosening chest congestion. Symptoms have fluctuated with a 3-4 day period of minimal coughing followed by recurrence of severe coughing episodes. He denies current wheezing or inhaler use.    CURRENT MEDICATIONS:  He continues Eliquis for anticoagulation and Fasinra injections which are working well. Taking Biotin supplementation for nail issues with minimal improvement.    DIET:  He consumes 2-3 eggs weekly, regular dairy products including cheese and milk, daily almonds, and frequent beans and rice.      ROS:  ROS findings as noted in HPI.        Review of Systems   Constitutional:  Negative for fatigue and unexpected weight change.   Respiratory:  Negative for chest tightness and shortness of breath.    Cardiovascular:  " Negative for chest pain, palpitations and leg swelling.   Gastrointestinal:  Negative for abdominal pain.   Musculoskeletal:  Negative for arthralgias.   Neurological:  Negative for dizziness, syncope, light-headedness and headaches.      Relevant History:    Trying to improve his exercise tolerance gradually. Now up to 4200 steps /day weekly average     Nephro CKd stage 3 , mild anemia     Breast C/o breast lump with tenderness 11/23 . Mammo kerrie and right breast us 12/23 showed mild benign gynecomastia likely due to spironolactone which he is on since 7/22 by card Dr. Garsia for bp instability. Wants to do trial off     Card Dr. Vogel/Sun h/o TIA . PAf on eliquis. Had conversion to sinus rhythm after amiodarone added 9/23 and has remained so.  Has occ gross hematuria related to prostate disease. CHADS score is 5 so eliquis continuation is recommended  Arrhythmia Dr. Graham s/p cardiac pacemaker for SSS placed 8/23  HPL- rash on pravastatin      GI dr. Mullen colonoscopy 5/20/23-no polyps     urology Dr. Truong-elevated PSA has had multiple biopsies-no prostate cancer.      Eye Dr. Hester-glaucoma     Allergy Dr. Unger- History: July 16th HA. 19th hives and whelps.  Allergy Dr. Unger who was consulted during hospital stay.  Admitted after syncope in triage at [facility]. Diagnosed with anaphylaxis and shock from unknown etiology. Denies throat or tongue swelling or SOB. No known food allergies.   Had slow pulse and low BP.  Given steroids. Released 22nd.  Had purpuric lesions concerning for possible vasculitis. Complement as normal.  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 Has had erratic and high BP.  C/o fatigue and sleepiness.  BX neg for vasculitis.  On bid antihistamine zyrtec and claritin.  Rash cleared immediately after admission. Has had no further hives x 2 months. On daily antihistamines     Endocrine Dr. Duncan -Has + TPO Ab 9/18 c/w hashimotos ,hypothyroid  TFTs nl on  alternating synthroid 50 and 75 mcg     Pulm Dr. Burch -asthma/COPD on fasenra. Having moire mucous prod and occ wheezing on trelegy, singulair. Takes claritin prn and uses flonase daily.    Hst- no sig james. No machine needed. C/o chronic fatigue for years, low energy, frequent naps at least 2 a day and never feeling rested. Has spurts of energy but usually get exhausted doing very little. Goes to gym 2-3 times per week.  Nuc stress test neg for ischemia and echo showed nl EF 2022. No wheezes or sob  Objective:      Physical Exam  Vitals and nursing note reviewed.   Constitutional:       Appearance: He is well-developed.   Cardiovascular:      Rate and Rhythm: Normal rate and regular rhythm.      Heart sounds: Normal heart sounds.   Pulmonary:      Effort: Pulmonary effort is normal.      Breath sounds: Normal breath sounds.   Skin:     General: Skin is warm and dry.   Neurological:      Mental Status: He is alert and oriented to person, place, and time.         Assessment:       ICD-10-CM ICD-9-CM    1. Hypothyroidism, unspecified type  E03.9 244.9 CBC Auto Differential      TSH      T4, Free      2. Essential hypertension  I10 401.9       3. Mixed hyperlipidemia  E78.2 272.2 Comprehensive Metabolic Panel      Lipid Panel      4. Severe persistent asthma without complication  J45.50 493.90       5. Paroxysmal atrial fibrillation  I48.0 427.31       6. S/P placement of cardiac pacemaker  Z95.0 V45.01       7. Hashimoto's thyroiditis  E06.3 245.2       8. Benign prostatic hyperplasia without lower urinary tract symptoms  N40.0 600.00       9. Low back pain radiating to right leg  M54.50 724.2 tiZANidine (ZANAFLEX) 4 MG tablet    M79.604           Plan:   1. Hypothyroidism, unspecified type (Primary)  Controlled on current medications.  Continue current medications.    - CBC Auto Differential; Future  - TSH; Future  - T4, Free; Future    2. Essential hypertension  Controlled on current medications.  Continue current  medications.      3. Mixed hyperlipidemia  Controlled on current medications.  Continue current medications.    - Comprehensive Metabolic Panel; Future  - Lipid Panel; Future    4. Severe persistent asthma without complication  Cont pulm care. Recommend consistent use of trelegy.  Albuterol prn. Cont fasenra injections    5. Paroxysmal atrial fibrillation  Cont eliquis and card care /monitoring    6. S/P placement of cardiac pacemaker  Cont card monitoring    7. Hashimoto's thyroiditis  Cont monitoring TFTs    8. Benign prostatic hyperplasia without lower urinary tract symptoms  Cont current mgmt and urology monitoring of psa    9. Low back pain radiating to right leg  Stretches recommended for lower back, massage.  Tylenol prn. Cont prn  - tiZANidine (ZANAFLEX) 4 MG tablet; Take 1 tablet (4 mg total) by mouth every 6 (six) hours as needed (spasms).  Dispense: 30 tablet; Refill: PRN    Assessment & Plan    - Explained impact of hydration on blood test results, particularly albumin and bilirubin levels, and discussed Gilbert syndrome's effect on bilirubin levels when fasting.  - Educated on importance of protein in diet, particularly for nail health and overall nutrition, including complete protein profile obtained from combining beans and rice.  - Recommend focusing on including protein in every meal.  - Mr. Magallon to perform stretches during the day to help with back/hip discomfort.  - Consider reducing intensity at the gym to avoid overexertion.  - Mr. Magallon to file down nails to prevent catching and further damage.  - Started Tizanidine (muscle relaxer) to be taken at night before sleep to help with morning stiffness.  - Ordered labs to be done in 6 months, prior to next visit.       Time spent with patient: 20 minutes  Patient with be reevaluated in 6 months or sooner prn  Greater than 50% of this visit was spent counseling as described in above documentation:Yes  This note was generated with the  assistance of ambient listening technology. Verbal consent was obtained by the patient and accompanying visitor(s) for the recording of patient appointment to facilitate this note. I attest to having reviewed and edited the generated note for accuracy, though some syntax or spelling errors may persist. Please contact the author of this note for any clarification.            [1]   Patient Active Problem List  Diagnosis    Chronic allergic rhinitis    Essential hypertension    Severe persistent asthma without complication    BPH (benign prostatic hyperplasia)    History of TIA (transient ischemic attack)    Hyperlipidemia    Cataract    Urticaria    Hypothyroidism    Autoimmune urticaria    Anaphylactic syndrome    Hashimoto's thyroiditis    Change in bowel habits    Acute urticaria    Paroxysmal atrial fibrillation    Hx of colonic polyps    Chronic sinus complaints    Fatigue    Hypersomnolence disorder    Severe persistent asthma dependent on systemic steroids with acute exacerbation    Eosinophilic asthma    Calcification of aorta    Blood pressure instability    Nonthrombocytopenic purpura    Tachy-mile syndrome    S/P placement of cardiac pacemaker    Atrial flutter    Alcohol dependence, uncomplicated    Chronic kidney disease, stage 3a    Drug-induced gynecomastia    Asthma-COPD overlap syndrome

## 2025-05-06 ENCOUNTER — CLINICAL SUPPORT (OUTPATIENT)
Dept: CARDIOLOGY | Facility: CLINIC | Age: 85
End: 2025-05-06

## 2025-05-06 ENCOUNTER — HOSPITAL ENCOUNTER (OUTPATIENT)
Dept: CARDIOLOGY | Facility: CLINIC | Age: 85
Discharge: HOME | End: 2025-05-06
Attending: GENERAL PRACTICE
Payer: MEDICARE

## 2025-05-06 DIAGNOSIS — Z95.0 PRESENCE OF CARDIAC PACEMAKER: ICD-10-CM

## 2025-05-06 DIAGNOSIS — R00.1 BRADYCARDIA, UNSPECIFIED: ICD-10-CM

## 2025-05-06 PROCEDURE — 93296 REM INTERROG EVL PM/IDS: CPT | Mod: PN | Performed by: GENERAL PRACTICE

## 2025-05-06 PROCEDURE — 93294 REM INTERROG EVL PM/LDLS PM: CPT | Mod: S$GLB,,, | Performed by: GENERAL PRACTICE

## 2025-05-20 ENCOUNTER — OFFICE VISIT (OUTPATIENT)
Dept: PULMONOLOGY | Facility: CLINIC | Age: 85
End: 2025-05-20
Payer: MEDICARE

## 2025-05-20 VITALS
BODY MASS INDEX: 24.04 KG/M2 | WEIGHT: 171.75 LBS | SYSTOLIC BLOOD PRESSURE: 146 MMHG | HEART RATE: 62 BPM | HEIGHT: 71 IN | OXYGEN SATURATION: 97 % | DIASTOLIC BLOOD PRESSURE: 62 MMHG

## 2025-05-20 DIAGNOSIS — J45.50 SEVERE PERSISTENT ASTHMA WITHOUT COMPLICATION: Primary | ICD-10-CM

## 2025-05-20 DIAGNOSIS — D50.8 OTHER IRON DEFICIENCY ANEMIA: ICD-10-CM

## 2025-05-20 PROBLEM — D50.9 IRON DEFICIENCY ANEMIA: Status: ACTIVE | Noted: 2025-05-20

## 2025-05-20 PROCEDURE — 1101F PT FALLS ASSESS-DOCD LE1/YR: CPT | Mod: CPTII,S$GLB,, | Performed by: INTERNAL MEDICINE

## 2025-05-20 PROCEDURE — 99999 PR PBB SHADOW E&M-EST. PATIENT-LVL III: CPT | Mod: PBBFAC,,, | Performed by: INTERNAL MEDICINE

## 2025-05-20 PROCEDURE — 99212 OFFICE O/P EST SF 10 MIN: CPT | Mod: S$GLB,,, | Performed by: INTERNAL MEDICINE

## 2025-05-20 PROCEDURE — 3288F FALL RISK ASSESSMENT DOCD: CPT | Mod: CPTII,S$GLB,, | Performed by: INTERNAL MEDICINE

## 2025-05-20 NOTE — PATIENT INSTRUCTIONS
Resp status doing well.    Fasenra likely controlling    Fatigue source still not clear-- no diagnosis..    Follow up blood count- eliquis dose may be decreased due to age

## 2025-05-20 NOTE — PROGRESS NOTES
2025    Maycol Hodge  Office Note    Chief Complaint   Patient presents with    Follow-up    Asthma    COPD       HPI:    2025 mucous good, on frasera shots again--pt feels no difference but no episodes.  Pt having a fib issues.  Not using trelegy nor inhalers..  no yg mucous.   Fatigue ongoing -- same.  Brother  age 91 yesterday-- had pneumonia, aspirated     Since on fasenra doing resp well..     Blood wk good 3/31 hgb  low 13?     Not going to gym regular.  Occ yd wk.  Walks 1-2 miles daily..  2025 pt was in [place] and developed cough after jeni.  Took azithromcin and prednisone when returned prior to new .  Mucous was green yellow-- cleared color with azithro.      No nocturnal worsening .   Fatigue very bad- slept 18 hrs last wk.   Usually sleeps 8 hrs nightly.    No fever and appetite ok -- chr slender..    Off abx and prednisone 3 days and  worsened cough-- was very severe.    Patient Instructions   Illness seems to be resolving.    Illness didn't feel like usual asthma but occurred prior to when fasenra was due (fasenra stopped)    Use codeine and repeat prednisone if worsening or not clearing cough.    Do chest xray 1-2 wks if not clearing    You had flu vaccine -- use tamiflu if needed      Re check in 6 wks or 3 months.    2024 pt having intermittent fatigue.  Some days very severe fatigue.      Patient Instructions   Would consider trial off fasenra.    Use prednisone as needed    Use albuterol as needed     Should stay on controller.... trelegy    Addendum -- we discuss and you direct to stop fasenra    2024 pt not better and has no stamina.  Occasionally not too bad but has low stamina and huffing and puffing sob......         Pt viewed side effects of buproprium -- declined to use    Took prednisone with no help.        Patient Instructions   No pulmonary issue found to cause fatigue.    Check oxygen sleeping  -- you had fair numbers with home sleep study in  2020... you have somnolence    Fasenra has been very effective to stop bronchospasm.           7/25/2024 pt having good and bad days, will have islas climbing stairs. Fatigues and naps easy-- sleeps ok, had sleep study past and did well.  Pt was able to yd wk 2yrs ago.    Still on fasenra and not on controller  Still on amiodarone and takes thyroid rx with  good tsh 5/2024...  Pt appreciates slowing down   No wt loss.  May have some down mood..  Pleasure reading, concentrates ok.    Wife ok---     Patient Instructions   Would do trial prednisone 20 mg daily for a wk, use trelegy daily -- as trial..   could do follow up breathing test.    After above -- would try Wellbutrin -- start one daily for a week -- if not effective go to one twice daily for a month(or so) stop if not effective      Continue fasenra as lungs have been very stable.  Would do above as stamina not good......    10/25/2023 pt relates had had blood in urine, pt on fasenra 3 yrs.  Cxr clear from  last eval with neg sputum culture-- cleareed    No urine pain. To see urology in [place] in am ....  Patient Instructions   Blood in urine may be from kidney and lung disease but not likely.      Urine test reasonable and urine culture  also.    If not clearly from urology problem-- may recheck kidney function?        Lungs are doing well...    Would still be concerned about amiodarone.    Cxr viewed and good.      10/11/2023 pt doing well on fasenra, but developed sore throat followed by 3 days with rhinitis, then cough with violent cough and clear mucous.  Appetite low and having fatigue.  Pt took prednisone  6 days -- illness was similar to prior asthma..  pt had some wheezes and worse laying down-- very fatigued.      Pt started amiodarone 8/1/2023 out pt Dr Garsia-- had pacer   Patient Instructions   Lungs do sound good today    Chest xray 8/1/2023 viewed with new pacer-- lungs small post op.  Need follow up.    Amiodarone may adversely affect lung--  chest xray may not be as good a screen as ct chest.  Would do chest xray 1st...    Lungs sound clear    Use codeine to suppress cough.    Would stay on controllers-- use trelegy for now.      Mucous was sl yellow today- do culture today - -may give antibiotic if needed (intolerances and amiodarone limit antibiotics)    Would do ct if not thriving     Oxygen sat walking varied 93-98...    Would  continue fasenra.    Need follow up in 2- 3 weeks if not cleared -- may need ct chest if not clearing.    12/6/2022 goes to gym daily, doing better.  Had pft better now than last yr.    Patient Instructions   Would recommend continue Fasenra as no asthma issues since starting over 2-3 yrs ago.    Breathing test today is better yet.      6/22/2022 used provigil with good results but waned.  Took prednisone with some benefit.    No wheezes and occ sob/albuterol use  Patient Instructions   Provigil will increase alertness --- not an medication for energy. There is no energy medication-- medications are for illness.      Asthma seems to be doing well  -- you have benefited from fasenra every 2 months, you have been on fasenra 2 yrs in September.      Would check lung capacity at follow up in 6 months.    3/7/2022 did not use provigil as felt did not have narcolepsy.  No prednisone.  bp 171/99-- pcp told to repeat  And still bp up.  Energy varies -- trys to get to gym an hour with variable results.  Asthma controlled.  Weaned off symbicort with breo use-- very stable and wishes to wean off.    No hives.  Patient Instructions   Would try provigil to improve alertness and vigilance.  May need follow up for refills as needed.  Would discuss bp with Dr Fox June 2 follow  up  Would be reasonable to use breo every other day -- if stable breo qod for a month - could skip if having no asthma symptoms or albuterol use.     You have had dramatic benefit from Fasenra-- need to continue.      12/62021 -- had fasenra 4 wks ago.  Had been  able use no prednisone nor rescue nor controller til 2 wks ago when had sore throat followed by nasal congestion then cough/wheezes-- pt cleared with prednisone/azithromycin and prn albuterol.  Patient Instructions   You started Fasenra last September with extreme dramatic benefit-- your asthma was under excellent control and ---your controller and rescue and action plans were able to be stopped.  You had mild exacerbation due to uri last 3 wks.      Would recommend continuing Fasenra as you were on prednisone every 2 - 3 months prior to Fasenra.     If your insurance mandates you be on controller -- would recommend using singulair and symbicort-- symbicort was started 3 wks.              Would recommend trial provigil for excess sleepiness-- your recent sleep study did not show significant sleep apnea with AHI 2.1.  Will try to get approval through speciality pharmacy.   Start provigil one daily to improve alertness and focus.         Addendum -sleep study 8/20/2020 had ahi 2.1.   Could repeat in house sleep study.  Suspect would do well with provigil -- would recommend trial 100 mg daily to treat hypersomnolence.      10/25/2021 having fatigue, sometimes wake up fatigued and sometimes develops during day.  Denies nerve/anxiety issues.  Appetite ok.  No cough/wheezes/sob.  No spasm in over a yr or more.  No inhaler use.  Fatigue occurs 1/3 days.  Voids adequately.  Will sit about throughout day once fatigued.  Fatigue occurring last 3 months or so, had colonoscopy last yr, has chr elevation psa which is monitor q yr from q 6 months in past. Saw cardiology.     will nap twice daily -- has excess sleepiness.  Patient Instructions   Would use prednisone once daily for 3 days if any fatigue-- may do  Once or twice.  Breathing test was better at 56% than 42% last yr-- but you may still have some airway problems..  Would try inhalers if fatigued-- try nebulizer.  Also try prednisone----- if prednisone works-- use  inhalers more.   Would screen for inflammation, testosterone (may not replace as psa up???), blood counts/chemistries, thyroid  (continue medications), diabetes screen, and chest xray.  Should f/u Dr Rojo soon if fatigue not clearing?????  psa not oredered as not able.   Addendum -sleep study 8/20/2020 had ahi 2.1.   Could repeat in house sleep study.  Suspect would do well with provigil -- would recommend trial 100 mg daily to treat hypersomnolence.      4/28/2021- fasenra going well, no steroids, 4-5 times daily has transparent mucous produced. No sleep apnea but some excessive sleepiness appreciated. Sleep 10, arouses 3 to void (had turp/meds in past)with difficulty going back. Off symbicort. Uses albuterol maybe once a wk or so.   Patient Instructions   Continue Gym.    Lungs should be good.  Need to continue fasenra.    Thyroid could be rechecked.     Home study-- no sleep apnea, could have restless legs?  10/28/2020 no prednisone since last visit, fasenra helped dramatically.  No prednisoen and breathing much better.  - pt got shot here 10/8/2020  Patient Instructions   Need to continue fasenra as has had dramatic response.     9/28/2020 took another course prednisone, has been on prednisone  Monthly since march.  Finished last 2 wks ago, feels will need [place] course soon. Uses symbicort regular.  Had sleep study few wks ago.fev 42 from 57% on 2017,      Sleep study 8/20/2020 with ahi 2/hr- within normal range.   Patient Instructions   Asthma very unstable..  Will place order for fasenra- sample shot would help.  Shot would be monthly x 3 then every 8 wks.    Use valtrex if shingles - call for any question.      Sound like you need treatment again soon- start prednisone soon or Fasenra shots.     will give paper script for advair - may replace symboicort if cheaper?     No sleep apnea of significance.      Fasenra is an interleukin 5 inhibitor.  8/11/2020 had to use prednisone since March, having severe  fatigue - reports sleeps well, sleep non restorative, snores with no sleep study in past.  Romo to mail box - wife sleeps separate room. No snore arousal.    Uses symbicort regular.  Uses albuterol rescue minimally less than once a wk.  Pt starts prednisone when cough becomes excessive.  Eats well, no wt loss. Sleeps 7 hrs/d.  Goes to [employer] for hr 3/wk.  Pt having vivid dreams.   Patient Instructions   Check lung capacity   Check sleep study - best prior to prednisone  May use prednisone - start 20/d dropping to 10 mg daily once better.  If you are found to have sleep apnea- less than 5 episodes an hour would be normal- would try cpap therapy.  2020 51 yo son  suddenly  in [place]- son avid - death.  Toxicology pending.  Had 2 spells ppt prednisone use since last visit with good results.  Took erythro also.  Having some grief- eats and sleeps ok . Wife in good shape. Uses symbicort regular.  Patient Instructions   Asthma not too bad but you needed prednisone twice since March.  Special shots may help-  Could ask to get fasenra covered.  May consider starting if pattern worsens.    symbicort needed regular especially in winter/spring.+      3/2/2020- having exhaustion after 10-15 min doing yd wk.  Saw cardiology- had a fib at colonoscopy in dec but sinus mile since. - eliquis started.  Pt had syncope 10 days - saw Dr Vogel in f/u.  Last wk had some noct wheezes.   Uses symbicort 2 bid and singulair.  No no prednisone use last yr.  Sleeps ok, nerves ok - denies depression.  Had hives last July.  Stress echo in 2019 was good save high bp?    Patient Instructions   Fatigue and wheezes are noted.  Breathing not typical cause for loss of consciousness.  Heart rate is low - may contribute to fatigue but not that bad?    Chronic sinuses seem ok.  Would recommend prednisone 20 mg daily for 3 days now,  May repeat but would like to see better function and activity.    Call if not perking up.    oxygen level walking in office went from 97 to 93 % - not bad.  bp standing didn't fall today.    Blood wk last month all looked good.    Feb 1, 2019- Onset one week, sore throat, congestion, cough (fits, severe, productive light yellow in color), took azithromycin with minimal benefit. Current Prednisone use.  January 24, 2019- Feeling well, no exacerbations requiring antibiotic and steroid therapy. No cough, no nocturnal arousals, no SOB.   Sept 27, 2018- had exacerbations in July and August needing azithro and prednisone- had cough clear mucous with sl yellow, no breathing issues.  Unusual to have exacerbation summer.  Jan 5, 2018-- took azithro and pred x 3 - good results.  Doing well  Jan 19,2017 hpi, had acute onset 5 days ago with sinus runny, cough, wheezes, sob, no fever, no n/v, pos headache.  Muscle aches and joint aches.  Took prednisone, took inhaler, neb rx was old. Run down, poor appetite,   Took prednisone and azithro x3, still cough but better,  Down but better.  Mucous clear.   Dec 16, 2016HPI:pt with asthma with no usual noct arousals or rescue use.  Exacerbates 3x yr in spring mainly. Progresses for a week on rescue and then uses pred and doxy (recent doxy reaction) to clear.  I cared for years ago.  No recent pft.  Pt feels breathing better than  Usual yrs past.      The chief compliant  problem varies with instablilty at time    PFSH:  Past Medical History:   Diagnosis Date    A-fib     Arthritis     Asthma     Cancer     Hematuria     Hypertension     Hypothyroidism, unspecified     Skin cancer, basal cell     Stroke     Syncope and collapse     Thyroid disease     TIA (transient ischemic attack)          Past Surgical History:   Procedure Laterality Date    A-V CARDIAC PACEMAKER INSERTION N/A 8/1/2023    Procedure: INSERTION, CARDIAC PACEMAKER, DUAL CHAMBER;  Surgeon: Amrit Garsia MD;  Location: [facility] CATH/EP LAB;  Service: Cardiology;  Laterality: N/A;    COLONOSCOPY      COLONOSCOPY N/A  2020    Procedure: COLONOSCOPY;  Surgeon: Willis Mullen MD;  Location: [facility] ENDO;  Service: Endoscopy;  Laterality: N/A;    COLONOSCOPY N/A 2023    Procedure: COLONOSCOPY;  Surgeon: Willis Mullen MD;  Location: [facility] ENDO;  Service: Endoscopy;  Laterality: N/A;    CYSTOSCOPY      negative    EYE SURGERY      bilateral cataracts    HERNIA REPAIR      righht inguinal    JOINT REPLACEMENT Left     shoulder    left cataract surgery      PROSTATE SURGERY      TURP     Social History     Tobacco Use    Smoking status: Former     Current packs/day: 0.00     Types: Cigarettes     Quit date:      Years since quittin.4    Smokeless tobacco: Never    Tobacco comments:     Quit around    Substance Use Topics    Alcohol use: Yes     Alcohol/week: 7.0 standard drinks of alcohol     Types: 7 Shots of liquor per week     Comment: daily    Drug use: No     Family History   Problem Relation Name Age of Onset    Multiple sclerosis Mother      Heart disease Father      Stroke Father      No Known Problems Daughter      Cancer Neg Hx      Diabetes Neg Hx      Hypertension Neg Hx       Review of patient's allergies indicates:   Allergen Reactions    Doxycycline Rash     Causes rash, hives and itching    Diltiazem      pruritis    Hydrochlorothiazide      Causes elevate uric acid, gout      Levaquin [levofloxacin] Swelling    Norvasc [amlodipine] Swelling       Performance Status:The patient's activity level is functions out of house.      Review of Systems:    Constitutional: Negative for activity change, appetite change, chills, diaphoresis, fatigue, fever and unexpected weight change.   HENT: Negative for dental problem, postnasal drip, trouble swallowing and voice change. Positive for  rhinorrhea, sinus pressure, sinus pain, sneezing, sore throat,   Respiratory: Negative for apnea,  shortness of breath, wheezing and stridor.  Positive for cough, chest tightness,  Cardiovascular: Negative for chest pain,  "palpitations and leg swelling.   Gastrointestinal: Negative for abdominal distention, abdominal pain, constipation and nausea.   Musculoskeletal: Negative for gait problem, myalgias and neck pain.   Skin: Negative for color change and pallor.   Allergic/Immunologic: Negative for environmental allergies and food allergies.   Neurological: Negative for dizziness, speech difficulty, weakness, light-headedness, numbness and headaches.   Hematological: Negative for adenopathy. Does not bruise/bleed easily.   Psychiatric/Behavioral: Negative for dysphoric mood and sleep disturbance. The patient is not nervous/anxious.     Exam:Comprehensive exam done.   BP (!) 146/62 (BP Location: Right arm, Patient Position: Sitting)   Pulse 62   Ht 5' 11" (1.803 m)   Wt 77.9 kg (171 lb 11.8 oz)   SpO2 97% Comment: on room air at rest  BMI 23.95 kg/m²   Exam included Vitals as listed, and patient's appearance and affect and alertness and mood, oral exam for yeast and hygiene and pharynx lesions and Mallapatti (M) score, neck with inspection for jvd and masses and thyroid abnormalities and lymph nodes (supraclavicular and infraclavicular nodes and axillary also examined and noted if abn), chest exam included symmetry and effort and fremitus and percussion and auscultation, cardiac exam included rhythm and gallops and murmur and rubs and jvd and edema, abdominal exam for mass and hepatosplenomegaly and tenderness and hernias and bowel sounds, Musculoskeletal exam with muscle tone and posture and mobility/gait and  strength, and skin for rashes and cyanosis and pallor and turgor, extremity for clubbing.  Findings were normal except for pertinent findings listed below:  M3,  chest is symmetric, no distress, normal percussion, normal fremitus, and no murmur, no edema, lung sounds good.    Rest good.      Radiographs (ct chest and cxr) reviewed from 10/16/2017 normal  cxr 5/20/2020 nad    Labs Reviewed  Lab Results   Component Value " Date    WBC 9.99 03/31/2025    HGB 13.0 (L) 03/31/2025    HCT 42.4 03/31/2025    MCV 85 03/31/2025     03/31/2025   holter 1/27/2020- The diary was properly completed. The tape was adequate (0 days , 48 hours, 0 minutes).  Predominant Rhythm Sinus rhythm with heart rates varying between 43 and 113 bpm with an average of 56 bpm.  Ventricular Arrhythmias There were very rare PVCs totalling 58 and averaging 1.21 per hour.  Supraventricular Arrhythmias There were occasional PACs totalling 638 and averaging 13.29 per hour.  Occassional atrial pair and short run of atrial tachycardia,the longest 7 beats with no associated symptoms..  There was an episode of SOB reported. The corresponding rhythm strips revealed the following: During the event (At the Gym), the rhythm was sinus tachycardia at 113 bpm with without ectopy.     Results for ARNULFO SALEH (MRN [id number]) as of 9/21/2018 11:38   Ref. Range 9/21/2018 08:59   Eosinophil% Latest Ref Range: 0.0 - 8.0 % 9.2 (H)   Eos # Latest Ref Range: 0.0 - 0.5 K/uL 0.6 (H)     PFT  March 31, 2017 fev 57%,     11/22/19-Conclusion     Concentric left ventricular remodeling.  Normal left ventricular systolic function. The estimated ejection fraction is 60%  Normal LV diastolic function.  No wall motion abnormalities.  Normal right ventricular systolic function.  The stress echo portion of this study is negative for myocardial ischemia.  The patient's exercise capacity was above average. functional capacity of 10 METS  The patient reached the end of the protocol.  There were no arrhythmias during stress.  The EKG portion of this study is negative for myocardial ischemia.  Exercise portion of stress test stopped due to an increase in systolic and diastolic blood pressure above protocol.  Hypertensive response changed from stress echo in 3/2018.     Spirometry with bronchodilator, lung volume by gas dilution, diffusion capacity were accomplished October 11, 2021. The FEV1 to FVC  "ratio was 67% indicating airflow obstruction. The FEV1 was 56% of predicted at 1.6 L-this makes airflow obstruction   moderately severe. Bronchodilator response was not statistically significant at 9%. Total lung capacity on lung volume by gas dilution was 60% of predicted and low. Diffusion was 62% of predicted and low.   Â    There is moderately severe airflow obstruction. There is no significant bronchodilator response. Total lung capacity is reduced to 60% predicted and diffusion is reduced to 62% predicted. Clinical correlation recommended.       Spirometry with bronchodilator, lung volume by gas dilution, diffusion capacity measured August 17, 2020.  The FEV1 to FVC ratio was 65% indicating airflow obstruction.  The FEV1 measured 42% predicted at 1.2 L making airflow obstruction severe.  There    was a 13% improvement in the FEV1 following bronchodilator, this is a significant bronchodilator response.  Total lung capacity was reduced to 57% predicted suggesting restriction.  Residual volume was a little bit elevated at 77% of predicted.     Diffusion was reduced to 61% of predicted, uncorrected for anemia present.       Plan:  Clinical impression is apparently straight forward and impression with management as below.    Maycol Barney" was seen today for follow-up, asthma and copd.    Diagnoses and all orders for this visit:    Severe persistent asthma without complication                          Maycol "Anurag" was seen today for follow-up, asthma and copd.    Diagnoses and all orders for this visit:    Severe persistent asthma without complication                        Follow up in about 6 months (around 11/20/2025), or if symptoms worsen or fail to improve, for Virtual Visit.    Discussed with patient above for education the following:      Patient Instructions   Resp status doing well.    Fasenra likely controlling    Fatigue source still not clear-- no diagnosis..                                "

## 2025-06-23 DIAGNOSIS — Z00.00 ENCOUNTER FOR MEDICARE ANNUAL WELLNESS EXAM: ICD-10-CM

## 2025-07-09 LAB
OHS CV AF BURDEN PERCENT: 7.5
OHS CV DC REMOTE DEVICE TYPE: NORMAL
OHS CV ICD SHOCK: NO
OHS CV RV PACING PERCENT: 3.95 %

## 2025-07-15 ENCOUNTER — TELEPHONE (OUTPATIENT)
Dept: PULMONOLOGY | Facility: CLINIC | Age: 85
End: 2025-07-15
Payer: MEDICARE

## 2025-07-15 NOTE — TELEPHONE ENCOUNTER
Copied from CRM #[id number]. Topic: Medications - New Medication Request  >> Jul 15, 2025 10:16 AM Gwen wrote:  Type:  Needs Medical Advice    Who Called: patient  Symptoms (please be specific): cough   How long has patient had these symptoms:    Pharmacy name and phone #:    [id number] - [place], [place] - [address] & [facility]  [address]  [place] [address]  Phone: [phone] Fax: [phone]    Would the patient rather a call back or a response via [facility]? call  Best Call Back Number: [phone] (home)    Additional Information: Patient is requesting an antibiotic and prednisone for his cough.  Please call patient to advise.  Thanks!

## 2025-07-15 NOTE — PROGRESS NOTES
Subjective:    Patient ID:  Maycol Hodge is a 85 y.o. male who presents for follow-up of   Chief Complaint   Patient presents with    Cough      Constant ongoing   and has  mucus of yellow and grey stated     Fatigue    Shortness of Breath    Dizziness     Upon standing  sometimes        HPI:    Followup visit pacemaker.  Noticed heart skips when doesn't feel well. Sometimes skips quite a bit.  COUGH for a week, sore throat cough up out of chest. Cough a lot sometimes. Robitussin helps.  KYLE SHORT DISTANCE, Not sure if asthma     EKG ATRIAL PACED , PVCS, PROLONGED AV CONDUCTION.RBBB LAFB        1/27/25  He is here for followup pacemaker. Watches BP AT HOME runs over 140s. Walks frequently outside a mile several times a week. Apaced 44%. Vpaced 3%  4.6% AFIB. No dizziness.        EKG ATRIAL PACED LAD RBBB  12/18/23     He is here today for follow-up of his atrial fib flutter, pacemaker my history of hypertension TIA chads Vasc score 5.  He has had no complaints and no episodes of atrial flutter that he is aware of..  Is taking the Pacerone 200 mg daily and the Eliquis 5 mg b.i.d..  Is no falling or bruising.           Dr Graham 12/13/23.     leoncio Hodge is an 83M with a history of symptomatic PAF. His [id number] Score is 5 (age, HTN, hx TIA) so he should continue eliquis.     Pt maintaining sinus rhythm on amiodarone 200 mg daily. Plan to reduce amiodarone to 200 mg daily, follow-up PRN.     Pt is not sure he is taking amiodarone. He is going to check when he gets home and call me if he is not. If he is not we will hold off on restarting and have him follow-up in 12 months.     Thank you for allowing me to participate in the care of this patient. Please do not hesitate to call me with any questions or concerns.                        9/26/23  Pacemaker insert 8/1/23  FLUTTER  sept 5 2023  HE WAS PLACED ON AMIODARONE AND SCHEDULED FOR CARDIOVERSION BUT SPONTANEOUSLY CONVERTED.     I CAN NOT PULL UP HIS EKGS  CURRENTLY BUT THE PACEMAKER TRACINGS LOOK LIKE ATRIAL FLUTTER  Gets KYLE when walk. Feels heat beat irregular at times when checking Bp.  IT IS LIGHTHEADED IF HE STANDS UP QUICKLY     PACEMAKER INTERROGATION 09/05/2023 REVEALED A 4 SVT LONGEST 43 SEC,PREVIOUSLY THE LONGEST 76 HOURS ON AUGUST 11TH           EKG TODAY REVEALS ATRIAL PACED WITH PROLONGED AV CONDUCTION INCOMPLETE RIGHT BUNDLE BRANCH BLOCK LEFT AXIS DEVIATION.  IS CURRENTLY TAKING PACERONE 200 B.I.D. AND ELIQUIS.  HE MAY BE A CANDIDATE FOR ATRIAL FLUTTER ABLATION AND IS REFERRED TO.     NEED TO ADJUST THE MOM ON THE PACEMAKER AS HE GETS SOME CHEST EXERTION HE WAS HOPING TO HAVE MORE ENERGY AFTER THE PACEMAKER WAS PLACED BUT HAS NOT WORK SO FAR  No follow-ups on file.        HE FEELS MORE TIRED IN THE PAST SINCE HE HAS BEEN ON METOPROLOL.  HE GETS DIZZY WHEN HE GETS UP QUICK IT HE IS ON HYTRIN FOR BLOOD PRESSURE MEDICATIONS.  I AM GOING TO CHANGE HIS BLOOD PRESSURE MEDICATIONS FROM HYTRIN AND METOPROLOL TO VERY EARLY IN TO SEE OF HE FEELS BETTER.              Here for followup the pacemaker 08/01/2023     Receives some monitor alerts of tachycardias 15% of the time.  His been generally 01/20/2028, 2113, on August 4th.  On August 3rd he had 40 minutes duration at 2316 of atrial fibrillation.  His heart rates have gone up to approximately 150 per min.  3.7 hours/ day.  BP RUNNING 140 SYSTOLIC.      Most Recent Echocardiogram Results  Results for orders placed during the hospital encounter of 08/03/22    Echo    Interpretation Summary  · The left ventricle is normal in size with moderate concentric hypertrophy and normal systolic function.  · Normal left ventricular diastolic function.  · The estimated PA systolic pressure is 21 mmHg.  · Normal right ventricular size with normal right ventricular systolic function.  · Normal central venous pressure (3 mmHg).  · The estimated ejection fraction is 70%.  · Plaque present.  · Mild pulmonic regurgitation.  ·  Moderate aortic regurgitation.  · Mild tricuspid regurgitation.  · Mild right atrial enlargement.      Most Recent Nuclear Stress Test Results  Results for orders placed during the hospital encounter of 08/03/22    Nuclear Stress - Cardiology Interpreted    Interpretation Summary    Normal myocardial perfusion scan. There is no evidence of myocardial ischemia or infarction.    The gated perfusion images showed an ejection fraction of 75% post stress. Normal ejection fraction is greater than 47%.    There is normal wall motion at rest and post stress.    LV cavity size is normal at rest and normal at stress.    The EKG portion of this study is negative for ischemia.    The patient reported no chest pain during the stress test.    There were no arrhythmias during stress.      Most Recent Cardiac PET Stress Test Results  No results found for this or any previous visit.      Most Recent NM Myocardial Perfusion  No results found for this or any previous visit.      Most Recent Cardiovascular Angiogram results  Results for orders placed during the hospital encounter of 09/12/23    Intra-Procedure Documentation    Narrative  Aborted invasive cardiology procedure- see Procedure Log link for details.      Other Most Recent Cardiology Results  Results for orders placed during the hospital encounter of 05/06/25    Cardiac device check - Remote alert    Results for orders placed or performed in visit on 01/27/25   IN OFFICE EKG 12-LEAD (to Daisy)    Collection Time: 01/27/25  1:13 PM   Result Value Ref Range    QRS Duration 124 ms    [facility] QTC Calculation 428 ms    Narrative    Test Reason : I48.0,R00.1,    Vent. Rate :  65 BPM     Atrial Rate :  65 BPM     P-R Int : 226 ms          QRS Dur : 124 ms      QT Int : 412 ms       P-R-T Axes :  54 -38  -3 degrees    QTcB Int : 428 ms    Atrial-paced rhythm with prolonged AV conduction  Left axis deviation  Right bundle branch block  Abnormal ECG  When compared with ECG of 18-Dec-2023  11:16,  No significant change was found  Confirmed by Amrit Garsia ([id number]) on 2/11/2025 8:37:51 PM    Referred By:            Confirmed By: Amrit Garsia           Review of patient's allergies indicates:   Allergen Reactions    Doxycycline Rash     Causes rash, hives and itching    Augmentin [amoxicillin-pot clavulanate] Nausea And Vomiting    Diltiazem      pruritis    Hydrochlorothiazide      Causes elevate uric acid, gout      Levaquin [levofloxacin] Swelling    Norvasc [amlodipine] Swelling    Pravastatin Rash       Past Medical History:   Diagnosis Date    A-fib     Arthritis     Asthma     Cancer     Hematuria     Hypertension     Hypothyroidism, unspecified     Skin cancer, basal cell     Stroke     Syncope and collapse     Thyroid disease     TIA (transient ischemic attack)      Past Surgical History:   Procedure Laterality Date    A-V CARDIAC PACEMAKER INSERTION N/A 8/1/2023    Procedure: INSERTION, CARDIAC PACEMAKER, DUAL CHAMBER;  Surgeon: Amrit Garsia MD;  Location: [facility] CATH/EP LAB;  Service: Cardiology;  Laterality: N/A;    COLONOSCOPY      COLONOSCOPY N/A 1/21/2020    Procedure: COLONOSCOPY;  Surgeon: Willis Mullen MD;  Location: [facility] ENDO;  Service: Endoscopy;  Laterality: N/A;    COLONOSCOPY N/A 5/30/2023    Procedure: COLONOSCOPY;  Surgeon: Willis Mullen MD;  Location: [facility] ENDO;  Service: Endoscopy;  Laterality: N/A;    CYSTOSCOPY      negative    EYE SURGERY      bilateral cataracts    HERNIA REPAIR      righht inguinal    JOINT REPLACEMENT Left     shoulder    left cataract surgery      PROSTATE SURGERY      TURP     Social History[1]  Family History   Problem Relation Name Age of Onset    Multiple sclerosis Mother      Heart disease Father      Stroke Father      No Known Problems Daughter      Cancer Neg Hx      Diabetes Neg Hx      Hypertension Neg Hx          Review of Systems:   Constitution: Negative for diaphoresis and fever.   HEENT: Negative for nosebleeds.     Cardiovascular: Negative for chest pain       No dyspnea on exertion       No leg swelling        No palpitations  Respiratory: Negative for shortness of breath and wheezing.    Hematologic/Lymphatic: Negative for bleeding problem. Does not bruise/bleed easily.   Skin: Negative for color change and rash.   Musculoskeletal: Negative for falls and myalgias.   Gastrointestinal: Negative for hematemesis and hematochezia.   Genitourinary: Negative for hematuria.   Neurological: Negative for dizziness and light-headedness.   Psychiatric/Behavioral: Negative for altered mental status and memory loss.          Objective:        There were no vitals filed for this visit.    LIPIDS - LAST 2   Lab Results   Component Value Date    CHOL 183 03/31/2025    CHOL 193 09/17/2024    HDL 59 03/31/2025    HDL 56 09/17/2024    LDLCALC 104.8 03/31/2025    LDLCALC 115.0 09/17/2024    TRIG 96 03/31/2025    TRIG 110 09/17/2024    CHOLHDL 32.2 03/31/2025    CHOLHDL 29.0 09/17/2024       CBC - LAST 2  Lab Results   Component Value Date    WBC 9.99 03/31/2025    WBC 7.34 09/17/2024    RBC 4.99 03/31/2025    RBC 4.86 09/17/2024    HGB 13.0 (L) 03/31/2025    HGB 14.6 09/17/2024    HCT 42.4 03/31/2025    HCT 44.1 09/17/2024    MCV 85 03/31/2025    MCV 91 09/17/2024    MCH 26.1 (L) 03/31/2025    MCH 30.0 09/17/2024    MCHC 30.7 (L) 03/31/2025    MCHC 33.1 09/17/2024    RDW 17.6 (H) 03/31/2025    RDW 15.9 (H) 09/17/2024     03/31/2025     09/17/2024    MPV 10.3 03/31/2025    MPV 10.5 09/17/2024    GRAN 4.2 09/17/2024    GRAN 57.3 09/17/2024    LYMPH 34.8 03/31/2025    LYMPH 3.48 03/31/2025    MONO 9.3 03/31/2025    MONO 0.93 03/31/2025    BASO 0.06 09/17/2024    BASO 0.05 05/08/2024    NRBC 0 03/31/2025    NRBC 0 09/17/2024       CHEMISTRY & LIVER FUNCTION - LAST 2  Lab Results   Component Value Date     03/31/2025     09/17/2024    K 3.8 03/31/2025    K 4.3 09/17/2024     03/31/2025     09/17/2024    CO2 29  03/31/2025    CO2 22 (L) 09/17/2024    ANIONGAP 7 (L) 03/31/2025    ANIONGAP 11 09/17/2024    BUN 20 03/31/2025    BUN 17 09/17/2024    CREATININE 1.0 03/31/2025    CREATININE 1.2 09/17/2024    GLU 81 03/31/2025    GLU 94 09/17/2024    CALCIUM 8.8 03/31/2025    CALCIUM 9.2 09/17/2024    ALBUMIN 3.4 (L) 03/31/2025    ALBUMIN 3.5 09/17/2024    PROT 6.7 03/31/2025    PROT 6.7 09/17/2024    ALKPHOS 72 03/31/2025    ALKPHOS 67 09/17/2024    ALT 13 03/31/2025    ALT 14 09/17/2024    AST 15 03/31/2025    AST 18 09/17/2024    BILITOT 1.0 03/31/2025    BILITOT 1.4 (H) 09/17/2024        CARDIAC PROFILE - LAST 2  Lab Results   Component Value Date     05/26/2022        COAGULATION - LAST 2  Lab Results   Component Value Date    INR 1.0 08/01/2023       ENDOCRINE & PSA - LAST 2  Lab Results   Component Value Date    HGBA1C 5.3 10/26/2021    TSH 1.768 03/31/2025    TSH 1.802 09/17/2024    PSA 12.4 (H) 02/17/2021          Physical Exam:  CONSTITUTIONAL: No fever, no chills  HEENT: Normocephalic, atraumatic,pupils reactive to light                 NECK:  No JVD no carotid bruit  CVS: S1S2+, RRR, no murmurs,   LUNGS: Clear  ABDOMEN: Soft, NT, BS+  EXTREMITIES: No cyanosis, edema  : No fletcher catheter  NEURO: AAO X 3  PSY: Normal affect    Medication List with Changes/Refills   New Medications    CEFUROXIME (CEFTIN) 500 MG TABLET    Take 1 tablet (500 mg total) by mouth 2 (two) times daily. for 5 days    METHYLPREDNISOLONE (MEDROL DOSEPACK) 4 MG TABLET    use as directed   Current Medications    ALBUTEROL (PROVENTIL/VENTOLIN HFA) 90 MCG/ACTUATION INHALER    2 puffs every 4 hours as needed for cough, wheeze, or shortness of breath    APIXABAN (ELIQUIS) 5 MG TAB    Take 1 tablet (5 mg total) by mouth 2 (two) times daily.    ASCORBIC ACID, VITAMIN C, (VITAMIN C) 1000 MG TABLET    Take 1,000 mg by mouth once daily.    BENRALIZUMAB (FASENRA PEN) 30 MG/ML ATIN    Inject 1 Dose into the skin every 8 weeks.    BIOTIN 10 MG TAB     Take by mouth.    FLUTICASONE-UMECLIDIN-VILANTER (TRELEGY ELLIPTA) 200-62.5-25 MCG INHALER    Inhale 1 puff into the lungs once daily.    GLUC HCL/GLUC JAMESON/KW-KVJ-H-GLUC (GLUCOSAMINE COMPLEX ORAL)    Take 1 capsule by mouth once daily.    LATANOPROST 0.005 % OPHTHALMIC SOLUTION    Place 1 drop into both eyes every evening.     LEVOTHYROXINE (SYNTHROID) 50 MCG TABLET    TAKE 1 TABLET BY MOUTH EVERY MORNING ON TUESDAY AND THURSDAY    LEVOTHYROXINE (SYNTHROID) 75 MCG TABLET    TAKE 1 TABLET BY MOUTH EVERY MORNING ON MONDAY, WEDNESDAY, FRIDAY, SATURDAY AND SUNDAY    LOSARTAN (COZAAR) 25 MG TABLET    Take 1 tablet (25 mg total) by mouth once daily.    MAGNESIUM HYDROXIDE (MAGNESIA ORAL)    Take 400 mg by mouth.    VERAPAMIL (VERELAN) 180 MG C24P    Take 1 capsule (180 mg total) by mouth once daily.    VIT C,W-LH-UBSOT-LUTEIN-ZEAXAN (EYE HEALTH AREDS-2) 250-90-40-1 MG CAP    Take by mouth.    VITAMIN D (VITAMIN D3) 1000 UNITS TAB    Take 1,000 Units by mouth once daily.           Assessment:       1. PVC (premature ventricular contraction)    2. Paroxysmal atrial fibrillation    3. Presence of cardiac pacemaker    4. History of TIA (transient ischemic attack)    5. S/P placement of cardiac pacemaker    6. Calcification of aorta    7. Mixed hyperlipidemia    8. Abnormal coagulation profile    9. SOB (shortness of breath)         Plan:     Problem List Items Addressed This Visit          Neuro    History of TIA (transient ischemic attack)       Cardiac/Vascular    Hyperlipidemia    Paroxysmal atrial fibrillation    Calcification of aorta    S/P placement of cardiac pacemaker     Other Visit Diagnoses         PVC (premature ventricular contraction)    -  Primary      Presence of cardiac pacemaker          Abnormal coagulation profile          SOB (shortness of breath)        Relevant Orders    IN OFFICE EKG 12-LEAD (to Muse)          PVCS CONT VERELAN   ADD METOPROLOL, CHECK ZIO    PACEMAKER CONSIDER ALTON TYLER  ANTIBIOTICS STEROIDS.    ATRIAL FIBRILLATION 7.5 % AFIB BURDEN ON PACEMAKER CONT ELIQUIS    Follow up in about 6 months (around 2026).    The patients questions were answered, they verbalized understanding, and agreed with the treatment plan.       All pertinent data including labs, imaging, EKGs, and studies listed above were reviewed personally.       THOMAS LANDON MD  [facility]         [1]   Social History  Tobacco Use    Smoking status: Former     Current packs/day: 0.00     Types: Cigarettes     Quit date:      Years since quittin.5    Smokeless tobacco: Never    Tobacco comments:     Quit around    Substance Use Topics    Alcohol use: Yes     Alcohol/week: 7.0 standard drinks of alcohol     Types: 7 Shots of liquor per week     Comment: daily    Drug use: No

## 2025-07-15 NOTE — TELEPHONE ENCOUNTER
Copied from CRM #[id number]. Topic: General Inquiry - Patient Advice  >> Jul 14, 2025  9:34 AM Gwen wrote:  Type:  Needs Medical Advice    Who Called: patient  Symptoms (please be specific): sore throat cough with yellow   How long has patient had these symptoms:  last wednesday  Pharmacy name and phone #:    [id number] - [place], [place] - [address] & [facility]  [address]  [place] [address]  Phone: [phone] Fax: [phone]      Would the patient rather a call back or a response via [facility]? call  Best Call Back Number: [phone] (home)    Additional Information: Patient would like antibiotics called in.  Thanks!

## 2025-07-16 ENCOUNTER — OFFICE VISIT (OUTPATIENT)
Dept: CARDIOLOGY | Facility: CLINIC | Age: 85
End: 2025-07-16
Payer: MEDICARE

## 2025-07-16 DIAGNOSIS — R06.02 SOB (SHORTNESS OF BREATH): ICD-10-CM

## 2025-07-16 DIAGNOSIS — Z95.0 PRESENCE OF CARDIAC PACEMAKER: ICD-10-CM

## 2025-07-16 DIAGNOSIS — I70.0 CALCIFICATION OF AORTA: ICD-10-CM

## 2025-07-16 DIAGNOSIS — R79.1 ABNORMAL COAGULATION PROFILE: ICD-10-CM

## 2025-07-16 DIAGNOSIS — I48.0 PAROXYSMAL ATRIAL FIBRILLATION: ICD-10-CM

## 2025-07-16 DIAGNOSIS — I49.3 PVC (PREMATURE VENTRICULAR CONTRACTION): Primary | ICD-10-CM

## 2025-07-16 DIAGNOSIS — J45.51 SEVERE PERSISTENT ASTHMA DEPENDENT ON SYSTEMIC STEROIDS WITH ACUTE EXACERBATION: ICD-10-CM

## 2025-07-16 DIAGNOSIS — E78.2 MIXED HYPERLIPIDEMIA: ICD-10-CM

## 2025-07-16 DIAGNOSIS — Z95.0 S/P PLACEMENT OF CARDIAC PACEMAKER: ICD-10-CM

## 2025-07-16 DIAGNOSIS — Z79.52 SEVERE PERSISTENT ASTHMA DEPENDENT ON SYSTEMIC STEROIDS WITH ACUTE EXACERBATION: ICD-10-CM

## 2025-07-16 DIAGNOSIS — Z86.73 HISTORY OF TIA (TRANSIENT ISCHEMIC ATTACK): ICD-10-CM

## 2025-07-16 PROCEDURE — 99999 PR PBB SHADOW E&M-EST. PATIENT-LVL II: CPT | Mod: PBBFAC,,, | Performed by: GENERAL PRACTICE

## 2025-07-16 PROCEDURE — 1160F RVW MEDS BY RX/DR IN RCRD: CPT | Mod: CPTII,S$GLB,, | Performed by: GENERAL PRACTICE

## 2025-07-16 PROCEDURE — 99214 OFFICE O/P EST MOD 30 MIN: CPT | Mod: S$GLB,,, | Performed by: GENERAL PRACTICE

## 2025-07-16 PROCEDURE — 1159F MED LIST DOCD IN RCRD: CPT | Mod: CPTII,S$GLB,, | Performed by: GENERAL PRACTICE

## 2025-07-16 PROCEDURE — 1101F PT FALLS ASSESS-DOCD LE1/YR: CPT | Mod: CPTII,S$GLB,, | Performed by: GENERAL PRACTICE

## 2025-07-16 PROCEDURE — 3288F FALL RISK ASSESSMENT DOCD: CPT | Mod: CPTII,S$GLB,, | Performed by: GENERAL PRACTICE

## 2025-07-16 RX ORDER — BIOTIN 10 MG
TABLET ORAL
COMMUNITY

## 2025-07-16 RX ORDER — CHOLECALCIFEROL (VITAMIN D3) 25 MCG
1000 TABLET ORAL DAILY
COMMUNITY

## 2025-07-16 RX ORDER — CEFUROXIME AXETIL 500 MG/1
500 TABLET ORAL 2 TIMES DAILY
Qty: 10 TABLET | Refills: 0 | Status: SHIPPED | OUTPATIENT
Start: 2025-07-16 | End: 2025-07-21

## 2025-07-16 RX ORDER — METHYLPREDNISOLONE 4 MG/1
TABLET ORAL
Qty: 21 EACH | Refills: 0 | Status: SHIPPED | OUTPATIENT
Start: 2025-07-16 | End: 2025-08-06

## 2025-07-16 RX ORDER — VIT C/E/ZN/COPPR/LUTEIN/ZEAXAN 250MG-90MG
CAPSULE ORAL
COMMUNITY

## 2025-07-16 RX ORDER — AZITHROMYCIN 500 MG/1
TABLET, FILM COATED ORAL
Qty: 3 TABLET | Refills: 3 | Status: SHIPPED | OUTPATIENT
Start: 2025-07-16

## 2025-07-16 RX ORDER — NEBIVOLOL 2.5 MG/1
2.5 TABLET ORAL DAILY
Qty: 30 TABLET | Refills: 11 | Status: SHIPPED | OUTPATIENT
Start: 2025-07-16 | End: 2026-07-16

## 2025-07-16 NOTE — TELEPHONE ENCOUNTER
Copied from CRM #[id number]. Topic: General Inquiry - Return Call  >> Jul 15, 2025  4:38 PM Gwen wrote:  Type:  Patient Returning Call    Who Called:patient  Who Left Message for Patient:Helena  Does the patient know what this is regarding?:medication  Would the patient rather a call back or a response via [facility]? call  Best Call Back Number:[phone] (home)    Additional Information: Please call patient to advise.  Thanks!

## 2025-07-25 ENCOUNTER — TELEPHONE (OUTPATIENT)
Dept: CARDIOLOGY | Facility: HOSPITAL | Age: 85
End: 2025-07-25

## 2025-07-25 NOTE — TELEPHONE ENCOUNTER
Left voicemail/mychart  Patient advised, test will be at [facility] ([address]  Will need to register on the first floor at the main entrance.  Patient advised that arrival time is 0720.  Patient advised that they may be here about 3.5-4 hours, and may want to bring something to occupy their time, as there will be periods of waiting.    Patient advised, they may take their medications prior to testing if you need to.  Advised if food is needed to take medications, please keep it light, like toast and juice.    Patient advised to avoid all caffeine 12 hours prior to testing.  This includes chocolate, tea and decaf coffee.    Wear comfortable clothing.  No lotions, oils, or powders to the upper chest area. May wear deodorant.    No metal jewelry, buttons, or zippers to the upper body.

## 2025-07-28 ENCOUNTER — HOSPITAL ENCOUNTER (OUTPATIENT)
Dept: CARDIOLOGY | Facility: CLINIC | Age: 85
Discharge: HOME | End: 2025-07-28
Attending: GENERAL PRACTICE
Payer: MEDICARE

## 2025-07-28 ENCOUNTER — HOSPITAL ENCOUNTER (OUTPATIENT)
Dept: CARDIOLOGY | Facility: HOSPITAL | Age: 85
Discharge: HOME | End: 2025-07-28
Attending: GENERAL PRACTICE
Payer: MEDICARE

## 2025-07-28 ENCOUNTER — HOSPITAL ENCOUNTER (OUTPATIENT)
Dept: RADIOLOGY | Facility: HOSPITAL | Age: 85
Discharge: HOME | End: 2025-07-28
Attending: GENERAL PRACTICE
Payer: MEDICARE

## 2025-07-28 VITALS — WEIGHT: 171 LBS | HEIGHT: 71 IN | BODY MASS INDEX: 23.94 KG/M2

## 2025-07-28 DIAGNOSIS — I48.0 PAROXYSMAL ATRIAL FIBRILLATION: ICD-10-CM

## 2025-07-28 DIAGNOSIS — R79.1 ABNORMAL COAGULATION PROFILE: ICD-10-CM

## 2025-07-28 DIAGNOSIS — Z86.73 HISTORY OF TIA (TRANSIENT ISCHEMIC ATTACK): ICD-10-CM

## 2025-07-28 DIAGNOSIS — Z95.0 PRESENCE OF CARDIAC PACEMAKER: ICD-10-CM

## 2025-07-28 DIAGNOSIS — Z95.0 S/P PLACEMENT OF CARDIAC PACEMAKER: ICD-10-CM

## 2025-07-28 LAB
AORTIC ROOT ANNULUS: 3.9 CM
AORTIC SIZE INDEX: 2 CM/M2
AORTIC VALVE CUSP SEPERATION: 2 CM
APICAL FOUR CHAMBER EJECTION FRACTION: 70 %
ASCENDING AORTA: 4 CM
AV INDEX (PROSTH): 0.82
AV MEAN GRADIENT: 3 MMHG
AV PEAK GRADIENT: 5 MMHG
AV REGURGITATION PRESSURE HALF TIME: 374 MS
AV VALVE AREA BY VELOCITY RATIO: 2.6 CM²
AV VALVE AREA: 2.6 CM²
AV VELOCITY RATIO: 0.82
BSA FOR ECHO PROCEDURE: 1.97 M2
CV ECHO LV RWT: 0.63 CM
DOP CALC AO PEAK VEL: 1.1 M/S
DOP CALC AO VTI: 26.3 CM
DOP CALC LVOT AREA: 3.1 CM2
DOP CALC LVOT DIAMETER: 2 CM
DOP CALC LVOT PEAK VEL: 0.9 M/S
DOP CALC LVOT STROKE VOLUME: 67.8 CM3
DOP CALCLVOT PEAK VEL VTI: 21.6 CM
E WAVE DECELERATION TIME: 179 MSEC
E/A RATIO: 1.05
E/E' RATIO: 10 M/S
ECHO LV POSTERIOR WALL: 1.2 CM (ref 0.6–1.1)
FRACTIONAL SHORTENING: 34.2 % (ref 28–44)
INTERVENTRICULAR SEPTUM: 1.6 CM (ref 0.6–1.1)
IVRT: 121 MSEC
LEFT ATRIUM SIZE: 3.6 CM
LEFT INTERNAL DIMENSION IN SYSTOLE: 2.5 CM (ref 2.1–4)
LEFT VENTRICLE DIASTOLIC VOLUME INDEX: 30.46 ML/M2
LEFT VENTRICLE DIASTOLIC VOLUME: 60 ML
LEFT VENTRICLE END DIASTOLIC VOLUME APICAL 4 CHAMBER: 57.9 ML
LEFT VENTRICLE MASS INDEX: 98.5 G/M2
LEFT VENTRICLE SYSTOLIC VOLUME INDEX: 11.2 ML/M2
LEFT VENTRICLE SYSTOLIC VOLUME: 22 ML
LEFT VENTRICULAR INTERNAL DIMENSION IN DIASTOLE: 3.8 CM (ref 3.5–6)
LEFT VENTRICULAR MASS: 194.1 G
LV LATERAL E/E' RATIO: 9 M/S
LV SEPTAL E/E' RATIO: 11.6 M/S
LVED V (TEICH): 60.4 ML
LVES V (TEICH): 21.7 ML
LVOT MG: 2 MMHG
LVOT MV: 0.55 CM/S
Lab: 2.2 CM/M
MV PEAK A VEL: 0.77 M/S
MV PEAK E VEL: 0.81 M/S
MV STENOSIS PRESSURE HALF TIME: 60 MS
MV VALVE AREA P 1/2 METHOD: 3.67 CM2
OHS CV CPX PATIENT HEIGHT IN: 71
OHS CV CPX PATIENT HEIGHT IN: 71
OHS CV RV/LV RATIO: 0.71 CM
PISA AR MAX VEL: 3.94 M/S
PISA TR MAX VEL: 2.3 M/S
PV MV: 0.53 M/S
PV PEAK GRADIENT: 3 MMHG
PV PEAK VELOCITY: 0.82 M/S
RIGHT VENTRICLE DIASTOLIC BASEL DIMENSION: 2.7 CM
RIGHT VENTRICULAR END-DIASTOLIC DIMENSION: 2.72 CM
TDI LATERAL: 0.09 M/S
TDI SEPTAL: 0.07 M/S
TDI: 0.08 M/S
TR MAX PG: 22 MMHG
Z-SCORE OF LEFT VENTRICULAR DIMENSION IN END DIASTOLE: -3.99
Z-SCORE OF LEFT VENTRICULAR DIMENSION IN END SYSTOLE: -2.62

## 2025-07-28 PROCEDURE — A9502 TC99M TETROFOSMIN: HCPCS | Performed by: GENERAL PRACTICE

## 2025-07-28 PROCEDURE — 78452 HT MUSCLE IMAGE SPECT MULT: CPT

## 2025-07-28 PROCEDURE — 93306 TTE W/DOPPLER COMPLETE: CPT | Mod: 26,,, | Performed by: GENERAL PRACTICE

## 2025-07-28 PROCEDURE — 93018 CV STRESS TEST I&R ONLY: CPT | Mod: ,,, | Performed by: GENERAL PRACTICE

## 2025-07-28 PROCEDURE — 93306 TTE W/DOPPLER COMPLETE: CPT

## 2025-07-28 PROCEDURE — 93016 CV STRESS TEST SUPVJ ONLY: CPT | Mod: ,,, | Performed by: GENERAL PRACTICE

## 2025-07-28 PROCEDURE — 78452 HT MUSCLE IMAGE SPECT MULT: CPT | Mod: 26,,, | Performed by: GENERAL PRACTICE

## 2025-07-28 PROCEDURE — 63600175 PHARM REV CODE 636 W HCPCS: Performed by: GENERAL PRACTICE

## 2025-07-28 RX ORDER — REGADENOSON 0.08 MG/ML
0.4 INJECTION, SOLUTION INTRAVENOUS
Status: COMPLETED | OUTPATIENT
Start: 2025-07-28 | End: 2025-07-28

## 2025-07-28 RX ADMIN — TETROFOSMIN 26.8 MILLICURIE: 1.38 INJECTION, POWDER, LYOPHILIZED, FOR SOLUTION INTRAVENOUS at 09:07

## 2025-07-28 RX ADMIN — REGADENOSON 0.4 MG: 0.08 INJECTION, SOLUTION INTRAVENOUS at 09:07

## 2025-07-28 RX ADMIN — TETROFOSMIN 12.5 MILLICURIE: 1.38 INJECTION, POWDER, LYOPHILIZED, FOR SOLUTION INTRAVENOUS at 07:07

## 2025-07-29 LAB
CV PHARM DOSE: 0.4 MG
CV STRESS BASE HR: 69 BPM
DIASTOLIC BLOOD PRESSURE: 88 MMHG
EJECTION FRACTION- HIGH: 65 %
END DIASTOLIC INDEX-HIGH: 153 ML/M2
END DIASTOLIC INDEX-LOW: 93 ML/M2
END SYSTOLIC INDEX-HIGH: 71 ML/M2
END SYSTOLIC INDEX-LOW: 31 ML/M2
NUC REST DIASTOLIC VOLUME INDEX: 51
NUC REST EJECTION FRACTION: 76
NUC REST SYSTOLIC VOLUME INDEX: 12
NUC STRESS DIASTOLIC VOLUME INDEX: 51
NUC STRESS EJECTION FRACTION: 67 %
NUC STRESS SYSTOLIC VOLUME INDEX: 17
OHS CV CPX 1 MINUTE RECOVERY HEART RATE: 67 BPM
OHS CV CPX 85 PERCENT MAX PREDICTED HEART RATE MALE: 113
OHS CV CPX MAX PREDICTED HEART RATE: 133
OHS CV CPX PATIENT HEIGHT IN: 71
OHS CV CPX PATIENT IS FEMALE: 0
OHS CV CPX PATIENT IS MALE: 1
OHS CV CPX PEAK DIASTOLIC BLOOD PRESSURE: 73 MMHG
OHS CV CPX PEAK HEAR RATE: 67 BPM
OHS CV CPX PEAK RATE PRESSURE PRODUCT: NORMAL
OHS CV CPX PEAK SYSTOLIC BLOOD PRESSURE: 169 MMHG
OHS CV CPX PERCENT MAX PREDICTED HEART RATE ACHIEVED: 50
OHS CV CPX RATE PRESSURE PRODUCT PRESENTING: NORMAL
OHS CV INITIAL DOSE: 12.5 MCG/KG/MIN
OHS CV PEAK DOSE: 26.8 MCG/KG/MIN
RETIRED EF AND QEF - SEE NOTES: 53 %
SYSTOLIC BLOOD PRESSURE: 150 MMHG

## 2025-08-05 ENCOUNTER — HOSPITAL ENCOUNTER (OUTPATIENT)
Dept: CARDIOLOGY | Facility: CLINIC | Age: 85
Discharge: HOME | End: 2025-08-05
Attending: GENERAL PRACTICE
Payer: MEDICARE

## 2025-08-05 ENCOUNTER — CLINICAL SUPPORT (OUTPATIENT)
Dept: CARDIOLOGY | Facility: CLINIC | Age: 85
End: 2025-08-05
Payer: MEDICARE

## 2025-08-05 DIAGNOSIS — R00.1 BRADYCARDIA, UNSPECIFIED: ICD-10-CM

## 2025-08-05 DIAGNOSIS — Z95.0 PRESENCE OF CARDIAC PACEMAKER: ICD-10-CM

## 2025-08-05 PROCEDURE — 93296 REM INTERROG EVL PM/IDS: CPT | Mod: PN | Performed by: INTERNAL MEDICINE

## 2025-08-05 PROCEDURE — 93294 REM INTERROG EVL PM/LDLS PM: CPT | Mod: S$GLB,,, | Performed by: INTERNAL MEDICINE

## 2025-08-13 ENCOUNTER — OFFICE VISIT (OUTPATIENT)
Dept: HOME HEALTH SERVICES | Facility: CLINIC | Age: 85
End: 2025-08-13
Payer: MEDICARE

## 2025-08-13 VITALS — HEART RATE: 66 BPM | DIASTOLIC BLOOD PRESSURE: 75 MMHG | SYSTOLIC BLOOD PRESSURE: 131 MMHG | OXYGEN SATURATION: 95 %

## 2025-08-13 DIAGNOSIS — D69.2 NONTHROMBOCYTOPENIC PURPURA: ICD-10-CM

## 2025-08-13 DIAGNOSIS — N40.0 BENIGN PROSTATIC HYPERPLASIA WITHOUT LOWER URINARY TRACT SYMPTOMS: ICD-10-CM

## 2025-08-13 DIAGNOSIS — E02 SUBCLINICAL IODINE-DEFICIENCY HYPOTHYROIDISM: ICD-10-CM

## 2025-08-13 DIAGNOSIS — R53.83 FATIGUE, UNSPECIFIED TYPE: ICD-10-CM

## 2025-08-13 DIAGNOSIS — I48.0 PAROXYSMAL ATRIAL FIBRILLATION: ICD-10-CM

## 2025-08-13 DIAGNOSIS — N18.31 CHRONIC KIDNEY DISEASE, STAGE 3A: ICD-10-CM

## 2025-08-13 DIAGNOSIS — Z95.0 S/P PLACEMENT OF CARDIAC PACEMAKER: ICD-10-CM

## 2025-08-13 DIAGNOSIS — I49.5 TACHY-BRADY SYNDROME: ICD-10-CM

## 2025-08-13 DIAGNOSIS — Z00.00 ENCOUNTER FOR MEDICARE ANNUAL WELLNESS EXAM: ICD-10-CM

## 2025-08-13 DIAGNOSIS — J44.89 ASTHMA-COPD OVERLAP SYNDROME: ICD-10-CM

## 2025-08-13 DIAGNOSIS — E78.2 MIXED HYPERLIPIDEMIA: ICD-10-CM

## 2025-08-13 DIAGNOSIS — I70.0 CALCIFICATION OF AORTA: ICD-10-CM

## 2025-08-13 DIAGNOSIS — I10 ESSENTIAL HYPERTENSION: Primary | ICD-10-CM

## 2025-08-13 RX ORDER — LANOLIN ALCOHOL/MO/W.PET/CERES
400 CREAM (GRAM) TOPICAL DAILY
COMMUNITY

## 2025-08-14 PROBLEM — Z79.52 SEVERE PERSISTENT ASTHMA DEPENDENT ON SYSTEMIC STEROIDS WITH ACUTE EXACERBATION: Status: RESOLVED | Noted: 2021-12-06 | Resolved: 2025-08-14

## 2025-08-14 PROBLEM — F10.20 ALCOHOL DEPENDENCE, UNCOMPLICATED: Status: RESOLVED | Noted: 2023-11-14 | Resolved: 2025-08-14

## 2025-08-14 PROBLEM — J45.51 SEVERE PERSISTENT ASTHMA DEPENDENT ON SYSTEMIC STEROIDS WITH ACUTE EXACERBATION: Status: RESOLVED | Noted: 2021-12-06 | Resolved: 2025-08-14

## 2025-08-20 DIAGNOSIS — E03.9 HYPOTHYROIDISM, UNSPECIFIED TYPE: ICD-10-CM

## 2025-08-20 RX ORDER — LEVOTHYROXINE SODIUM 50 UG/1
TABLET ORAL
Qty: 26 TABLET | Refills: 2 | Status: SHIPPED | OUTPATIENT
Start: 2025-08-20

## 2025-08-22 LAB
OHS CV AF BURDEN PERCENT: 6.8
OHS CV DC REMOTE DEVICE TYPE: NORMAL
OHS CV ICD SHOCK: NO
OHS CV RV PACING PERCENT: 3.52 %

## (undated) DEVICE — DRESSING AQUACEL AG W/SILVER 3.5X6

## (undated) DEVICE — Device

## (undated) DEVICE — SHEATH INTRODUCER PEELABLE OPTISEAL 6FR